# Patient Record
Sex: FEMALE | Race: ASIAN | NOT HISPANIC OR LATINO | Employment: OTHER | URBAN - METROPOLITAN AREA
[De-identification: names, ages, dates, MRNs, and addresses within clinical notes are randomized per-mention and may not be internally consistent; named-entity substitution may affect disease eponyms.]

---

## 2017-01-18 ENCOUNTER — HOSPITAL ENCOUNTER (INPATIENT)
Facility: HOSPITAL | Age: 72
LOS: 2 days | Discharge: HOME | DRG: 202 | End: 2017-01-20
Attending: EMERGENCY MEDICINE | Admitting: INTERNAL MEDICINE
Payer: MEDICARE

## 2017-01-18 ENCOUNTER — APPOINTMENT (EMERGENCY)
Dept: RADIOLOGY | Facility: HOSPITAL | Age: 72
DRG: 202 | End: 2017-01-18
Payer: MEDICARE

## 2017-01-18 DIAGNOSIS — J45.901 ASTHMA EXACERBATION: Primary | ICD-10-CM

## 2017-01-18 DIAGNOSIS — J98.4 ACUTE PULMONARY INSUFFICIENCY: ICD-10-CM

## 2017-01-18 PROBLEM — E66.9 DIABETES MELLITUS TYPE 2 IN OBESE (HCC): Status: ACTIVE | Noted: 2017-01-18

## 2017-01-18 PROBLEM — F32.A DEPRESSION: Status: ACTIVE | Noted: 2017-01-18

## 2017-01-18 PROBLEM — I10 ACCELERATED HYPERTENSION: Status: ACTIVE | Noted: 2017-01-18

## 2017-01-18 PROBLEM — E11.69 DIABETES MELLITUS TYPE 2 IN OBESE (HCC): Status: ACTIVE | Noted: 2017-01-18

## 2017-01-18 PROBLEM — F41.9 ANXIETY DISORDER: Status: ACTIVE | Noted: 2017-01-18

## 2017-01-18 PROBLEM — E66.9 OBESITY: Status: ACTIVE | Noted: 2017-01-18

## 2017-01-18 PROBLEM — E78.5 DYSLIPIDEMIA: Status: ACTIVE | Noted: 2017-01-18

## 2017-01-18 LAB
ALBUMIN SERPL BCP-MCNC: 3.4 G/DL (ref 3.5–5)
ALP SERPL-CCNC: 72 U/L (ref 46–116)
ALT SERPL W P-5'-P-CCNC: 18 U/L (ref 12–78)
ANION GAP SERPL CALCULATED.3IONS-SCNC: 8 MMOL/L (ref 4–13)
AST SERPL W P-5'-P-CCNC: 20 U/L (ref 5–45)
BASOPHILS # BLD AUTO: 0.03 THOUSANDS/ΜL (ref 0–0.1)
BASOPHILS NFR BLD AUTO: 0 % (ref 0–1)
BILIRUB SERPL-MCNC: 0.3 MG/DL (ref 0.2–1)
BUN SERPL-MCNC: 10 MG/DL (ref 5–25)
CALCIUM SERPL-MCNC: 8.5 MG/DL (ref 8.3–10.1)
CHLORIDE SERPL-SCNC: 96 MMOL/L (ref 100–108)
CO2 SERPL-SCNC: 29 MMOL/L (ref 21–32)
CREAT SERPL-MCNC: 0.68 MG/DL (ref 0.6–1.3)
EOSINOPHIL # BLD AUTO: 0 THOUSAND/ΜL (ref 0–0.61)
EOSINOPHIL NFR BLD AUTO: 0 % (ref 0–6)
ERYTHROCYTE [DISTWIDTH] IN BLOOD BY AUTOMATED COUNT: 13.1 % (ref 11.6–15.1)
FLUAV AG SPEC QL IA: NEGATIVE
FLUBV AG SPEC QL IA: NEGATIVE
GFR SERPL CREATININE-BSD FRML MDRD: >60 ML/MIN/1.73SQ M
GLUCOSE SERPL-MCNC: 211 MG/DL (ref 65–140)
GLUCOSE SERPL-MCNC: 298 MG/DL (ref 65–140)
GLUCOSE SERPL-MCNC: 380 MG/DL (ref 65–140)
HCT VFR BLD AUTO: 46.7 % (ref 34.8–46.1)
HGB BLD-MCNC: 14.7 G/DL (ref 11.5–15.4)
LYMPHOCYTES # BLD AUTO: 1.65 THOUSANDS/ΜL (ref 0.6–4.47)
LYMPHOCYTES NFR BLD AUTO: 14 % (ref 14–44)
MCH RBC QN AUTO: 28.8 PG (ref 26.8–34.3)
MCHC RBC AUTO-ENTMCNC: 31.5 G/DL (ref 31.4–37.4)
MCV RBC AUTO: 92 FL (ref 82–98)
MONOCYTES # BLD AUTO: 0.38 THOUSAND/ΜL (ref 0.17–1.22)
MONOCYTES NFR BLD AUTO: 3 % (ref 4–12)
NEUTROPHILS # BLD AUTO: 9.43 THOUSANDS/ΜL (ref 1.85–7.62)
NEUTS SEG NFR BLD AUTO: 81 % (ref 43–75)
NRBC BLD AUTO-RTO: 0 /100 WBCS
PLATELET # BLD AUTO: 370 THOUSANDS/UL (ref 149–390)
PMV BLD AUTO: 9 FL (ref 8.9–12.7)
POTASSIUM SERPL-SCNC: 3.6 MMOL/L (ref 3.5–5.3)
PROT SERPL-MCNC: 7.8 G/DL (ref 6.4–8.2)
RBC # BLD AUTO: 5.1 MILLION/UL (ref 3.81–5.12)
SODIUM SERPL-SCNC: 133 MMOL/L (ref 136–145)
WBC # BLD AUTO: 11.62 THOUSAND/UL (ref 4.31–10.16)

## 2017-01-18 PROCEDURE — 80053 COMPREHEN METABOLIC PANEL: CPT | Performed by: EMERGENCY MEDICINE

## 2017-01-18 PROCEDURE — 36415 COLL VENOUS BLD VENIPUNCTURE: CPT | Performed by: EMERGENCY MEDICINE

## 2017-01-18 PROCEDURE — 94640 AIRWAY INHALATION TREATMENT: CPT

## 2017-01-18 PROCEDURE — 85025 COMPLETE CBC W/AUTO DIFF WBC: CPT | Performed by: EMERGENCY MEDICINE

## 2017-01-18 PROCEDURE — 87798 DETECT AGENT NOS DNA AMP: CPT | Performed by: EMERGENCY MEDICINE

## 2017-01-18 PROCEDURE — 99284 EMERGENCY DEPT VISIT MOD MDM: CPT

## 2017-01-18 PROCEDURE — 87400 INFLUENZA A/B EACH AG IA: CPT | Performed by: EMERGENCY MEDICINE

## 2017-01-18 PROCEDURE — 94760 N-INVAS EAR/PLS OXIMETRY 1: CPT

## 2017-01-18 PROCEDURE — 82948 REAGENT STRIP/BLOOD GLUCOSE: CPT

## 2017-01-18 PROCEDURE — 71020 HB CHEST X-RAY 2VW FRONTAL&LATL: CPT

## 2017-01-18 RX ORDER — ATORVASTATIN CALCIUM 10 MG/1
10 TABLET, FILM COATED ORAL DAILY
Status: DISCONTINUED | OUTPATIENT
Start: 2017-01-18 | End: 2017-01-20 | Stop reason: HOSPADM

## 2017-01-18 RX ORDER — LOSARTAN POTASSIUM AND HYDROCHLOROTHIAZIDE 12.5; 5 MG/1; MG/1
1 TABLET ORAL DAILY
COMMUNITY
End: 2021-06-29 | Stop reason: HOSPADM

## 2017-01-18 RX ORDER — LANOLIN ALCOHOL/MO/W.PET/CERES
6 CREAM (GRAM) TOPICAL
Status: DISCONTINUED | OUTPATIENT
Start: 2017-01-18 | End: 2017-01-20 | Stop reason: HOSPADM

## 2017-01-18 RX ORDER — ACETAMINOPHEN 325 MG/1
TABLET ORAL
Status: COMPLETED
Start: 2017-01-18 | End: 2017-01-18

## 2017-01-18 RX ORDER — PAROXETINE HYDROCHLORIDE 20 MG/1
10 TABLET, FILM COATED ORAL EVERY MORNING
Status: DISCONTINUED | OUTPATIENT
Start: 2017-01-19 | End: 2017-01-20 | Stop reason: HOSPADM

## 2017-01-18 RX ORDER — PREDNISONE 20 MG/1
60 TABLET ORAL ONCE
Status: COMPLETED | OUTPATIENT
Start: 2017-01-18 | End: 2017-01-18

## 2017-01-18 RX ORDER — MONTELUKAST SODIUM 10 MG/1
10 TABLET ORAL
Status: DISCONTINUED | OUTPATIENT
Start: 2017-01-18 | End: 2017-01-20 | Stop reason: HOSPADM

## 2017-01-18 RX ORDER — ALBUTEROL SULFATE 2.5 MG/3ML
2.5 SOLUTION RESPIRATORY (INHALATION) EVERY 6 HOURS PRN
COMMUNITY

## 2017-01-18 RX ORDER — LORAZEPAM 0.5 MG/1
0.5 TABLET ORAL ONCE
Status: COMPLETED | OUTPATIENT
Start: 2017-01-18 | End: 2017-01-18

## 2017-01-18 RX ORDER — GLIMEPIRIDE 2 MG/1
4 TABLET ORAL
Status: DISCONTINUED | OUTPATIENT
Start: 2017-01-19 | End: 2017-01-20 | Stop reason: HOSPADM

## 2017-01-18 RX ORDER — ACETAMINOPHEN 325 MG/1
650 TABLET ORAL EVERY 6 HOURS PRN
Status: DISCONTINUED | OUTPATIENT
Start: 2017-01-18 | End: 2017-01-20 | Stop reason: HOSPADM

## 2017-01-18 RX ORDER — LORAZEPAM 0.5 MG/1
0.5 TABLET ORAL EVERY 6 HOURS PRN
Status: ON HOLD | COMMUNITY
End: 2021-06-29 | Stop reason: SDUPTHER

## 2017-01-18 RX ORDER — HYDRALAZINE HYDROCHLORIDE 20 MG/ML
10 INJECTION INTRAMUSCULAR; INTRAVENOUS EVERY 4 HOURS PRN
Status: DISCONTINUED | OUTPATIENT
Start: 2017-01-18 | End: 2017-01-20 | Stop reason: HOSPADM

## 2017-01-18 RX ORDER — IPRATROPIUM BROMIDE AND ALBUTEROL SULFATE 2.5; .5 MG/3ML; MG/3ML
3 SOLUTION RESPIRATORY (INHALATION) ONCE
Status: COMPLETED | OUTPATIENT
Start: 2017-01-18 | End: 2017-01-18

## 2017-01-18 RX ORDER — BENZONATATE 100 MG/1
100 CAPSULE ORAL ONCE
Status: COMPLETED | OUTPATIENT
Start: 2017-01-18 | End: 2017-01-18

## 2017-01-18 RX ORDER — LORAZEPAM 0.5 MG/1
0.5 TABLET ORAL EVERY 6 HOURS PRN
Status: DISCONTINUED | OUTPATIENT
Start: 2017-01-18 | End: 2017-01-20 | Stop reason: HOSPADM

## 2017-01-18 RX ORDER — MONTELUKAST SODIUM 10 MG/1
10 TABLET ORAL
COMMUNITY

## 2017-01-18 RX ORDER — GLIMEPIRIDE 4 MG/1
4 TABLET ORAL
COMMUNITY
End: 2021-08-06 | Stop reason: HOSPADM

## 2017-01-18 RX ORDER — PAROXETINE 10 MG/1
10 TABLET, FILM COATED ORAL EVERY MORNING
COMMUNITY

## 2017-01-18 RX ORDER — ATORVASTATIN CALCIUM 10 MG/1
10 TABLET, FILM COATED ORAL
COMMUNITY

## 2017-01-18 RX ORDER — ALBUTEROL SULFATE 90 UG/1
2 AEROSOL, METERED RESPIRATORY (INHALATION) EVERY 6 HOURS PRN
COMMUNITY

## 2017-01-18 RX ADMIN — MELATONIN TAB 3 MG 6 MG: 3 TAB at 23:06

## 2017-01-18 RX ADMIN — ACETAMINOPHEN 650 MG: 325 TABLET ORAL at 16:34

## 2017-01-18 RX ADMIN — INSULIN LISPRO 2 UNITS: 100 INJECTION, SOLUTION INTRAVENOUS; SUBCUTANEOUS at 20:50

## 2017-01-18 RX ADMIN — ALBUTEROL SULFATE 10 MG: 2.5 SOLUTION RESPIRATORY (INHALATION) at 10:28

## 2017-01-18 RX ADMIN — LOSARTAN POTASSIUM: 50 TABLET, FILM COATED ORAL at 10:56

## 2017-01-18 RX ADMIN — BENZONATATE 100 MG: 100 CAPSULE ORAL at 13:40

## 2017-01-18 RX ADMIN — SITAGLIPTIN 100 MG: 100 TABLET, FILM COATED ORAL at 16:34

## 2017-01-18 RX ADMIN — ACETAMINOPHEN 650 MG: 325 TABLET ORAL at 23:05

## 2017-01-18 RX ADMIN — LOSARTAN POTASSIUM: 50 TABLET, FILM COATED ORAL at 12:58

## 2017-01-18 RX ADMIN — ATORVASTATIN CALCIUM 10 MG: 10 TABLET, FILM COATED ORAL at 18:29

## 2017-01-18 RX ADMIN — METFORMIN HYDROCHLORIDE 500 MG: 500 TABLET, FILM COATED ORAL at 16:34

## 2017-01-18 RX ADMIN — LORAZEPAM 0.5 MG: 0.5 TABLET ORAL at 09:04

## 2017-01-18 RX ADMIN — ALBUTEROL SULFATE 10 MG: 2.5 SOLUTION RESPIRATORY (INHALATION) at 09:20

## 2017-01-18 RX ADMIN — PREDNISONE 60 MG: 20 TABLET ORAL at 08:25

## 2017-01-18 RX ADMIN — INSULIN LISPRO 6 UNITS: 100 INJECTION, SOLUTION INTRAVENOUS; SUBCUTANEOUS at 16:33

## 2017-01-18 RX ADMIN — HYDRALAZINE HYDROCHLORIDE 10 MG: 20 INJECTION INTRAMUSCULAR; INTRAVENOUS at 21:40

## 2017-01-18 RX ADMIN — HYDROCHLOROTHIAZIDE: 12.5 TABLET ORAL at 18:28

## 2017-01-18 RX ADMIN — HYDRALAZINE HYDROCHLORIDE 10 MG: 20 INJECTION INTRAMUSCULAR; INTRAVENOUS at 16:34

## 2017-01-18 RX ADMIN — IPRATROPIUM BROMIDE AND ALBUTEROL SULFATE 3 ML: .5; 3 SOLUTION RESPIRATORY (INHALATION) at 08:25

## 2017-01-18 RX ADMIN — LORAZEPAM 0.5 MG: 0.5 TABLET ORAL at 23:06

## 2017-01-18 RX ADMIN — MONTELUKAST SODIUM 10 MG: 10 TABLET, FILM COATED ORAL at 22:23

## 2017-01-19 LAB
ANION GAP SERPL CALCULATED.3IONS-SCNC: 10 MMOL/L (ref 4–13)
BUN SERPL-MCNC: 9 MG/DL (ref 5–25)
CALCIUM SERPL-MCNC: 9 MG/DL (ref 8.3–10.1)
CHLORIDE SERPL-SCNC: 95 MMOL/L (ref 100–108)
CO2 SERPL-SCNC: 31 MMOL/L (ref 21–32)
CREAT SERPL-MCNC: 0.53 MG/DL (ref 0.6–1.3)
ERYTHROCYTE [DISTWIDTH] IN BLOOD BY AUTOMATED COUNT: 13.2 % (ref 11.6–15.1)
FLUAV AG SPEC QL: NORMAL
FLUBV AG SPEC QL: NORMAL
GFR SERPL CREATININE-BSD FRML MDRD: >60 ML/MIN/1.73SQ M
GLUCOSE SERPL-MCNC: 140 MG/DL (ref 65–140)
GLUCOSE SERPL-MCNC: 141 MG/DL (ref 65–140)
GLUCOSE SERPL-MCNC: 166 MG/DL (ref 65–140)
GLUCOSE SERPL-MCNC: 183 MG/DL (ref 65–140)
GLUCOSE SERPL-MCNC: 243 MG/DL (ref 65–140)
HCT VFR BLD AUTO: 45.6 % (ref 34.8–46.1)
HGB BLD-MCNC: 14.7 G/DL (ref 11.5–15.4)
MCH RBC QN AUTO: 28.9 PG (ref 26.8–34.3)
MCHC RBC AUTO-ENTMCNC: 32.2 G/DL (ref 31.4–37.4)
MCV RBC AUTO: 90 FL (ref 82–98)
PLATELET # BLD AUTO: 370 THOUSANDS/UL (ref 149–390)
PMV BLD AUTO: 9.3 FL (ref 8.9–12.7)
POTASSIUM SERPL-SCNC: 3.5 MMOL/L (ref 3.5–5.3)
RBC # BLD AUTO: 5.08 MILLION/UL (ref 3.81–5.12)
RSV B RNA SPEC QL NAA+PROBE: NORMAL
SODIUM SERPL-SCNC: 136 MMOL/L (ref 136–145)
WBC # BLD AUTO: 11.65 THOUSAND/UL (ref 4.31–10.16)

## 2017-01-19 PROCEDURE — 80048 BASIC METABOLIC PNL TOTAL CA: CPT | Performed by: INTERNAL MEDICINE

## 2017-01-19 PROCEDURE — 82948 REAGENT STRIP/BLOOD GLUCOSE: CPT

## 2017-01-19 PROCEDURE — 85027 COMPLETE CBC AUTOMATED: CPT | Performed by: INTERNAL MEDICINE

## 2017-01-19 RX ORDER — PREDNISONE 20 MG/1
40 TABLET ORAL DAILY
Status: DISCONTINUED | OUTPATIENT
Start: 2017-01-19 | End: 2017-01-20 | Stop reason: HOSPADM

## 2017-01-19 RX ADMIN — INSULIN LISPRO 1 UNITS: 100 INJECTION, SOLUTION INTRAVENOUS; SUBCUTANEOUS at 10:45

## 2017-01-19 RX ADMIN — MELATONIN TAB 3 MG 6 MG: 3 TAB at 21:34

## 2017-01-19 RX ADMIN — GLIMEPIRIDE 4 MG: 2 TABLET ORAL at 08:46

## 2017-01-19 RX ADMIN — LORAZEPAM 0.5 MG: 0.5 TABLET ORAL at 12:15

## 2017-01-19 RX ADMIN — ATORVASTATIN CALCIUM 10 MG: 10 TABLET, FILM COATED ORAL at 16:17

## 2017-01-19 RX ADMIN — INSULIN LISPRO 3 UNITS: 100 INJECTION, SOLUTION INTRAVENOUS; SUBCUTANEOUS at 17:40

## 2017-01-19 RX ADMIN — LORAZEPAM 0.5 MG: 0.5 TABLET ORAL at 05:32

## 2017-01-19 RX ADMIN — METFORMIN HYDROCHLORIDE 500 MG: 500 TABLET, FILM COATED ORAL at 16:17

## 2017-01-19 RX ADMIN — MONTELUKAST SODIUM 10 MG: 10 TABLET, FILM COATED ORAL at 21:34

## 2017-01-19 RX ADMIN — METFORMIN HYDROCHLORIDE 500 MG: 500 TABLET, FILM COATED ORAL at 08:46

## 2017-01-19 RX ADMIN — PAROXETINE HYDROCHLORIDE 10 MG: 20 TABLET, FILM COATED ORAL at 08:45

## 2017-01-19 RX ADMIN — LORAZEPAM 0.5 MG: 0.5 TABLET ORAL at 21:34

## 2017-01-19 RX ADMIN — ACETAMINOPHEN 650 MG: 325 TABLET ORAL at 12:15

## 2017-01-19 RX ADMIN — ENOXAPARIN SODIUM 40 MG: 40 INJECTION SUBCUTANEOUS at 08:45

## 2017-01-19 RX ADMIN — ACETAMINOPHEN 650 MG: 325 TABLET ORAL at 21:35

## 2017-01-19 RX ADMIN — HYDROCHLOROTHIAZIDE: 12.5 TABLET ORAL at 08:47

## 2017-01-19 RX ADMIN — LOSARTAN POTASSIUM: 50 TABLET, FILM COATED ORAL at 08:46

## 2017-01-19 RX ADMIN — ACETAMINOPHEN 650 MG: 325 TABLET ORAL at 05:31

## 2017-01-19 RX ADMIN — PREDNISONE 40 MG: 20 TABLET ORAL at 10:42

## 2017-01-19 RX ADMIN — INSULIN LISPRO 1 UNITS: 100 INJECTION, SOLUTION INTRAVENOUS; SUBCUTANEOUS at 10:44

## 2017-01-19 RX ADMIN — SITAGLIPTIN 100 MG: 100 TABLET, FILM COATED ORAL at 08:46

## 2017-01-20 VITALS
HEART RATE: 101 BPM | DIASTOLIC BLOOD PRESSURE: 61 MMHG | HEIGHT: 62 IN | TEMPERATURE: 98 F | SYSTOLIC BLOOD PRESSURE: 139 MMHG | OXYGEN SATURATION: 92 % | BODY MASS INDEX: 40.48 KG/M2 | WEIGHT: 220 LBS | RESPIRATION RATE: 18 BRPM

## 2017-01-20 LAB — GLUCOSE SERPL-MCNC: 155 MG/DL (ref 65–140)

## 2017-01-20 PROCEDURE — 82948 REAGENT STRIP/BLOOD GLUCOSE: CPT

## 2017-01-20 RX ORDER — METHYLPREDNISOLONE 4 MG/1
TABLET ORAL
Qty: 21 TABLET | Refills: 0 | Status: SHIPPED | OUTPATIENT
Start: 2017-01-20 | End: 2021-06-29 | Stop reason: HOSPADM

## 2017-01-20 RX ADMIN — LOSARTAN POTASSIUM: 50 TABLET, FILM COATED ORAL at 08:44

## 2017-01-20 RX ADMIN — PAROXETINE HYDROCHLORIDE 10 MG: 20 TABLET, FILM COATED ORAL at 08:43

## 2017-01-20 RX ADMIN — GLIMEPIRIDE 4 MG: 2 TABLET ORAL at 08:43

## 2017-01-20 RX ADMIN — SITAGLIPTIN 100 MG: 100 TABLET, FILM COATED ORAL at 08:43

## 2017-01-20 RX ADMIN — ENOXAPARIN SODIUM 40 MG: 40 INJECTION SUBCUTANEOUS at 08:43

## 2017-01-20 RX ADMIN — LORAZEPAM 0.5 MG: 0.5 TABLET ORAL at 05:16

## 2017-01-20 RX ADMIN — ACETAMINOPHEN 650 MG: 325 TABLET ORAL at 05:16

## 2017-01-20 RX ADMIN — HYDROCHLOROTHIAZIDE: 12.5 TABLET ORAL at 08:44

## 2017-01-20 RX ADMIN — METFORMIN HYDROCHLORIDE 500 MG: 500 TABLET, FILM COATED ORAL at 08:43

## 2017-01-20 RX ADMIN — PREDNISONE 40 MG: 20 TABLET ORAL at 08:44

## 2018-12-07 ENCOUNTER — APPOINTMENT (EMERGENCY)
Dept: CT IMAGING | Facility: HOSPITAL | Age: 73
DRG: 378 | End: 2018-12-07
Payer: MEDICARE

## 2018-12-07 ENCOUNTER — HOSPITAL ENCOUNTER (INPATIENT)
Facility: HOSPITAL | Age: 73
LOS: 1 days | Discharge: HOME | DRG: 378 | End: 2018-12-09
Attending: EMERGENCY MEDICINE | Admitting: INTERNAL MEDICINE
Payer: MEDICARE

## 2018-12-07 DIAGNOSIS — E11.69 DIABETES MELLITUS TYPE 2 IN OBESE (HCC): ICD-10-CM

## 2018-12-07 DIAGNOSIS — E66.9 DIABETES MELLITUS TYPE 2 IN OBESE (HCC): ICD-10-CM

## 2018-12-07 DIAGNOSIS — K62.5 BLEEDING PER RECTUM: ICD-10-CM

## 2018-12-07 DIAGNOSIS — K92.2 GI BLEED: Primary | ICD-10-CM

## 2018-12-07 PROBLEM — E11.9 DIABETES MELLITUS (HCC): Status: ACTIVE | Noted: 2018-12-07

## 2018-12-07 LAB
ABO GROUP BLD: NORMAL
ALBUMIN SERPL BCP-MCNC: 3.2 G/DL (ref 3.5–5)
ALP SERPL-CCNC: 78 U/L (ref 46–116)
ALT SERPL W P-5'-P-CCNC: 15 U/L (ref 12–78)
ANION GAP SERPL CALCULATED.3IONS-SCNC: 7 MMOL/L (ref 4–13)
AST SERPL W P-5'-P-CCNC: 20 U/L (ref 5–45)
BASOPHILS # BLD AUTO: 0.05 THOUSANDS/ΜL (ref 0–0.1)
BASOPHILS NFR BLD AUTO: 1 % (ref 0–1)
BILIRUB SERPL-MCNC: 0.4 MG/DL (ref 0.2–1)
BLD GP AB SCN SERPL QL: NEGATIVE
BUN SERPL-MCNC: 13 MG/DL (ref 5–25)
CALCIUM SERPL-MCNC: 8.6 MG/DL (ref 8.3–10.1)
CHLORIDE SERPL-SCNC: 92 MMOL/L (ref 100–108)
CO2 SERPL-SCNC: 35 MMOL/L (ref 21–32)
CREAT SERPL-MCNC: 0.65 MG/DL (ref 0.6–1.3)
EOSINOPHIL # BLD AUTO: 0.18 THOUSAND/ΜL (ref 0–0.61)
EOSINOPHIL NFR BLD AUTO: 2 % (ref 0–6)
ERYTHROCYTE [DISTWIDTH] IN BLOOD BY AUTOMATED COUNT: 13.3 % (ref 11.6–15.1)
EST. AVERAGE GLUCOSE BLD GHB EST-MCNC: 166 MG/DL
GFR SERPL CREATININE-BSD FRML MDRD: 87 ML/MIN/1.73SQ M
GLUCOSE SERPL-MCNC: 170 MG/DL (ref 65–140)
GLUCOSE SERPL-MCNC: 174 MG/DL (ref 65–140)
GLUCOSE SERPL-MCNC: 206 MG/DL (ref 65–140)
GLUCOSE SERPL-MCNC: 211 MG/DL (ref 65–140)
HBA1C MFR BLD: 7.4 % (ref 4.2–6.3)
HCT VFR BLD AUTO: 39 % (ref 34.8–46.1)
HGB BLD-MCNC: 13 G/DL (ref 11.5–15.4)
IMM GRANULOCYTES # BLD AUTO: 0.02 THOUSAND/UL (ref 0–0.2)
IMM GRANULOCYTES NFR BLD AUTO: 0 % (ref 0–2)
INR PPP: 0.96 (ref 0.86–1.17)
LACTATE SERPL-SCNC: 1.6 MMOL/L (ref 0.5–2)
LYMPHOCYTES # BLD AUTO: 1.97 THOUSANDS/ΜL (ref 0.6–4.47)
LYMPHOCYTES NFR BLD AUTO: 26 % (ref 14–44)
MCH RBC QN AUTO: 29.9 PG (ref 26.8–34.3)
MCHC RBC AUTO-ENTMCNC: 33.3 G/DL (ref 31.4–37.4)
MCV RBC AUTO: 90 FL (ref 82–98)
MONOCYTES # BLD AUTO: 0.87 THOUSAND/ΜL (ref 0.17–1.22)
MONOCYTES NFR BLD AUTO: 11 % (ref 4–12)
NEUTROPHILS # BLD AUTO: 4.59 THOUSANDS/ΜL (ref 1.85–7.62)
NEUTS SEG NFR BLD AUTO: 60 % (ref 43–75)
NRBC BLD AUTO-RTO: 0 /100 WBCS
PLATELET # BLD AUTO: 289 THOUSANDS/UL (ref 149–390)
PMV BLD AUTO: 8.6 FL (ref 8.9–12.7)
POTASSIUM SERPL-SCNC: 3.8 MMOL/L (ref 3.5–5.3)
PROT SERPL-MCNC: 7 G/DL (ref 6.4–8.2)
PROTHROMBIN TIME: 12.7 SECONDS (ref 11.8–14.2)
RBC # BLD AUTO: 4.35 MILLION/UL (ref 3.81–5.12)
RH BLD: POSITIVE
SODIUM SERPL-SCNC: 134 MMOL/L (ref 136–145)
SPECIMEN EXPIRATION DATE: NORMAL
WBC # BLD AUTO: 7.68 THOUSAND/UL (ref 4.31–10.16)

## 2018-12-07 PROCEDURE — 86850 RBC ANTIBODY SCREEN: CPT | Performed by: EMERGENCY MEDICINE

## 2018-12-07 PROCEDURE — 83605 ASSAY OF LACTIC ACID: CPT | Performed by: EMERGENCY MEDICINE

## 2018-12-07 PROCEDURE — C9113 INJ PANTOPRAZOLE SODIUM, VIA: HCPCS | Performed by: EMERGENCY MEDICINE

## 2018-12-07 PROCEDURE — 85610 PROTHROMBIN TIME: CPT | Performed by: EMERGENCY MEDICINE

## 2018-12-07 PROCEDURE — 96374 THER/PROPH/DIAG INJ IV PUSH: CPT

## 2018-12-07 PROCEDURE — 85025 COMPLETE CBC W/AUTO DIFF WBC: CPT | Performed by: EMERGENCY MEDICINE

## 2018-12-07 PROCEDURE — 80053 COMPREHEN METABOLIC PANEL: CPT | Performed by: EMERGENCY MEDICINE

## 2018-12-07 PROCEDURE — 83036 HEMOGLOBIN GLYCOSYLATED A1C: CPT | Performed by: INTERNAL MEDICINE

## 2018-12-07 PROCEDURE — 36415 COLL VENOUS BLD VENIPUNCTURE: CPT | Performed by: EMERGENCY MEDICINE

## 2018-12-07 PROCEDURE — 99220 PR INITIAL OBSERVATION CARE/DAY 70 MINUTES: CPT | Performed by: INTERNAL MEDICINE

## 2018-12-07 PROCEDURE — 74177 CT ABD & PELVIS W/CONTRAST: CPT

## 2018-12-07 PROCEDURE — 86900 BLOOD TYPING SEROLOGIC ABO: CPT | Performed by: EMERGENCY MEDICINE

## 2018-12-07 PROCEDURE — 93005 ELECTROCARDIOGRAM TRACING: CPT

## 2018-12-07 PROCEDURE — 86901 BLOOD TYPING SEROLOGIC RH(D): CPT | Performed by: EMERGENCY MEDICINE

## 2018-12-07 PROCEDURE — 82948 REAGENT STRIP/BLOOD GLUCOSE: CPT

## 2018-12-07 PROCEDURE — 1124F ACP DISCUSS-NO DSCNMKR DOCD: CPT | Performed by: INTERNAL MEDICINE

## 2018-12-07 PROCEDURE — 99214 OFFICE O/P EST MOD 30 MIN: CPT | Performed by: INTERNAL MEDICINE

## 2018-12-07 PROCEDURE — 87505 NFCT AGENT DETECTION GI: CPT | Performed by: EMERGENCY MEDICINE

## 2018-12-07 PROCEDURE — 99285 EMERGENCY DEPT VISIT HI MDM: CPT

## 2018-12-07 RX ORDER — PAROXETINE HYDROCHLORIDE 20 MG/1
10 TABLET, FILM COATED ORAL EVERY MORNING
Status: DISCONTINUED | OUTPATIENT
Start: 2018-12-07 | End: 2018-12-09 | Stop reason: HOSPADM

## 2018-12-07 RX ORDER — HYDRALAZINE HYDROCHLORIDE 20 MG/ML
10 INJECTION INTRAMUSCULAR; INTRAVENOUS EVERY 4 HOURS PRN
Status: DISCONTINUED | OUTPATIENT
Start: 2018-12-07 | End: 2018-12-09 | Stop reason: HOSPADM

## 2018-12-07 RX ORDER — MONTELUKAST SODIUM 10 MG/1
10 TABLET ORAL
Status: DISCONTINUED | OUTPATIENT
Start: 2018-12-07 | End: 2018-12-09 | Stop reason: HOSPADM

## 2018-12-07 RX ORDER — SODIUM CHLORIDE AND POTASSIUM CHLORIDE .9; .15 G/100ML; G/100ML
50 SOLUTION INTRAVENOUS CONTINUOUS
Status: DISCONTINUED | OUTPATIENT
Start: 2018-12-07 | End: 2018-12-08

## 2018-12-07 RX ORDER — ALBUTEROL SULFATE 90 UG/1
2 AEROSOL, METERED RESPIRATORY (INHALATION) EVERY 6 HOURS PRN
Status: DISCONTINUED | OUTPATIENT
Start: 2018-12-07 | End: 2018-12-09 | Stop reason: HOSPADM

## 2018-12-07 RX ORDER — ALBUTEROL SULFATE 2.5 MG/3ML
2.5 SOLUTION RESPIRATORY (INHALATION) EVERY 6 HOURS PRN
Status: DISCONTINUED | OUTPATIENT
Start: 2018-12-07 | End: 2018-12-08

## 2018-12-07 RX ORDER — PANTOPRAZOLE SODIUM 40 MG/1
40 INJECTION, POWDER, FOR SOLUTION INTRAVENOUS EVERY 12 HOURS SCHEDULED
Status: DISCONTINUED | OUTPATIENT
Start: 2018-12-07 | End: 2018-12-07

## 2018-12-07 RX ORDER — LORAZEPAM 0.5 MG/1
0.5 TABLET ORAL EVERY 6 HOURS PRN
Status: DISCONTINUED | OUTPATIENT
Start: 2018-12-07 | End: 2018-12-09 | Stop reason: HOSPADM

## 2018-12-07 RX ORDER — ACETAMINOPHEN 325 MG/1
650 TABLET ORAL EVERY 8 HOURS PRN
Status: DISCONTINUED | OUTPATIENT
Start: 2018-12-07 | End: 2018-12-09 | Stop reason: HOSPADM

## 2018-12-07 RX ORDER — PANTOPRAZOLE SODIUM 40 MG/1
40 INJECTION, POWDER, FOR SOLUTION INTRAVENOUS ONCE
Status: COMPLETED | OUTPATIENT
Start: 2018-12-07 | End: 2018-12-07

## 2018-12-07 RX ORDER — POLYETHYLENE GLYCOL 3350 17 G/17G
238 POWDER, FOR SOLUTION ORAL ONCE
Status: COMPLETED | OUTPATIENT
Start: 2018-12-07 | End: 2018-12-07

## 2018-12-07 RX ORDER — ENALAPRILAT 2.5 MG/2ML
1.25 INJECTION INTRAVENOUS ONCE
Status: DISCONTINUED | OUTPATIENT
Start: 2018-12-07 | End: 2018-12-07

## 2018-12-07 RX ORDER — ATORVASTATIN CALCIUM 10 MG/1
10 TABLET, FILM COATED ORAL DAILY
Status: DISCONTINUED | OUTPATIENT
Start: 2018-12-07 | End: 2018-12-09 | Stop reason: HOSPADM

## 2018-12-07 RX ADMIN — PANTOPRAZOLE SODIUM 40 MG: 40 INJECTION, POWDER, FOR SOLUTION INTRAVENOUS at 08:14

## 2018-12-07 RX ADMIN — LOSARTAN POTASSIUM: 50 TABLET, FILM COATED ORAL at 12:53

## 2018-12-07 RX ADMIN — LORAZEPAM 0.5 MG: 0.5 TABLET ORAL at 22:46

## 2018-12-07 RX ADMIN — INSULIN LISPRO 1 UNITS: 100 INJECTION, SOLUTION INTRAVENOUS; SUBCUTANEOUS at 17:03

## 2018-12-07 RX ADMIN — IOHEXOL 100 ML: 350 INJECTION, SOLUTION INTRAVENOUS at 09:06

## 2018-12-07 RX ADMIN — MONTELUKAST SODIUM 10 MG: 10 TABLET, FILM COATED ORAL at 21:06

## 2018-12-07 RX ADMIN — POLYETHYLENE GLYCOL 3350 238 G: 17 POWDER, FOR SOLUTION ORAL at 17:03

## 2018-12-07 RX ADMIN — SODIUM CHLORIDE AND POTASSIUM CHLORIDE 50 ML/HR: .9; .15 SOLUTION INTRAVENOUS at 14:10

## 2018-12-07 RX ADMIN — PAROXETINE HYDROCHLORIDE 10 MG: 20 TABLET, FILM COATED ORAL at 12:52

## 2018-12-07 RX ADMIN — ATORVASTATIN CALCIUM 10 MG: 10 TABLET, FILM COATED ORAL at 12:53

## 2018-12-07 RX ADMIN — INSULIN LISPRO 2 UNITS: 100 INJECTION, SOLUTION INTRAVENOUS; SUBCUTANEOUS at 14:52

## 2018-12-07 RX ADMIN — LORAZEPAM 0.5 MG: 0.5 TABLET ORAL at 12:57

## 2018-12-07 RX ADMIN — BISACODYL 10 MG: 5 TABLET, COATED ORAL at 16:18

## 2018-12-07 RX ADMIN — INSULIN LISPRO 2 UNITS: 100 INJECTION, SOLUTION INTRAVENOUS; SUBCUTANEOUS at 21:08

## 2018-12-07 NOTE — H&P
History and Physical - [facility]    Patient Information: Vanessa Forbes 74 y o  female MRN: [id number]  Unit/Bed#: -01 Encounter: [id number]  Admitting Physician: Shiva Alex MD  PCP: No primary care provider on file  Date of Admission:  12/07/18    Assessment/Plan:      * Bleeding per rectum   Assessment & Plan    Patient has no significant abdominal pain  She also denies being constipated  Three days ago, she had some diarrhea  Likely diverticular bleed  She needs further workup to rule out other causes like AVMs/tumors/cancers/hemorrhoids  Discussed with GI  She would be prep later today for further GI workup  GI bleed   Assessment & Plan    Upper versus lower  Would also give her PPI  Likely lower  GI workup in progress  Accelerated hypertension   Assessment & Plan    Blood pressure was quite elevated in the ER  I am not really convinced that those were correct readings as on the floor her blood pressure-systolic is in 434'U  Would resume her home medication and also give her IV p r n  Hydralazine for elevated blood pressures  She did not take her medications at home  Depression   Assessment & Plan    Continue with home medication including Paxil     Dyslipidemia   Assessment & Plan    Continue with statin     Diabetes mellitus type 2 in obese [facility])   Assessment & Plan    No results found for: HGBA1C    No results for input(s): POCGLU in the last 72 hours  Blood Sugar Average: Last 72 hrs:    Check hemoglobin A1c  Put her on sliding scale insulin as she is going to be NPO for her GI procedures  Hold oral medications     Anxiety disorder   Assessment & Plan    Continue with current treatment including benzodiazepine     Obesity   Assessment & Plan    Needs to work on her diet and do some exercise           Present on Admission:   Bleeding per rectum   Diabetes mellitus type 2 in obese ([address] )   Obesity   Anxiety disorder   Dyslipidemia   Depression   Accelerated hypertension   GI bleed        VTE Prophylaxis: Pharmacologic VTE Prophylaxis contraindicated due to Bleeding per rectum  / sequential compression device   Code Status:  Full code  POLST: There is no POLST form on file for this patient (pre-hospital)    Anticipated Length of Stay:  Patient will be admitted on an Observation basis with an anticipated length of stay of  less than 2 midnights  Justification for Hospital Stay:  Bleeding per rectum    Total Time for Visit, including Counseling / Coordination of Care: 45 minutes  Greater than 50% of this total time spent on direct patient counseling and coordination of care  Chief Complaint:   Bleeding per rectum    History of Present Illness:    Minh Wells is a 74 y o  female who presents with bleeding per rectum started last night  She had diarrhea about 3 days ago  No abdominal pain  No fever or chills  No nausea or vomiting  No history of having similar issues in the past or any history of constipation  Denies being dizzy or lightheaded  Denies any chest pain  No hematemesis or melena  Bleeding is actually bright red             Review of Systems    All systems are reviewed  Positive as per history of presenting illness  Patient answered no to all other questions  Past Medical and Surgical History:     Past Medical History:   Diagnosis Date    Asthma     Diabetes mellitus ([facility] [address] )     Hypertension        History reviewed  No pertinent surgical history  Meds/Allergies:    Prior to Admission medications    Medication Sig Start Date End Date Taking?  Authorizing Provider   albuterol (2 5 mg/3 mL) 0 083 % nebulizer solution Take 2 5 mg by nebulization every 6 (six) hours as needed for wheezing   Yes Historical Provider, MD   albuterol (PROVENTIL HFA,VENTOLIN HFA) 90 mcg/act inhaler Inhale 2 puffs every 6 (six) hours as needed for wheezing   Yes Historical Provider, MD   atorvastatin (LIPITOR) 10 mg tablet Take 10 mg by mouth daily Yes Historical Provider, MD   glimepiride (AMARYL) 4 mg tablet Take 4 mg by mouth every morning before breakfast   Yes Historical Provider, MD   LORazepam (ATIVAN) 0 5 mg tablet Take 0 5 mg by mouth every 6 (six) hours as needed for anxiety   Yes Historical Provider, MD   losartan-hydrochlorothiazide (HYZAAR) 50-12 5 mg per tablet Take 1 tablet by mouth daily   Yes Historical Provider, MD   metFORMIN (GLUCOPHAGE) 500 mg tablet Take 500 mg by mouth 2 (two) times a day with meals   Yes Historical Provider, MD   montelukast (SINGULAIR) 10 mg tablet Take 10 mg by mouth daily at bedtime   Yes Historical Provider, MD   PARoxetine (PAXIL) 10 mg tablet Take 10 mg by mouth every morning   Yes Historical Provider, MD   sitaGLIPtin (JANUVIA) 100 mg tablet Take 100 mg by mouth daily   Yes Historical Provider, MD   Methylprednisolone 4 MG TBPK Use as directed on package 1/20/17   Alli Griffiths MD     Reviewed as documented above    Allergies: No Known Allergies    Social History:     Marital Status: /Civil Union   Occupation:  Not working  Patient Pre-hospital Living Situation:  With Family  Patient Pre-hospital Level of Mobility:  Independent  Patient Pre-hospital Diet Restrictions:  None  Substance Use History:   History   Alcohol Use No     History   Smoking Status    Never Smoker   Smokeless Tobacco    Never Used     History   Drug Use No       Family History:    Family history is reviewed and is not pertinent to her current illness        Physical Exam      Vitals:   Blood Pressure: 114/60 (12/07/18 1208)  Pulse: 77 (12/07/18 1208)  Temperature: 98 7 °F (37 1 °C) (12/07/18 0743)  Temp Source: Oral (12/07/18 1208)  Respirations: 17 (12/07/18 1208)  Weight - Scale: 94 2 kg (207 lb 10 8 oz) (12/07/18 0743)  SpO2: 96 % (12/07/18 1208)    Vital signs are reviewed as above  Constitutional:  Morbidly obese female who is lying in bed  She is not in any respiratory distress  Eyes: EOM grossly intact   Conjunctivae slightly pale  Patient has anicteric sclera  HENT: Oropharynx are crowded and dry  Did not notice any significant lesions on the tongue  Head normocephalic  Neck: Neck is obese and supple  There is no significant lymphadenopathy  I also did not notice any significant thyromegaly  Cardiac: I did not hear any rubs or gallop  Patient appears to be in sinus rhythm  Heart sounds are distant because of body habitus  Respiratory: Patient not in significant respiratory distress  Air entry in general is poor because of body habitus  GI: Abdomen is obese and soft  It is grossly nontender  Bowel sounds are audible  I was not able to appreciate any hepatosplenomegaly  Neurologic:  Patient is awake and alert  Neurological examination is grossly intact  No obvious focal neurological deficit noticed  Skin: Skin is warm and dry  Her skin looks pale/Sallow to me  Psychiatric: Mood and affect are pleasant  Musculoskeletal  Patient moving her arms while in bed  Extremities: Patient has big ankles          Additional Data:     Lab Results: I have personally reviewed pertinent reports  Results from last 7 days  Lab Units 12/07/18  0808   WBC Thousand/uL 7 68   HEMOGLOBIN g/dL 13 0   HEMATOCRIT % 39 0   PLATELETS Thousands/uL 289   NEUTROS PCT % 60   LYMPHS PCT % 26   MONOS PCT % 11   EOS PCT % 2       Results from last 7 days  Lab Units 12/07/18  0808   POTASSIUM mmol/L 3 8   CHLORIDE mmol/L 92*   CO2 mmol/L 35*   BUN mg/dL 13   CREATININE mg/dL 0 65   CALCIUM mg/dL 8 6   ALK PHOS U/L 78   ALT U/L 15   AST U/L 20       Results from last 7 days  Lab Units 12/07/18  0808   INR  0 96       Imaging: I have personally reviewed pertinent reports  Ct Abdomen Pelvis With Contrast    Result Date: 12/7/2018  Narrative: CT ABDOMEN AND PELVIS WITH IV CONTRAST INDICATION:   rectal bleeding, never had colonoscopy  COMPARISON:  None  TECHNIQUE:  CT examination of the abdomen and pelvis was performed   Axial, sagittal, and coronal 2D reformatted images were created from the source data and submitted for interpretation  Radiation dose length product (DLP) for this visit:  738 mGy-cm   This examination, like all CT scans performed in the [facility], was performed utilizing techniques to minimize radiation dose exposure, including the use of iterative reconstruction and automated exposure control  IV Contrast:  100 mL of iohexol (OMNIPAQUE) Enteric Contrast:  Enteric contrast was not administered  FINDINGS: ABDOMEN LOWER CHEST:  No clinically significant abnormality identified in the visualized lower chest  LIVER/BILIARY TREE:  Unremarkable  GALLBLADDER:  No calcified gallstones  No pericholecystic inflammatory change  SPLEEN:  Unremarkable  PANCREAS:  Unremarkable  ADRENAL GLANDS:  Unremarkable  KIDNEYS/URETERS:  Unremarkable  No hydronephrosis  STOMACH AND BOWEL:  Unremarkable  APPENDIX:  No findings to suggest appendicitis  ABDOMINOPELVIC CAVITY:  No ascites or free intraperitoneal air  No lymphadenopathy  VESSELS:  Unremarkable for patient's age  PELVIS REPRODUCTIVE ORGANS:  There are multiple calcified lesions within the uterus, most likely representing calcified fibroids  URINARY BLADDER:  Unremarkable  ABDOMINAL WALL/INGUINAL REGIONS:  Unremarkable  OSSEOUS STRUCTURES:  No acute fracture or destructive osseous lesion  Impression: 1  Diverticulosis without active diverticulitis  2   Calcified fibroids  Workstation performed: [id number]       EKG, Pathology, and Other Studies Reviewed on Admission:   · EKG:  No EKG done  Would get 1 if not done in the ER    Allscripts Records Reviewed: No     ** Please Note: Dragon 360 Dictation voice to text software may have been used in the creation of this document   **

## 2018-12-07 NOTE — ASSESSMENT & PLAN NOTE
Lab Results   Component Value Date    HGBA1C 7 4 (H) 12/07/2018       Recent Labs      12/07/18   1622  12/07/18   2108  12/08/18   0604  12/08/18   1405   POCGLU  170*  211*  167*  199*       Blood Sugar Average: Last 72 hrs:    Hemoglobin A1c 7 4    Accu-Cheks are fair

## 2018-12-07 NOTE — ASSESSMENT & PLAN NOTE
Bleeding stopped  Going for colonoscopy/GI workup  Likely diverticular bleed  Has mild decrease in hemoglobin  Likely dilutional/hemodynamic    Continue to monitor

## 2018-12-07 NOTE — ED PROVIDER NOTES
History  Chief Complaint   Patient presents with    Rectal Bleeding     pt c/o blood in stools since last night  denies any pain     68-year-old female patient presents emergency department for evaluation of rectal bleeding  The patient states started approximately midnight last night, was painless without cramping or any other discomfort  She states this morning she woke up, had worsening bleeding, told her  about this who brought her to the hospital for evaluation  Currently the patient is lying in bed, no apparent distress, otherwise well-appearing, she does have some abdominal tenderness  A patient 74years old, has never had a colonoscopy previously, has never had rectal bleeding her history of diverticulosis or diverticulitis  The patient be evaluated with a differential diagnosis to include but not be limited to rectal bleeding, bleeding diverticulitis, colitis  History provided by:  Patient and relative   used: No    Black or Bloody Stool   Quality:  Bright red  Timing:  Constant  Chronicity:  New  Context: not anal fissures, not constipation, not diarrhea, not hemorrhoids and not rectal injury    Relieved by:  Nothing  Worsened by:  Nothing  Ineffective treatments:  None tried  Associated symptoms: no dizziness, no light-headedness and no vomiting    Risk factors: no anticoagulant use        Prior to Admission Medications   Prescriptions Last Dose Informant Patient Reported? Taking?    LORazepam (ATIVAN) 0 5 mg tablet   Yes Yes   Sig: Take 0 5 mg by mouth every 6 (six) hours as needed for anxiety   Methylprednisolone 4 MG TBPK   No No   Sig: Use as directed on package   PARoxetine (PAXIL) 10 mg tablet   Yes Yes   Sig: Take 10 mg by mouth every morning   albuterol (2 5 mg/3 mL) 0 083 % nebulizer solution   Yes Yes   Sig: Take 2 5 mg by nebulization every 6 (six) hours as needed for wheezing   albuterol (PROVENTIL HFA,VENTOLIN HFA) 90 mcg/act inhaler   Yes Yes   Sig: Inhale 2 puffs every 6 (six) hours as needed for wheezing   atorvastatin (LIPITOR) 10 mg tablet   Yes Yes   Sig: Take 10 mg by mouth daily   glimepiride (AMARYL) 4 mg tablet   Yes Yes   Sig: Take 4 mg by mouth every morning before breakfast   losartan-hydrochlorothiazide (HYZAAR) 50-12 5 mg per tablet   Yes Yes   Sig: Take 1 tablet by mouth daily   metFORMIN (GLUCOPHAGE) 500 mg tablet   Yes Yes   Sig: Take 500 mg by mouth 2 (two) times a day with meals   montelukast (SINGULAIR) 10 mg tablet   Yes Yes   Sig: Take 10 mg by mouth daily at bedtime   sitaGLIPtin (JANUVIA) 100 mg tablet   Yes Yes   Sig: Take 100 mg by mouth daily      Facility-Administered Medications: None       Past Medical History:   Diagnosis Date    Asthma     [facility]     Hypertension        History reviewed  No pertinent surgical history  History reviewed  No pertinent family history  I have reviewed and agree with the history as documented  Social History   Substance Use Topics    Smoking status: Never Smoker    Smokeless tobacco: Never Used    Alcohol use No        Review of Systems   Gastrointestinal: Positive for blood in stool  Negative for vomiting  Neurological: Negative for dizziness and light-headedness  All other systems reviewed and are negative  Physical Exam  Physical Exam   Constitutional: She is oriented to person, place, and time  She appears well-developed and well-nourished  HENT:   Head: Normocephalic and atraumatic  Right Ear: External ear normal    Left Ear: External ear normal    Eyes: Conjunctivae and EOM are normal    Neck: No JVD present  No tracheal deviation present  No thyromegaly present  Cardiovascular: Normal rate  Pulmonary/Chest: Effort normal and breath sounds normal  No stridor  Abdominal: Soft  She exhibits no distension and no mass  There is no tenderness  There is no guarding  No hernia  Musculoskeletal: Normal range of motion   She exhibits no edema, tenderness or deformity  Lymphadenopathy:     She has no cervical adenopathy  Neurological: She is alert and oriented to person, place, and time  Skin: Skin is warm  No rash noted  No erythema  No pallor  Psychiatric: She has a normal mood and affect  Her behavior is normal    Nursing note and vitals reviewed  Vital Signs  ED Triage Vitals [12/07/18 0743]   Temperature Pulse Respirations Blood Pressure SpO2   98 7 °F (37 1 °C) 99 18 (!) 187/107 92 %      Temp Source Heart Rate Source Patient Position - Orthostatic VS BP Location FiO2 (%)   Oral Monitor Lying Right arm --      Pain Score       No Pain           Vitals:    12/07/18 1900 12/07/18 2317 12/08/18 0300 12/08/18 0700   BP: 118/61 104/60 116/78 114/60   Pulse: 72 69 68 84   Patient Position - Orthostatic VS: Lying Lying Lying Lying       Visual Acuity      ED Medications  Medications   PARoxetine (PAXIL) tablet 10 mg (10 mg Oral Given 12/8/18 0827)   montelukast (SINGULAIR) tablet 10 mg (10 mg Oral Given 12/7/18 2106)   losartan potassium-hydrochlorothiazide (HYZAAR 50/12  5) combination ( Oral Not Given 12/8/18 0826)   LORazepam (ATIVAN) tablet 0 5 mg (0 5 mg Oral Given 12/7/18 2246)   atorvastatin (LIPITOR) tablet 10 mg (10 mg Oral Given 12/8/18 0826)   albuterol (PROVENTIL HFA,VENTOLIN HFA) inhaler 2 puff (not administered)   insulin lispro (HumaLOG) 100 units/mL subcutaneous injection 1-6 Units ( Subcutaneous Dose Auto Held 12/13/18 1600)   insulin lispro (HumaLOG) 100 units/mL subcutaneous injection 1-6 Units ( Subcutaneous Dose Auto Held 12/13/18 2200)   hydrALAZINE (APRESOLINE) injection 10 mg ( Intravenous MAR Hold 12/8/18 0637)   sodium chloride 0 9 % with KCl 20 mEq/L infusion (premix) (50 mL/hr Intravenous New Bag 12/8/18 0822)   acetaminophen (TYLENOL) tablet 650 mg ( Oral MAR Hold 12/8/18 0637)   pantoprazole (PROTONIX) injection 40 mg (40 mg Intravenous Given 12/7/18 0814)   iohexol (OMNIPAQUE) 350 MG/ML injection (MULTI-DOSE) 100 mL (100 mL Intravenous Given 12/7/18 0906)   bisacodyl (DULCOLAX) EC tablet 10 mg (10 mg Oral Given 12/7/18 1618)   polyethylene glycol (GLYCOLAX) bowel prep 238 g (238 g Oral Given 12/7/18 1703)       Diagnostic Studies  Results Reviewed     Procedure Component Value Units Date/Time    Stool Enteric Bacterial Panel by PCR [[id number]] Collected:  12/07/18 0918    Lab Status: In process Specimen:  Stool from Rectum Updated:  12/07/18 0925    Lactic acid, plasma [[id number]]  (Normal) Collected:  12/07/18 6720    Lab Status:  Final result Specimen:  Blood from Arm, Left Updated:  12/07/18 0837     LACTIC ACID 1 6 mmol/L     Narrative:         Result may be elevated if tourniquet was used during collection  Comprehensive metabolic panel [[id number]]  (Abnormal) Collected:  12/07/18 0808    Lab Status:  Final result Specimen:  Blood from Arm, Left Updated:  12/07/18 9069     Sodium 134 (L) mmol/L      Potassium 3 8 mmol/L      Chloride 92 (L) mmol/L      CO2 35 (H) mmol/L      ANION GAP 7 mmol/L      BUN 13 mg/dL      Creatinine 0 65 mg/dL      Glucose 174 (H) mg/dL      Calcium 8 6 mg/dL      AST 20 U/L      ALT 15 U/L      Alkaline Phosphatase 78 U/L      Total Protein 7 0 g/dL      Albumin 3 2 (L) g/dL      Total Bilirubin 0 40 mg/dL      eGFR 87 ml/min/1 73sq m     Narrative:         National Kidney Disease Education Program recommendations are as follows:  GFR calculation is accurate only with a steady state creatinine  Chronic Kidney disease less than 60 ml/min/1 73 sq  meters  Kidney failure less than 15 ml/min/1 73 sq  meters      Protime-INR [[id number]]  (Normal) Collected:  12/07/18 0808    Lab Status:  Final result Specimen:  Blood from Arm, Left Updated:  12/07/18 0830     Protime 12 7 seconds      INR 0 96    CBC and differential [[id number]]  (Abnormal) Collected:  12/07/18 0808    Lab Status:  Final result Specimen:  Blood from Arm, Left Updated:  12/07/18 0815     WBC 7 68 Thousand/uL      RBC 4 35 Million/uL Hemoglobin 13 0 g/dL      Hematocrit 39 0 %      MCV 90 fL      MCH 29 9 pg      MCHC 33 3 g/dL      RDW 13 3 %      MPV 8 6 (L) fL      Platelets 433 Thousands/uL      nRBC 0 /100 WBCs      Neutrophils Relative 60 %      Immat GRANS % 0 %      Lymphocytes Relative 26 %      Monocytes Relative 11 %      Eosinophils Relative 2 %      Basophils Relative 1 %      Neutrophils Absolute 4 59 Thousands/µL      Immature Grans Absolute 0 02 Thousand/uL      Lymphocytes Absolute 1 97 Thousands/µL      Monocytes Absolute 0 87 Thousand/µL      Eosinophils Absolute 0 18 Thousand/µL      Basophils Absolute 0 05 Thousands/µL                  CT abdomen pelvis with contrast   Final Result by Ramon Chi MD (12/07 0112)         1  Diverticulosis without active diverticulitis  2   Calcified fibroids              Workstation performed: [id number]                    Procedures  Procedures       Phone Contacts  ED Phone Contact    ED Course                               MDM  Number of Diagnoses or Management Options  GI bleed: new and requires workup     Amount and/or Complexity of Data Reviewed  Clinical lab tests: reviewed and ordered  Tests in the radiology section of CPT®: reviewed and ordered  Decide to obtain previous medical records or to obtain history from someone other than the patient: yes  Review and summarize past medical records: yes    Patient Progress  Patient progress: stable    CritCare Time    Disposition  Final diagnoses:   GI bleed     Time reflects when diagnosis was documented in both MDM as applicable and the Disposition within this note     Time User Action Codes Description Comment    12/7/2018 10:53 AM Elfredia Mely Add [K92 2] GI bleed     12/7/2018 10:55 AM Mariah Fluke Add [K62 5] Bleeding per rectum     12/7/2018 10:55 AM Mariah Fluke Modify [K62 5] Bleeding per rectum       ED Disposition     ED Disposition Condition Comment    Admit  Case was discussed with Dr Vince Srinivasan and the patient's admission status was agreed to be Admission Status: inpatient status to the service of Dr Tennille Vizcarra          Follow-up Information    None         Current Discharge Medication List      CONTINUE these medications which have NOT CHANGED    Details   albuterol (2 5 mg/3 mL) 0 083 % nebulizer solution Take 2 5 mg by nebulization every 6 (six) hours as needed for wheezing      albuterol (PROVENTIL HFA,VENTOLIN HFA) 90 mcg/act inhaler Inhale 2 puffs every 6 (six) hours as needed for wheezing      atorvastatin (LIPITOR) 10 mg tablet Take 10 mg by mouth daily      glimepiride (AMARYL) 4 mg tablet Take 4 mg by mouth every morning before breakfast      LORazepam (ATIVAN) 0 5 mg tablet Take 0 5 mg by mouth every 6 (six) hours as needed for anxiety      losartan-hydrochlorothiazide (HYZAAR) 50-12 5 mg per tablet Take 1 tablet by mouth daily      metFORMIN (GLUCOPHAGE) 500 mg tablet Take 500 mg by mouth 2 (two) times a day with meals      montelukast (SINGULAIR) 10 mg tablet Take 10 mg by mouth daily at bedtime      PARoxetine (PAXIL) 10 mg tablet Take 10 mg by mouth every morning      sitaGLIPtin (JANUVIA) 100 mg tablet Take 100 mg by mouth daily      Methylprednisolone 4 MG TBPK Use as directed on package  Qty: 21 tablet, Refills: 0           No discharge procedures on file      ED Provider  Electronically Signed by           Giovany Stanley DO  12/08/18 4966

## 2018-12-07 NOTE — CONSULTS
Consultation - 126 [facility]  Glen Travissreedhar 74 y o  female MRN: [id number]  Unit/Bed#: ED 32 Encounter: [id number]        Consults    Reason for Consult / Principal Problem: Hematochezia    HPI: Ms Fred Padron is a 73 yo F with a PMH of DM2, HTN, asthma, HLD, presenting with acute onset of BRBPR since midnight  She denies any associated abdominal pain, nausea, or vomiting  She reports many episodes of this, including 10 episodes this morning  She denies any stool mixed in with the blood  This has never happened to her before and she does not take any anticoagulation or antiplatelets  She denies any recent weight loss but has noticed that her stools are becoming thinner  She has never had a colonoscopy  She has no known history of hemorrhoids  REVIEW OF SYSTEMS: Negative except for as stated above    Historical Information   Past Medical History:   Diagnosis Date    Asthma     Diabetes mellitus ([facility] [address] )     Hypertension      History reviewed  No pertinent surgical history  Social History   History   Alcohol Use No     History   Drug Use No     History   Smoking Status    Never Smoker   Smokeless Tobacco    Never Used     History reviewed  No pertinent family history  Meds/Allergies       (Not in a hospital admission)  Current Facility-Administered Medications   Medication Dose Route Frequency    pantoprazole (PROTONIX) injection 40 mg  40 mg Intravenous Q12H [address]       No Known Allergies        Objective     Blood pressure (!) 203/84, pulse 88, temperature 98 7 °F (37 1 °C), temperature source Oral, resp  rate 16, weight 94 2 kg (207 lb 10 8 oz), SpO2 95 %      No intake or output data in the 24 hours ending 12/07/18 1150      PHYSICAL EXAM:      General Appearance:   Alert, oriented x3, no acute distress   HENT[de-identified]  Eyes:   Normocephalic, atraumatic  Anicteric   Neck:  Supple, symmetrical, trachea midline   Lungs:   Clear to auscultation bilaterally, no respiratory distress   Heart[de-identified]   RRR, no murmur Abdomen:   Non-distended, soft, BS active, NTTP    Rectal:   Deferred    Extremities:  No cyanosis or LE edema    Pulses:  2+ and symmetric all extremities    Skin:  No jaundice or pallor      Lab Results:     Results from last 7 days  Lab Units 12/07/18  0808   WBC Thousand/uL 7 68   HEMOGLOBIN g/dL 13 0   HEMATOCRIT % 39 0   PLATELETS Thousands/uL 289   NEUTROS PCT % 60   LYMPHS PCT % 26   MONOS PCT % 11   EOS PCT % 2       Results from last 7 days  Lab Units 12/07/18  0808   POTASSIUM mmol/L 3 8   CHLORIDE mmol/L 92*   CO2 mmol/L 35*   BUN mg/dL 13   CREATININE mg/dL 0 65   CALCIUM mg/dL 8 6   ALK PHOS U/L 78   ALT U/L 15   AST U/L 20       Results from last 7 days  Lab Units 12/07/18  0808   INR  0 96           Imaging Studies: I have personally reviewed pertinent imaging studies  Ct Abdomen Pelvis With Contrast  Result Date: 12/7/2018  Impression: 1  Diverticulosis without active diverticulitis  2   Calcified fibroids  ASSESSMENT and PLAN:      Rectal Bleeding  - Hb 13 0 on admission, benign abdominal exam, CT A/P shows diverticulosis without any other acute etiology or mass  - Since she reports continued passage of BRB and has no prior colonoscopy, will plan for colonoscopy tomorrow for further evaluation  - Differential includes diverticular bleed v hemorrhoidal bleeding v AVMs v malignancy  - Clear liquid diet today  - Start bowel prep later this afternoon, NPO after midnight  - Monitor H/H q12 for now  - Serial abdominal exams      Patient will be seen and examined by Dr Luci Velez  All freeman medical decisions were made by Dr Luci Velez  Thank you for allowing us to participate in the care of this present patient  We will follow-up with you closely

## 2018-12-08 ENCOUNTER — ANESTHESIA (OUTPATIENT)
Dept: PERIOP | Facility: HOSPITAL | Age: 73
DRG: 378 | End: 2018-12-08
Payer: MEDICARE

## 2018-12-08 ENCOUNTER — ANESTHESIA EVENT (OUTPATIENT)
Dept: PERIOP | Facility: HOSPITAL | Age: 73
DRG: 378 | End: 2018-12-08
Payer: MEDICARE

## 2018-12-08 PROBLEM — E87.6 HYPOKALEMIA: Status: ACTIVE | Noted: 2018-12-08

## 2018-12-08 LAB
ANION GAP SERPL CALCULATED.3IONS-SCNC: 7 MMOL/L (ref 4–13)
ATRIAL RATE: 86 BPM
BUN SERPL-MCNC: 7 MG/DL (ref 5–25)
CALCIUM SERPL-MCNC: 8.4 MG/DL (ref 8.3–10.1)
CAMPYLOBACTER DNA SPEC NAA+PROBE: NORMAL
CHLORIDE SERPL-SCNC: 96 MMOL/L (ref 100–108)
CO2 SERPL-SCNC: 31 MMOL/L (ref 21–32)
CREAT SERPL-MCNC: 0.62 MG/DL (ref 0.6–1.3)
ERYTHROCYTE [DISTWIDTH] IN BLOOD BY AUTOMATED COUNT: 13.6 % (ref 11.6–15.1)
GFR SERPL CREATININE-BSD FRML MDRD: 88 ML/MIN/1.73SQ M
GLUCOSE SERPL-MCNC: 148 MG/DL (ref 65–140)
GLUCOSE SERPL-MCNC: 167 MG/DL (ref 65–140)
GLUCOSE SERPL-MCNC: 199 MG/DL (ref 65–140)
GLUCOSE SERPL-MCNC: 211 MG/DL (ref 65–140)
GLUCOSE SERPL-MCNC: 240 MG/DL (ref 65–140)
HCT VFR BLD AUTO: 33.3 % (ref 34.8–46.1)
HGB BLD-MCNC: 11 G/DL (ref 11.5–15.4)
MCH RBC QN AUTO: 29.6 PG (ref 26.8–34.3)
MCHC RBC AUTO-ENTMCNC: 33 G/DL (ref 31.4–37.4)
MCV RBC AUTO: 90 FL (ref 82–98)
P AXIS: 53 DEGREES
PLATELET # BLD AUTO: 276 THOUSANDS/UL (ref 149–390)
PMV BLD AUTO: 8.5 FL (ref 8.9–12.7)
POTASSIUM SERPL-SCNC: 3.3 MMOL/L (ref 3.5–5.3)
PR INTERVAL: 190 MS
QRS AXIS: 15 DEGREES
QRSD INTERVAL: 86 MS
QT INTERVAL: 408 MS
QTC INTERVAL: 488 MS
RBC # BLD AUTO: 3.72 MILLION/UL (ref 3.81–5.12)
SALMONELLA DNA SPEC QL NAA+PROBE: NORMAL
SHIGA TOXIN STX GENE SPEC NAA+PROBE: NORMAL
SHIGELLA DNA SPEC QL NAA+PROBE: NORMAL
SODIUM SERPL-SCNC: 134 MMOL/L (ref 136–145)
T WAVE AXIS: 47 DEGREES
VENTRICULAR RATE: 86 BPM
WBC # BLD AUTO: 8.18 THOUSAND/UL (ref 4.31–10.16)

## 2018-12-08 PROCEDURE — 93010 ELECTROCARDIOGRAM REPORT: CPT | Performed by: INTERNAL MEDICINE

## 2018-12-08 PROCEDURE — 45385 COLONOSCOPY W/LESION REMOVAL: CPT | Performed by: INTERNAL MEDICINE

## 2018-12-08 PROCEDURE — 0DBL8ZZ EXCISION OF TRANSVERSE COLON, VIA NATURAL OR ARTIFICIAL OPENING ENDOSCOPIC: ICD-10-PCS | Performed by: INTERNAL MEDICINE

## 2018-12-08 PROCEDURE — 88305 TISSUE EXAM BY PATHOLOGIST: CPT | Performed by: PATHOLOGY

## 2018-12-08 PROCEDURE — 82948 REAGENT STRIP/BLOOD GLUCOSE: CPT

## 2018-12-08 PROCEDURE — 85027 COMPLETE CBC AUTOMATED: CPT | Performed by: PHYSICIAN ASSISTANT

## 2018-12-08 PROCEDURE — 0DBN8ZZ EXCISION OF SIGMOID COLON, VIA NATURAL OR ARTIFICIAL OPENING ENDOSCOPIC: ICD-10-PCS | Performed by: INTERNAL MEDICINE

## 2018-12-08 PROCEDURE — 80048 BASIC METABOLIC PNL TOTAL CA: CPT | Performed by: PHYSICIAN ASSISTANT

## 2018-12-08 PROCEDURE — 99233 SBSQ HOSP IP/OBS HIGH 50: CPT | Performed by: INTERNAL MEDICINE

## 2018-12-08 RX ORDER — POTASSIUM CHLORIDE 14.9 MG/ML
20 INJECTION INTRAVENOUS ONCE
Status: COMPLETED | OUTPATIENT
Start: 2018-12-08 | End: 2018-12-08

## 2018-12-08 RX ORDER — LIDOCAINE HYDROCHLORIDE 10 MG/ML
INJECTION, SOLUTION INFILTRATION; PERINEURAL AS NEEDED
Status: DISCONTINUED | OUTPATIENT
Start: 2018-12-08 | End: 2018-12-08 | Stop reason: SURG

## 2018-12-08 RX ORDER — PROPOFOL 10 MG/ML
INJECTION, EMULSION INTRAVENOUS AS NEEDED
Status: DISCONTINUED | OUTPATIENT
Start: 2018-12-08 | End: 2018-12-08 | Stop reason: SURG

## 2018-12-08 RX ORDER — SODIUM CHLORIDE, SODIUM LACTATE, POTASSIUM CHLORIDE, CALCIUM CHLORIDE 600; 310; 30; 20 MG/100ML; MG/100ML; MG/100ML; MG/100ML
INJECTION, SOLUTION INTRAVENOUS CONTINUOUS PRN
Status: DISCONTINUED | OUTPATIENT
Start: 2018-12-08 | End: 2018-12-08 | Stop reason: SURG

## 2018-12-08 RX ADMIN — PROPOFOL 80 MG: 10 INJECTION, EMULSION INTRAVENOUS at 12:48

## 2018-12-08 RX ADMIN — LIDOCAINE HYDROCHLORIDE 25 MG: 10 INJECTION, SOLUTION INFILTRATION; PERINEURAL at 12:47

## 2018-12-08 RX ADMIN — ACETAMINOPHEN 650 MG: 325 TABLET ORAL at 21:55

## 2018-12-08 RX ADMIN — POTASSIUM CHLORIDE 20 MEQ: 200 INJECTION, SOLUTION INTRAVENOUS at 10:26

## 2018-12-08 RX ADMIN — PAROXETINE HYDROCHLORIDE 10 MG: 20 TABLET, FILM COATED ORAL at 08:27

## 2018-12-08 RX ADMIN — SODIUM CHLORIDE AND POTASSIUM CHLORIDE 50 ML/HR: .9; .15 SOLUTION INTRAVENOUS at 08:22

## 2018-12-08 RX ADMIN — LORAZEPAM 0.5 MG: 0.5 TABLET ORAL at 16:15

## 2018-12-08 RX ADMIN — PROPOFOL 20 MG: 10 INJECTION, EMULSION INTRAVENOUS at 12:55

## 2018-12-08 RX ADMIN — MONTELUKAST SODIUM 10 MG: 10 TABLET, FILM COATED ORAL at 21:55

## 2018-12-08 RX ADMIN — ATORVASTATIN CALCIUM 10 MG: 10 TABLET, FILM COATED ORAL at 08:26

## 2018-12-08 RX ADMIN — PROPOFOL 20 MG: 10 INJECTION, EMULSION INTRAVENOUS at 12:51

## 2018-12-08 RX ADMIN — SODIUM CHLORIDE, SODIUM LACTATE, POTASSIUM CHLORIDE, AND CALCIUM CHLORIDE: .6; .31; .03; .02 INJECTION, SOLUTION INTRAVENOUS at 12:45

## 2018-12-08 RX ADMIN — ACETAMINOPHEN 650 MG: 325 TABLET ORAL at 01:32

## 2018-12-08 RX ADMIN — ACETAMINOPHEN 650 MG: 325 TABLET ORAL at 16:19

## 2018-12-08 RX ADMIN — LORAZEPAM 0.5 MG: 0.5 TABLET ORAL at 22:00

## 2018-12-08 RX ADMIN — INSULIN LISPRO 2 UNITS: 100 INJECTION, SOLUTION INTRAVENOUS; SUBCUTANEOUS at 16:15

## 2018-12-08 RX ADMIN — PROPOFOL 20 MG: 10 INJECTION, EMULSION INTRAVENOUS at 12:53

## 2018-12-08 NOTE — UTILIZATION REVIEW
Initial Clinical Review    Admission: Date/Time/Statement: Observation 12/7 and changed to Inpatient on 12/8 @ 1422    Admitting Physician Angelita Snow    Level of Care Med Surg    Estimated length of stay More than 2 Midnights    Certification I certify that inpatient services are medically necessary for this patient for a duration of greater than two midnights  See H&P and MD Progress Notes for additional information about the patient's course of treatment  ED: Date/Time/Mode of Arrival:   ED Arrival Information     Expected Arrival Acuity Means of Arrival Escorted By Service Admission Type    - 12/7/2018 07:41 Urgent Walk-In Spouse General Medicine Urgent    Arrival Complaint    -          Chief Complaint:   Chief Complaint   Patient presents with    Rectal Bleeding     pt c/o blood in stools since last night  denies any pain       History of Illness: 52-year-old female patient presents emergency department for evaluation of rectal bleeding  The patient states started approximately midnight last night, was painless without cramping or any other discomfort  ED Vital Signs:   ED Triage Vitals [12/07/18 0743]   Temperature Pulse Respirations Blood Pressure SpO2   98 7 °F (37 1 °C) 99 18 (!) 187/107 92 %      Temp Source Heart Rate Source Patient Position - Orthostatic VS BP Location FiO2 (%)   Oral Monitor Lying Right arm --      Pain Score       No Pain        Wt Readings from Last 1 Encounters:   12/07/18 94 2 kg (207 lb 10 8 oz)       Vital Signs (abnormal):   12/07/18 1129  --  88  16   203/84  95 %  None (Room air)  Lying   12/07/18 0922  --  87  17   193/115  92 %  None (Room air)  Lying   12/07/18 0748  --  --  --   180/94           Abnormal Labs: Na = 134  K = 3 3  12/7 H/H = 13 0/ 39 0  12/8 H/H = 11 0/33 3    Diagnostic Test Results: CT Abd/ Pelvis -  Diverticulosis without active diverticulitis    Calcified fibroids    ED Treatment:   Medication Administration from 12/07/2018 0741 to 12/07/2018 1208       Date/Time Order Dose Route Action     12/07/2018 0814 pantoprazole (PROTONIX) injection 40 mg 40 mg Intravenous Given     12/07/2018 0906 iohexol (OMNIPAQUE) 350 MG/ML injection (MULTI-DOSE) 100 mL 100 mL Intravenous Given       Past Medical/Surgical History: Active Ambulatory Problems     Diagnosis Date Noted    Acute pulmonary insufficiency 01/18/2017    Asthma exacerbation 01/18/2017    Obesity 01/18/2017    Anxiety disorder 01/18/2017    Diabetes mellitus type 2 in obese ([facility] [address] ) 01/18/2017    Dyslipidemia 01/18/2017    Depression 01/18/2017    Accelerated hypertension 01/18/2017     Resolved Ambulatory Problems     Diagnosis Date Noted    No Resolved Ambulatory Problems     Past Medical History:   Diagnosis Date    Asthma     [facility]     Hypertension        Admitting Diagnosis: Rectal bleeding [K62 5]  GI bleed [K92 2]  Bleeding per rectum [K62 5]    Age/Sex: 74 y o  female    Assessment/Plan:   74y Female to ED presenting with bleeding per rectum started last night  She had diarrhea about 3 days ago  Pt is being admitted with Bleeding per rectum/ GI Bleed/ Accelerated hypertension  Likely diverticular bleed  Gastroenterology consult  Upper vs lower  PPI     B/P elevated in ED    12/7 Gastroenterology cons:  Rectal Bleeding  - Hb 13 0 on admission, benign abdominal exam, CT A/P shows diverticulosis without any other acute etiology or mass  - Since she reports continued passage of BRB and has no prior colonoscopy, will plan for colonoscopy tomorrow for further evaluation  - Differential includes diverticular bleed v hemorrhoidal bleeding v AVMs v malignancy  Start bowel prep later this afternoon, NPO after midnight  - Monitor H/H q12 for now  - Serial abdominal exams       Admission Orders:  NPO; Sips with meds  12/8 GI Lab - Colonoscopy    Tele monitoring  Stool Enteric Bacterial panel by PCR  Gastroenterology cons    Scheduled Meds:   Current Facility-Administered Medications:  atorvastatin 10 mg Oral Daily   [MAR Hold] insulin lispro 1-6 Units Subcutaneous TID AC   [MAR Hold] insulin lispro 1-6 Units Subcutaneous HS   losartan potassium-hydrochlorothiazide (HYZAAR 50/12  5) combination  Oral Daily   montelukast 10 mg Oral HS   PARoxetine 10 mg Oral QAM   potassium chloride 20 mEq Intravenous Once     Continuous Infusions:   sodium chloride 0 9 % with KCl 20 mEq/L 50 mL/hr Last Rate: 50 mL/hr (12/08/18 0587)     PRN Meds:     Acetaminophen po x1    albuterol    hydrALAZINE    LORazepam    ----------------------------------------------------------------------------------------------------------------------------------  12/8 Progress notes:  Bleeding stopped  Going for colonoscopy/GI workup  Likely diverticular bleed  Has mild decrease in hemoglobin  Likely dilutional/hemodynamic    Continue to monitor  Certification Statement: The patient, admitted on an observation basis, will now require > 2 midnight hospital stay due to Further close monitoring of her electrolytes/hemoglobin for acute lower GI bleed

## 2018-12-08 NOTE — ANESTHESIA POSTPROCEDURE EVALUATION
Post-Op Assessment Note      CV Status:  Stable    Mental Status:  Alert and awake    Hydration Status:  Euvolemic    PONV Controlled:  Controlled    Airway Patency:  Patent    Post Op Vitals Reviewed: Yes          Staff: CRNA, Anesthesiologist           BP   87/41   Temp      Pulse  77   Resp   16   SpO2   96%

## 2018-12-08 NOTE — OP NOTE
COLONOSCOPY    PROCEDURE: Colonoscopy/ Polypectomy (Cold Snare)    INDICATIONS: Rectal Bleeding    POST-OP DIAGNOSIS: See the impression below    SEDATION: Monitored anesthesia care, check anesthesia records    PHYSICAL EXAM:  Vitals:    12/08/18 1119   BP:    Pulse:    Resp:    Temp:    SpO2: 97%     Body mass index is 40 56 kg/m²  General: NAD  Heart: S1 & S2 normal, RRR  Lungs: CTA, No rales or rhonchi  Abdomen: Soft, nontender, nondistended, good bowel sounds    CONSENT:  Informed consent was obtained for the procedure, including sedation after explaining the risks and benefits of the procedure  Risks including but not limited to bleeding, perforation, infection, aspiration were discussed in detail  Also explained about less than 100%$ sensitivity with the exam and other alternatives  PREPARATION:   EKG tracing, pulse oximetry, blood pressure were monitored throughout the procedure  Patient was identified by myself both verbally and by visual inspection of ID band  DESCRIPTION:   Patient was placed in the left lateral decubitus position and was sedated with the above medication  Digital rectal examination was performed  The colonoscope was introduced in to the anal canal and advanced up to cecum, which was identified by the appendiceal orifice and IC valve  A careful inspection was made as the colonoscope was withdrawn, including a retroflexed view of the rectum; findings and interventions are described below  Appropriate photodocumentation was obtained  The quality of the colonic preparation was excellent  The blood loss was minimal     FINDINGS:  There was diverticulosis noted in the descending colon and sigmoid colon  Twop 1 5 cm sessile polyps were removed from the hepatic flexure colon and the rectosigmoid with cold snare polypectomy    The cecum ascending colon transverse colon splenic flexure and rectum appeared normal   Small internal hemorrhoids were noted on retroflexion IMPRESSIONS:    Likely diverticular bleed  Polyp status post cold snare    RECOMMENDATIONS:  Regular diet  She is stable for discharge  Will sign off please re-consult as needed  Await pathology    COMPLICATIONS:  None; patient tolerated the procedure well    DISPOSITION: PACU  CONDITION: Stable    Sameer Story MD  12/8/2018,12:58 PM

## 2018-12-08 NOTE — PROGRESS NOTES
[facility] Progress Note  Patient: Glen Turner 74 y o  female   MRN: [id number]  PCP: No primary care provider on file  Unit/Bed#: -01 Encounter: [id number]  Date Of Visit: 12/08/18          * Bleeding per rectum   Assessment & Plan    Bleeding stopped  Going for colonoscopy/GI workup  Likely diverticular bleed  Has mild decrease in hemoglobin  Likely dilutional/hemodynamic  Continue to monitor     Hypokalemia   Assessment & Plan    Replete and follow-up     GI bleed   Assessment & Plan    Upper versus lower  Would also give her PPI  Likely lower  GI workup in progress  Accelerated hypertension   Assessment & Plan    Blood pressure has remained stable  Hold antihypertensive medication for low blood pressure     Depression   Assessment & Plan    Continue with home medication including Paxil     Dyslipidemia   Assessment & Plan    Continue with statin     Diabetes mellitus type 2 in obese [facility])   Assessment & Plan    Lab Results   Component Value Date    HGBA1C 7 4 (H) 12/07/2018       Recent Labs      12/07/18   1622  12/07/18   2108  12/08/18   0604  12/08/18   1405   POCGLU  170*  211*  167*  199*       Blood Sugar Average: Last 72 hrs:    Hemoglobin A1c 7 4  Accu-Cheks are fair     Anxiety disorder   Assessment & Plan    Continue with current treatment including benzodiazepine     Obesity   Assessment & Plan    Needs to work on her diet and do some exercise  PT/OT evaluation and treatment         Present on Admission:   Bleeding per rectum   Diabetes mellitus type 2 in obese ([facility])   Obesity   Anxiety disorder   Dyslipidemia   Depression   Accelerated hypertension            VTE Pharmacologic Prophylaxis:   Pharmacologic: Pharmacologic VTE Prophylaxis contraindicated due to Bleeding per rectum  Mechanical VTE Prophylaxis in Place: Yes    Patient Centered Rounds: I have performed bedside rounds with nursing staff today      Discussions with Specialists or Other Care Team Provider:  Yes    Education and Discussions with Family / Patient:  Yes    Time Spent for Care: 35+ minutes  More than 50% of total time spent on counseling and coordination of care as described above  Current Length of Stay: 0 day(s)    Current Patient Status: Inpatient   Certification Statement: The patient, admitted on an observation basis, will now require > 2 midnight hospital stay due to Further close monitoring of her electrolytes/hemoglobin for acute lower GI bleed  Discharge Plan:  Hopefully home soon    Code Status:  Full code      Subjective:   Feels fine  No further bleeding per rectum  No chest pain or shortness of breath  Awaiting to go for colonoscopy  No nausea or vomiting  No abdominal pain    Objective:     Vitals:   Temp (24hrs), Av 2 °F (36 8 °C), Min:97 8 °F (36 6 °C), Max:98 8 °F (37 1 °C)    Temp:  [97 8 °F (36 6 °C)-98 8 °F (37 1 °C)] 98 °F (36 7 °C)  HR:  [68-84] 80  Resp:  [18-19] 18  BP: (104-155)/(60-78) 126/72  SpO2:  [90 %-97 %] 92 %  Body mass index is 40 56 kg/m²  Input and Output Summary (last 24 hours): Intake/Output Summary (Last 24 hours) at 18 1424  Last data filed at 18 1327   Gross per 24 hour   Intake          1054 17 ml   Output              850 ml   Net           204 17 ml           Physical Exam:     Vital signs are reviewed as above  Constitutional:  Obese female who is lying in bed  Eyes: EOM grossly intact  Conjunctivae slightly pale  HENT: Oropharynx are moist   Neck: Neck is obese and supple  Cardiac: I did not hear any rubs or gallop  Patient appears to be in sinus rhythm  Respiratory: Patient not in significant respiratory distress  Air entry in general is fair anterolaterally  GI: Abdomen is obese and soft  It is grossly nontender  Bowel sounds are audible  I was not able to appreciate any hepatosplenomegaly  Neurologic:  Patient is awake and alert  Neurological examination is grossly intact   No obvious focal neurological deficit noticed  Skin: Skin is warm and dry  Additional Data:     Labs:      Results from last 7 days  Lab Units 12/08/18  0916 12/07/18  0808   WBC Thousand/uL 8 18 7 68   HEMOGLOBIN g/dL 11 0* 13 0   HEMATOCRIT % 33 3* 39 0   PLATELETS Thousands/uL 276 289   NEUTROS PCT %  --  60   LYMPHS PCT %  --  26   MONOS PCT %  --  11   EOS PCT %  --  2       Results from last 7 days  Lab Units 12/08/18  0916 12/07/18  0808   POTASSIUM mmol/L 3 3* 3 8   CHLORIDE mmol/L 96* 92*   CO2 mmol/L 31 35*   BUN mg/dL 7 13   CREATININE mg/dL 0 62 0 65   CALCIUM mg/dL 8 4 8 6   ALK PHOS U/L  --  78   ALT U/L  --  15   AST U/L  --  20       Results from last 7 days  Lab Units 12/07/18  0808   INR  0 96       * I Have Reviewed All Lab Data Listed Above  * Additional Pertinent Lab Tests Reviewed: All Labs Within Last 24 Hours Reviewed      Recent Cultures (last 7 days):           Last 24 Hours Medication List:     Current Facility-Administered Medications:  acetaminophen 650 mg Oral Q8H PRN Camilo Salas MD   albuterol 2 puff Inhalation Q6H PRN Laureano Das MD   atorvastatin 10 mg Oral Daily Laureano Das MD   hydrALAZINE 10 mg Intravenous Q4H PRN Laureano Das MD   insulin lispro 1-6 Units Subcutaneous TID Stella Caraballo MD   insulin lispro 1-6 Units Subcutaneous HS Laureano Das MD   LORazepam 0 5 mg Oral Q6H PRN Laureano Das MD   losartan potassium-hydrochlorothiazide (HYZAAR 50/12  5) combination  Oral Daily Laureano Das MD   montelukast 10 mg Oral HS Laureano Das MD   PARoxetine 10 mg Oral QAM Laureano Das MD        Today, Patient Was Seen By: Laureano Das MD    ** Please Note: Dragon 360 Dictation voice to text software may have been used in the creation of this document   **

## 2018-12-08 NOTE — ANESTHESIA PREPROCEDURE EVALUATION
Review of Systems/Medical History  Patient summary reviewed  Chart reviewed  No history of anesthetic complications     Cardiovascular  Negative cardio ROS Hypertension ,    Pulmonary  Asthma , well controlled/ stable Last rescue: < 6 months ago Asthma type of rescue: PRN inhaler,        GI/Hepatic    GI bleeding , active,        Negative  ROS        Endo/Other  Diabetes type 2 Oral agent,      GYN  Negative gynecology ROS          Hematology  Negative hematology ROS      Musculoskeletal  Negative musculoskeletal ROS        Neurology  Negative neurology ROS      Psychology   Anxiety, Depression ,              Physical Exam    Airway    Mallampati score: II  TM Distance: >3 FB  Neck ROM: full     Dental   lower dentures and upper dentures,     Cardiovascular  Comment: Negative ROS, Cardiovascular exam normal    Pulmonary  Pulmonary exam normal     Other Findings        Anesthesia Plan  ASA Score- 2     Anesthesia Type- IV sedation with anesthesia with ASA Monitors  Additional Monitors:   Airway Plan:         Plan Factors-    Induction- intravenous  Postoperative Plan-     Informed Consent- Anesthetic plan and risks discussed with patient  I personally reviewed this patient with the CRNA  Discussed and agreed on the Anesthesia Plan with the CRNA  Talha Walton Pt seen and examined  All labs and diagnostic data personally reviewed  I agree with documentation in the pre-operative assessment performed by the CRNA

## 2018-12-09 VITALS
TEMPERATURE: 98.4 F | SYSTOLIC BLOOD PRESSURE: 116 MMHG | RESPIRATION RATE: 18 BRPM | HEIGHT: 60 IN | BODY MASS INDEX: 40.64 KG/M2 | OXYGEN SATURATION: 95 % | HEART RATE: 82 BPM | DIASTOLIC BLOOD PRESSURE: 52 MMHG | WEIGHT: 207 LBS

## 2018-12-09 PROBLEM — E87.6 HYPOKALEMIA: Status: RESOLVED | Noted: 2018-12-08 | Resolved: 2018-12-09

## 2018-12-09 LAB
ANION GAP SERPL CALCULATED.3IONS-SCNC: 10 MMOL/L (ref 4–13)
BUN SERPL-MCNC: 6 MG/DL (ref 5–25)
CALCIUM SERPL-MCNC: 8.1 MG/DL (ref 8.3–10.1)
CHLORIDE SERPL-SCNC: 100 MMOL/L (ref 100–108)
CO2 SERPL-SCNC: 28 MMOL/L (ref 21–32)
CREAT SERPL-MCNC: 0.62 MG/DL (ref 0.6–1.3)
ERYTHROCYTE [DISTWIDTH] IN BLOOD BY AUTOMATED COUNT: 13.7 % (ref 11.6–15.1)
GFR SERPL CREATININE-BSD FRML MDRD: 88 ML/MIN/1.73SQ M
GLUCOSE SERPL-MCNC: 171 MG/DL (ref 65–140)
GLUCOSE SERPL-MCNC: 176 MG/DL (ref 65–140)
GLUCOSE SERPL-MCNC: 176 MG/DL (ref 65–140)
HCT VFR BLD AUTO: 29.6 % (ref 34.8–46.1)
HGB BLD-MCNC: 9.7 G/DL (ref 11.5–15.4)
MCH RBC QN AUTO: 29.9 PG (ref 26.8–34.3)
MCHC RBC AUTO-ENTMCNC: 32.8 G/DL (ref 31.4–37.4)
MCV RBC AUTO: 91 FL (ref 82–98)
PLATELET # BLD AUTO: 270 THOUSANDS/UL (ref 149–390)
PMV BLD AUTO: 8.8 FL (ref 8.9–12.7)
POTASSIUM SERPL-SCNC: 3.6 MMOL/L (ref 3.5–5.3)
RBC # BLD AUTO: 3.24 MILLION/UL (ref 3.81–5.12)
SODIUM SERPL-SCNC: 138 MMOL/L (ref 136–145)
WBC # BLD AUTO: 11.32 THOUSAND/UL (ref 4.31–10.16)

## 2018-12-09 PROCEDURE — 85027 COMPLETE CBC AUTOMATED: CPT | Performed by: INTERNAL MEDICINE

## 2018-12-09 PROCEDURE — 99239 HOSP IP/OBS DSCHRG MGMT >30: CPT | Performed by: INTERNAL MEDICINE

## 2018-12-09 PROCEDURE — 82948 REAGENT STRIP/BLOOD GLUCOSE: CPT

## 2018-12-09 PROCEDURE — 80048 BASIC METABOLIC PNL TOTAL CA: CPT | Performed by: INTERNAL MEDICINE

## 2018-12-09 RX ADMIN — ACETAMINOPHEN 650 MG: 325 TABLET ORAL at 09:27

## 2018-12-09 RX ADMIN — PAROXETINE HYDROCHLORIDE 10 MG: 20 TABLET, FILM COATED ORAL at 09:12

## 2018-12-09 RX ADMIN — LOSARTAN POTASSIUM: 50 TABLET, FILM COATED ORAL at 09:12

## 2018-12-09 RX ADMIN — ATORVASTATIN CALCIUM 10 MG: 10 TABLET, FILM COATED ORAL at 09:12

## 2018-12-09 RX ADMIN — INSULIN LISPRO 1 UNITS: 100 INJECTION, SOLUTION INTRAVENOUS; SUBCUTANEOUS at 09:09

## 2018-12-09 NOTE — DISCHARGE SUMMARY
Discharge Summary - [facility] Medicine    Patient Information: Aamir Ragland 74 y o  female MRN: [id number]  Unit/Bed#: -01 Encounter: [id number]    Discharging Physician / Practitioner: Yana Mendenhall MD  PCP: No primary care provider on file  Admission Date: 12/7/2018  Discharge Date: 12/09/18    Reason for Admission:  Bleeding per rectum    Discharge Diagnoses:     Principal Problem:    Bleeding per rectum  Active Problems:  Acute blood loss anemia    Obesity    Anxiety disorder    Diabetes mellitus type 2 in obese (HCC)    Dyslipidemia    Depression    Accelerated hypertension  Resolved Problems:    Hypokalemia    Present on Admission:   Bleeding per rectum   Diabetes mellitus type 2 in obese ([address] )   Obesity   Anxiety disorder   Dyslipidemia   Depression   Accelerated hypertension    Consultations During Hospital Stay:  · GI    Procedures Performed:     · CT scan abdomen/pelvis showing diverticulosis and calcified fibroids  · Colonoscopy-showing diverticulosis descending and sigmoid colon  Also was found to have 1 5 cm sessile polyps x2 -status post cold snare    Significant Findings:     · As above    Incidental Findings:   · As above     Test Results Pending at Discharge (will require follow up):   · Polyps biopsy results     Outpatient Tests Requested:  · None    Complications:  None    Hospital Course:     Aamir Ragland is a 74 y o  female patient who originally presented to the hospital on 12/7/2018 due to bleeding per rectum  It was spontaneous and without any pain  She was given IV fluids  Also started on IV ppi  Did well  Had CT scan and colonoscopy and results as above  No further bleeding  Hemoglobin went down likely secondary to dilution/acute blood loss anemia  Her hemoglobin was pretty much normal when she came in  She is eating well  No further bleeding  Eager to be discharged home    She would resume her metformin on 12/11/18    Spoke with patient's  also patient's son  Patient's  is here    Condition at Discharge: good     Discharge Day Visit / Exam:     Subjective:  Feels good  No further bleeding per rectum  No abdominal pain  No nausea or vomiting  Eating well  Vitals: Blood Pressure: 116/52 (12/09/18 0700)  Pulse: 82 (12/09/18 0700)  Temperature: 98 4 °F (36 9 °C) (12/09/18 0700)  Temp Source: Oral (12/09/18 0700)  Respirations: 18 (12/09/18 0700)  Height: 5' (152 4 cm) (12/08/18 1634)  Weight - Scale: 93 9 kg (207 lb) (12/08/18 1633)  SpO2: 95 % (12/09/18 0700)  Exam:        Vital signs are reviewed as above  Constitutional:  Patient lying in bed  Not in any distress  Eyes: EOM grossly intact  Conjunctivae are slightly pale   HENT: Oropharynx are moist   Neck: Neck is supple  Cardiac: I did not hear any rubs or gallop  Patient appears to be in sinus rhythm  Respiratory: Patient not in significant respiratory distress  Air entry in general is fair  GI: Abdomen is obese and soft  It is grossly nontender  Bowel sounds are audible  I was not able to appreciate any hepatosplenomegaly  Neurologic:  Patient is awake and alert  Neurological examination is grossly intact  No obvious focal neurological deficit noticed  Skin: Skin is warm and dry  Psychiatric: Mood and affect are pleasant              Discharge instructions/Information to patient and family:   See after visit summary for information provided to patient and family  Provisions for Follow-Up Care:  See after visit summary for information related to follow-up care and any pertinent home health orders  Disposition:     Home    For Discharges to [facility] SNF:   · Not Applicable to this Patient - Not Applicable to this Patient    Planned Readmission:  None     Discharge Statement:  I spent 32+ minutes discharging the patient  This time was spent on the day of discharge  I had direct contact with the patient on the day of discharge   Greater than 50% of the total time was spent examining patient, answering all patient questions, arranging and discussing plan of care with patient as well as directly providing post-discharge instructions  Additional time then spent on discharge activities  Discharge Medications:  See after visit summary for reconciled discharge medications provided to patient and family  ** Please Note: Dragon 360 Dictation voice to text software may have been used in the creation of this document   **

## 2018-12-09 NOTE — PHYSICIAN ADVISOR
Current patient class: Inpatient  The patient is currently on Hospital Day: 3 at [address]      The patient was admitted to the hospital at 05 09 31 10 19 on 12/8/18 for the following diagnosis:  Rectal bleeding [K62 5]  GI bleed [K92 2]  Bleeding per rectum [K62 5]       There is documentation in the medical record of an expected length of stay of at least 2 midnights  The patient is therefore expected to satisfy the 2 midnight benchmark and given the 2 midnight presumption is appropriate for INPATIENT ADMISSION  Given this expectation of a satisfying stay, CMS instructs us that the patient is most often appropriate for inpatient admission under part A provided medical necessity is documented in the chart  After review of the relevant documentation, labs, vital signs and test results, the patient is appropriate for INPATIENT ADMISSION  Admission to the hospital as an inpatient is a complex decision making process which requires the practitioner to consider the patients presenting complaint, history and physical examination and all relevant testing  With this in mind, in this case, the patient was deemed appropriate for INPATIENT ADMISSION  After review of the documentation and testing available at the time of the admission I concur with this clinical determination of medical necessity  Rationale is as follows: The patient is a 74 yrs old Female who presented to the ED at 12/7/2018  7:41 AM with a chief complaint of Rectal Bleeding (pt c/o blood in stools since last night  denies any pain)     Given the need for further hospitalization, and along with the documentation of medical necessity present in the chart, the patient is appropriate for inpatient admission  The patient is expected to satisfy the 2 midnight benchmark, and will require further acute medical care  The patient does have comorbid conditions which increases the risk for significant adverse outcome   Given this the patient is appropriate for inpatient admission  The patients vitals on arrival were ED Triage Vitals [12/07/18 0743]   Temperature Pulse Respirations Blood Pressure SpO2   98 7 °F (37 1 °C) 99 18 (!) 187/107 92 %      Temp Source Heart Rate Source Patient Position - Orthostatic VS BP Location FiO2 (%)   Oral Monitor Lying Right arm --      Pain Score       No Pain           Past Medical History:   Diagnosis Date    Asthma     Diabetes mellitus ([address] )     Glaucoma     Hypertension      History reviewed  No pertinent surgical history  Consults have been placed to:   IP CONSULT TO GASTROENTEROLOGY    Vitals:    12/08/18 1633 12/08/18 1634 12/08/18 1934 12/08/18 2320   BP:   116/62 120/62   BP Location:   Right arm Right arm   Pulse:   87 60   Resp:   18 18   Temp:   97 9 °F (36 6 °C) 98 1 °F (36 7 °C)   TempSrc:   Oral Oral   SpO2:   90% 94%   Weight: 93 9 kg (207 lb)      Height: 5' (1 524 m) 5' (1 524 m)         Most recent labs:    Recent Labs      12/07/18   0808  12/08/18   0916   WBC  7 68  8 18   HGB  13 0  11 0*   HCT  39 0  33 3*   PLT  289  276   K  3 8  3 3*   CALCIUM  8 6  8 4   BUN  13  7   CREATININE  0 65  0 62   INR  0 96   --    AST  20   --    ALT  15   --    ALKPHOS  78   --        Scheduled Meds:  Current Facility-Administered Medications:  acetaminophen 650 mg Oral Q8H PRN Camilo Todd MD   albuterol 2 puff Inhalation Q6H PRN Abraham Ortiz MD   atorvastatin 10 mg Oral Daily Abraham Ortiz MD   hydrALAZINE 10 mg Intravenous Q4H PRN Abraham Ortiz MD   insulin lispro 1-6 Units Subcutaneous TID Jimmy King MD   insulin lispro 1-6 Units Subcutaneous HS Abraham Ortiz MD   LORazepam 0 5 mg Oral Q6H PRN Abraham Ortiz MD   losartan potassium-hydrochlorothiazide (HYZAAR 50/12  5) combination  Oral Daily Abraham Ortiz MD   montelukast 10 mg Oral HS Abraham Ortiz MD   PARoxetine 10 mg Oral QAM Abraham Ortiz MD     Continuous Infusions:   PRN Meds:   acetaminophen   albuterol    hydrALAZINE    LORazepam    Surgical procedures (if appropriate):  Procedure(s):  COLONOSCOPY

## 2018-12-09 NOTE — PLAN OF CARE
Problem: DISCHARGE PLANNING - CARE MANAGEMENT  Goal: Discharge to post-acute care or home with appropriate resources  INTERVENTIONS:  - Conduct assessment to determine patient/family and health care team treatment goals, and need for post-acute services based on payer coverage, community resources, and patient preferences, and barriers to discharge  - Address psychosocial, clinical, and financial barriers to discharge as identified in assessment in conjunction with the patient/family and health care team  - Arrange appropriate level of post-acute services according to patient's   needs and preference and payer coverage in collaboration with the physician and health care team  - Communicate with and update the patient/family, physician, and health care team regarding progress on the discharge plan  - Arrange appropriate transportation to post-acute venues  Outcome: Completed Date Met: 12/09/18  CM met with pt at bedside  Pt to be discharged home w/no needs  Refusing [facility] services   Alessandra Hernandez will transport home

## 2018-12-09 NOTE — SOCIAL WORK
CM met with pt at bedside  Pt to be discharged home w/no needs  Refusing [facility] services   Luli Lamb will transport home

## 2019-02-08 ENCOUNTER — HOSPITAL ENCOUNTER (EMERGENCY)
Facility: HOSPITAL | Age: 74
Discharge: HOME | End: 2019-02-08
Attending: EMERGENCY MEDICINE
Payer: MEDICARE

## 2019-02-08 ENCOUNTER — APPOINTMENT (EMERGENCY)
Dept: RADIOLOGY | Facility: HOSPITAL | Age: 74
End: 2019-02-08
Payer: MEDICARE

## 2019-02-08 VITALS
DIASTOLIC BLOOD PRESSURE: 70 MMHG | BODY MASS INDEX: 40.47 KG/M2 | OXYGEN SATURATION: 94 % | RESPIRATION RATE: 16 BRPM | SYSTOLIC BLOOD PRESSURE: 174 MMHG | TEMPERATURE: 99.2 F | WEIGHT: 207.23 LBS | HEART RATE: 107 BPM

## 2019-02-08 DIAGNOSIS — J45.909 ASTHMATIC BRONCHITIS: Primary | ICD-10-CM

## 2019-02-08 PROCEDURE — 94640 AIRWAY INHALATION TREATMENT: CPT

## 2019-02-08 PROCEDURE — 71046 X-RAY EXAM CHEST 2 VIEWS: CPT

## 2019-02-08 PROCEDURE — 99285 EMERGENCY DEPT VISIT HI MDM: CPT

## 2019-02-08 RX ORDER — PREDNISONE 20 MG/1
40 TABLET ORAL DAILY
Qty: 10 TABLET | Refills: 0 | Status: SHIPPED | OUTPATIENT
Start: 2019-02-08 | End: 2019-02-13

## 2019-02-08 RX ORDER — ALBUTEROL SULFATE 2.5 MG/3ML
5 SOLUTION RESPIRATORY (INHALATION) ONCE
Status: COMPLETED | OUTPATIENT
Start: 2019-02-08 | End: 2019-02-08

## 2019-02-08 RX ORDER — AZITHROMYCIN 250 MG/1
TABLET, FILM COATED ORAL
Qty: 6 TABLET | Refills: 0 | Status: SHIPPED | OUTPATIENT
Start: 2019-02-08 | End: 2019-02-13

## 2019-02-08 RX ADMIN — IPRATROPIUM BROMIDE 0.5 MG: 0.5 SOLUTION RESPIRATORY (INHALATION) at 09:37

## 2019-02-08 RX ADMIN — ALBUTEROL SULFATE 5 MG: 2.5 SOLUTION RESPIRATORY (INHALATION) at 09:37

## 2019-02-08 NOTE — ED NOTES
Discharge instructions and medications reviewed with pt  Pt verbalized understanding, with no further questions at this time  Pt assisted in wheelchair, via [place] - ED tech, assisted into 's car for ride home       Pam Trujillo, CANDACE  02/08/19 3751

## 2019-02-08 NOTE — DISCHARGE INSTRUCTIONS
Asthma, Ambulatory Care   GENERAL INFORMATION:   Asthma  is a lung disease that makes breathing difficult  Chronic inflammation and reactions to triggers narrow the airways in your lungs  Asthma can become life-threatening if it is not managed  Common symptoms include the following:   · Coughing     · Wheezing     · Shortness of breath     · Chest tightness  Seek immediate care for the following symptoms:   · Severe shortness of breath    · Blue or gray lips or nails    · Skin around your neck and ribs pulls in with each breath    · Shortness of breath, even after you take your short-term medicine as directed     · Peak flow numbers in the red zone of your asthma action plan  Treatment for asthma  will depend on how severe it is  Medicine may decrease inflammation, open airways, and make it easier to breathe  Medicines may be inhaled, taken as a pill, or injected  Short-term medicines relieve your symptoms quickly  Long-term medicines are used to prevent future attacks  You may also need medicine to help control your allergies  Manage and prevent future asthma attacks:   · Follow your asthma action pan  This is a written plan that you and your healthcare provider create  It explains which medicine you need and when to change doses if necessary  It also explains how you can monitor symptoms and use a peak flow meter  The meter measures how well your lungs are working  · Manage other health conditions , such as allergies, acid reflux, and sleep apnea  · Identify and avoid triggers  These may include pets, dust mites, mold, and cockroaches  · Do not smoke and avoid others who smoke  If you smoke, it is never too late to quit  Ask your healthcare provider if you need help quitting  · Ask about a flu vaccine  The flu can make your asthma worse  You may need a yearly flu shot  Follow up with your healthcare provider as directed:   You will need to return to make sure your medicine is working and your symptoms are controlled  You may be referred to an asthma or allergy specialist  Rosalinda Levin may be asked to keep a record of your peak flow values and bring it with you to your appointments  Write down your questions so you remember to ask them during your visits  CARE AGREEMENT:   You have the right to help plan your care  Learn about your health condition and how it may be treated  Discuss treatment options with your caregivers to decide what care you want to receive  You always have the right to refuse treatment  The above information is an  only  It is not intended as medical advice for individual conditions or treatments  Talk to your doctor, nurse or pharmacist before following any medical regimen to see if it is safe and effective for you  © 2014 [address] is for End User's use only and may not be sold, redistributed or otherwise used for commercial purposes  All illustrations and images included in CareNotes® are the copyrighted property of A D [employer] M [employer]  or [employer]  Acute Bronchitis   WHAT YOU NEED TO KNOW:   Acute bronchitis is swelling and irritation in the air passages of your lungs  This irritation may cause you to cough or have other breathing problems  Acute bronchitis often starts because of another illness, such as a cold or the flu  The illness spreads from your nose and throat to your windpipe and airways  Bronchitis is often called a chest cold  Acute bronchitis lasts about 3 to 6 weeks and is usually not a serious illness  Your cough can last for several weeks  DISCHARGE INSTRUCTIONS:   Return to the emergency department if:   · You cough up blood  · Your lips or fingernails turn blue  · You feel like you are not getting enough air when you breathe  Contact your healthcare provider if:   · You have a fever  · Your breathing problems do not go away or get worse  · Your cough does not get better within 4 weeks      · You have questions or concerns about your condition or care  Self-care:   · Get more rest   Rest helps your body to heal  Slowly start to do more each day  Rest when you feel it is needed  · Avoid irritants in the air  Avoid chemicals, fumes, and dust  Wear a face mask if you must work around dust or fumes  Stay inside on days when air pollution levels are high  If you have allergies, stay inside when pollen counts are high  Do not use aerosol products, such as spray-on deodorant, bug spray, and hair spray  · Do not smoke or be around others who smoke  Nicotine and other chemicals in cigarettes and cigars damages the cilia that move mucus out of your lungs  Ask your healthcare provider for information if you currently smoke and need help to quit  E-cigarettes or smokeless tobacco still contain nicotine  Talk to your healthcare provider before you use these products  · Drink liquids as directed  Liquids help keep your air passages moist and help you cough up mucus  You may need to drink more liquids when you have acute bronchitis  Ask how much liquid to drink each day and which liquids are best for you  · Use a humidifier or vaporizer  Use a cool mist humidifier or a vaporizer to increase air moisture in your home  This may make it easier for you to breathe and help decrease your cough  Decrease risk for acute bronchitis:   · Get the vaccinations you need  Ask your healthcare provider if you should get vaccinated against the flu or pneumonia  · Prevent the spread of germs  You can decrease your risk of acute bronchitis and other illnesses by doing the following:     [facility] your hands often with soap and water  Carry germ-killing hand lotion or gel with you  You can use the lotion or gel to clean your hands when soap and water are not available      ¨ Do not touch your eyes, nose, or mouth unless you have washed your hands first     ¨ Always cover your mouth when you cough to prevent the spread of germs  It is best to cough into a tissue or your shirt sleeve instead of into your hand  Ask those around you cover their mouths when they cough  ¨ Try to avoid people who have a cold or the flu  If you are sick, stay away from others as much as possible  Medicines: Your healthcare provider may  give you any of the following:  · Ibuprofen or acetaminophen  are medicines that help lower your fever  They are available without a doctor's order  Ask your healthcare provider which medicine is right for you  Ask how much to take and how often to take it  Follow directions  These medicines can cause stomach bleeding if not taken correctly  Ibuprofen can cause kidney damage  Do not take ibuprofen if you have kidney disease, an ulcer, or allergies to aspirin  Acetaminophen can cause liver damage  Do not take more than 4,000 milligrams in 24 hours  · Decongestants  help loosen mucus in your lungs and make it easier to cough up  This can help you breathe easier  · Cough suppressants  decrease your urge to cough  If your cough produces mucus, do not take a cough suppressant unless your healthcare provider tells you to  Your healthcare provider may suggest that you take a cough suppressant at night so you can rest     · Inhalers  may be given  Your healthcare provider may give you one or more inhalers to help you breathe easier and cough less  An inhaler gives your medicine to open your airways  Ask your healthcare provider to show you how to use your inhaler correctly  · Take your medicine as directed  Contact your healthcare provider if you think your medicine is not helping or if you have side effects  Tell him of her if you are allergic to any medicine  Keep a list of the medicines, vitamins, and herbs you take  Include the amounts, and when and why you take them  Bring the list or the pill bottles to follow-up visits  Carry your medicine list with you in case of an emergency    Follow up with your healthcare provider as directed:  Write down questions you have so you will remember to ask them during your follow-up visits  © 2017 [address] Information is for End User's use only and may not be sold, redistributed or otherwise used for commercial purposes  All illustrations and images included in CareNotes® are the copyrighted property of A D A M , Inc  or Jesus Villa  The above information is an  only  It is not intended as medical advice for individual conditions or treatments  Talk to your doctor, nurse or pharmacist before following any medical regimen to see if it is safe and effective for you

## 2019-02-08 NOTE — ED PROVIDER NOTES
History  Chief Complaint   Patient presents with    Breathing Difficulty     c/o coughing and sore throat with difficulty breathing for past few days     This is a 49-year-old female who presents here with a chief complaint of cough and wheeze and increased respiratory symptoms over the last few days  She reports sore throat as well  She feels generally achy reporting myalgias  No nausea no vomiting no chest pain  No neck or back pain  She reports increased sputum production for yellowish sputum  She has a soft oxygen saturation around 94%-96%            Prior to Admission Medications   Prescriptions Last Dose Informant Patient Reported? Taking?    LORazepam (ATIVAN) 0 5 mg tablet   Yes No   Sig: Take 0 5 mg by mouth every 6 (six) hours as needed for anxiety   Methylprednisolone 4 MG TBPK   No No   Sig: Use as directed on package   PARoxetine (PAXIL) 10 mg tablet   Yes No   Sig: Take 10 mg by mouth every morning   albuterol (2 5 mg/3 mL) 0 083 % nebulizer solution   Yes No   Sig: Take 2 5 mg by nebulization every 6 (six) hours as needed for wheezing   albuterol (PROVENTIL HFA,VENTOLIN HFA) 90 mcg/act inhaler   Yes No   Sig: Inhale 2 puffs every 6 (six) hours as needed for wheezing   atorvastatin (LIPITOR) 10 mg tablet   Yes No   Sig: Take 10 mg by mouth daily   glimepiride (AMARYL) 4 mg tablet   Yes No   Sig: Take 4 mg by mouth every morning before breakfast   losartan-hydrochlorothiazide (HYZAAR) 50-12 5 mg per tablet   Yes No   Sig: Take 1 tablet by mouth daily   metFORMIN (GLUCOPHAGE) 500 mg tablet   No No   Sig: Take 1 tablet (500 mg total) by mouth 2 (two) times a day with meals Resume on 12/11/18   montelukast (SINGULAIR) 10 mg tablet   Yes No   Sig: Take 10 mg by mouth daily at bedtime   sitaGLIPtin (JANUVIA) 100 mg tablet   Yes No   Sig: Take 100 mg by mouth daily      Facility-Administered Medications: None       Past Medical History:   Diagnosis Date    Asthma     [facility]     Glaucoma     Hypertension        Past Surgical History:   Procedure Laterality Date    COLONOSCOPY N/A 12/8/2018    Procedure: COLONOSCOPY;  Surgeon: Jose Antonio Olsen MD;  Location: [facility]; Service: Gastroenterology       History reviewed  No pertinent family history  I have reviewed and agree with the history as documented  Social History   Substance Use Topics    Smoking status: Never Smoker    Smokeless tobacco: Never Used    Alcohol use No        Review of Systems   Constitutional: Negative for activity change, fatigue and fever  HENT: Negative for congestion, ear pain, rhinorrhea and sore throat  Eyes: Negative  Respiratory: Positive for cough and wheezing  Negative for shortness of breath  Cardiovascular: Negative for chest pain and leg swelling  Gastrointestinal: Negative for abdominal pain, diarrhea, nausea and vomiting  Endocrine: Negative  Genitourinary: Negative for difficulty urinating, dyspareunia, dysuria, flank pain, frequency, menstrual problem, pelvic pain, urgency, vaginal bleeding, vaginal discharge and vaginal pain  Musculoskeletal: Negative for arthralgias and myalgias  Skin: Negative for color change and pallor  Neurological: Negative for dizziness, speech difficulty, weakness and headaches  Hematological: Negative for adenopathy  Psychiatric/Behavioral: Negative for confusion  Physical Exam  Physical Exam   Constitutional: She is oriented to person, place, and time  She appears well-developed and well-nourished  She appears distressed (mildly)  HENT:   Head: Normocephalic and atraumatic  Nose: No rhinorrhea or sinus tenderness  Mouth/Throat: Mucous membranes are normal  No trismus in the jaw  Posterior oropharyngeal erythema present  No oropharyngeal exudate  Eyes: Pupils are equal, round, and reactive to light  Conjunctivae and EOM are normal  Right eye exhibits no discharge  Left eye exhibits no discharge     Neck: Normal range of motion  Neck supple  No JVD present  Cardiovascular: Normal rate and regular rhythm  Pulmonary/Chest: Effort normal  She has wheezes  Abdominal: Soft  She exhibits no distension  There is no guarding  Musculoskeletal: Normal range of motion  She exhibits edema (Baseline) and tenderness (generalized body aches)  Lymphadenopathy:     She has no cervical adenopathy  Neurological: She is alert and oriented to person, place, and time  Skin: Skin is warm  No rash noted  Psychiatric: She has a normal mood and affect  Vitals reviewed  Vital Signs  ED Triage Vitals [02/08/19 0918]   Temperature Pulse Respirations Blood Pressure SpO2   99 2 °F (37 3 °C) 102 20 (!) 182/81 96 %      Temp Source Heart Rate Source Patient Position - Orthostatic VS BP Location FiO2 (%)   Oral Monitor Sitting Right arm --      Pain Score       No Pain           Vitals:    02/08/19 0918 02/08/19 1115   BP: (!) 182/81 (!) 174/70   Pulse: 102 (!) 107   Patient Position - Orthostatic VS: Sitting Lying       Visual Acuity      ED Medications  Medications   albuterol inhalation solution 5 mg (5 mg Nebulization Given 2/8/19 0937)   ipratropium (ATROVENT) 0 02 % inhalation solution 0 5 mg (0 5 mg Nebulization Given 2/8/19 4063)       Diagnostic Studies  Results Reviewed     None                 XR chest pa & lateral   ED Interpretation by RON Salomon (02/08 1101)   Unchanged from prior                 Procedures  Procedures       Phone Contacts  ED Phone Contact    ED Course                               MDM  Number of Diagnoses or Management Options  Asthmatic bronchitis: new and requires workup  Diagnosis management comments: Chest x-ray appears unchanged from previous  Increased asthma symptoms including increased sputum production and increased cough  Also reporting myalgias suggestive acute infectious process likely bronchitis possibly 2nd influenza         Amount and/or Complexity of Data Reviewed  Tests in the radiology section of CPT®: reviewed and ordered  Independent visualization of images, tracings, or specimens: yes    Patient Progress  Patient progress: stable      Disposition  Final diagnoses:   Asthmatic bronchitis     Time reflects when diagnosis was documented in both MDM as applicable and the Disposition within this note     Time User Action Codes Description Comment    2/8/2019 11:10 AM Jeferson Christian Add [J45 [id number]] Asthmatic bronchitis       ED Disposition     ED Disposition Condition Date/Time Comment    Discharge  Fri Feb 8, 2019 11:10 AM Brianda Camargo discharge to home/self care  Condition at discharge: Stable        Follow-up Information     Follow up With Specialties Details Why [facility] Emergency Department Emergency Medicine  If your symptoms worsen, or you are not improving  [address] [facility] [address]  [phone] [facility], [address] [place] [address], [facility]          Patient's Medications   Discharge Prescriptions    AZITHROMYCIN (ZITHROMAX) 250 MG TABLET    Take 500 mg day 1, 250 mg days 2-5       Start Date: 2/8/2019  End Date: 2/13/2019       Order Dose: --       Quantity: 6 tablet    Refills: 0    PREDNISONE 20 MG TABLET    Take 2 tablets (40 mg total) by mouth daily for 5 days       Start Date: 2/8/2019  End Date: 2/13/2019       Order Dose: 40 mg       Quantity: 10 tablet    Refills: 0     No discharge procedures on file      ED Provider  Electronically Signed by           RON Watson  02/08/19 1567

## 2019-04-23 ENCOUNTER — APPOINTMENT (OUTPATIENT)
Dept: LAB | Facility: HOSPITAL | Age: 74
End: 2019-04-23
Payer: MEDICARE

## 2019-04-23 ENCOUNTER — TRANSCRIBE ORDERS (OUTPATIENT)
Dept: ADMINISTRATIVE | Facility: HOSPITAL | Age: 74
End: 2019-04-23

## 2019-04-23 DIAGNOSIS — R53.83 OTHER FATIGUE: ICD-10-CM

## 2019-04-23 DIAGNOSIS — E78.5 HYPERLIPIDEMIA, UNSPECIFIED HYPERLIPIDEMIA TYPE: ICD-10-CM

## 2019-04-23 DIAGNOSIS — E13.8 DIABETES MELLITUS OF OTHER TYPE WITH COMPLICATION, UNSPECIFIED WHETHER LONG TERM INSULIN USE: Primary | ICD-10-CM

## 2019-04-23 DIAGNOSIS — E13.8 DIABETES MELLITUS OF OTHER TYPE WITH COMPLICATION, UNSPECIFIED WHETHER LONG TERM INSULIN USE: ICD-10-CM

## 2019-04-23 LAB
ALBUMIN SERPL BCP-MCNC: 3.7 G/DL (ref 3.5–5)
ALP SERPL-CCNC: 76 U/L (ref 46–116)
ALT SERPL W P-5'-P-CCNC: 15 U/L (ref 12–78)
ANION GAP SERPL CALCULATED.3IONS-SCNC: 7 MMOL/L (ref 4–13)
AST SERPL W P-5'-P-CCNC: 16 U/L (ref 5–45)
BILIRUB DIRECT SERPL-MCNC: 0.17 MG/DL (ref 0–0.2)
BILIRUB SERPL-MCNC: 0.5 MG/DL (ref 0.2–1)
BUN SERPL-MCNC: 9 MG/DL (ref 5–25)
CALCIUM SERPL-MCNC: 9.1 MG/DL (ref 8.3–10.1)
CHLORIDE SERPL-SCNC: 89 MMOL/L (ref 100–108)
CO2 SERPL-SCNC: 34 MMOL/L (ref 21–32)
CREAT SERPL-MCNC: 0.54 MG/DL (ref 0.6–1.3)
ERYTHROCYTE [DISTWIDTH] IN BLOOD BY AUTOMATED COUNT: 14.6 % (ref 11.6–15.1)
EST. AVERAGE GLUCOSE BLD GHB EST-MCNC: 157 MG/DL
GFR SERPL CREATININE-BSD FRML MDRD: 93 ML/MIN/1.73SQ M
GLUCOSE P FAST SERPL-MCNC: 108 MG/DL (ref 65–99)
HBA1C MFR BLD: 7.1 % (ref 4.2–6.3)
HCT VFR BLD AUTO: 44.7 % (ref 34.8–46.1)
HGB BLD-MCNC: 14.6 G/DL (ref 11.5–15.4)
MCH RBC QN AUTO: 29 PG (ref 26.8–34.3)
MCHC RBC AUTO-ENTMCNC: 32.7 G/DL (ref 31.4–37.4)
MCV RBC AUTO: 89 FL (ref 82–98)
PLATELET # BLD AUTO: 349 THOUSANDS/UL (ref 149–390)
PMV BLD AUTO: 8.7 FL (ref 8.9–12.7)
POTASSIUM SERPL-SCNC: 3.4 MMOL/L (ref 3.5–5.3)
PROT SERPL-MCNC: 7.9 G/DL (ref 6.4–8.2)
RBC # BLD AUTO: 5.03 MILLION/UL (ref 3.81–5.12)
SODIUM SERPL-SCNC: 130 MMOL/L (ref 136–145)
WBC # BLD AUTO: 6.77 THOUSAND/UL (ref 4.31–10.16)

## 2019-04-23 PROCEDURE — 85027 COMPLETE CBC AUTOMATED: CPT

## 2019-04-23 PROCEDURE — 36415 COLL VENOUS BLD VENIPUNCTURE: CPT

## 2019-04-23 PROCEDURE — 80076 HEPATIC FUNCTION PANEL: CPT

## 2019-04-23 PROCEDURE — 83036 HEMOGLOBIN GLYCOSYLATED A1C: CPT

## 2019-04-23 PROCEDURE — 80048 BASIC METABOLIC PNL TOTAL CA: CPT

## 2019-09-05 ENCOUNTER — TRANSCRIBE ORDERS (OUTPATIENT)
Dept: ADMINISTRATIVE | Facility: HOSPITAL | Age: 74
End: 2019-09-05

## 2019-09-05 ENCOUNTER — APPOINTMENT (OUTPATIENT)
Dept: LAB | Facility: HOSPITAL | Age: 74
End: 2019-09-05
Payer: MEDICARE

## 2019-09-05 DIAGNOSIS — R53.83 TIREDNESS: ICD-10-CM

## 2019-09-05 DIAGNOSIS — E11.9 DIABETES MELLITUS WITHOUT COMPLICATION (HCC): Primary | ICD-10-CM

## 2019-09-05 DIAGNOSIS — E11.9 DIABETES MELLITUS WITHOUT COMPLICATION (HCC): ICD-10-CM

## 2019-09-05 DIAGNOSIS — E78.5 HYPERLIPIDEMIA, UNSPECIFIED HYPERLIPIDEMIA TYPE: ICD-10-CM

## 2019-09-05 LAB
ALBUMIN SERPL BCP-MCNC: 3.7 G/DL (ref 3.5–5)
ALP SERPL-CCNC: 69 U/L (ref 46–116)
ALT SERPL W P-5'-P-CCNC: 17 U/L (ref 12–78)
AST SERPL W P-5'-P-CCNC: 24 U/L (ref 5–45)
BILIRUB DIRECT SERPL-MCNC: 0.17 MG/DL (ref 0–0.2)
BILIRUB SERPL-MCNC: 0.6 MG/DL (ref 0.2–1)
CHOLEST SERPL-MCNC: 134 MG/DL (ref 50–200)
EST. AVERAGE GLUCOSE BLD GHB EST-MCNC: 157 MG/DL
GLUCOSE SERPL-MCNC: 164 MG/DL (ref 65–140)
HBA1C MFR BLD: 7.1 % (ref 4.2–6.3)
HDLC SERPL-MCNC: 73 MG/DL (ref 40–60)
LDLC SERPL CALC-MCNC: 27 MG/DL (ref 0–100)
NONHDLC SERPL-MCNC: 61 MG/DL
PROT SERPL-MCNC: 8.1 G/DL (ref 6.4–8.2)
TRIGL SERPL-MCNC: 170 MG/DL

## 2019-09-05 PROCEDURE — 80061 LIPID PANEL: CPT

## 2019-09-05 PROCEDURE — 36415 COLL VENOUS BLD VENIPUNCTURE: CPT

## 2019-09-05 PROCEDURE — 80076 HEPATIC FUNCTION PANEL: CPT

## 2019-09-05 PROCEDURE — 83036 HEMOGLOBIN GLYCOSYLATED A1C: CPT

## 2019-09-05 PROCEDURE — 82947 ASSAY GLUCOSE BLOOD QUANT: CPT

## 2020-01-10 ENCOUNTER — TRANSCRIBE ORDERS (OUTPATIENT)
Dept: ADMINISTRATIVE | Facility: HOSPITAL | Age: 75
End: 2020-01-10

## 2020-01-10 ENCOUNTER — APPOINTMENT (OUTPATIENT)
Dept: LAB | Facility: HOSPITAL | Age: 75
End: 2020-01-10
Payer: COMMERCIAL

## 2020-01-10 DIAGNOSIS — E13.69 OTHER SPECIFIED DIABETES MELLITUS WITH OTHER SPECIFIED COMPLICATION, UNSPECIFIED WHETHER LONG TERM INSULIN USE (HCC): Primary | ICD-10-CM

## 2020-01-10 DIAGNOSIS — E78.5 HYPERLIPIDEMIA, UNSPECIFIED HYPERLIPIDEMIA TYPE: ICD-10-CM

## 2020-01-10 DIAGNOSIS — R53.83 OTHER FATIGUE: ICD-10-CM

## 2020-01-10 DIAGNOSIS — E13.69 OTHER SPECIFIED DIABETES MELLITUS WITH OTHER SPECIFIED COMPLICATION, UNSPECIFIED WHETHER LONG TERM INSULIN USE (HCC): ICD-10-CM

## 2020-01-10 LAB
ALBUMIN SERPL BCP-MCNC: 3.8 G/DL (ref 3.5–5)
ALP SERPL-CCNC: 89 U/L (ref 46–116)
ALT SERPL W P-5'-P-CCNC: 17 U/L (ref 12–78)
AST SERPL W P-5'-P-CCNC: 16 U/L (ref 5–45)
BILIRUB DIRECT SERPL-MCNC: 0.17 MG/DL (ref 0–0.2)
BILIRUB SERPL-MCNC: 0.6 MG/DL (ref 0.2–1)
CHOLEST SERPL-MCNC: 156 MG/DL (ref 50–200)
EST. AVERAGE GLUCOSE BLD GHB EST-MCNC: 157 MG/DL
GLUCOSE P FAST SERPL-MCNC: 124 MG/DL (ref 65–99)
HBA1C MFR BLD: 7.1 % (ref 4.2–6.3)
HDLC SERPL-MCNC: 76 MG/DL
LDLC SERPL CALC-MCNC: 53 MG/DL (ref 0–100)
NONHDLC SERPL-MCNC: 80 MG/DL
PROT SERPL-MCNC: 8.1 G/DL (ref 6.4–8.2)
TRIGL SERPL-MCNC: 135 MG/DL

## 2020-01-10 PROCEDURE — 80061 LIPID PANEL: CPT

## 2020-01-10 PROCEDURE — 83036 HEMOGLOBIN GLYCOSYLATED A1C: CPT

## 2020-01-10 PROCEDURE — 80076 HEPATIC FUNCTION PANEL: CPT

## 2020-01-10 PROCEDURE — 82947 ASSAY GLUCOSE BLOOD QUANT: CPT

## 2020-01-10 PROCEDURE — 36415 COLL VENOUS BLD VENIPUNCTURE: CPT

## 2020-07-24 ENCOUNTER — APPOINTMENT (OUTPATIENT)
Dept: LAB | Facility: HOSPITAL | Age: 75
End: 2020-07-24
Payer: COMMERCIAL

## 2020-07-24 ENCOUNTER — TRANSCRIBE ORDERS (OUTPATIENT)
Dept: ADMINISTRATIVE | Facility: HOSPITAL | Age: 75
End: 2020-07-24

## 2020-07-24 DIAGNOSIS — E78.5 HYPERLIPIDEMIA, UNSPECIFIED HYPERLIPIDEMIA TYPE: ICD-10-CM

## 2020-07-24 DIAGNOSIS — R53.83 TIREDNESS: ICD-10-CM

## 2020-07-24 DIAGNOSIS — E11.9 DIABETES MELLITUS WITHOUT COMPLICATION (HCC): ICD-10-CM

## 2020-07-24 DIAGNOSIS — E11.9 DIABETES MELLITUS WITHOUT COMPLICATION (HCC): Primary | ICD-10-CM

## 2020-07-24 LAB
ALBUMIN SERPL BCP-MCNC: 3.5 G/DL (ref 3.5–5)
ALP SERPL-CCNC: 63 U/L (ref 46–116)
ALT SERPL W P-5'-P-CCNC: 15 U/L (ref 12–78)
ANION GAP SERPL CALCULATED.3IONS-SCNC: 6 MMOL/L (ref 4–13)
AST SERPL W P-5'-P-CCNC: 22 U/L (ref 5–45)
BILIRUB DIRECT SERPL-MCNC: 0.16 MG/DL (ref 0–0.2)
BILIRUB SERPL-MCNC: 0.5 MG/DL (ref 0.2–1)
BUN SERPL-MCNC: 11 MG/DL (ref 5–25)
CALCIUM SERPL-MCNC: 8.8 MG/DL (ref 8.3–10.1)
CHLORIDE SERPL-SCNC: 100 MMOL/L (ref 100–108)
CHOLEST SERPL-MCNC: 155 MG/DL (ref 50–200)
CO2 SERPL-SCNC: 32 MMOL/L (ref 21–32)
CREAT SERPL-MCNC: 0.68 MG/DL (ref 0.6–1.3)
ERYTHROCYTE [DISTWIDTH] IN BLOOD BY AUTOMATED COUNT: 16 % (ref 11.6–15.1)
EST. AVERAGE GLUCOSE BLD GHB EST-MCNC: 163 MG/DL
GFR SERPL CREATININE-BSD FRML MDRD: 85 ML/MIN/1.73SQ M
GLUCOSE P FAST SERPL-MCNC: 168 MG/DL (ref 65–99)
HBA1C MFR BLD: 7.3 %
HCT VFR BLD AUTO: 46 % (ref 34.8–46.1)
HDLC SERPL-MCNC: 74 MG/DL
HGB BLD-MCNC: 14.5 G/DL (ref 11.5–15.4)
LDLC SERPL CALC-MCNC: 58 MG/DL (ref 0–100)
MCH RBC QN AUTO: 29.4 PG (ref 26.8–34.3)
MCHC RBC AUTO-ENTMCNC: 31.5 G/DL (ref 31.4–37.4)
MCV RBC AUTO: 93 FL (ref 82–98)
NONHDLC SERPL-MCNC: 81 MG/DL
PLATELET # BLD AUTO: 310 THOUSANDS/UL (ref 149–390)
PMV BLD AUTO: 9 FL (ref 8.9–12.7)
POTASSIUM SERPL-SCNC: 3.8 MMOL/L (ref 3.5–5.3)
PROT SERPL-MCNC: 7.1 G/DL (ref 6.4–8.2)
RBC # BLD AUTO: 4.94 MILLION/UL (ref 3.81–5.12)
SODIUM SERPL-SCNC: 138 MMOL/L (ref 136–145)
TRIGL SERPL-MCNC: 117 MG/DL
WBC # BLD AUTO: 7.06 THOUSAND/UL (ref 4.31–10.16)

## 2020-07-24 PROCEDURE — 36415 COLL VENOUS BLD VENIPUNCTURE: CPT

## 2020-07-24 PROCEDURE — 80061 LIPID PANEL: CPT

## 2020-07-24 PROCEDURE — 83036 HEMOGLOBIN GLYCOSYLATED A1C: CPT

## 2020-07-24 PROCEDURE — 80053 COMPREHEN METABOLIC PANEL: CPT

## 2020-07-24 PROCEDURE — 82248 BILIRUBIN DIRECT: CPT

## 2020-07-24 PROCEDURE — 85027 COMPLETE CBC AUTOMATED: CPT

## 2021-01-12 ENCOUNTER — TRANSCRIBE ORDERS (OUTPATIENT)
Dept: ADMINISTRATIVE | Facility: HOSPITAL | Age: 76
End: 2021-01-12

## 2021-01-12 ENCOUNTER — LAB (OUTPATIENT)
Dept: LAB | Facility: HOSPITAL | Age: 76
End: 2021-01-12
Payer: COMMERCIAL

## 2021-01-12 DIAGNOSIS — R53.83 TIREDNESS: ICD-10-CM

## 2021-01-12 DIAGNOSIS — E11.9 DIABETES MELLITUS WITHOUT COMPLICATION (HCC): ICD-10-CM

## 2021-01-12 DIAGNOSIS — E03.9 ADULT HYPOTHYROIDISM: ICD-10-CM

## 2021-01-12 DIAGNOSIS — E78.5 HYPERLIPIDEMIA, UNSPECIFIED HYPERLIPIDEMIA TYPE: ICD-10-CM

## 2021-01-12 DIAGNOSIS — E11.9 DIABETES MELLITUS WITHOUT COMPLICATION (HCC): Primary | ICD-10-CM

## 2021-01-12 LAB
ALBUMIN SERPL BCP-MCNC: 3.6 G/DL (ref 3.5–5)
ALP SERPL-CCNC: 75 U/L (ref 46–116)
ALT SERPL W P-5'-P-CCNC: 15 U/L (ref 12–78)
ANION GAP SERPL CALCULATED.3IONS-SCNC: 6 MMOL/L (ref 4–13)
AST SERPL W P-5'-P-CCNC: 15 U/L (ref 5–45)
BILIRUB SERPL-MCNC: 0.5 MG/DL (ref 0.2–1)
BUN SERPL-MCNC: 12 MG/DL (ref 5–25)
CALCIUM SERPL-MCNC: 8.9 MG/DL (ref 8.3–10.1)
CHLORIDE SERPL-SCNC: 92 MMOL/L (ref 100–108)
CHOLEST SERPL-MCNC: 144 MG/DL (ref 50–200)
CO2 SERPL-SCNC: 32 MMOL/L (ref 21–32)
CREAT SERPL-MCNC: 0.63 MG/DL (ref 0.6–1.3)
ERYTHROCYTE [DISTWIDTH] IN BLOOD BY AUTOMATED COUNT: 13.8 % (ref 11.6–15.1)
EST. AVERAGE GLUCOSE BLD GHB EST-MCNC: 148 MG/DL
GFR SERPL CREATININE-BSD FRML MDRD: 87 ML/MIN/1.73SQ M
GLUCOSE P FAST SERPL-MCNC: 147 MG/DL (ref 65–99)
HBA1C MFR BLD: 6.8 %
HCT VFR BLD AUTO: 42.5 % (ref 34.8–46.1)
HDLC SERPL-MCNC: 75 MG/DL
HGB BLD-MCNC: 13.4 G/DL (ref 11.5–15.4)
LDLC SERPL CALC-MCNC: 50 MG/DL (ref 0–100)
MCH RBC QN AUTO: 26.8 PG (ref 26.8–34.3)
MCHC RBC AUTO-ENTMCNC: 31.5 G/DL (ref 31.4–37.4)
MCV RBC AUTO: 85 FL (ref 82–98)
NONHDLC SERPL-MCNC: 69 MG/DL
PLATELET # BLD AUTO: 373 THOUSANDS/UL (ref 149–390)
PMV BLD AUTO: 8.7 FL (ref 8.9–12.7)
POTASSIUM SERPL-SCNC: 4 MMOL/L (ref 3.5–5.3)
PROT SERPL-MCNC: 8.1 G/DL (ref 6.4–8.2)
RBC # BLD AUTO: 5 MILLION/UL (ref 3.81–5.12)
SODIUM SERPL-SCNC: 130 MMOL/L (ref 136–145)
TRIGL SERPL-MCNC: 93 MG/DL
TSH SERPL DL<=0.05 MIU/L-ACNC: 1.37 UIU/ML (ref 0.36–3.74)
WBC # BLD AUTO: 7.9 THOUSAND/UL (ref 4.31–10.16)

## 2021-01-12 PROCEDURE — 80061 LIPID PANEL: CPT

## 2021-01-12 PROCEDURE — 80053 COMPREHEN METABOLIC PANEL: CPT

## 2021-01-12 PROCEDURE — 36415 COLL VENOUS BLD VENIPUNCTURE: CPT

## 2021-01-12 PROCEDURE — 83036 HEMOGLOBIN GLYCOSYLATED A1C: CPT

## 2021-01-12 PROCEDURE — 84443 ASSAY THYROID STIM HORMONE: CPT

## 2021-01-12 PROCEDURE — 85027 COMPLETE CBC AUTOMATED: CPT

## 2021-01-22 DIAGNOSIS — Z23 ENCOUNTER FOR IMMUNIZATION: ICD-10-CM

## 2021-02-05 ENCOUNTER — IMMUNIZATIONS (OUTPATIENT)
Dept: FAMILY MEDICINE CLINIC | Facility: HOSPITAL | Age: 76
End: 2021-02-05

## 2021-02-05 DIAGNOSIS — Z23 ENCOUNTER FOR IMMUNIZATION: Primary | ICD-10-CM

## 2021-03-03 ENCOUNTER — IMMUNIZATIONS (OUTPATIENT)
Dept: FAMILY MEDICINE CLINIC | Facility: HOSPITAL | Age: 76
End: 2021-03-03

## 2021-03-03 DIAGNOSIS — Z23 ENCOUNTER FOR IMMUNIZATION: Primary | ICD-10-CM

## 2021-03-03 PROCEDURE — 91301 SARS-COV-2 / COVID-19 MRNA VACCINE (MODERNA) 100 MCG: CPT

## 2021-03-03 PROCEDURE — 0012A SARS-COV-2 / COVID-19 MRNA VACCINE (MODERNA) 100 MCG: CPT

## 2021-06-08 ENCOUNTER — HOSPITAL ENCOUNTER (EMERGENCY)
Facility: HOSPITAL | Age: 76
Discharge: HOME | End: 2021-06-08
Attending: EMERGENCY MEDICINE | Admitting: EMERGENCY MEDICINE
Payer: COMMERCIAL

## 2021-06-08 ENCOUNTER — APPOINTMENT (EMERGENCY)
Dept: RADIOLOGY | Facility: HOSPITAL | Age: 76
End: 2021-06-08
Payer: COMMERCIAL

## 2021-06-08 VITALS
SYSTOLIC BLOOD PRESSURE: 131 MMHG | RESPIRATION RATE: 19 BRPM | WEIGHT: 207.23 LBS | TEMPERATURE: 99 F | DIASTOLIC BLOOD PRESSURE: 78 MMHG | BODY MASS INDEX: 40.47 KG/M2 | HEART RATE: 101 BPM | OXYGEN SATURATION: 89 %

## 2021-06-08 DIAGNOSIS — S76.012A HIP STRAIN, LEFT, INITIAL ENCOUNTER: Primary | ICD-10-CM

## 2021-06-08 PROCEDURE — 73552 X-RAY EXAM OF FEMUR 2/>: CPT

## 2021-06-08 PROCEDURE — 99284 EMERGENCY DEPT VISIT MOD MDM: CPT | Performed by: EMERGENCY MEDICINE

## 2021-06-08 PROCEDURE — 99283 EMERGENCY DEPT VISIT LOW MDM: CPT

## 2021-06-08 PROCEDURE — 73502 X-RAY EXAM HIP UNI 2-3 VIEWS: CPT

## 2021-06-08 RX ORDER — IBUPROFEN 600 MG/1
600 TABLET ORAL ONCE
Status: DISCONTINUED | OUTPATIENT
Start: 2021-06-08 | End: 2021-06-08 | Stop reason: HOSPADM

## 2021-06-08 RX ORDER — IBUPROFEN 600 MG/1
600 TABLET ORAL EVERY 6 HOURS PRN
Qty: 30 TABLET | Refills: 0 | Status: SHIPPED | OUTPATIENT
Start: 2021-06-08 | End: 2021-06-29 | Stop reason: HOSPADM

## 2021-06-08 NOTE — ED PROVIDER NOTES
History  Chief Complaint   Patient presents with    Leg Pain     pt c/o left hip and leg pain for three days  states that she thought she pulled something when rolling over in bed     Patient is a 64-year-old female  She rolled over in bed about 3 days ago and pulled something in her left hip  She has been complaining of pain there since  No associated motor or sensory complaints  No fever  No relief with Lay Chaffee or Tylenol  Prior to Admission Medications   Prescriptions Last Dose Informant Patient Reported? Taking?    LORazepam (ATIVAN) 0 5 mg tablet   Yes No   Sig: Take 0 5 mg by mouth every 6 (six) hours as needed for anxiety   Methylprednisolone 4 MG TBPK   No No   Sig: Use as directed on package   PARoxetine (PAXIL) 10 mg tablet   Yes No   Sig: Take 10 mg by mouth every morning   albuterol (2 5 mg/3 mL) 0 083 % nebulizer solution   Yes No   Sig: Take 2 5 mg by nebulization every 6 (six) hours as needed for wheezing   albuterol (PROVENTIL HFA,VENTOLIN HFA) 90 mcg/act inhaler   Yes No   Sig: Inhale 2 puffs every 6 (six) hours as needed for wheezing   atorvastatin (LIPITOR) 10 mg tablet   Yes No   Sig: Take 10 mg by mouth daily   glimepiride (AMARYL) 4 mg tablet   Yes No   Sig: Take 4 mg by mouth every morning before breakfast   losartan-hydrochlorothiazide (HYZAAR) 50-12 5 mg per tablet   Yes No   Sig: Take 1 tablet by mouth daily   metFORMIN (GLUCOPHAGE) 500 mg tablet   No No   Sig: Take 1 tablet (500 mg total) by mouth 2 (two) times a day with meals Resume on 12/11/18   montelukast (SINGULAIR) 10 mg tablet   Yes No   Sig: Take 10 mg by mouth daily at bedtime   sitaGLIPtin (JANUVIA) 100 mg tablet   Yes No   Sig: Take 100 mg by mouth daily      Facility-Administered Medications: None       Past Medical History:   Diagnosis Date    Asthma     Diabetes mellitus ([facility] [address] )     Glaucoma     Hypertension        Past Surgical History:   Procedure Laterality Date    COLONOSCOPY N/A 12/8/2018    Procedure: COLONOSCOPY;  Surgeon: Ron Flores MD;  Location: [facility]; Service: Gastroenterology       History reviewed  No pertinent family history  I have reviewed and agree with the history as documented  E-Cigarette/Vaping    E-Cigarette Use Never User      E-Cigarette/Vaping Substances     Social History     Tobacco Use    Smoking status: Never Smoker    Smokeless tobacco: Never Used   Substance Use Topics    Alcohol use: No    Drug use: No       Review of Systems   Skin: Negative for pallor and wound  Neurological: Negative for weakness and numbness  All other systems reviewed and are negative  Physical Exam  Physical Exam  Vitals signs reviewed  Constitutional:       General: She is not in acute distress  Appearance: She is obese  HENT:      Head: Normocephalic and atraumatic  Nose: Nose normal       Mouth/Throat:      Mouth: Mucous membranes are moist    Eyes:      General:         Right eye: No discharge  Left eye: No discharge  Conjunctiva/sclera: Conjunctivae normal    Neck:      Musculoskeletal: Normal range of motion and neck supple  Cardiovascular:      Rate and Rhythm: Normal rate and regular rhythm  Pulmonary:      Effort: Pulmonary effort is normal  No respiratory distress  Musculoskeletal:         General: Signs of injury present  No deformity  Comments: There is no point tenderness  Normal inspection  Neurovascular exam is normal   Patient has fairly decent range of motion, though with pain  No bruising  No swelling  Skin:     General: Skin is warm and dry  Findings: No rash  Neurological:      General: No focal deficit present  Mental Status: She is alert and oriented to person, place, and time     Psychiatric:         Mood and Affect: Mood normal          Behavior: Behavior normal          Vital Signs  ED Triage Vitals [06/08/21 0820]   Temperature Pulse Respirations Blood Pressure SpO2   99 °F (37 2 °C) 101 19 131/78 (!) 89 %      Temp Source Heart Rate Source Patient Position - Orthostatic VS BP Location FiO2 (%)   Oral Monitor Sitting Left arm --      Pain Score       9           Vitals:    06/08/21 0820   BP: 131/78   Pulse: 101   Patient Position - Orthostatic VS: Sitting         Visual Acuity      ED Medications  Medications   ibuprofen (MOTRIN) tablet 600 mg (has no administration in time range)       Diagnostic Studies  Results Reviewed     None                 XR hip/pelv 2-3 vws left   ED Interpretation by Ollie Beck MD (06/08 1021)   No fracture or dislocation      XR femur 2 views LEFT   ED Interpretation by Ollie Beck MD (06/08 1021)   No fracture or dislocation                 Procedures  Procedures         ED Course                                           MDM  Number of Diagnoses or Management Options  Diagnosis management comments: Imaging is negative for fracture or dislocation  Most likely muscular  Possibly ligamentous  Patient can ambulate with walker  Appropriate for discharge and outpatient management with follow-up with Orthopedics  Amount and/or Complexity of Data Reviewed  Tests in the radiology section of CPT®: ordered and reviewed  Independent visualization of images, tracings, or specimens: yes        Disposition  Final diagnoses:   Hip strain, left, initial encounter     Time reflects when diagnosis was documented in both MDM as applicable and the Disposition within this note     Time User Action Codes Description Comment    6/8/2021 10:36 AM Shakira Lao Add [S76 [id number]] Hip strain, left, initial encounter       ED Disposition     ED Disposition Condition Date/Time Comment    Discharge Stable Tue Jun 8, 2021 10:36 AM [facility] DESHAWN Mullen discharge to home/self care              Follow-up Information     Follow up With Specialties Details Why Contact Info Additional [address] In 1 week  [address] [facility], [id number] [place] [address], [address] [facility]          Patient's Medications   Discharge Prescriptions    IBUPROFEN (MOTRIN) 600 MG TABLET    Take 1 tablet (600 mg total) by mouth every 6 (six) hours as needed (Pain)       Start Date: 6/8/2021  End Date: --       Order Dose: 600 mg       Quantity: 30 tablet    Refills: 0     No discharge procedures on file      PDMP Review     None          ED Provider  Electronically Signed by           Marj Troy MD  06/08/21 1037

## 2021-06-19 ENCOUNTER — HOSPITAL ENCOUNTER (INPATIENT)
Facility: HOSPITAL | Age: 76
LOS: 9 days | Discharge: NURSING, REHAB OR LONG-TERM CARE | DRG: 071 | End: 2021-06-29
Attending: EMERGENCY MEDICINE | Admitting: STUDENT IN AN ORGANIZED HEALTH CARE EDUCATION/TRAINING PROGRAM
Payer: COMMERCIAL

## 2021-06-19 ENCOUNTER — APPOINTMENT (EMERGENCY)
Dept: CT IMAGING | Facility: HOSPITAL | Age: 76
DRG: 071 | End: 2021-06-19
Payer: COMMERCIAL

## 2021-06-19 ENCOUNTER — APPOINTMENT (EMERGENCY)
Dept: RADIOLOGY | Facility: HOSPITAL | Age: 76
DRG: 071 | End: 2021-06-19
Payer: COMMERCIAL

## 2021-06-19 DIAGNOSIS — F41.9 ANXIETY DISORDER: ICD-10-CM

## 2021-06-19 DIAGNOSIS — E87.1 HYPONATREMIA: ICD-10-CM

## 2021-06-19 DIAGNOSIS — I27.20 PULMONARY HYPERTENSION (HCC): ICD-10-CM

## 2021-06-19 DIAGNOSIS — K92.1 GASTROINTESTINAL HEMORRHAGE WITH MELENA: ICD-10-CM

## 2021-06-19 DIAGNOSIS — R41.82 ALTERED MENTAL STATUS: Primary | ICD-10-CM

## 2021-06-19 DIAGNOSIS — E87.1 ACUTE HYPONATREMIA: ICD-10-CM

## 2021-06-19 LAB
ALBUMIN SERPL BCP-MCNC: 4 G/DL (ref 3.5–5)
ALP SERPL-CCNC: 45 U/L (ref 46–116)
ALT SERPL W P-5'-P-CCNC: 36 U/L (ref 12–78)
AMPHETAMINES SERPL QL SCN: NEGATIVE
ANION GAP SERPL CALCULATED.3IONS-SCNC: 4 MMOL/L (ref 4–13)
APAP SERPL-MCNC: <2 UG/ML (ref 10–20)
AST SERPL W P-5'-P-CCNC: 50 U/L (ref 5–45)
BACTERIA UR QL AUTO: ABNORMAL /HPF
BARBITURATES UR QL: NEGATIVE
BASOPHILS # BLD AUTO: 0.01 THOUSANDS/ÂΜL (ref 0–0.1)
BASOPHILS NFR BLD AUTO: 0 % (ref 0–1)
BENZODIAZ UR QL: NEGATIVE
BILIRUB DIRECT SERPL-MCNC: 0.38 MG/DL (ref 0–0.2)
BILIRUB SERPL-MCNC: 1 MG/DL (ref 0.2–1)
BILIRUB UR QL STRIP: NEGATIVE
BUN SERPL-MCNC: 16 MG/DL (ref 5–25)
CALCIUM SERPL-MCNC: 8.8 MG/DL (ref 8.3–10.1)
CHLORIDE SERPL-SCNC: 77 MMOL/L (ref 100–108)
CLARITY UR: CLEAR
CO2 SERPL-SCNC: 35 MMOL/L (ref 21–32)
COCAINE UR QL: NEGATIVE
COLOR UR: YELLOW
CREAT SERPL-MCNC: 0.69 MG/DL (ref 0.6–1.3)
D DIMER PPP FEU-MCNC: 0.72 UG/ML FEU
EOSINOPHIL # BLD AUTO: 0.01 THOUSAND/ÂΜL (ref 0–0.61)
EOSINOPHIL NFR BLD AUTO: 0 % (ref 0–6)
ERYTHROCYTE [DISTWIDTH] IN BLOOD BY AUTOMATED COUNT: 14.6 % (ref 11.6–15.1)
ETHANOL SERPL-MCNC: <3 MG/DL (ref 0–3)
GFR SERPL CREATININE-BSD FRML MDRD: 84 ML/MIN/1.73SQ M
GLUCOSE SERPL-MCNC: 116 MG/DL (ref 65–140)
GLUCOSE SERPL-MCNC: 73 MG/DL (ref 65–140)
GLUCOSE UR STRIP-MCNC: NEGATIVE MG/DL
HCT VFR BLD AUTO: 44.3 % (ref 34.8–46.1)
HGB BLD-MCNC: 14.4 G/DL (ref 11.5–15.4)
HGB UR QL STRIP.AUTO: NEGATIVE
IMM GRANULOCYTES # BLD AUTO: 0.04 THOUSAND/UL (ref 0–0.2)
IMM GRANULOCYTES NFR BLD AUTO: 1 % (ref 0–2)
KETONES UR STRIP-MCNC: ABNORMAL MG/DL
LACTATE SERPL-SCNC: 0.9 MMOL/L (ref 0.5–2)
LEUKOCYTE ESTERASE UR QL STRIP: NEGATIVE
LIPASE SERPL-CCNC: 51 U/L (ref 73–393)
LYMPHOCYTES # BLD AUTO: 0.87 THOUSANDS/ÂΜL (ref 0.6–4.47)
LYMPHOCYTES NFR BLD AUTO: 13 % (ref 14–44)
MAGNESIUM SERPL-MCNC: 2 MG/DL (ref 1.6–2.6)
MCH RBC QN AUTO: 26 PG (ref 26.8–34.3)
MCHC RBC AUTO-ENTMCNC: 32.5 G/DL (ref 31.4–37.4)
MCV RBC AUTO: 80 FL (ref 82–98)
METHADONE UR QL: NEGATIVE
MONOCYTES # BLD AUTO: 0.64 THOUSAND/ÂΜL (ref 0.17–1.22)
MONOCYTES NFR BLD AUTO: 10 % (ref 4–12)
NEUTROPHILS # BLD AUTO: 5.13 THOUSANDS/ÂΜL (ref 1.85–7.62)
NEUTS SEG NFR BLD AUTO: 76 % (ref 43–75)
NITRITE UR QL STRIP: NEGATIVE
NON-SQ EPI CELLS URNS QL MICRO: ABNORMAL /HPF
NRBC BLD AUTO-RTO: 0 /100 WBCS
NT-PROBNP SERPL-MCNC: 7077 PG/ML
OPIATES UR QL SCN: NEGATIVE
OXYCODONE+OXYMORPHONE UR QL SCN: NEGATIVE
PCP UR QL: NEGATIVE
PH UR STRIP.AUTO: 6.5 [PH]
PLATELET # BLD AUTO: 174 THOUSANDS/UL (ref 149–390)
PMV BLD AUTO: 10.3 FL (ref 8.9–12.7)
POTASSIUM SERPL-SCNC: 3.5 MMOL/L (ref 3.5–5.3)
PROT SERPL-MCNC: 7.1 G/DL (ref 6.4–8.2)
PROT UR STRIP-MCNC: ABNORMAL MG/DL
RBC # BLD AUTO: 5.54 MILLION/UL (ref 3.81–5.12)
RBC #/AREA URNS AUTO: ABNORMAL /HPF
SALICYLATES SERPL-MCNC: <3 MG/DL (ref 3–20)
SARS-COV-2 RNA RESP QL NAA+PROBE: NEGATIVE
SODIUM SERPL-SCNC: 116 MMOL/L (ref 136–145)
SP GR UR STRIP.AUTO: 1.02 (ref 1–1.03)
THC UR QL: NEGATIVE
TROPONIN I SERPL-MCNC: 0.03 NG/ML
TSH SERPL DL<=0.05 MIU/L-ACNC: 0.47 UIU/ML (ref 0.36–3.74)
UROBILINOGEN UR QL STRIP.AUTO: 0.2 E.U./DL
WBC # BLD AUTO: 6.7 THOUSAND/UL (ref 4.31–10.16)
WBC #/AREA URNS AUTO: ABNORMAL /HPF

## 2021-06-19 PROCEDURE — U0005 INFEC AGEN DETEC AMPLI PROBE: HCPCS | Performed by: EMERGENCY MEDICINE

## 2021-06-19 PROCEDURE — 82570 ASSAY OF URINE CREATININE: CPT | Performed by: EMERGENCY MEDICINE

## 2021-06-19 PROCEDURE — 80048 BASIC METABOLIC PNL TOTAL CA: CPT | Performed by: EMERGENCY MEDICINE

## 2021-06-19 PROCEDURE — 36415 COLL VENOUS BLD VENIPUNCTURE: CPT | Performed by: EMERGENCY MEDICINE

## 2021-06-19 PROCEDURE — U0003 INFECTIOUS AGENT DETECTION BY NUCLEIC ACID (DNA OR RNA); SEVERE ACUTE RESPIRATORY SYNDROME CORONAVIRUS 2 (SARS-COV-2) (CORONAVIRUS DISEASE [COVID-19]), AMPLIFIED PROBE TECHNIQUE, MAKING USE OF HIGH THROUGHPUT TECHNOLOGIES AS DESCRIBED BY CMS-2020-01-R: HCPCS | Performed by: EMERGENCY MEDICINE

## 2021-06-19 PROCEDURE — 84484 ASSAY OF TROPONIN QUANT: CPT | Performed by: EMERGENCY MEDICINE

## 2021-06-19 PROCEDURE — 87040 BLOOD CULTURE FOR BACTERIA: CPT | Performed by: EMERGENCY MEDICINE

## 2021-06-19 PROCEDURE — 84300 ASSAY OF URINE SODIUM: CPT | Performed by: EMERGENCY MEDICINE

## 2021-06-19 PROCEDURE — 85379 FIBRIN DEGRADATION QUANT: CPT | Performed by: EMERGENCY MEDICINE

## 2021-06-19 PROCEDURE — 83880 ASSAY OF NATRIURETIC PEPTIDE: CPT | Performed by: EMERGENCY MEDICINE

## 2021-06-19 PROCEDURE — 85025 COMPLETE CBC W/AUTO DIFF WBC: CPT | Performed by: EMERGENCY MEDICINE

## 2021-06-19 PROCEDURE — 96365 THER/PROPH/DIAG IV INF INIT: CPT

## 2021-06-19 PROCEDURE — 81001 URINALYSIS AUTO W/SCOPE: CPT | Performed by: EMERGENCY MEDICINE

## 2021-06-19 PROCEDURE — 99291 CRITICAL CARE FIRST HOUR: CPT | Performed by: EMERGENCY MEDICINE

## 2021-06-19 PROCEDURE — 96366 THER/PROPH/DIAG IV INF ADDON: CPT

## 2021-06-19 PROCEDURE — 83605 ASSAY OF LACTIC ACID: CPT | Performed by: EMERGENCY MEDICINE

## 2021-06-19 PROCEDURE — 71045 X-RAY EXAM CHEST 1 VIEW: CPT

## 2021-06-19 PROCEDURE — 83935 ASSAY OF URINE OSMOLALITY: CPT | Performed by: EMERGENCY MEDICINE

## 2021-06-19 PROCEDURE — 99285 EMERGENCY DEPT VISIT HI MDM: CPT

## 2021-06-19 PROCEDURE — 93005 ELECTROCARDIOGRAM TRACING: CPT

## 2021-06-19 PROCEDURE — 80179 DRUG ASSAY SALICYLATE: CPT | Performed by: EMERGENCY MEDICINE

## 2021-06-19 PROCEDURE — 82077 ASSAY SPEC XCP UR&BREATH IA: CPT | Performed by: EMERGENCY MEDICINE

## 2021-06-19 PROCEDURE — 83690 ASSAY OF LIPASE: CPT | Performed by: EMERGENCY MEDICINE

## 2021-06-19 PROCEDURE — 80143 DRUG ASSAY ACETAMINOPHEN: CPT | Performed by: EMERGENCY MEDICINE

## 2021-06-19 PROCEDURE — 83735 ASSAY OF MAGNESIUM: CPT | Performed by: EMERGENCY MEDICINE

## 2021-06-19 PROCEDURE — 70450 CT HEAD/BRAIN W/O DYE: CPT

## 2021-06-19 PROCEDURE — 71275 CT ANGIOGRAPHY CHEST: CPT

## 2021-06-19 PROCEDURE — 84443 ASSAY THYROID STIM HORMONE: CPT | Performed by: EMERGENCY MEDICINE

## 2021-06-19 PROCEDURE — 80076 HEPATIC FUNCTION PANEL: CPT | Performed by: EMERGENCY MEDICINE

## 2021-06-19 PROCEDURE — 82948 REAGENT STRIP/BLOOD GLUCOSE: CPT

## 2021-06-19 PROCEDURE — 80307 DRUG TEST PRSMV CHEM ANLYZR: CPT | Performed by: EMERGENCY MEDICINE

## 2021-06-19 RX ADMIN — SODIUM CHLORIDE, SODIUM LACTATE, POTASSIUM CHLORIDE, AND CALCIUM CHLORIDE 500 ML: .6; .31; .03; .02 INJECTION, SOLUTION INTRAVENOUS at 19:26

## 2021-06-19 RX ADMIN — CEFEPIME HYDROCHLORIDE 2000 MG: 2 INJECTION, POWDER, FOR SOLUTION INTRAVENOUS at 22:35

## 2021-06-19 RX ADMIN — IOHEXOL 100 ML: 350 INJECTION, SOLUTION INTRAVENOUS at 21:52

## 2021-06-20 PROBLEM — R41.82 ALTERED MENTAL STATUS: Status: ACTIVE | Noted: 2021-06-20

## 2021-06-20 PROBLEM — E16.2 HYPOGLYCEMIA: Status: ACTIVE | Noted: 2021-06-20

## 2021-06-20 PROBLEM — J81.1 PULMONARY EDEMA: Status: ACTIVE | Noted: 2021-06-20

## 2021-06-20 PROBLEM — N39.0 UTI (URINARY TRACT INFECTION): Status: ACTIVE | Noted: 2021-06-20

## 2021-06-20 PROBLEM — E87.1 HYPONATREMIA: Status: ACTIVE | Noted: 2021-06-20

## 2021-06-20 LAB
ANION GAP SERPL CALCULATED.3IONS-SCNC: 1 MMOL/L (ref 4–13)
ANION GAP SERPL CALCULATED.3IONS-SCNC: 3 MMOL/L (ref 4–13)
ANION GAP SERPL CALCULATED.3IONS-SCNC: 3 MMOL/L (ref 4–13)
BUN SERPL-MCNC: 11 MG/DL (ref 5–25)
BUN SERPL-MCNC: 5 MG/DL (ref 5–25)
BUN SERPL-MCNC: 7 MG/DL (ref 5–25)
CALCIUM SERPL-MCNC: 7.7 MG/DL (ref 8.3–10.1)
CALCIUM SERPL-MCNC: 7.8 MG/DL (ref 8.3–10.1)
CALCIUM SERPL-MCNC: 8.1 MG/DL (ref 8.3–10.1)
CHLORIDE SERPL-SCNC: 78 MMOL/L (ref 100–108)
CHLORIDE SERPL-SCNC: 82 MMOL/L (ref 100–108)
CHLORIDE SERPL-SCNC: 84 MMOL/L (ref 100–108)
CO2 SERPL-SCNC: 35 MMOL/L (ref 21–32)
CO2 SERPL-SCNC: 36 MMOL/L (ref 21–32)
CO2 SERPL-SCNC: 38 MMOL/L (ref 21–32)
CREAT SERPL-MCNC: 0.5 MG/DL (ref 0.6–1.3)
CREAT SERPL-MCNC: 0.52 MG/DL (ref 0.6–1.3)
CREAT SERPL-MCNC: 0.53 MG/DL (ref 0.6–1.3)
CREAT UR-MCNC: 69.4 MG/DL
GFR SERPL CREATININE-BSD FRML MDRD: 92 ML/MIN/1.73SQ M
GFR SERPL CREATININE-BSD FRML MDRD: 92 ML/MIN/1.73SQ M
GFR SERPL CREATININE-BSD FRML MDRD: 94 ML/MIN/1.73SQ M
GLUCOSE SERPL-MCNC: 105 MG/DL (ref 65–140)
GLUCOSE SERPL-MCNC: 108 MG/DL (ref 65–140)
GLUCOSE SERPL-MCNC: 108 MG/DL (ref 65–140)
GLUCOSE SERPL-MCNC: 117 MG/DL (ref 65–140)
GLUCOSE SERPL-MCNC: 121 MG/DL (ref 65–140)
GLUCOSE SERPL-MCNC: 50 MG/DL (ref 65–140)
GLUCOSE SERPL-MCNC: 51 MG/DL (ref 65–140)
GLUCOSE SERPL-MCNC: 82 MG/DL (ref 65–140)
GLUCOSE SERPL-MCNC: 94 MG/DL (ref 65–140)
OSMOLALITY UR/SERPL-RTO: 243 MMOL/KG (ref 282–298)
OSMOLALITY UR: 207 MMOL/KG
OSMOLALITY UR: 506 MMOL/KG
POTASSIUM SERPL-SCNC: 3.2 MMOL/L (ref 3.5–5.3)
POTASSIUM SERPL-SCNC: 3.3 MMOL/L (ref 3.5–5.3)
POTASSIUM SERPL-SCNC: 3.5 MMOL/L (ref 3.5–5.3)
SODIUM 24H UR-SCNC: 55 MOL/L
SODIUM SERPL-SCNC: 117 MMOL/L (ref 136–145)
SODIUM SERPL-SCNC: 120 MMOL/L (ref 136–145)
SODIUM SERPL-SCNC: 123 MMOL/L (ref 136–145)
TSH SERPL DL<=0.05 MIU/L-ACNC: 0.31 UIU/ML (ref 0.36–3.74)
URATE SERPL-MCNC: 3.6 MG/DL (ref 2–6.8)

## 2021-06-20 PROCEDURE — 99223 1ST HOSP IP/OBS HIGH 75: CPT | Performed by: INTERNAL MEDICINE

## 2021-06-20 PROCEDURE — 83930 ASSAY OF BLOOD OSMOLALITY: CPT | Performed by: EMERGENCY MEDICINE

## 2021-06-20 PROCEDURE — 82948 REAGENT STRIP/BLOOD GLUCOSE: CPT

## 2021-06-20 PROCEDURE — 82436 ASSAY OF URINE CHLORIDE: CPT | Performed by: INTERNAL MEDICINE

## 2021-06-20 PROCEDURE — 84443 ASSAY THYROID STIM HORMONE: CPT | Performed by: INTERNAL MEDICINE

## 2021-06-20 PROCEDURE — 84300 ASSAY OF URINE SODIUM: CPT | Performed by: INTERNAL MEDICINE

## 2021-06-20 PROCEDURE — 84133 ASSAY OF URINE POTASSIUM: CPT | Performed by: INTERNAL MEDICINE

## 2021-06-20 PROCEDURE — 80048 BASIC METABOLIC PNL TOTAL CA: CPT | Performed by: PHYSICIAN ASSISTANT

## 2021-06-20 PROCEDURE — 83935 ASSAY OF URINE OSMOLALITY: CPT | Performed by: INTERNAL MEDICINE

## 2021-06-20 PROCEDURE — 84550 ASSAY OF BLOOD/URIC ACID: CPT | Performed by: INTERNAL MEDICINE

## 2021-06-20 PROCEDURE — 36415 COLL VENOUS BLD VENIPUNCTURE: CPT | Performed by: EMERGENCY MEDICINE

## 2021-06-20 RX ORDER — ACETAMINOPHEN 325 MG/1
650 TABLET ORAL EVERY 6 HOURS PRN
Status: DISCONTINUED | OUTPATIENT
Start: 2021-06-20 | End: 2021-06-29 | Stop reason: HOSPADM

## 2021-06-20 RX ORDER — POTASSIUM CHLORIDE 20 MEQ/1
40 TABLET, EXTENDED RELEASE ORAL ONCE
Status: DISCONTINUED | OUTPATIENT
Start: 2021-06-20 | End: 2021-06-20

## 2021-06-20 RX ORDER — POTASSIUM CHLORIDE 20 MEQ/1
40 TABLET, EXTENDED RELEASE ORAL ONCE
Status: COMPLETED | OUTPATIENT
Start: 2021-06-20 | End: 2021-06-20

## 2021-06-20 RX ORDER — MONTELUKAST SODIUM 10 MG/1
10 TABLET ORAL
Status: DISCONTINUED | OUTPATIENT
Start: 2021-06-20 | End: 2021-06-29 | Stop reason: HOSPADM

## 2021-06-20 RX ORDER — ATORVASTATIN CALCIUM 10 MG/1
10 TABLET, FILM COATED ORAL
Status: DISCONTINUED | OUTPATIENT
Start: 2021-06-20 | End: 2021-06-29 | Stop reason: HOSPADM

## 2021-06-20 RX ORDER — HEPARIN SODIUM 5000 [USP'U]/ML
5000 INJECTION, SOLUTION INTRAVENOUS; SUBCUTANEOUS EVERY 8 HOURS SCHEDULED
Status: DISCONTINUED | OUTPATIENT
Start: 2021-06-20 | End: 2021-06-29 | Stop reason: HOSPADM

## 2021-06-20 RX ORDER — FUROSEMIDE 10 MG/ML
20 INJECTION INTRAMUSCULAR; INTRAVENOUS ONCE
Status: COMPLETED | OUTPATIENT
Start: 2021-06-20 | End: 2021-06-20

## 2021-06-20 RX ORDER — ALBUTEROL SULFATE 2.5 MG/3ML
2.5 SOLUTION RESPIRATORY (INHALATION) EVERY 6 HOURS PRN
Status: DISCONTINUED | OUTPATIENT
Start: 2021-06-20 | End: 2021-06-29 | Stop reason: HOSPADM

## 2021-06-20 RX ORDER — PAROXETINE 10 MG/1
10 TABLET, FILM COATED ORAL EVERY MORNING
Status: CANCELLED | OUTPATIENT
Start: 2021-06-20

## 2021-06-20 RX ORDER — SODIUM CHLORIDE 9 MG/ML
50 INJECTION, SOLUTION INTRAVENOUS CONTINUOUS
Status: DISCONTINUED | OUTPATIENT
Start: 2021-06-20 | End: 2021-06-21

## 2021-06-20 RX ORDER — ALBUTEROL SULFATE 90 UG/1
2 AEROSOL, METERED RESPIRATORY (INHALATION) EVERY 6 HOURS PRN
Status: DISCONTINUED | OUTPATIENT
Start: 2021-06-20 | End: 2021-06-29 | Stop reason: HOSPADM

## 2021-06-20 RX ORDER — DEXTROSE MONOHYDRATE 25 G/50ML
25 INJECTION, SOLUTION INTRAVENOUS ONCE
Status: COMPLETED | OUTPATIENT
Start: 2021-06-20 | End: 2021-06-20

## 2021-06-20 RX ADMIN — ACETAMINOPHEN 650 MG: 325 TABLET, FILM COATED ORAL at 22:48

## 2021-06-20 RX ADMIN — CEFEPIME HYDROCHLORIDE 1000 MG: 2 INJECTION, POWDER, FOR SOLUTION INTRAVENOUS at 11:57

## 2021-06-20 RX ADMIN — ALBUTEROL SULFATE 2 PUFF: 90 AEROSOL, METERED RESPIRATORY (INHALATION) at 23:00

## 2021-06-20 RX ADMIN — POTASSIUM CHLORIDE 40 MEQ: 1500 TABLET, EXTENDED RELEASE ORAL at 19:24

## 2021-06-20 RX ADMIN — FUROSEMIDE 20 MG: 10 INJECTION, SOLUTION INTRAMUSCULAR; INTRAVENOUS at 05:22

## 2021-06-20 RX ADMIN — POTASSIUM CHLORIDE 40 MEQ: 1500 TABLET, EXTENDED RELEASE ORAL at 05:35

## 2021-06-20 RX ADMIN — DEXTROSE MONOHYDRATE 25 ML: 25 INJECTION, SOLUTION INTRAVENOUS at 05:36

## 2021-06-20 RX ADMIN — ATORVASTATIN CALCIUM 10 MG: 10 TABLET, FILM COATED ORAL at 18:34

## 2021-06-20 RX ADMIN — HEPARIN SODIUM 5000 UNITS: 5000 INJECTION INTRAVENOUS; SUBCUTANEOUS at 05:24

## 2021-06-20 RX ADMIN — MONTELUKAST SODIUM 10 MG: 10 TABLET, FILM COATED ORAL at 22:48

## 2021-06-20 RX ADMIN — SODIUM CHLORIDE 50 ML/HR: 0.9 INJECTION, SOLUTION INTRAVENOUS at 05:05

## 2021-06-20 RX ADMIN — HEPARIN SODIUM 5000 UNITS: 5000 INJECTION INTRAVENOUS; SUBCUTANEOUS at 22:48

## 2021-06-20 RX ADMIN — CEFEPIME HYDROCHLORIDE 1000 MG: 2 INJECTION, POWDER, FOR SOLUTION INTRAVENOUS at 23:00

## 2021-06-20 NOTE — ED PROVIDER NOTES
History  Chief Complaint   Patient presents with   • Altered Mental Status     Pt arrives to the ED AMS  EMS states her BS was 46 on scene  24-year-old female presents for evaluation of altered mental status  The patient presents via EMS  EMS states that the patient was found somnolent to the point of being somewhat obtunded  Family had reported that she had been taking more “sleeping pills” than she had been prescribed  Also she had been taking all of her diabetes medications but apparently not eating  EMS found her initial blood sugar to be in the low 40s and so administered D10 en route to the hospital   EMS states that after correction of her blood sugar of the patient became more responsive  On my evaluation in the emergency department she is able to answer orientation questions and follow simple commands without focal deficit  When asked how she is feeling, the patient reports “better” but also seems to perseverate on the fact that “I can not pee ”      History provided by:  Patient   used: No    Altered Mental Status  Presenting symptoms: lethargy and partial responsiveness    Severity:  Moderate  Most recent episode: Today  Episode history:  Single  Timing:  Constant  Progression:  Improving  Chronicity:  New  Context: not taking medications as prescribed        Prior to Admission Medications   Prescriptions Last Dose Informant Patient Reported? Taking?    LORazepam (ATIVAN) 0 5 mg tablet   Yes Yes   Sig: Take 0 5 mg by mouth every 6 (six) hours as needed for anxiety   Methylprednisolone 4 MG TBPK Not Taking at Unknown time  No No   Sig: Use as directed on package   Patient not taking: Reported on 6/19/2021   PARoxetine (PAXIL) 10 mg tablet   Yes No   Sig: Take 10 mg by mouth every morning   albuterol (2 5 mg/3 mL) 0 083 % nebulizer solution   Yes Yes   Sig: Take 2 5 mg by nebulization every 6 (six) hours as needed for wheezing   albuterol (PROVENTIL HFA,VENTOLIN HFA) 90 mcg/act inhaler   Yes Yes   Sig: Inhale 2 puffs every 6 (six) hours as needed for wheezing   atorvastatin (LIPITOR) 10 mg tablet   Yes Yes   Sig: Take 10 mg by mouth daily   glimepiride (AMARYL) 4 mg tablet   Yes Yes   Sig: Take 4 mg by mouth every morning before breakfast   ibuprofen (MOTRIN) 600 mg tablet Not Taking at Unknown time  No No   Sig: Take 1 tablet (600 mg total) by mouth every 6 (six) hours as needed (Pain)   Patient not taking: Reported on 6/19/2021   losartan-hydrochlorothiazide (HYZAAR) 50-12 5 mg per tablet Not Taking at Unknown time  Yes No   Sig: Take 1 tablet by mouth daily   Patient not taking: Reported on 6/19/2021   metFORMIN (GLUCOPHAGE) 500 mg tablet   No Yes   Sig: Take 1 tablet (500 mg total) by mouth 2 (two) times a day with meals Resume on 12/11/18   montelukast (SINGULAIR) 10 mg tablet   Yes No   Sig: Take 10 mg by mouth daily at bedtime   sitaGLIPtin (JANUVIA) 100 mg tablet   Yes No   Sig: Take 100 mg by mouth daily      Facility-Administered Medications: None       Past Medical History:   Diagnosis Date   • Asthma    • Diabetes mellitus ([address] )    • Glaucoma    • Hypertension        Past Surgical History:   Procedure Laterality Date   • COLONOSCOPY N/A 12/8/2018    Procedure: COLONOSCOPY;  Surgeon: Queenie Germain MD;  Location: [facility]; Service: Gastroenterology       No family history on file  I have reviewed and agree with the history as documented  E-Cigarette/Vaping   • E-Cigarette Use Never User      E-Cigarette/Vaping Substances     Social History     Tobacco Use   • Smoking status: Never Smoker   • Smokeless tobacco: Never Used   Vaping Use   • Vaping Use: Never used   Substance Use Topics   • Alcohol use: No   • Drug use: No       Review of Systems   Unable to perform ROS: Mental status change       Physical Exam  Physical Exam  Constitutional:       Appearance: Normal appearance  She is obese        Comments: Mildly somnolent and somewhat confused but easily arousable and able to answer simple questions follows simple commands  HENT:      Head: Normocephalic and atraumatic  Nose: Nose normal       Mouth/Throat:      Mouth: Mucous membranes are moist       Pharynx: Oropharynx is clear  Eyes:      Extraocular Movements: Extraocular movements intact  Conjunctiva/sclera: Conjunctivae normal       Pupils: Pupils are equal, round, and reactive to light  Cardiovascular:      Rate and Rhythm: Normal rate and regular rhythm  Pulses: Normal pulses  Pulmonary:      Effort: Pulmonary effort is normal  No respiratory distress  Breath sounds: Normal breath sounds  Abdominal:      General: There is no distension  Palpations: Abdomen is soft  Tenderness: There is no abdominal tenderness  Musculoskeletal:         General: No swelling, tenderness or signs of injury  Normal range of motion  Cervical back: Normal range of motion and neck supple  No rigidity  Skin:     General: Skin is warm and dry  Capillary Refill: Capillary refill takes less than 2 seconds  Findings: No rash  Neurological:      General: No focal deficit present  Mental Status: She is alert and oriented to person, place, and time  GCS: GCS eye subscore is 4  GCS verbal subscore is 5  GCS motor subscore is 6  Sensory: Sensory deficit present  Psychiatric:         Mood and Affect: Mood normal          Thought Content:  Thought content normal          Vital Signs  ED Triage Vitals [06/19/21 1757]   Temperature Pulse Respirations Blood Pressure SpO2   97 6 °F (36 4 °C) 90 20 164/81 99 %      Temp Source Heart Rate Source Patient Position - Orthostatic VS BP Location FiO2 (%)   Oral Monitor Lying Left arm --      Pain Score       --           Vitals:    06/19/21 2200 06/19/21 2330 06/20/21 0000 06/20/21 0015   BP: (!) 171/80 (!) 172/81 156/67    Pulse: 86 86 80 82   Patient Position - Orthostatic VS: Lying Lying Lying          Visual Acuity      ED Medications  Medications   lactated ringers bolus 500 mL (0 mL Intravenous Stopped 6/19/21 2130)   iohexol (OMNIPAQUE) 350 MG/ML injection (MULTI-DOSE) 100 mL (100 mL Intravenous Given 6/19/21 2152)   cefepime (MAXIPIME) 2,000 mg in dextrose 5 % 50 mL IVPB (0 mg Intravenous Stopped 6/20/21 0015)       Diagnostic Studies  Results Reviewed     Procedure Component Value Units Date/Time    Osmolality-"If this is regarding a toxic alcohol, please STOP and consult medical  for further guidance " [[id number]] Collected: 06/20/21 0108    Lab Status: In process Specimen: Blood from Arm, Left Updated: 06/20/21 0110    Sodium, urine, random [[id number]] Collected: 06/19/21 1845    Lab Status: In process Specimen: Urine Updated: 06/20/21 0038    Creatinine, urine, random [[id number]] Collected: 06/19/21 1845    Lab Status: In process Specimen: Urine Updated: 06/20/21 0038    Osmolality, urine [[id number]] Collected: 06/19/21 1845    Lab Status: In process Specimen: Urine Updated: 06/20/21 0037    Lactic acid [[id number]]  (Normal) Collected: 06/19/21 2008    Lab Status: Final result Specimen: Blood from Arm, Left Updated: 06/19/21 2033     LACTIC ACID 0 9 mmol/L     Narrative:      Result may be elevated if tourniquet was used during collection  Blood culture #1 [[id number]] Collected: 06/19/21 2008    Lab Status: In process Specimen: Blood from Arm, Right Updated: 06/19/21 2012    Blood culture #2 [[id number]] Collected: 06/19/21 2008    Lab Status:  In process Specimen: Blood from Arm, Left Updated: 06/19/21 2012    Ethanol [[id number]]  (Normal) Collected: 06/19/21 1922    Lab Status: Final result Specimen: Blood from Arm, Right Updated: 06/19/21 2008     Ethanol Lvl <3 mg/dL     Lipase [[id number]]  (Abnormal) Collected: 06/19/21 1922    Lab Status: Final result Specimen: Blood from Arm, Right Updated: 06/19/21 2007     Lipase 51 u/L     TSH [[id number]]  (Normal) Collected: 06/19/21 1922    Lab Status: Final result Specimen: Blood from Arm, Right Updated: 06/19/21 2007     TSH 3RD GENERATON 0 475 uIU/mL     Narrative:      Patients undergoing fluorescein dye angiography may retain small amounts of fluorescein in the body for 48-72 hours post procedure  Samples containing fluorescein can produce falsely depressed TSH values  If the patient had this procedure,a specimen should be resubmitted post fluorescein clearance  NT-BNP PRO [[id number]]  (Abnormal) Collected: 06/19/21 1922    Lab Status: Final result Specimen: Blood from Arm, Right Updated: 06/19/21 2007     NT-proBNP 3,564 pg/mL     Salicylate level [[id number]]  (Abnormal) Collected: 06/19/21 1922    Lab Status: Final result Specimen: Blood from Arm, Right Updated: 33/89/88 7454     Salicylate Lvl <3 mg/dL     Acetaminophen level-If concentration is detectable, please discuss with medical  on call   [[id number]]  (Abnormal) Collected: 06/19/21 1922    Lab Status: Final result Specimen: Blood from Arm, Right Updated: 06/19/21 2007     Acetaminophen Level <2 0 ug/mL     Basic metabolic panel [[id number]]  (Abnormal) Collected: 06/19/21 1922    Lab Status: Final result Specimen: Blood from Arm, Right Updated: 06/19/21 2007     Sodium 116 mmol/L      Potassium 3 5 mmol/L      Chloride 77 mmol/L      CO2 35 mmol/L      ANION GAP 4 mmol/L      BUN 16 mg/dL      Creatinine 0 69 mg/dL      Glucose 73 mg/dL      Calcium 8 8 mg/dL      eGFR 84 ml/min/1 73sq m     Narrative:      [facility] guidelines for Chronic Kidney Disease (CKD):   •  Stage 1 with normal or high GFR (GFR > 90 mL/min/1 73 square meters)  •  Stage 2 Mild CKD (GFR = 60-89 mL/min/1 73 square meters)  •  Stage 3A Moderate CKD (GFR = 45-59 mL/min/1 73 square meters)  •  Stage 3B Moderate CKD (GFR = 30-44 mL/min/1 73 square meters)  •  Stage 4 Severe CKD (GFR = 15-29 mL/min/1 73 square meters)  •  Stage 5 End Stage CKD (GFR <15 mL/min/1 73 square meters)  Note: GFR calculation is accurate only with a steady state creatinine    Hepatic function panel [[id number]]  (Abnormal) Collected: 06/19/21 1922    Lab Status: Final result Specimen: Blood from Arm, Right Updated: 06/19/21 2007     Total Bilirubin 1 00 mg/dL      Bilirubin, Direct 0 38 mg/dL      Alkaline Phosphatase 45 U/L      AST 50 U/L      ALT 36 U/L      Total Protein 7 1 g/dL      Albumin 4 0 g/dL     Magnesium [[id number]]  (Normal) Collected: 06/19/21 1922    Lab Status: Final result Specimen: Blood from Arm, Right Updated: 06/19/21 2007     Magnesium 2 0 mg/dL     Troponin I [[id number]]  (Normal) Collected: 06/19/21 1922    Lab Status: Final result Specimen: Blood from Arm, Right Updated: 06/19/21 1955     Troponin I 0 03 ng/mL     D-Dimer [[id number]]  (Abnormal) Collected: 06/19/21 1922    Lab Status: Final result Specimen: Blood from Arm, Right Updated: 06/19/21 1951     D-Dimer, Quant 0 72 ug/ml FEU     CBC and differential [[id number]]  (Abnormal) Collected: 06/19/21 1922    Lab Status: Final result Specimen: Blood from Arm, Right Updated: 06/19/21 1933     WBC 6 70 Thousand/uL      RBC 5 54 Million/uL      Hemoglobin 14 4 g/dL      Hematocrit 44 3 %      MCV 80 fL      MCH 26 0 pg      MCHC 32 5 g/dL      RDW 14 6 %      MPV 10 3 fL      Platelets 588 Thousands/uL      nRBC 0 /100 WBCs      Neutrophils Relative 76 %      Immat GRANS % 1 %      Lymphocytes Relative 13 %      Monocytes Relative 10 %      Eosinophils Relative 0 %      Basophils Relative 0 %      Neutrophils Absolute 5 13 Thousands/µL      Immature Grans Absolute 0 04 Thousand/uL      Lymphocytes Absolute 0 87 Thousands/µL      Monocytes Absolute 0 64 Thousand/µL      Eosinophils Absolute 0 01 Thousand/µL      Basophils Absolute 0 01 Thousands/µL     Novel Coronavirus (Covid-19),PCR [facility] - 2 Hour Stat [[id number]]  (Normal) Collected: 06/19/21 1809    Lab Status: Final result Specimen: Nares from Nose Updated: 06/19/21 1914     SARS-CoV-2 Negative    Narrative: The specimen collection materials, transport medium, and/or testing methodology utilized in the production of these test results have been proven to be reliable in a limited validation with an abbreviated program under the Emergency Utilization Authorization provided by the FDA  Testing reported as "Presumptive positive" will be confirmed with secondary testing to ensure result accuracy  Clinical caution and judgement should be used with the interpretation of these results with consideration of the clinical impression and other laboratory testing  Testing reported as "Positive" or "Negative" has been proven to be accurate according to standard laboratory validation requirements  All testing is performed with control materials showing appropriate reactivity at standard intervals  Urine Microscopic [[id number]]  (Abnormal) Collected: 06/19/21 1845    Lab Status: Final result Specimen: Urine, Indwelling Jean-Baptiste Catheter Updated: 06/19/21 1913     RBC, UA None Seen /hpf      WBC, UA 2-4 /hpf      Epithelial Cells Occasional /hpf      Bacteria, UA Moderate /hpf     Rapid drug screen, urine [[id number]]  (Normal) Collected: 06/19/21 1845    Lab Status: Final result Specimen: Urine, Other Updated: 06/19/21 1909     Amph/Meth UR Negative     Barbiturate Ur Negative     Benzodiazepine Urine Negative     Cocaine Urine Negative     Methadone Urine Negative     Opiate Urine Negative     PCP Ur Negative     THC Urine Negative     Oxycodone Urine Negative    Narrative:      FOR MEDICAL PURPOSES ONLY  IF CONFIRMATION NEEDED PLEASE CONTACT THE LAB WITHIN 5 DAYS      Drug Screen Cutoff Levels:  AMPHETAMINE/METHAMPHETAMINES  1000 ng/mL  BARBITURATES     200 ng/mL  BENZODIAZEPINES     200 ng/mL  COCAINE      300 ng/mL  METHADONE      300 ng/mL  OPIATES      300 ng/mL  PHENCYCLIDINE     25 ng/mL  THC       50 ng/mL  OXYCODONE      100 ng/mL    UA w Reflex to Microscopic w Reflex to Culture [[id number]]  (Abnormal) Collected: 06/19/21 1845    Lab Status: Final result Specimen: Urine, Indwelling Jean-Baptiste Catheter Updated: 06/19/21 1856     Color, UA Yellow     Clarity, UA Clear     Specific [place], UA 1 020     pH, UA 6 5     Leukocytes, UA Negative     Nitrite, UA Negative     Protein, UA Trace mg/dl      Glucose, UA Negative mg/dl      Ketones, UA Trace mg/dl      Urobilinogen, UA 0 2 E U /dl      Bilirubin, UA Negative     Blood, UA Negative    Fingerstick Glucose (POCT) [[id number]]  (Normal) Collected: 06/19/21 1757    Lab Status: Final result Updated: 06/19/21 1811     POC Glucose 116 mg/dl                  CTA ED chest PE study   Final Result by Berenice Soulier, MD (06/19 2327)      No evidence of pulmonary embolism  Bilateral patchy groundglass attenuation with interlobular septal thickening likely represents pulmonary edema  Main pulmonary artery is dilated measuring 4 4 cm distended with pulmonary arterial hypertension  Correlate with echocardiogram       The study was marked in EPIC for immediate notification  Workstation performed: [id number]         CT head without contrast   Final Result by Jeremias Collins MD (06/19 1842)      No acute intracranial abnormality                    Workstation performed: [id number]         XR chest 1 view portable    (Results Pending)              Procedures  CriticalCare Time  Performed by: Morales Wilson MD  Authorized by: Morales Wilson MD     Critical care provider statement:     Critical care time (minutes):  45    Critical care time was exclusive of:  Separately billable procedures and treating other patients and teaching time    Critical care was necessary to treat or prevent imminent or life-threatening deterioration of the following conditions:  Metabolic crisis    Critical care was time spent personally by me on the following activities:  Blood draw for specimens, obtaining history from patient or surrogate, development of treatment plan with patient or surrogate, discussions with consultants, evaluation of patient's response to treatment, examination of patient, ordering and performing treatments and interventions, ordering and review of laboratory studies, ordering and review of radiographic studies, re-evaluation of patient's condition and review of old charts    I assumed direction of critical care for this patient from another provider in my specialty: no    Comments:      Severe hyponatremia               ED Course                             SBIRT 22yo+      Most Recent Value   SBIRT (22 yo +)   In order to provide better care to our patients, we are screening all of our patients for alcohol and drug use  Would it be okay to ask you these screening questions? Yes Filed at: 06/19/2021 1926   Initial Alcohol Screen: US AUDIT-C    1  How often do you have a drink containing alcohol?  0 Filed at: 06/19/2021 1926   2  How many drinks containing alcohol do you have on a typical day you are drinking? 0 Filed at: 06/19/2021 1926   3a  Male UNDER 65: How often do you have five or more drinks on one occasion? 0 Filed at: 06/19/2021 1926   3b  FEMALE Any Age, or MALE 65+: How often do you have 4 or more drinks on one occassion? 0 Filed at: 06/19/2021 1926   Audit-C Score  0 Filed at: 06/19/2021 1926   ELYSIA: How many times in the past year have you    Used an illegal drug or used a prescription medication for non-medical reasons?   Never Filed at: 06/19/2021 1926                    MDM  Number of Diagnoses or Management Options  Acute hyponatremia: new and requires workup  Altered mental status: new and requires workup  Pulmonary hypertension ([facility] [address] ): new and requires workup     Amount and/or Complexity of Data Reviewed  Clinical lab tests: reviewed and ordered  Tests in the radiology section of CPT®: reviewed and ordered  Review and summarize past medical records: yes  Discuss the patient with other providers: yes  Independent visualization of images, tracings, or specimens: yes        Disposition  Final diagnoses: Altered mental status   Pulmonary hypertension ([facility] [address] )   Acute hyponatremia     Time reflects when diagnosis was documented in both MDM as applicable and the Disposition within this note     Time User Action Codes Description Comment    6/19/2021 10:47 PM Jocelyn Corporal Add [R41 82] Altered mental status     6/20/2021  1:24 AM Tretha Carota L Add [E87 6] Hypokalemia     6/20/2021  1:24 AM Jocelyn Corporal Add [I27 20] Pulmonary hypertension ([facility] [address] )     6/20/2021  1:24 AM Jocelyn Corporal Remove [E87 6] Hypokalemia     6/20/2021  1:24 AM Jocelyn Corporal Add [E87 1] Acute hyponatremia       ED Disposition     ED Disposition Condition Date/Time Comment    Admit Stable Sat Jun 19, 2021 9552 Case was discussed with FAMILIA and the patient's admission status was agreed to be Admission Status: inpatient status to the service of Dr Stockton Smoker  Follow-up Information    None         Patient's Medications   Discharge Prescriptions    No medications on file     No discharge procedures on file      PDMP Review     None          ED Provider  Electronically Signed by           Reymundo Gaona MD  06/20/21 3228

## 2021-06-20 NOTE — CONSULTS
Consultation - Nephrology   Shilpa Velasco 66 y o  female MRN: [id number]  Unit/Bed#: ED 15 Encounter: [id number]    Referring PHYSICIAN: Jonn Parker     REASON FOR THE CONSULTATION:  Hyponatremia    DATE OF CONSULTATION:  June 20, 2021    ADMISSION DIAGNOSIS: Altered mental status     CHIEF COMPLAINT     Patient was brought emergency room with altered mental status likely because of hypoglycemia and was also found to be hyponatremic and being admitted and I am being consulted for hyponatremia    HPI     Patient who does not speak English well was found to be confusion of secondary to home so EMS was called who found blood glucose to be low  Patient was brought emergency room where further workup also revealed hyponatremia    Patient also has history of diabetes and hypertension and on medication including diuretic in form of hydrochlorothiazide    Patient apparently was not eating and drinking well and continue took medication  She has she was having recurrent hypoglycemia at home  Patient also taking Ambien and possibly taking more than need to be because of inability to sleep    In emergency room glucose was supplemented with patient making more awake and alert though still seems to be confused    Talking the patient in ER season a quite awake  Complaining of sleeping problem  Also complaining abdominal discomfort    She is complaining of short of breath though does not appear to be short of breath  She is making quite a bit of urine with help of Jean-Baptiste catheter    She was given dose of diuretic in form of Lasix because of CT scan suggesting volume overload    PAST MEDICAL HISTORY     Past Medical History:   Diagnosis Date   • Asthma    • Diabetes mellitus ([address] )    • Glaucoma    • Hypertension        PAST SURGICAL HISTORY     Past Surgical History:   Procedure Laterality Date   • COLONOSCOPY N/A 12/8/2018    Procedure: COLONOSCOPY;  Surgeon: Kimmy Fonseca MD;  Location: [facility];   Service: Gastroenterology       ALLERGIES     No Known Allergies    SOCIAL HISTORY     Social History     Substance and Sexual Activity   Alcohol Use No     Social History     Substance and Sexual Activity   Drug Use No     Social History     Tobacco Use   Smoking Status Never Smoker   Smokeless Tobacco Never Used       FAMILY HISTORY     No family history on file      CURRENT MEDICATIONS       Current Facility-Administered Medications:   •  acetaminophen (TYLENOL) tablet 650 mg, 650 mg, Oral, Q6H PRN, Enrique Erickson PA-C  •  albuterol (PROVENTIL HFA,VENTOLIN HFA) inhaler 2 puff, 2 puff, Inhalation, Q6H PRN, Enrique Erickson PA-C  •  albuterol inhalation solution 2 5 mg, 2 5 mg, Nebulization, Q6H PRN, Enrique Erickson PA-C  •  atorvastatin (LIPITOR) tablet 10 mg, 10 mg, Oral, Daily With Dinner, Enrique Erickson PA-C  •  cefepime (MAXIPIME) 1,000 mg in dextrose 5 % 50 mL IVPB, 1,000 mg, Intravenous, Q12H, Letty Chairez PA-C  •  heparin (porcine) subcutaneous injection 5,000 Units, 5,000 Units, Subcutaneous, Q8H FLORESITA, Enrique Erickson PA-C, 5,000 Units at 06/20/21 3055  •  montelukast (SINGULAIR) tablet 10 mg, 10 mg, Oral, HS, Letty Chairez PA-C  •  sodium chloride 0 9 % infusion, 50 mL/hr, Intravenous, Continuous, Enrique Erickson PA-C, Last Rate: 50 mL/hr at 06/20/21 0505, 50 mL/hr at 06/20/21 0505    Current Outpatient Medications:   •  albuterol (2 5 mg/3 mL) 0 083 % nebulizer solution, Take 2 5 mg by nebulization every 6 (six) hours as needed for wheezing, Disp: , Rfl:   •  albuterol (PROVENTIL HFA,VENTOLIN HFA) 90 mcg/act inhaler, Inhale 2 puffs every 6 (six) hours as needed for wheezing, Disp: , Rfl:   •  atorvastatin (LIPITOR) 10 mg tablet, Take 10 mg by mouth daily, Disp: , Rfl:   •  glimepiride (AMARYL) 4 mg tablet, Take 4 mg by mouth every morning before breakfast, Disp: , Rfl:   •  LORazepam (ATIVAN) 0 5 mg tablet, Take 0 5 mg by mouth every 6 (six) hours as needed for anxiety, Disp: , Rfl:   •  metFORMIN (GLUCOPHAGE) 500 mg tablet, Take 1 tablet (500 mg total) by mouth 2 (two) times a day with meals Resume on 12/11/18, Disp: , Rfl: 0  •  ibuprofen (MOTRIN) 600 mg tablet, Take 1 tablet (600 mg total) by mouth every 6 (six) hours as needed (Pain) (Patient not taking: Reported on 6/19/2021), Disp: 30 tablet, Rfl: 0  •  losartan-hydrochlorothiazide (HYZAAR) 50-12 5 mg per tablet, Take 1 tablet by mouth daily (Patient not taking: Reported on 6/19/2021), Disp: , Rfl:   •  Methylprednisolone 4 MG TBPK, Use as directed on package (Patient not taking: Reported on 6/19/2021), Disp: 21 tablet, Rfl: 0  •  montelukast (SINGULAIR) 10 mg tablet, Take 10 mg by mouth daily at bedtime, Disp: , Rfl:   •  PARoxetine (PAXIL) 10 mg tablet, Take 10 mg by mouth every morning, Disp: , Rfl:   •  sitaGLIPtin (JANUVIA) 100 mg tablet, Take 100 mg by mouth daily, Disp: , Rfl:     REVIEW OF SYSTEMS     Review of Systems   Constitutional: Positive for appetite change and fatigue  Negative for activity change  HENT: Negative for congestion and ear discharge  Eyes: Negative for photophobia and pain  Respiratory: Negative for apnea and choking  Cardiovascular: Negative for chest pain and palpitations  Gastrointestinal: Negative for abdominal distention and blood in stool  Endocrine: Negative for heat intolerance and polyphagia  Genitourinary: Negative for difficulty urinating, flank pain and urgency  Musculoskeletal: Negative for neck pain and neck stiffness  Skin: Negative for color change and wound  Allergic/Immunologic: Negative for food allergies and immunocompromised state  Neurological: Negative for seizures and facial asymmetry  Hematological: Negative for adenopathy  Does not bruise/bleed easily  Psychiatric/Behavioral: Positive for confusion and sleep disturbance  Negative for self-injury and suicidal ideas         LAB RESULTS        Results from last 7 days   Lab Units 06/20/21  0108 06/19/21  1922   WBC Thousand/uL  --  6 70   HEMOGLOBIN g/dL  --  14 4   HEMATOCRIT %  --  44 3   PLATELETS Thousands/uL  --  174   POTASSIUM mmol/L 3 3* 3 5   CHLORIDE mmol/L 78* 77*   CO2 mmol/L 36* 35*   BUN mg/dL 11 16   CREATININE mg/dL 0 52* 0 69   EGFR ml/min/1 73sq m 92 84   CALCIUM mg/dL 7 8* 8 8   MAGNESIUM mg/dL  --  2 0       I have personally reviewed the old medical records and patient does have hyponatremia in past though not this severe  Renal function also was normal    RADIOLOGY RESULTS     No results found for this or any previous visit  Results for orders placed during the hospital encounter of 02/08/19    XR chest pa & lateral    Narrative  CHEST    INDICATION:   cough  COMPARISON:  1/18/2017    EXAM PERFORMED/VIEWS:  XR CHEST PA & LATERAL      FINDINGS:    Cardiomediastinal silhouette appears unremarkable  The lungs are clear  Stable elevation right hemidiaphragm  No pneumothorax or pleural effusion  Osseous structures appear within normal limits for patient age  Impression  No acute cardiopulmonary disease  Workstation performed: [id number]    No results found for this or any previous visit  No results found for this or any previous visit  No results found for this or any previous visit  No results found for this or any previous visit  OBJECTIVE     Current Weight: Weight - Scale: 92 7 kg (204 lb 5 9 oz)  Vitals:    06/20/21 1000   BP: 159/77   Pulse: 83   Resp: 21   Temp:    SpO2: 97%       Intake/Output Summary (Last 24 hours) at 6/20/2021 1017  Last data filed at 6/20/2021 5281  Gross per 24 hour   Intake 550 ml   Output 4560 ml   Net -4010 ml       PHYSICAL EXAMINATION     Physical Exam  Constitutional:       General: She is not in acute distress  Appearance: She is well-developed  She is obese  HENT:      Head: Normocephalic and atraumatic  Mouth/Throat:      Mouth: Mucous membranes are moist    Eyes:      General: No scleral icterus       Conjunctiva/sclera: Conjunctivae normal       Pupils: Pupils are equal, round, and reactive to light  Neck:      Vascular: No JVD  Cardiovascular:      Rate and Rhythm: Normal rate  Heart sounds: Normal heart sounds  Pulmonary:      Effort: Pulmonary effort is normal       Breath sounds: Normal breath sounds  No wheezing  Abdominal:      General: Bowel sounds are normal  There is no distension  Palpations: Abdomen is soft  Tenderness: There is no abdominal tenderness  Musculoskeletal:         General: No swelling  Normal range of motion  Cervical back: Normal range of motion and neck supple  Skin:     General: Skin is warm  Findings: No rash  Neurological:      General: No focal deficit present  Mental Status: She is alert  She is disoriented  Psychiatric:      Comments: Seems to be confused          PLAN / RECOMMENDATIONS      Hyponatremia:  Sodium is 117 early this morning  She is making quite a bit urine  Will like to repeat BMP to assess improvement in sodium  Definitely will avoid any fast correction of the sodium  Agree with normal saline at this point with she is getting  Volume overload status:  By CT scan the patient appear quite asymptomatic  She is diuresing quite well with help of Lasix  Will hold of any more diuretic at this point    Altered mental status:  Likely because of hypoglycemia and hyponatremia along with Ambien use it  Will continue to monitor    Hypertension:  Seems to be reasonable control at this point  Will avoid any hydrochlorothiazide at this point    Diabetes type 2:  Fluctuating glucose  Has been running low in the past   Medicine dose will be adjusted by the slim    Will monitor patient closely with you    Thank you for the consultation to participate in patient's care  I have personally discussed my plan with the referring physician       Tyree Santo MD  Nephrology  6/20/2021        Portions of the record may have been created with voice recognition software  Occasional wrong word or "sound a like" substitutions may have occurred due to the inherent limitations of voice recognition software  Read the chart carefully and recognize, using context, where substitutions have occurred

## 2021-06-20 NOTE — ASSESSMENT & PLAN NOTE
· Level on arrival 116, most likely in setting of persistent diarrhea and dehydration  · Per review of ICU evaluation note provided with gentle IV fluid hydration with normal saline at 50 mL/hr  · Serial BMP checks q 8 hours  · Monitor on telemetry  · Seizure precautions

## 2021-06-20 NOTE — ASSESSMENT & PLAN NOTE
· Urinalysis revealing moderate bacteria and white blood cells  · Continue IV cefepime 1 g q 12 hours

## 2021-06-20 NOTE — ASSESSMENT & PLAN NOTE
· Patient's son reporting patient more confused at home and was found to be hypoglycemic around 55, has not been eating but taking diabetic meds  · CT head negative, EKG with sinus rhythm with PAC and T-wave inversion  · Most likely in setting of hypoglycemia, UTI, hyponatremia  · Fall precautions, provide with frequent verbal redirection  · Hold home Ativan, Ambien, avoid opiates

## 2021-06-20 NOTE — QUICK NOTE
Progress Note - Triage Asssessment   Stan Dai 66 y o  female MRN: [id number]    Time Called ( Time): 0015  Date Called: 06/20/21  Room#: [facility]  Person requesting evaluation: Dr Josh Yu    Situation:    78-year-old female presenting with altered mental status with a several day history of diarrhea and increased use of Ambien, presumably around the clock  Sodium 116  Nonfocal neurologic exam, speaks some English, oriented to name and place  Follows commands, falls back to sleep when not stimulated  Interventions:   Suspect hypovolemic hyponatremia secondary to diarrhea  Recommend gentle normal saline resuscitation, serial sodiums with correction not to exceed 0 5-1 mEq/L/hr  Fall and seizure precautions  Abstain from further Ambien administration           Triage Assessment:     Patient can be admitted to med-surg level of care with telemetry    Recommendations discussed with Dr Josh Yu

## 2021-06-20 NOTE — ED NOTES
Pt  at bedside  Warm blankets provided  Pt offered to order breakfast but denied at this time  No complaints, call bell within reach       Paula Carbajal RN  06/20/21 6725

## 2021-06-20 NOTE — ASSESSMENT & PLAN NOTE
· Noted on CT A, no PE, pulmonary edema with pulmonary hypertension  · Echocardiogram ordered  · Consider cardiology consult  · Due to benefit of needing IV fluids at this time will provide with 1 dose IV Lasix 20 mg  · Intake and output monitoring, daily weights  · Low-sodium diet with 1500 mL fluid restriction

## 2021-06-20 NOTE — H&P
[address] [facility]  H&P- Fly Lopes 1945, 66 y o  female MRN: [id number]  Unit/Bed#: ED 15 Encounter: [id number]  Primary Care Provider: Jose Luis Jarvis MD   Date and time admitted to hospital: 6/19/2021  5:47 PM    * Altered mental status  Assessment & Plan  · Patient's son reporting patient more confused at home and was found to be hypoglycemic around 55, has not been eating but taking diabetic meds  · CT head negative, EKG with sinus rhythm with PAC and T-wave inversion  · Most likely in setting of hypoglycemia, UTI, hyponatremia  · Fall precautions, provide with frequent verbal redirection  · Hold home Ativan, Ambien, avoid opiates    Hyponatremia  Assessment & Plan  · Level on arrival 116, most likely in setting of persistent diarrhea and dehydration  · Per review of ICU evaluation note provided with gentle IV fluid hydration with normal saline at 50 mL/hr  · Serial BMP checks q 8 hours  · Monitor on telemetry  · Seizure precautions    Pulmonary edema  Assessment & Plan  · Noted on CT A, no PE, pulmonary edema with pulmonary hypertension  · Echocardiogram ordered  · Consider cardiology consult  · Due to benefit of needing IV fluids at this time will provide with 1 dose IV Lasix 20 mg  · Intake and output monitoring, daily weights  · Low-sodium diet with 1500 mL fluid restriction    Hypoglycemia  Assessment & Plan  · Patient's son reporting low blood glucose level at home, on arrival blood glucose 73  · Continue to monitor blood glucose checks q i d , hypoglycemia protocol  · Will hold home oral diabetic medications  · Consider adding sliding scale insulin if blood glucose begins to elevate    UTI (urinary tract infection)  Assessment & Plan  · Urinalysis revealing moderate bacteria and white blood cells  · Continue IV cefepime 1 g q 12 hours      VTE Pharmacologic Prophylaxis: VTE Score: 4 Moderate Risk (Score 3-4) - Pharmacological DVT Prophylaxis Ordered: heparin    Code Status: Level 1 - Full Code   Discussion with family: Updated  (son) at bedside  Anticipated Length of Stay: Patient will be admitted on an inpatient basis with an anticipated length of stay of greater than 2 midnights secondary to See above  Total Time for Visit, including Counseling / Coordination of Care: 60 minutes Greater than 50% of this total time spent on direct patient counseling and coordination of care  Chief Complaint:    Chief Complaint   Patient presents with   • Altered Mental Status     Pt arrives to the ED AMS  EMS states her BS was 46 on scene  History of Present Illness:  Bouchra Coombs is a 66 y o  female with a PMH of diabetes mellitus type 2, asthma, anxiety disorder who presents with patient's son after he reports patient has been more confused at home and had low blood glucose level home  Patient with limited English but is able to express and be oriented to person and place  Disoriented to time with translation through patient's son  Patient currently without any complaints, denies any chest pain, shortness of breath  Per patient's son noted to have low blood glucose level home, has been having diarrhea and not been eating well but has been taking her diabetic medications  Patient also frequently taking Ambien per ICU provider note    Review of Systems:  Review of Systems   Constitutional: Positive for activity change  Negative for fatigue  Respiratory: Negative for shortness of breath  Cardiovascular: Negative for chest pain  Gastrointestinal: Negative for abdominal pain  Neurological: Negative for dizziness and headaches  Past Medical and Surgical History:   Past Medical History:   Diagnosis Date   • Asthma    • Diabetes mellitus ([address] )    • Glaucoma    • Hypertension        Past Surgical History:   Procedure Laterality Date   • COLONOSCOPY N/A 12/8/2018    Procedure: COLONOSCOPY;  Surgeon: Kriss Toribio MD;  Location: [facility];   Service: Gastroenterology       Meds/Allergies:  Prior to Admission medications    Medication Sig Start Date End Date Taking? Authorizing Provider   albuterol (2 5 mg/3 mL) 0 083 % nebulizer solution Take 2 5 mg by nebulization every 6 (six) hours as needed for wheezing   Yes Historical Provider, MD   albuterol (PROVENTIL HFA,VENTOLIN HFA) 90 mcg/act inhaler Inhale 2 puffs every 6 (six) hours as needed for wheezing   Yes Historical Provider, MD   atorvastatin (LIPITOR) 10 mg tablet Take 10 mg by mouth daily   Yes Historical Provider, MD   glimepiride (AMARYL) 4 mg tablet Take 4 mg by mouth every morning before breakfast   Yes Historical Provider, MD   LORazepam (ATIVAN) 0 5 mg tablet Take 0 5 mg by mouth every 6 (six) hours as needed for anxiety   Yes Historical Provider, MD   metFORMIN (GLUCOPHAGE) 500 mg tablet Take 1 tablet (500 mg total) by mouth 2 (two) times a day with meals Resume on 12/11/18 12/11/18  Yes Gege Billingsley MD   ibuprofen (MOTRIN) 600 mg tablet Take 1 tablet (600 mg total) by mouth every 6 (six) hours as needed (Pain)  Patient not taking: Reported on 6/19/2021 6/8/21   Kameron Moser MD   losartan-hydrochlorothiazide [facility]) 50-12 5 mg per tablet Take 1 tablet by mouth daily  Patient not taking: Reported on 6/19/2021    Historical Provider, MD   Methylprednisolone 4 MG TBPK Use as directed on package  Patient not taking: Reported on 6/19/2021 1/20/17   Gege Billingsley MD   montelukast (SINGULAIR) 10 mg tablet Take 10 mg by mouth daily at bedtime    Historical Provider, MD   PARoxetine (PAXIL) 10 mg tablet Take 10 mg by mouth every morning    Historical Provider, MD   sitaGLIPtin (JANUVIA) 100 mg tablet Take 100 mg by mouth daily    Historical Provider, MD FLOREZ reviewed home medications per review of chart       Allergies: No Known Allergies    Social History:  Marital Status: /Civil Union   Patient Pre-hospital Diet Restrictions:  Diabetic  Substance Use History:   Social History     Substance and Sexual Activity   Alcohol Use No     Social History     Tobacco Use   Smoking Status Never Smoker   Smokeless Tobacco Never Used     Social History     Substance and Sexual Activity   Drug Use No       Family History:  No family history on file  Physical Exam:     Vitals:   Blood Pressure: 155/74 (06/20/21 0330)  Pulse: 91 (06/20/21 0330)  Temperature: 97 6 °F (36 4 °C) (06/19/21 1757)  Temp Source: Oral (06/19/21 1757)  Respirations: 18 (06/20/21 0330)  Weight - Scale: 92 7 kg (204 lb 5 9 oz) (06/19/21 1757)  SpO2: 94 % (06/20/21 0330)    Physical Exam  Vitals and nursing note reviewed  Constitutional:       General: She is not in acute distress  Appearance: Normal appearance  She is obese  HENT:      Head: Normocephalic  Cardiovascular:      Rate and Rhythm: Normal rate and regular rhythm  Pulses: Normal pulses  Heart sounds: Normal heart sounds  Pulmonary:      Effort: Pulmonary effort is normal  No respiratory distress  Breath sounds: Normal breath sounds  No wheezing or rales  Abdominal:      General: Abdomen is flat  Bowel sounds are normal  There is no distension  Palpations: Abdomen is soft  Tenderness: There is no abdominal tenderness  There is no guarding  Musculoskeletal:         General: No tenderness  Right lower leg: No edema  Left lower leg: No edema  Skin:     General: Skin is warm and dry  Coloration: Skin is not pale  Findings: No erythema  Neurological:      General: No focal deficit present  Mental Status: She is alert        Comments: Alert and oriented to person and place, disoriented to time   Psychiatric:      Comments: Blunted affect          Additional Data:     Lab Results:  Results from last 7 days   Lab Units 06/19/21  1922   WBC Thousand/uL 6 70   HEMOGLOBIN g/dL 14 4   HEMATOCRIT % 44 3   PLATELETS Thousands/uL 174   NEUTROS PCT % 76*   LYMPHS PCT % 13*   MONOS PCT % 10   EOS PCT % 0     Results from last 7 days   Lab Units 06/19/21  1922   SODIUM mmol/L 116*   POTASSIUM mmol/L 3 5   CHLORIDE mmol/L 77*   CO2 mmol/L 35*   BUN mg/dL 16   CREATININE mg/dL 0 69   ANION GAP mmol/L 4   CALCIUM mg/dL 8 8   ALBUMIN g/dL 4 0   TOTAL BILIRUBIN mg/dL 1 00   ALK PHOS U/L 45*   ALT U/L 36   AST U/L 50*   GLUCOSE RANDOM mg/dL 73         Results from last 7 days   Lab Units 06/19/21  1757   POC GLUCOSE mg/dl 116         Results from last 7 days   Lab Units 06/19/21 2008   LACTIC ACID mmol/L 0 9       Imaging: Reviewed radiology reports from this admission including: chest CT scan and CT head  CTA ED chest PE study   Final Result by Matthew Arenas MD (06/19 1298)      No evidence of pulmonary embolism  Bilateral patchy groundglass attenuation with interlobular septal thickening likely represents pulmonary edema  Main pulmonary artery is dilated measuring 4 4 cm distended with pulmonary arterial hypertension  Correlate with echocardiogram       The study was marked in EPIC for immediate notification  Workstation performed: [id number]         CT head without contrast   Final Result by Yael Del Real MD (06/19 9152)      No acute intracranial abnormality  Workstation performed: [id number]         XR chest 1 view portable    (Results Pending)       EKG and Other Studies Reviewed on Admission:   · EKG: Sinus rhythm with PAC, T-wave inversion  ** Please Note: This note has been constructed using a voice recognition system   **

## 2021-06-20 NOTE — ASSESSMENT & PLAN NOTE
· Patient's son reporting low blood glucose level at home, on arrival blood glucose 73  · Continue to monitor blood glucose checks q i d , hypoglycemia protocol  · Will hold home oral diabetic medications  · Consider adding sliding scale insulin if blood glucose begins to elevate

## 2021-06-21 ENCOUNTER — APPOINTMENT (INPATIENT)
Dept: NON INVASIVE DIAGNOSTICS | Facility: HOSPITAL | Age: 76
DRG: 071 | End: 2021-06-21
Payer: COMMERCIAL

## 2021-06-21 LAB
ANION GAP SERPL CALCULATED.3IONS-SCNC: 3 MMOL/L (ref 4–13)
ANION GAP SERPL CALCULATED.3IONS-SCNC: 7 MMOL/L (ref 4–13)
ANION GAP SERPL CALCULATED.3IONS-SCNC: 7 MMOL/L (ref 4–13)
BUN SERPL-MCNC: 6 MG/DL (ref 5–25)
BUN SERPL-MCNC: 8 MG/DL (ref 5–25)
BUN SERPL-MCNC: 9 MG/DL (ref 5–25)
CALCIUM SERPL-MCNC: 8 MG/DL (ref 8.3–10.1)
CALCIUM SERPL-MCNC: 8.1 MG/DL (ref 8.3–10.1)
CALCIUM SERPL-MCNC: 8.2 MG/DL (ref 8.3–10.1)
CHLORIDE SERPL-SCNC: 81 MMOL/L (ref 100–108)
CHLORIDE SERPL-SCNC: 82 MMOL/L (ref 100–108)
CHLORIDE SERPL-SCNC: 84 MMOL/L (ref 100–108)
CHLORIDE UR-SCNC: 55 MMOL/L
CO2 SERPL-SCNC: 33 MMOL/L (ref 21–32)
CO2 SERPL-SCNC: 34 MMOL/L (ref 21–32)
CO2 SERPL-SCNC: 35 MMOL/L (ref 21–32)
CREAT SERPL-MCNC: 0.5 MG/DL (ref 0.6–1.3)
CREAT SERPL-MCNC: 0.5 MG/DL (ref 0.6–1.3)
CREAT SERPL-MCNC: 0.53 MG/DL (ref 0.6–1.3)
GFR SERPL CREATININE-BSD FRML MDRD: 92 ML/MIN/1.73SQ M
GFR SERPL CREATININE-BSD FRML MDRD: 94 ML/MIN/1.73SQ M
GFR SERPL CREATININE-BSD FRML MDRD: 94 ML/MIN/1.73SQ M
GLUCOSE SERPL-MCNC: 182 MG/DL (ref 65–140)
GLUCOSE SERPL-MCNC: 183 MG/DL (ref 65–140)
GLUCOSE SERPL-MCNC: 213 MG/DL (ref 65–140)
GLUCOSE SERPL-MCNC: 213 MG/DL (ref 65–140)
GLUCOSE SERPL-MCNC: 214 MG/DL (ref 65–140)
GLUCOSE SERPL-MCNC: 217 MG/DL (ref 65–140)
GLUCOSE SERPL-MCNC: 227 MG/DL (ref 65–140)
POTASSIUM SERPL-SCNC: 3.3 MMOL/L (ref 3.5–5.3)
POTASSIUM SERPL-SCNC: 3.4 MMOL/L (ref 3.5–5.3)
POTASSIUM SERPL-SCNC: 3.8 MMOL/L (ref 3.5–5.3)
POTASSIUM UR-SCNC: 14.9 MMOL/L
SODIUM 24H UR-SCNC: 62 MOL/L
SODIUM SERPL-SCNC: 122 MMOL/L (ref 136–145)

## 2021-06-21 PROCEDURE — 93306 TTE W/DOPPLER COMPLETE: CPT | Performed by: INTERNAL MEDICINE

## 2021-06-21 PROCEDURE — 94640 AIRWAY INHALATION TREATMENT: CPT

## 2021-06-21 PROCEDURE — C8929 TTE W OR WO FOL WCON,DOPPLER: HCPCS

## 2021-06-21 PROCEDURE — 80048 BASIC METABOLIC PNL TOTAL CA: CPT | Performed by: PHYSICIAN ASSISTANT

## 2021-06-21 PROCEDURE — 82948 REAGENT STRIP/BLOOD GLUCOSE: CPT

## 2021-06-21 PROCEDURE — 99232 SBSQ HOSP IP/OBS MODERATE 35: CPT | Performed by: INTERNAL MEDICINE

## 2021-06-21 PROCEDURE — 94760 N-INVAS EAR/PLS OXIMETRY 1: CPT

## 2021-06-21 RX ORDER — POTASSIUM CHLORIDE 20 MEQ/1
40 TABLET, EXTENDED RELEASE ORAL ONCE
Status: COMPLETED | OUTPATIENT
Start: 2021-06-21 | End: 2021-06-21

## 2021-06-21 RX ORDER — FUROSEMIDE 10 MG/ML
20 INJECTION INTRAMUSCULAR; INTRAVENOUS ONCE
Status: COMPLETED | OUTPATIENT
Start: 2021-06-21 | End: 2021-06-21

## 2021-06-21 RX ORDER — FUROSEMIDE 20 MG/1
20 TABLET ORAL
Status: DISCONTINUED | OUTPATIENT
Start: 2021-06-21 | End: 2021-06-29 | Stop reason: HOSPADM

## 2021-06-21 RX ADMIN — CEFEPIME HYDROCHLORIDE 1000 MG: 2 INJECTION, POWDER, FOR SOLUTION INTRAVENOUS at 12:07

## 2021-06-21 RX ADMIN — CEFEPIME HYDROCHLORIDE 1000 MG: 2 INJECTION, POWDER, FOR SOLUTION INTRAVENOUS at 21:53

## 2021-06-21 RX ADMIN — ALBUTEROL SULFATE 2.5 MG: 2.5 SOLUTION RESPIRATORY (INHALATION) at 01:01

## 2021-06-21 RX ADMIN — POTASSIUM CHLORIDE 40 MEQ: 1500 TABLET, EXTENDED RELEASE ORAL at 12:21

## 2021-06-21 RX ADMIN — HEPARIN SODIUM 5000 UNITS: 5000 INJECTION INTRAVENOUS; SUBCUTANEOUS at 05:25

## 2021-06-21 RX ADMIN — FUROSEMIDE 20 MG: 20 TABLET ORAL at 18:40

## 2021-06-21 RX ADMIN — FUROSEMIDE 20 MG: 10 INJECTION, SOLUTION INTRAMUSCULAR; INTRAVENOUS at 01:18

## 2021-06-21 RX ADMIN — HEPARIN SODIUM 5000 UNITS: 5000 INJECTION INTRAVENOUS; SUBCUTANEOUS at 21:57

## 2021-06-21 RX ADMIN — PERFLUTREN 0.4 ML/MIN: 6.52 INJECTION, SUSPENSION INTRAVENOUS at 11:28

## 2021-06-21 RX ADMIN — MONTELUKAST SODIUM 10 MG: 10 TABLET, FILM COATED ORAL at 21:56

## 2021-06-21 RX ADMIN — HEPARIN SODIUM 5000 UNITS: 5000 INJECTION INTRAVENOUS; SUBCUTANEOUS at 15:00

## 2021-06-21 RX ADMIN — ATORVASTATIN CALCIUM 10 MG: 10 TABLET, FILM COATED ORAL at 18:40

## 2021-06-21 NOTE — ASSESSMENT & PLAN NOTE
· Patient's son reporting patient more confused at home and was found to be hypoglycemic around 55, has not been eating but taking diabetic meds  · CT head negative, EKG with sinus rhythm with PAC and T-wave inversion  · Most likely in setting of hypoglycemia, UTI, hyponatremia  · Hold home Ativan, Ambien, avoid opiates  · Has since resolved

## 2021-06-21 NOTE — ASSESSMENT & PLAN NOTE
· Patient's son reporting low blood glucose level at home, on arrival blood glucose 73  · Continue to monitor blood glucose checks q i d , hypoglycemia protocol  · Will hold home oral diabetic medications  · Has since resolved

## 2021-06-21 NOTE — PROGRESS NOTES
[facility]  Progress Note - Nelida Gottron 1945, 66 y o  female MRN: [id number]  Unit/Bed#: -01 Encounter: [id number]  Primary Care Provider: Laurie Smith MD   Date and time admitted to hospital: 6/19/2021  5:47 PM    Pulmonary edema  Assessment & Plan  · Noted on CT A, no PE, pulmonary edema with pulmonary hypertension  · Echocardiogram ordered  · Consider cardiology consult  · Consider further diuresis will discuss with Nephrology  · Intake and output monitoring, daily weights  · Low-sodium diet with 1500 mL fluid restriction    Hypoglycemia  Assessment & Plan  · Patient's son reporting low blood glucose level at home, on arrival blood glucose 73  · Continue to monitor blood glucose checks q i d , hypoglycemia protocol  · Will hold home oral diabetic medications  · Has since resolved    Hyponatremia  Assessment & Plan  · Level on arrival 116, most likely in setting of persistent diarrhea and dehydration  · Initially treated with IVF with mild improvement of sodium level to 122 however was noted to be volume overloaded and IV  Was given Lasix  · Will continue to monitor sodium closely  · Follow-up nephrology recs    UTI (urinary tract infection)  Assessment & Plan  · Urinalysis revealing moderate bacteria and white blood cells  · Continue IV cefepime 1 g q 12 hours    * Altered mental status  Assessment & Plan  · Patient's son reporting patient more confused at home and was found to be hypoglycemic around 55, has not been eating but taking diabetic meds  · CT head negative, EKG with sinus rhythm with PAC and T-wave inversion  · Most likely in setting of hypoglycemia, UTI, hyponatremia  · Hold home Ativan, Ambien, avoid opiates  · Has since resolved      VTE Pharmacologic Prophylaxis:   Pharmacologic: Heparin  Mechanical VTE Prophylaxis in Place: Yes    Patient Centered Rounds: I have performed bedside rounds with nursing staff today      Discussions with Specialists or Other Care Team Provider: cm, nursing    Education and Discussions with Family / Patient: pt    Time Spent for Care: 30 minutes  More than 50% of total time spent on counseling and coordination of care as described above  Current Length of Stay: 1 day(s)    Current Patient Status: Inpatient   Certification Statement: The patient will continue to require additional inpatient hospital stay due to Still requiring further evaluation and treatment of hyponatremia    Discharge Plan:  See above statement    Code Status: Level 1 - Full Code      Subjective:   No acute complaints    Objective:     Vitals:   Temp (24hrs), Av °F (36 7 °C), Min:97 9 °F (36 6 °C), Max:98 °F (36 7 °C)    Temp:  [97 9 °F (36 6 °C)-98 °F (36 7 °C)] 98 °F (36 7 °C)  HR:  [80-95] 95  Resp:  [15-30] 16  BP: (136-165)/(68-81) 145/78  SpO2:  [85 %-100 %] 94 %  Body mass index is 38 66 kg/m²  Input and Output Summary (last 24 hours): Intake/Output Summary (Last 24 hours) at 2021 0754  Last data filed at 2021 0543  Gross per 24 hour   Intake 1071 67 ml   Output 6500 ml   Net -5428 33 ml       Physical Exam:     Physical Exam  Constitutional:       General: She is not in acute distress  Appearance: She is well-developed  She is not diaphoretic  HENT:      Head: Normocephalic and atraumatic  Nose: Nose normal       Mouth/Throat:      Pharynx: No oropharyngeal exudate  Eyes:      General: No scleral icterus  Right eye: No discharge  Left eye: No discharge  Conjunctiva/sclera: Conjunctivae normal    Neck:      Thyroid: No thyromegaly  Vascular: No JVD  Cardiovascular:      Rate and Rhythm: Normal rate and regular rhythm  Heart sounds: Normal heart sounds  No murmur heard  No friction rub  No gallop  Pulmonary:      Effort: Pulmonary effort is normal  No respiratory distress  Breath sounds: Normal breath sounds  No wheezing or rales  Chest:      Chest wall: No tenderness  Abdominal:      General: Bowel sounds are normal  There is no distension  Palpations: Abdomen is soft  Tenderness: There is no abdominal tenderness  There is no guarding or rebound  Musculoskeletal:         General: No tenderness or deformity  Normal range of motion  Cervical back: Normal range of motion and neck supple  Skin:     General: Skin is warm and dry  Findings: No erythema or rash  Neurological:      Mental Status: She is alert  Mental status is at baseline  Cranial Nerves: No cranial nerve deficit  Sensory: No sensory deficit  Motor: No abnormal muscle tone  Coordination: Coordination normal          Additional Data:     Labs:    Results from last 7 days   Lab Units 06/19/21  1922   WBC Thousand/uL 6 70   HEMOGLOBIN g/dL 14 4   HEMATOCRIT % 44 3   PLATELETS Thousands/uL 174   NEUTROS PCT % 76*   LYMPHS PCT % 13*   MONOS PCT % 10   EOS PCT % 0     Results from last 7 days   Lab Units 06/21/21  0540 06/19/21  1922   SODIUM mmol/L 122* 116*   POTASSIUM mmol/L 3 3* 3 5   CHLORIDE mmol/L 81* 77*   CO2 mmol/L 34* 35*   BUN mg/dL 6 16   CREATININE mg/dL 0 50* 0 69   ANION GAP mmol/L 7 4   CALCIUM mg/dL 8 0* 8 8   ALBUMIN g/dL  --  4 0   TOTAL BILIRUBIN mg/dL  --  1 00   ALK PHOS U/L  --  45*   ALT U/L  --  36   AST U/L  --  50*   GLUCOSE RANDOM mg/dL 183* 73         Results from last 7 days   Lab Units 06/20/21  2116 06/20/21  1716 06/20/21  1311 06/20/21  0747 06/20/21  0558 06/20/21  0524 06/19/21  1757   POC GLUCOSE mg/dl 117 121 94 105 108 50* 116         Results from last 7 days   Lab Units 06/19/21 2008   LACTIC ACID mmol/L 0 9           * I Have Reviewed All Lab Data Listed Above  * Additional Pertinent Lab Tests Reviewed:  All Labs Within Last 24 Hours Reviewed    Imaging:    Imaging Reports Reviewed Today Include: na  Imaging Personally Reviewed by Myself Includes:  na    Recent Cultures (last 7 days):     Results from last 7 days   Lab Units 06/19/21 2008   BLOOD CULTURE  Received in Microbiology Lab  Culture in Progress  Received in Microbiology Lab  Culture in Progress  Last 24 Hours Medication List:   Current Facility-Administered Medications   Medication Dose Route Frequency Provider Last Rate   • acetaminophen  650 mg Oral Q6H PRN Jana Blanton PA-C     • albuterol  2 puff Inhalation Q6H PRN Jana Blanton PA-C     • albuterol  2 5 mg Nebulization Q6H PRN Jana Blanton PA-C     • atorvastatin  10 mg Oral Daily With Dinner Jana Blanton PA-C     • cefepime  1,000 mg Intravenous Q12H FREDERICK MckeonC 1,000 mg (06/20/21 2300)   • heparin (porcine)  5,000 Units Subcutaneous Q8H [facility] Letty Chairez PA-C     • montelukast  10 mg Oral HS Jana Blanton PA-C          Today, Patient Was Seen By: Quiana Moralez MD    ** Please Note: Dictation voice to text software may have been used in the creation of this document   **

## 2021-06-21 NOTE — PROGRESS NOTES
Paged by nurse  Pt complaining of increased shortness of breath  Evaluated by respiratory with reports of wet sounding lungs  Stop IV fluids at this time  Give one time dose of IV lasix 20 mg  Echo already pending

## 2021-06-21 NOTE — PROGRESS NOTES
NEPHROLOGY PROGRESS NOTE    Patient: Marilia Nunez               Sex: female          DOA: 6/19/2021  5:47 PM   YOB: 1945        Age:  66 y o         LOS:  LOS: 1 day       HPI     Patient admitted the hospital with confusion and found to be hyponatremic    SUBJECTIVE     Patient feeling better  Denies any complaint    No shortness of breath no chest pain no palpitation    No nausea no vomiting    On admission patient was hypoglycemic now it is improving    CURRENT MEDICATIONS       Current Facility-Administered Medications:   •  acetaminophen (TYLENOL) tablet 650 mg, 650 mg, Oral, Q6H PRN, Jeanell Dance, PA-C, 650 mg at 06/20/21 2248  •  albuterol (PROVENTIL HFA,VENTOLIN HFA) inhaler 2 puff, 2 puff, Inhalation, Q6H PRN, Jeanell Dance, PA-C, 2 puff at 06/20/21 2300  •  albuterol inhalation solution 2 5 mg, 2 5 mg, Nebulization, Q6H PRN, Jeanell Dance, PA-C, 2 5 mg at 06/21/21 0101  •  atorvastatin (LIPITOR) tablet 10 mg, 10 mg, Oral, Daily With Dinner, Jeanell Dance, PA-C, 10 mg at 06/20/21 1834  •  cefepime (MAXIPIME) 1,000 mg in dextrose 5 % 50 mL IVPB, 1,000 mg, Intravenous, Q12H, Letty Chairez PA-C, Last Rate: 100 mL/hr at 06/21/21 1207, 1,000 mg at 06/21/21 1207  •  heparin (porcine) subcutaneous injection 5,000 Units, 5,000 Units, Subcutaneous, Q8H [facility], Jeanell Dance, PA-C, 5,000 Units at 06/21/21 0525  •  montelukast (SINGULAIR) tablet 10 mg, 10 mg, Oral, HS, Letty Chairez PA-C, 10 mg at 06/20/21 2248    OBJECTIVE     Current Weight: Weight - Scale: 89 8 kg (197 lb 15 6 oz)  Vitals:    06/21/21 1424   BP: 163/92   Pulse: 96   Resp:    Temp: 97 7 °F (36 5 °C)   SpO2: 96%       Intake/Output Summary (Last 24 hours) at 6/21/2021 1534  Last data filed at 6/21/2021 1345  Gross per 24 hour   Intake 1551 67 ml   Output 3700 ml   Net -2148 33 ml       PHYSICAL EXAMINATION     Physical Exam  Constitutional:       General: She is not in acute distress  Appearance: She is well-developed  HENT:      Head: Normocephalic  Eyes:      General: No scleral icterus  Conjunctiva/sclera: Conjunctivae normal       Pupils: Pupils are equal, round, and reactive to light  Neck:      Vascular: No JVD  Cardiovascular:      Rate and Rhythm: Normal rate and regular rhythm  Heart sounds: Normal heart sounds  Pulmonary:      Effort: Pulmonary effort is normal       Breath sounds: Normal breath sounds  No wheezing  Abdominal:      General: Bowel sounds are normal       Palpations: Abdomen is soft  Tenderness: There is no abdominal tenderness  Musculoskeletal:         General: Normal range of motion  Cervical back: Neck supple  Skin:     General: Skin is warm  Findings: No rash  Neurological:      General: No focal deficit present  Mental Status: She is alert and oriented to person, place, and time  Psychiatric:         Behavior: Behavior normal           LAB RESULTS     Results from last 7 days   Lab Units 06/21/21  1249 06/21/21  0540 06/20/21  2223 06/20/21  1459 06/20/21  0108 06/19/21  1922   WBC Thousand/uL  --   --   --   --   --  6 70   HEMOGLOBIN g/dL  --   --   --   --   --  14 4   HEMATOCRIT %  --   --   --   --   --  44 3   PLATELETS Thousands/uL  --   --   --   --   --  174   POTASSIUM mmol/L 3 4* 3 3* 3 5 3 2* 3 3* 3 5   CHLORIDE mmol/L 82* 81* 82* 84* 78* 77*   CO2 mmol/L 33* 34* 35* 38* 36* 35*   BUN mg/dL 8 6 5 7 11 16   CREATININE mg/dL 0 50* 0 50* 0 53* 0 50* 0 52* 0 69   EGFR ml/min/1 73sq m 94 94 92 94 92 84   CALCIUM mg/dL 8 1* 8 0* 7 7* 8 1* 7 8* 8 8   MAGNESIUM mg/dL  --   --   --   --   --  2 0       RADIOLOGY RESULTS      Results for orders placed during the hospital encounter of 06/19/21    XR chest 1 view portable    Narrative  CHEST    INDICATION:   hypoxia  COMPARISON: Chest radiograph 2/8/2019 and chest CT from 6/19/2021  EXAM PERFORMED/VIEWS:  XR CHEST PORTABLE      FINDINGS:    Cardiomediastinal silhouette normal  Redemonstration of pulmonary artery enlargement  Lungs clear  No effusion or pneumothorax  Osseous structures normal for age  Impression  No acute cardiopulmonary disease  Redemonstration of pulmonary artery enlargement compatible with the history of pulmonary hypertension  Workstation performed: [id number]    Results for orders placed during the hospital encounter of 02/08/19    XR chest pa & lateral    Narrative  CHEST    INDICATION:   cough  COMPARISON:  1/18/2017    EXAM PERFORMED/VIEWS:  XR CHEST PA & LATERAL      FINDINGS:    Cardiomediastinal silhouette appears unremarkable  The lungs are clear  Stable elevation right hemidiaphragm  No pneumothorax or pleural effusion  Osseous structures appear within normal limits for patient age  Impression  No acute cardiopulmonary disease  Workstation performed: [id number]    No results found for this or any previous visit  No results found for this or any previous visit  No results found for this or any previous visit  No results found for this or any previous visit  PLAN / RECOMMENDATIONS      Hyponatremia:  Likely because of volume related  Patient appear volume overload with pulmonary edema  She got diuresis with help of Lasix  We had good urine output and feeling much better at  Sodium is still remain low and will continue to monitor  May need diuretic which I will put her on in form of Lasix    Pulmonary edema:  By the CT scan finding  Echo suggest diastolic dysfunction she will continue diuretic    Diabetes type 2:  Came in hypoglycemia much better now    Will continue to monitor    Tuan Barragan MD  Nephrology  6/21/2021        Portions of the record may have been created with voice recognition software  Occasional wrong word or "sound a like" substitutions may have occurred due to the inherent limitations of voice recognition software  Read the chart carefully and recognize, using context, where substitutions have occurred

## 2021-06-21 NOTE — ASSESSMENT & PLAN NOTE
· Noted on CT A, no PE, pulmonary edema with pulmonary hypertension  · Echocardiogram ordered  · Consider cardiology consult  · Consider further diuresis will discuss with Nephrology  · Intake and output monitoring, daily weights  · Low-sodium diet with 1500 mL fluid restriction

## 2021-06-21 NOTE — UTILIZATION REVIEW
Initial Clinical Review    Admission: Date/Time/Statement:   Admission Orders (From admission, onward)     Ordered        06/20/21 0417  Inpatient Admission  Once                   Orders Placed This Encounter   Procedures   • Inpatient Admission     Standing Status:   Standing     Number of Occurrences:   1     Order Specific Question:   Level of Care     Answer:   Med Surg [16]     Order Specific Question:   Estimated length of stay     Answer:   More than 2 Midnights     Order Specific Question:   Certification     Answer:   I certify that inpatient services are medically necessary for this patient for a duration of greater than two midnights  See H&P and MD Progress Notes for additional information about the patient's course of treatment  ED Arrival Information     Expected Arrival Acuity    - 6/19/2021 17:47 Emergent         Means of arrival Escorted by Service Admission type    [facility]) Hospitalist Emergency         Arrival complaint    Weakness        Chief Complaint   Patient presents with   • Altered Mental Status     Pt arrives to the ED AMS  EMS states her BS was 46 on scene  Initial Presentation: 65 yo female to ED from home via EMS  w/ inc confusion and low bld glucose level at home  Has been having diarrhea and not eating well , taking diabetic medications   CT head neg , EKG w/ NS w/ PACs and T wave inversion   Na 116 most likely w/ persistent diarrhea and dehydration   Pulm edema on CT   Admitted IP status  Plan for serial BMPs q8hr , IVF , tele , seizure precautions   IV alsix for edema, I&O , weights , low Na diet and fld restriction   BS on arrival 73, monitor BS and hold po meds  PE : oriented to person and place  Disoriented to time, blunt affect     6/20 Nephrology Consult   Hyponatremia Na 117, repeat BMP , cont NSS   Hold off on any further lasix   6/21 Progress Note   Pt c/o inc SOB , lungs sound wet   Plan to stop IVF , give 1x dose if iv lasix    Echo pending       6/21 IM Note   Na improved to 122, noted to be in volume overload   IVF stopped and given lasix  nephrology following   pulm edema , consider cardiology consult    Monitor I&O and weights     Last data filed at 6/21/2021 0543      Gross per 24 hour   Intake 1071 67 ml   Output 6500 ml   Net -5428 33 ml       ED Triage Vitals   Temperature Pulse Respirations Blood Pressure SpO2   06/19/21 1757 06/19/21 1757 06/19/21 1757 06/19/21 1757 06/19/21 1757   97 6 °F (36 4 °C) 90 20 164/81 99 %      Temp Source Heart Rate Source Patient Position - Orthostatic VS BP Location FiO2 (%)   06/19/21 1757 06/19/21 1757 06/19/21 1757 06/19/21 1757 --   Oral Monitor Lying Left arm       Pain Score       06/20/21 2030       2          Wt Readings from Last 1 Encounters:   06/21/21 89 8 kg (197 lb 15 6 oz)     Additional Vital Signs:   06/21/21 07:43:53  98 °F (36 7 °C)  95  16  --  --  94 %  --  --  --  --   06/21/21 0105  --  --  --  --  --  100 %  28  2 L/min  Nasal cannula  --   06/21/21 0102  --  --  --  --  --  100 %  --  --  --  --   06/20/21 22:13:13  --  89  30Abnormal   145/78  100  93 %  --  --  --  --   06/20/21 20:46:51  97 9 °F (36 6 °C)  83  16  136/78  97  85 %Abnormal   --  --  --  --   06/20/21 1830  --  91  30Abnormal   143/68  98  92 %  --  --  --  --   06/20/21 1730  --  91  26Abnormal   165/81  114  92 %  --  --  --  --   06/20/21 1700  --  87  23Abnormal   148/72  103  94 %  --  --  --  --   06/20/21 1630  --  91  22  147/77  105  92 %  --  --  --  --   06/20/21 1600  --  87  22  144/68  98  94 %  --  --  --  --   06/20/21 1530  --  86  20  153/72  103  97 %  --  --  --  --   06/20/21 1430  --  86  23Abnormal   147/76  97  97 %  --  --  --  --   06/20/21 1400  --  85  21  149/71  102  98 %  --  --  --  --   06/20/21 1230  --  85  20  152/71  102  97 %  --  --  --  --   06/20/21 1200  --  86  20  156/81  113  93 %  --  --  --  --   06/20/21 1130  --  84  20  162/71  102  98 %  --  --  --  --   06/20/21 1030  --  86  21  154/75  105  98 %  --  --  --  --   06/20/21 1000  --  83  21  159/77  110  97 %  --  --  --  --   06/20/21 0830  --  80  15  152/76  107  --  --  --  --  --   06/20/21 0730  --  81  21  147/92  108  96 %  --  --  --  --   06/20/21 0700  --  83  20  165/79  113  96 %  32  3 L/min  Nasal cannula  Lying   06/20/21 0630  --  82  19  160/79  113  97 %  28  2 L/min  Nasal cannula  Lying   06/20/21 0530  --  80  18  161/70  107  97 %  28  2 L/min  Nasal cannula  Lying   06/20/21 0500  --  84  19  155/74  107  95 %  28  2 L/min  Nasal cannula  Lying   06/20/21 0430  --  86  18  168/74  107  96 %  28  2 L/min  Nasal cannula  Lying   06/20/21 0400  --  89  18  181/84Abnormal   121  95 %  28  2 L/min  Nasal cannula  Lying   06/20/21 0330  --  91  18  155/74  106  94 %  28  2 L/min  Nasal cannula  Lying   06/20/21 0300  --  92  20  157/73  105  94 %  28  2 L/min  Nasal cannula  Lying   06/20/21 0230  --  91  20  141/88  108  94 %  28  2 L/min  Nasal cannula  Lying   06/20/21 0130  --  80  18  136/61  88  96 %  28  2 L/min  Nasal cannula  Lying   06/20/21 0100  --  84  20  141/64  92  98 %  28  2 L/min  Nasal cannula  Lying   06/20/21 0015  --  82  20  --  --  98 %  --  --  --  --   06/20/21 0000  --  80  17  156/67  96  97 %  --  --  None (Room air)  Lying   06/19/21 2330  --  86  18  172/81Abnormal   117  98 %  --  --  None (Room air)  Lying   06/19/21 2200  --  86  20  171/80Abnormal   115  96 %  32  3 L/min  Nasal cannula  Lying   06/19/21 2130  --  83  18  177/81Abnormal   117  98 %  --  --  None (Room air)  Lying   06/19/21 2100  --  86  22  164/81  115  98 %  32  3 L/min  Nasal cannula  Lying   06/19/21 2015  --  79  18  157/76  109  99 %  32  3 L/min  Nasal cannula  Lying   06/19/21 2014  --  --  --  --  --  --               Pertinent Labs/Diagnostic Test Results:   6/19 CT head No acute intracranial abnormality  6/19 PCXR    No acute cardiopulmonary disease    Redemonstration of pulmonary artery enlargement compatible with the history of pulmonary hypertension    6/19 CTA chest No evidence of pulmonary embolism    Bilateral patchy groundglass attenuation with interlobular septal thickening likely represents pulmonary edema    Main pulmonary artery is dilated measuring 4 4 cm distended with pulmonary arterial hypertension  Correlate with echocardiogram    6/21 Echo - LEFT VENTRICLE:  Systolic function was normal  Ejection fraction was estimated to be 60 %  There were no regional wall motion abnormalities  Doppler parameters were consistent with abnormal left ventricular relaxation (grade 1 diastolic dysfunction)    RIGHT VENTRICLE:  The ventricle was dilated  Systolic function was normal    LEFT ATRIUM:  The atrium was mildly dilated    MITRAL VALVE:  There was mild to moderate annular calcification    TRICUSPID VALVE:  There was trace regurgitation  Estimated peak PA pressure was at least 51 mmHg    Results from last 7 days   Lab Units 06/19/21  1809   SARS-COV-2  Negative     Results from last 7 days   Lab Units 06/19/21  1922   WBC Thousand/uL 6 70   HEMOGLOBIN g/dL 14 4   HEMATOCRIT % 44 3   PLATELETS Thousands/uL 174   NEUTROS ABS Thousands/µL 5 13     Results from last 7 days   Lab Units 06/21/21  0540 06/20/21  2223 06/20/21  1459 06/20/21  0108 06/19/21  1922   SODIUM mmol/L 122* 120* 123* 117* 116*   POTASSIUM mmol/L 3 3* 3 5 3 2* 3 3* 3 5   CHLORIDE mmol/L 81* 82* 84* 78* 77*   CO2 mmol/L 34* 35* 38* 36* 35*   ANION GAP mmol/L 7 3* 1* 3* 4   BUN mg/dL 6 5 7 11 16   CREATININE mg/dL 0 50* 0 53* 0 50* 0 52* 0 69   EGFR ml/min/1 73sq m 94 92 94 92 84   CALCIUM mg/dL 8 0* 7 7* 8 1* 7 8* 8 8   MAGNESIUM mg/dL  --   --   --   --  2 0     Results from last 7 days   Lab Units 06/19/21  1922   AST U/L 50*   ALT U/L 36   ALK PHOS U/L 45*   TOTAL PROTEIN g/dL 7 1   ALBUMIN g/dL 4 0   TOTAL BILIRUBIN mg/dL 1 00   BILIRUBIN DIRECT mg/dL 0 38*     Results from last 7 days   Lab Units 06/21/21  0740 06/20/21  2116 06/20/21  1716 06/20/21  1311 06/20/21  0747 06/20/21  0558 06/20/21  0524 06/19/21  1757   POC GLUCOSE mg/dl 182* 117 121 94 105 108 50* 116     Results from last 7 days   Lab Units 06/21/21  0540 06/20/21  2223 06/20/21  1459 06/20/21  0108 06/19/21  1922   GLUCOSE RANDOM mg/dL 183* 108 82 51* 73     Results from last 7 days   Lab Units 06/20/21  0108   OSMOLALITY, SERUM mmol/*     Results from last 7 days   Lab Units 06/19/21  1922   TROPONIN I ng/mL 0 03     Results from last 7 days   Lab Units 06/19/21  1922   D-DIMER QUANTITATIVE ug/ml FEU 0 72*     Results from last 7 days   Lab Units 06/20/21  1459 06/19/21  1922   TSH 3RD GENERATON uIU/mL 0 314* 0 475     Results from last 7 days   Lab Units 06/19/21 2008   LACTIC ACID mmol/L 0 9     Results from last 7 days   Lab Units 06/19/21 1922   NT-PRO BNP pg/mL 7,077*       Results from last 7 days   Lab Units 06/19/21  1922   LIPASE u/L 51*     Results from last 7 days   Lab Units 06/20/21  1054 06/19/21  1845   CLARITY UA   --  Clear   COLOR UA   --  Yellow   SPEC GRAV UA   --  1 020   PH UA   --  6 5   GLUCOSE UA mg/dl  --  Negative   KETONES UA mg/dl  --  Trace*   BLOOD UA   --  Negative   PROTEIN UA mg/dl  --  Trace*   NITRITE UA   --  Negative   BILIRUBIN UA   --  Negative   UROBILINOGEN UA E U /dl  --  0 2   LEUKOCYTES UA   --  Negative   WBC UA /hpf  --  2-4   RBC UA /hpf  --  None Seen   BACTERIA UA /hpf  --  Moderate*   EPITHELIAL CELLS WET PREP /hpf  --  Occasional   SODIUM UR  62 55   CREATININE UR mg/dL  --  69 4     Results from last 7 days   Lab Units 06/19/21  1845   AMPH/METH  Negative   BARBITURATE UR  Negative   BENZODIAZEPINE UR  Negative   COCAINE UR  Negative   METHADONE URINE  Negative   OPIATE UR  Negative   PCP UR  Negative   THC UR  Negative     Results from last 7 days   Lab Units 06/19/21  1922   ETHANOL LVL mg/dL <3   ACETAMINOPHEN LVL ug/mL <2 6*   SALICYLATE LVL mg/dL <3*     Results from last 7 days   Lab Units 06/19/21 2008   BLOOD CULTURE  Received in Microbiology Lab  Culture in Progress  Received in Microbiology Lab  Culture in Progress         ED Treatment:   Medication Administration from 06/19/2021 1747 to 06/20/2021 2037       Date/Time Order Dose Route Action     06/19/2021 1926 lactated ringers bolus 500 mL 500 mL Intravenous New Bag     06/19/2021 2235 cefepime (MAXIPIME) 2,000 mg in dextrose 5 % 50 mL IVPB 2,000 mg Intravenous New Bag     06/20/2021 0505 sodium chloride 0 9 % infusion 50 mL/hr Intravenous New Bag     06/20/2021 1834 atorvastatin (LIPITOR) tablet 10 mg 10 mg Oral Given     06/20/2021 1157 cefepime (MAXIPIME) 1,000 mg in dextrose 5 % 50 mL IVPB 1,000 mg Intravenous New Bag     06/20/2021 0522 furosemide (LASIX) injection 20 mg 20 mg Intravenous Given     06/20/2021 0524 heparin (porcine) subcutaneous injection 5,000 Units 5,000 Units Subcutaneous Given     06/20/2021 0536 dextrose 50 % IV solution 25 mL 25 mL Intravenous Given     06/20/2021 0535 potassium chloride (K-DUR,KLOR-CON) CR tablet 40 mEq 40 mEq Oral Given     06/20/2021 1924 potassium chloride (K-DUR,KLOR-CON) CR tablet 40 mEq 40 mEq Oral Given        Past Medical History:   Diagnosis Date   • Asthma    • Diabetes mellitus ([facility] )    • Glaucoma    • Hypertension      Present on Admission:  • Altered mental status  • UTI (urinary tract infection)  • Hyponatremia  • Hypoglycemia  • Pulmonary edema      Admitting Diagnosis: Acute hyponatremia [E87 1]  Hyponatremia [E87 1]  Altered mental status [R41 82]  Pulmonary hypertension ([facility] ) [I27 20]  AMS (altered mental status) [R41 82]  Age/Sex: 66 y o  female  Admission Orders:  Scheduled Medications:  atorvastatin, 10 mg, Oral, Daily With Dinner  cefepime, 1,000 mg, Intravenous, Q12H  heparin (porcine), 5,000 Units, Subcutaneous, Q8H [address]  montelukast, 10 mg, Oral, HS      Continuous IV Infusions:     PRN Meds:  acetaminophen, 650 mg, Oral, Q6H PRN  albuterol, 2 puff, Inhalation, Q6H PRN  albuterol, 2 5 mg, Nebulization, Q6H PRN    fingerstick ac and hs   Tele     IP CONSULT TO NEPHROLOGY    Network Utilization Review Department  ATTENTION: Please call with any questions or concerns to [phone] and carefully listen to the prompts so that you are directed to the right person  All voicemails are confidential   Ros Sanchez all requests for admission clinical reviews, approved or denied determinations and any other requests to dedicated fax number below belonging to the campus where the patient is receiving treatment   List of dedicated fax numbers for the Facilities:  [address] DENIALS (Administrative/Medical Necessity) [phone]   [address] (Maternity/NICU/Pediatrics) [phone] [address] [phone]   [address]  [place] [address] [address] Trev Nguyen 28 [phone]   [address] 815 [facility] [phone]

## 2021-06-21 NOTE — ASSESSMENT & PLAN NOTE
· Level on arrival 116, most likely in setting of persistent diarrhea and dehydration  · Initially treated with IVF with mild improvement of sodium level to 122 however was noted to be volume overloaded and IV    Was given Lasix  · Will continue to monitor sodium closely  · Follow-up nephrology recs

## 2021-06-22 LAB
ATRIAL RATE: 97 BPM
BASOPHILS # BLD AUTO: 0.04 THOUSANDS/ÂΜL (ref 0–0.1)
BASOPHILS NFR BLD AUTO: 0 % (ref 0–1)
EOSINOPHIL # BLD AUTO: 0.02 THOUSAND/ÂΜL (ref 0–0.61)
EOSINOPHIL NFR BLD AUTO: 0 % (ref 0–6)
ERYTHROCYTE [DISTWIDTH] IN BLOOD BY AUTOMATED COUNT: 14.9 % (ref 11.6–15.1)
GLUCOSE SERPL-MCNC: 193 MG/DL (ref 65–140)
GLUCOSE SERPL-MCNC: 205 MG/DL (ref 65–140)
GLUCOSE SERPL-MCNC: 219 MG/DL (ref 65–140)
GLUCOSE SERPL-MCNC: 262 MG/DL (ref 65–140)
HCT VFR BLD AUTO: 45.1 % (ref 34.8–46.1)
HGB BLD-MCNC: 14.7 G/DL (ref 11.5–15.4)
IMM GRANULOCYTES # BLD AUTO: 0.16 THOUSAND/UL (ref 0–0.2)
IMM GRANULOCYTES NFR BLD AUTO: 1 % (ref 0–2)
LYMPHOCYTES # BLD AUTO: 0.79 THOUSANDS/ÂΜL (ref 0.6–4.47)
LYMPHOCYTES NFR BLD AUTO: 7 % (ref 14–44)
MCH RBC QN AUTO: 26.5 PG (ref 26.8–34.3)
MCHC RBC AUTO-ENTMCNC: 32.6 G/DL (ref 31.4–37.4)
MCV RBC AUTO: 81 FL (ref 82–98)
MONOCYTES # BLD AUTO: 1.43 THOUSAND/ÂΜL (ref 0.17–1.22)
MONOCYTES NFR BLD AUTO: 12 % (ref 4–12)
NEUTROPHILS # BLD AUTO: 9.21 THOUSANDS/ÂΜL (ref 1.85–7.62)
NEUTS SEG NFR BLD AUTO: 80 % (ref 43–75)
NRBC BLD AUTO-RTO: 0 /100 WBCS
P AXIS: 64 DEGREES
PLATELET # BLD AUTO: 302 THOUSANDS/UL (ref 149–390)
PMV BLD AUTO: 7.9 FL (ref 8.9–12.7)
PR INTERVAL: 202 MS
QRS AXIS: 142 DEGREES
QRSD INTERVAL: 98 MS
QT INTERVAL: 364 MS
QTC INTERVAL: 462 MS
RBC # BLD AUTO: 5.55 MILLION/UL (ref 3.81–5.12)
T WAVE AXIS: -18 DEGREES
VENTRICULAR RATE: 97 BPM
WBC # BLD AUTO: 11.65 THOUSAND/UL (ref 4.31–10.16)

## 2021-06-22 PROCEDURE — 99232 SBSQ HOSP IP/OBS MODERATE 35: CPT | Performed by: INTERNAL MEDICINE

## 2021-06-22 PROCEDURE — 85025 COMPLETE CBC W/AUTO DIFF WBC: CPT | Performed by: INTERNAL MEDICINE

## 2021-06-22 PROCEDURE — 82948 REAGENT STRIP/BLOOD GLUCOSE: CPT

## 2021-06-22 PROCEDURE — 97163 PT EVAL HIGH COMPLEX 45 MIN: CPT

## 2021-06-22 PROCEDURE — 93010 ELECTROCARDIOGRAM REPORT: CPT | Performed by: INTERNAL MEDICINE

## 2021-06-22 RX ORDER — QUETIAPINE FUMARATE 25 MG/1
12.5 TABLET, FILM COATED ORAL
Status: DISCONTINUED | OUTPATIENT
Start: 2021-06-22 | End: 2021-06-29 | Stop reason: HOSPADM

## 2021-06-22 RX ADMIN — FUROSEMIDE 20 MG: 20 TABLET ORAL at 09:00

## 2021-06-22 RX ADMIN — Medication 15 MG: at 14:19

## 2021-06-22 RX ADMIN — ATORVASTATIN CALCIUM 10 MG: 10 TABLET, FILM COATED ORAL at 17:03

## 2021-06-22 RX ADMIN — CEFEPIME HYDROCHLORIDE 1000 MG: 2 INJECTION, POWDER, FOR SOLUTION INTRAVENOUS at 22:41

## 2021-06-22 RX ADMIN — FUROSEMIDE 20 MG: 20 TABLET ORAL at 17:00

## 2021-06-22 RX ADMIN — CEFEPIME HYDROCHLORIDE 1000 MG: 2 INJECTION, POWDER, FOR SOLUTION INTRAVENOUS at 11:54

## 2021-06-22 RX ADMIN — MONTELUKAST SODIUM 10 MG: 10 TABLET, FILM COATED ORAL at 22:35

## 2021-06-22 RX ADMIN — QUETIAPINE FUMARATE 12.5 MG: 25 TABLET ORAL at 22:35

## 2021-06-22 RX ADMIN — HEPARIN SODIUM 5000 UNITS: 5000 INJECTION INTRAVENOUS; SUBCUTANEOUS at 22:37

## 2021-06-22 RX ADMIN — QUETIAPINE FUMARATE 12.5 MG: 25 TABLET ORAL at 01:40

## 2021-06-22 RX ADMIN — HEPARIN SODIUM 5000 UNITS: 5000 INJECTION INTRAVENOUS; SUBCUTANEOUS at 05:20

## 2021-06-22 RX ADMIN — HEPARIN SODIUM 5000 UNITS: 5000 INJECTION INTRAVENOUS; SUBCUTANEOUS at 14:19

## 2021-06-22 NOTE — PROGRESS NOTES
[facility]  Progress Note - Sue Alaniz 1945, 66 y o  female MRN: [id number]  Unit/Bed#: -01 Encounter: [id number]  Primary Care Provider: Reymundo Wade MD   Date and time admitted to hospital: 6/19/2021  5:47 PM    Pulmonary edema  Assessment & Plan  · Noted on CT A, no PE, pulmonary edema with pulmonary hypertension  · Echocardiogram grossly wnl  · Will c/w further dieuresis  · Intake and output monitoring, daily weights  · Low-sodium diet with 1500 mL fluid restriction    Hypoglycemia  Assessment & Plan  · Patient's son reporting low blood glucose level at home, on arrival blood glucose 73  · Continue to monitor blood glucose checks q i d , hypoglycemia protocol  · Will hold home oral diabetic medications  · Has since resolved    Hyponatremia  Assessment & Plan  · Level on arrival 116, most likely in setting of persistent diarrhea and dehydration  · Initially treated with IVF with mild improvement of sodium level to 122 however was noted to be volume overloaded and IV  · Sodium stable at 122  · C/w lasix therapy  · Will continue to monitor sodium closely  · Follow-up nephrology recs    UTI (urinary tract infection)  Assessment & Plan  · Urinalysis revealing moderate bacteria and white blood cells  · Continue IV cefepime 1 g q 12 hours    * Altered mental status  Assessment & Plan  · Patient's son reporting patient more confused at home and was found to be hypoglycemic around 55, has not been eating but taking diabetic meds  · CT head negative, EKG with sinus rhythm with PAC and T-wave inversion  · Most likely in setting of hypoglycemia, UTI, hyponatremia  · Hold home Ativan, Ambien, avoid opiates  · Has since resolved        VTE Pharmacologic Prophylaxis:   Pharmacologic: Heparin  Mechanical VTE Prophylaxis in Place: Yes    Patient Centered Rounds: I have performed bedside rounds with nursing staff today      Discussions with Specialists or Other Care Team Provider: bucky nursing    Education and Discussions with Family / Patient: pt    Time Spent for Care: 30 minutes  More than 50% of total time spent on counseling and coordination of care as described above  Current Length of Stay: 2 day(s)    Current Patient Status: Inpatient   Certification Statement: The patient will continue to require additional inpatient hospital stay due to pending improvement of hyponatremia    Discharge Plan: see above    Code Status: Level 1 - Full Code      Subjective:   No acute complaints    Objective:     Vitals:   Temp (24hrs), Av 6 °F (36 4 °C), Min:97 4 °F (36 3 °C), Max:97 8 °F (36 6 °C)    Temp:  [97 4 °F (36 3 °C)-97 8 °F (36 6 °C)] 97 4 °F (36 3 °C)  HR:  [] 107  Resp:  [10] 10  BP: (150-163)/(74-92) 155/75  SpO2:  [89 %-96 %] 89 %  Body mass index is 38 45 kg/m²  Input and Output Summary (last 24 hours): Intake/Output Summary (Last 24 hours) at 2021 1024  Last data filed at 2021 0815  Gross per 24 hour   Intake 480 ml   Output 700 ml   Net -220 ml       Physical Exam:     Physical Exam  Constitutional:       General: She is not in acute distress  Appearance: She is well-developed  She is not diaphoretic  HENT:      Head: Normocephalic and atraumatic  Nose: Nose normal       Mouth/Throat:      Pharynx: No oropharyngeal exudate  Eyes:      General: No scleral icterus  Right eye: No discharge  Left eye: No discharge  Conjunctiva/sclera: Conjunctivae normal    Neck:      Thyroid: No thyromegaly  Vascular: No JVD  Cardiovascular:      Rate and Rhythm: Normal rate and regular rhythm  Heart sounds: Normal heart sounds  No murmur heard  No friction rub  No gallop  Pulmonary:      Effort: Pulmonary effort is normal  No respiratory distress  Breath sounds: Normal breath sounds  No wheezing or rales  Chest:      Chest wall: No tenderness  Abdominal:      General: Bowel sounds are normal  There is no distension  Palpations: Abdomen is soft  Tenderness: There is no abdominal tenderness  There is no guarding or rebound  Musculoskeletal:         General: No tenderness or deformity  Normal range of motion  Cervical back: Normal range of motion and neck supple  Skin:     General: Skin is warm and dry  Findings: No erythema or rash  Neurological:      Mental Status: She is alert  Mental status is at baseline  Cranial Nerves: No cranial nerve deficit  Sensory: No sensory deficit  Motor: No abnormal muscle tone  Coordination: Coordination normal          Additional Data:     Labs:    Results from last 7 days   Lab Units 06/22/21  0613   WBC Thousand/uL 11 65*   HEMOGLOBIN g/dL 14 7   HEMATOCRIT % 45 1   PLATELETS Thousands/uL 302   NEUTROS PCT % 80*   LYMPHS PCT % 7*   MONOS PCT % 12   EOS PCT % 0     Results from last 7 days   Lab Units 06/21/21  2142 06/19/21  1922   SODIUM mmol/L 122* 116*   POTASSIUM mmol/L 3 8 3 5   CHLORIDE mmol/L 84* 77*   CO2 mmol/L 35* 35*   BUN mg/dL 9 16   CREATININE mg/dL 0 53* 0 69   ANION GAP mmol/L 3* 4   CALCIUM mg/dL 8 2* 8 8   ALBUMIN g/dL  --  4 0   TOTAL BILIRUBIN mg/dL  --  1 00   ALK PHOS U/L  --  45*   ALT U/L  --  36   AST U/L  --  50*   GLUCOSE RANDOM mg/dL 213* 73         Results from last 7 days   Lab Units 06/22/21  0750 06/21/21  2100 06/21/21  1539 06/21/21  1119 06/21/21  0740 06/20/21  2116 06/20/21  1716 06/20/21  1311 06/20/21  0747 06/20/21  0558 06/20/21  0524 06/19/21  1757   POC GLUCOSE mg/dl 193* 227* 214* 217* 182* 117 121 94 105 108 50* 116         Results from last 7 days   Lab Units 06/19/21 2008   LACTIC ACID mmol/L 0 9           * I Have Reviewed All Lab Data Listed Above  * Additional Pertinent Lab Tests Reviewed:  [address] Admission Reviewed    Imaging:    Imaging Reports Reviewed Today Include: na  Imaging Personally Reviewed by Myself Includes:  na    Recent Cultures (last 7 days):     Results from last 7 days   Lab Units 06/19/21 2008   BLOOD CULTURE  No Growth at 24 hrs  No Growth at 24 hrs  Last 24 Hours Medication List:   Current Facility-Administered Medications   Medication Dose Route Frequency Provider Last Rate   • acetaminophen  650 mg Oral Q6H PRN Jeanell Dance, PA-C     • albuterol  2 puff Inhalation Q6H PRN Jeanell Dance, PA-C     • albuterol  2 5 mg Nebulization Q6H PRN Jeanell Dance, PA-C     • atorvastatin  10 mg Oral Daily With Dinner Jeanell Dance, PA-C     • cefepime  1,000 mg Intravenous Q12H Jeanell Dance, PA-C 1,000 mg (06/21/21 0492)   • furosemide  20 mg Oral BID (diuretic) Arie Elizondo MD     • heparin (porcine)  5,000 Units Subcutaneous Q8H [facility] Letty Chairez PA-C     • montelukast  10 mg Oral HS Letty Chairez PA-C     • QUEtiapine  12 5 mg Oral HS Anastasia Bravo PA-C          Today, Patient Was Seen By: Tavia Jay MD    ** Please Note: Dictation voice to text software may have been used in the creation of this document   **

## 2021-06-22 NOTE — PHYSICAL THERAPY NOTE
Physical Therapy Evaluation     Patient's Name: Jorge Shea    Admitting Diagnosis  Acute hyponatremia [E87 1]  Hyponatremia [E87 1]  Altered mental status [R41 82]  Pulmonary hypertension ([facility] [address] ) [I27 20]  AMS (altered mental status) [R41 82]    Problem List  Patient Active Problem List   Diagnosis   • Acute pulmonary insufficiency   • Asthma exacerbation   • Obesity   • Anxiety disorder   • Diabetes mellitus type 2 in obese [facility])   • Dyslipidemia   • Depression   • Accelerated hypertension   • Bleeding per rectum   • GI bleed   • Altered mental status   • UTI (urinary tract infection)   • Hyponatremia   • Hypoglycemia   • Pulmonary edema   • Pulmonary hypertension ([facility] [address] )       Past Medical History  Past Medical History:   Diagnosis Date   • Asthma    • Diabetes mellitus ([facility] [address] )    • Glaucoma    • Hypertension        Past Surgical History  Past Surgical History:   Procedure Laterality Date   • COLONOSCOPY N/A 12/8/2018    Procedure: COLONOSCOPY;  Surgeon: Lilo Bonds MD;  Location: [facility]; Service: Gastroenterology          06/22/21 1345   PT Last Visit   PT Visit Date 06/22/21   Note Type   Note type Evaluation   Pain Assessment   Pain Assessment Tool FLACC   Pain Rating: FLACC (Rest) - Face 0   Pain Rating: FLACC (Rest) - Legs 0   Pain Rating: FLACC (Rest) - Activity 0   Pain Rating: FLACC (Rest) - Cry 0   Pain Rating: FLACC (Rest) - Consolability 0   Score: FLACC (Rest) 0   Pain Rating: FLACC (Activity) - Face 1   Pain Rating: FLACC (Activity) - Legs 1   Pain Rating: FLACC (Activity) - Activity 1   Pain Rating: FLACC (Activity) - Cry 0   Pain Rating: FLACC (Activity) - Consolability 0   Score: FLACC (Activity) 3   Home Living   Type of Home House  (1 floor rancher)   Home Layout One level;Stairs to enter with rails; Performs ADLs on one level  (2-3 JAX)   Home Equipment Walker;Cane;Wheelchair-manual   Additional Comments pt ambulatory at baseline with walker   Prior Function   Level of [place] Needs assistance with ADLs and functional mobility; Needs assistance with IADLs   Lives With Spouse   Receives Help From Family   ADL Assistance Needs assistance   IADLs Needs assistance   Falls in the last 6 months 1 to 4  (2 falls in last 2 weeks)   Comments son assisted c history as pt poor historian given cognitive deficits  pt's son reports pt has been requiring increased assistance from spouse over last 6 months, for which pt's spouse unable to provide increased assistance   Restrictions/Precautions   Weight Bearing Precautions Per Order No   Other Precautions Cognitive; Bed Alarm;Multiple lines;O2;Fall Risk;Seizure  ([place] vs [place] communication (language barrier))   General   Family/Caregiver Present Yes   Cognition   Overall Cognitive Status Impaired   Arousal/Participation Alert   Orientation Level Oriented to person;Disoriented to place; Disoriented to time;Disoriented to situation   Memory Decreased short term memory;Decreased recall of biographical information;Decreased recall of recent events;Decreased recall of precautions   Following Commands Follows one step commands inconsistently   Comments son present at bedside, pt able to intermittently communicate in [place], [facility] needs known  son was providing assistance c [facility] translation at times, d/t pt's cognitive deficits   RLE Assessment   RLE Assessment   (grossly 3/5 assessed c functional mobility)   LLE Assessment   LLE Assessment   (grossly 3/5 assessed c functional mobility)   Coordination   Movements are Fluid and Coordinated 1   Sensation WFL   Bed Mobility   Supine to Sit 3  Moderate assistance   Additional items Assist x 2;HOB elevated; Increased time required;Verbal cues;LE management   Sit to Supine 3  Moderate assistance   Additional items Assist x 2;HOB elevated; Increased time required;Verbal cues;LE management   Transfers   Sit to Stand 4  Minimal assistance   Additional items Assist x 2; Increased time required;Verbal cues   Stand to Sit 4 Minimal assistance   Additional items Assist x 2; Increased time required;Verbal cues   Additional Comments pt unable to demonstrate egress test components   Ambulation/Elevation   Gait pattern Not tested   Balance   Static Sitting Fair   Dynamic Sitting Fair -   Static Standing Poor +   Dynamic Standing Poor   Ambulatory   (DNT)   Endurance Deficit   Endurance Deficit Yes   Activity Tolerance   Activity Tolerance Patient limited by fatigue   Medical Staff Made Aware CM Jacques   Nurse Made Aware RN Lul ([place]) verbalized pt appropriate to see, made aware of session outcome/recs   Assessment   Prognosis Fair   Problem List Decreased strength;Decreased endurance; Impaired balance;Decreased mobility; Decreased cognition;Obesity   Assessment Pt is 66 y o  female seen for PT evaluation on 6/22/2021 s/p admit to [place] on 6/19/2021 w/ Altered mental status  PT consulted to assess pt's functional mobility and d/c needs  Order placed for PT eval and tx, w/ up as tolerated order  Performed at least 2 patient identifiers during session: Name and wristband  Comorbidities affecting pt's physical performance at time of assessment include:  has a past medical history of Asthma, Diabetes mellitus ([address] ), Glaucoma, and Hypertension  PTA, pt was independent w/ all functional mobility w/ walker, ambulates household distances, has 2-3 JAX and lives w/ spouse in 1 level home  Personal factors affecting pt at time of IE include: inaccessible home environment, ambulating w/ assistive device, stairs to enter home, inability to ambulate household distances, inability to navigate level surfaces w/o external assistance, decreased cognition, limited home support, preferred language not [place] (language barrier), positive fall history and decreased initiation and engagement   Please find objective findings from PT assessment regarding body systems outlined above with impairments and limitations including weakness, impaired balance, decreased endurance, pain, decreased activity tolerance, fall risk and decreased cognition, as well as mobility assessment (need for cueing for mobility technique)  The following objective measures performed on IE also reveal limitations: Barthel Index: 25/100 and Modified [place]: 4 (moderate/severe disability)  Pt's clinical presentation is currently unstable/unpredictable seen in pt's presentation of abnormal lab value(s), need for input for task focus and mobility technique and ongoing medical assessment  Pt to benefit from continued PT tx to address deficits as defined above and maximize level of functional independent mobility and consistency  From PT/mobility standpoint, recommendation at time of d/c would be post acute rehabilitation services pending progress in order to facilitate return to PLOF  Barriers to Discharge Inaccessible home environment;Decreased caregiver support   Goals   Patient Goals to return home   STG Expiration Date 07/02/21   Short Term Goal #1 In 7-10 days: Increase bilateral LE strength 1/2 grade to facilitate independent mobility, Perform all bed mobility tasks with min A of 1 to decrease caregiver burden, Perform all transfers with min A of 1 to improve independence, Increase all balance 1/2 grade to decrease risk for falls, Tolerate seated at EOB 15 minutes to facilitate functional task performance, Improve Barthel Index score to 40 or greater to facilitate independence, PT provider will perform functional balance assessment to determine fall risk and PT to see and establish goals for ambulation and ambulatory balance when appropriate   PT Treatment Day 0   Plan   Treatment/Interventions Functional transfer training;LE strengthening/ROM; Therapeutic exercise; Endurance training;Patient/family training;Equipment eval/education; Bed mobility;Spoke to nursing;Spoke to case management; Family   PT Frequency   (3-5x/wk)   Recommendation   PT Discharge Recommendation Post acute rehabilitation services Equipment Recommended   (TBD)   PT - OK to Discharge Yes   Additional Comments when medically cleared if ot STR   AM-PAC Basic Mobility Inpatient   Turning in Bed Without Bedrails 2   Lying on Back to Sitting on Edge of Flat Bed 2   Moving Bed to Chair 1   Standing Up From Chair 1   Walk in Room 1   Climb 3-5 Stairs 1   Basic Mobility Inpatient Raw Score 8   Turning Head Towards Sound 3   Follow Simple Instructions 2   Low Function Basic Mobility Raw Score 13   Low Function Basic Mobility Standardized Score 20 14   Modified Baker Scale   Modified Baker Scale 4   Barthel Index   Feeding 5   Bathing 0   Grooming Score 0   Dressing Score 5   Bladder Score 0   Bowels Score 5   Toilet Use Score 5   Transfers (Bed/Chair) Score 5   Mobility (Level Surface) Score 0   Stairs Score 0   Barthel Index Score 25         Poli Proper, PT, DPT

## 2021-06-22 NOTE — PROGRESS NOTES
NEPHROLOGY PROGRESS NOTE    Patient: Ricardo Pineda               Sex: female          DOA: 6/19/2021  5:47 PM   YOB: 1945        Age:  66 y o         LOS:  LOS: 2 days       HPI    patient admitted with hyponatremia and altered mental status    SUBJECTIVE      patient remained confused though seems to be quite     Denies any acute complaint and does not appear to be any distress     No chest pain no palpitation or any shortness of breath    CURRENT MEDICATIONS       Current Facility-Administered Medications:   •  acetaminophen (TYLENOL) tablet 650 mg, 650 mg, Oral, Q6H PRN, Popeye Jennings PA-C, 650 mg at 06/20/21 2248  •  albuterol (PROVENTIL HFA,VENTOLIN HFA) inhaler 2 puff, 2 puff, Inhalation, Q6H PRN, Popeye Jennings PA-C, 2 puff at 06/20/21 2300  •  albuterol inhalation solution 2 5 mg, 2 5 mg, Nebulization, Q6H PRN, Popeye Jennings PA-C, 2 5 mg at 06/21/21 0101  •  atorvastatin (LIPITOR) tablet 10 mg, 10 mg, Oral, Daily With Dinner, Popeye Jennings PA-C, 10 mg at 06/21/21 1840  •  cefepime (MAXIPIME) 1,000 mg in dextrose 5 % 50 mL IVPB, 1,000 mg, Intravenous, Q12H, Letty Chairez PA-C, Last Rate: 100 mL/hr at 06/22/21 1154, 1,000 mg at 06/22/21 1154  •  furosemide (LASIX) tablet 20 mg, 20 mg, Oral, BID (diuretic), Mariana Solano MD, 20 mg at 06/22/21 0900  •  heparin (porcine) subcutaneous injection 5,000 Units, 5,000 Units, Subcutaneous, Q8H [facility], Popeye Jennings PA-C, 5,000 Units at 06/22/21 0520  •  montelukast (SINGULAIR) tablet 10 mg, 10 mg, Oral, HS, Letty Chairez PA-C, 10 mg at 06/21/21 2156  •  QUEtiapine (SEROquel) tablet 12 5 mg, 12 5 mg, Oral, HS, Anastasia Pacheco PA-C, 12 5 mg at 06/22/21 0140  •  tolvaptan (SAMSCA) split tablet 15 mg, 15 mg, Oral, Once, Mariana Solano MD    OBJECTIVE     Current Weight: Weight - Scale: 89 3 kg (196 lb 13 9 oz)  Vitals:    06/22/21 0756   BP: 155/75   Pulse: (!) 107   Resp: (!) 10   Temp: (!) 97 4 °F (36 3 °C)   SpO2: (!) 89%       Intake/Output Summary (Last 24 hours) at 6/22/2021 1413  Last data filed at 6/22/2021 4467  Gross per 24 hour   Intake 120 ml   Output 950 ml   Net -830 ml       PHYSICAL EXAMINATION     Physical Exam  Constitutional:       General: She is not in acute distress  Appearance: She is well-developed  HENT:      Head: Normocephalic  Eyes:      General: No scleral icterus  Conjunctiva/sclera: Conjunctivae normal    Neck:      Vascular: No JVD  Cardiovascular:      Rate and Rhythm: Normal rate  Heart sounds: Normal heart sounds  Pulmonary:      Effort: Pulmonary effort is normal       Breath sounds: No wheezing  Abdominal:      Palpations: Abdomen is soft  Tenderness: There is no abdominal tenderness  Musculoskeletal:         General: Normal range of motion  Cervical back: Neck supple  Skin:     General: Skin is warm  Findings: No rash  Neurological:      Mental Status: She is alert  She is disoriented     Psychiatric:         Behavior: Behavior normal           LAB RESULTS     Results from last 7 days   Lab Units 06/22/21  0613 06/21/21  2142 06/21/21  1249 06/21/21  0540 06/20/21  2223 06/20/21  1459 06/20/21  0108 06/19/21  1922   WBC Thousand/uL 11 65*  --   --   --   --   --   --  6 70   HEMOGLOBIN g/dL 14 7  --   --   --   --   --   --  14 4   HEMATOCRIT % 45 1  --   --   --   --   --   --  44 3   PLATELETS Thousands/uL 302  --   --   --   --   --   --  174   POTASSIUM mmol/L  --  3 8 3 4* 3 3* 3 5 3 2* 3 3* 3 5   CHLORIDE mmol/L  --  84* 82* 81* 82* 84* 78* 77*   CO2 mmol/L  --  35* 33* 34* 35* 38* 36* 35*   BUN mg/dL  --  9 8 6 5 7 11 16   CREATININE mg/dL  --  0 53* 0 50* 0 50* 0 53* 0 50* 0 52* 0 69   EGFR ml/min/1 73sq m  --  92 94 94 92 94 92 84   CALCIUM mg/dL  --  8 2* 8 1* 8 0* 7 7* 8 1* 7 8* 8 8   MAGNESIUM mg/dL  --   --   --   --   --   --   --  2 0       RADIOLOGY RESULTS      Results for orders placed during the hospital encounter of 06/19/21    XR chest 1 view portable    Narrative  CHEST    INDICATION:   hypoxia  COMPARISON: Chest radiograph 2/8/2019 and chest CT from 6/19/2021  EXAM PERFORMED/VIEWS:  XR CHEST PORTABLE  FINDINGS:    Cardiomediastinal silhouette normal  Redemonstration of pulmonary artery enlargement  Lungs clear  No effusion or pneumothorax  Osseous structures normal for age  Impression  No acute cardiopulmonary disease  Redemonstration of pulmonary artery enlargement compatible with the history of pulmonary hypertension  Workstation performed: [id number]    Results for orders placed during the hospital encounter of 02/08/19    XR chest pa & lateral    Narrative  CHEST    INDICATION:   cough  COMPARISON:  1/18/2017    EXAM PERFORMED/VIEWS:  XR CHEST PA & LATERAL      FINDINGS:    Cardiomediastinal silhouette appears unremarkable  The lungs are clear  Stable elevation right hemidiaphragm  No pneumothorax or pleural effusion  Osseous structures appear within normal limits for patient age  Impression  No acute cardiopulmonary disease  Workstation performed: [id number]    No results found for this or any previous visit  No results found for this or any previous visit  No results found for this or any previous visit  No results found for this or any previous visit  PLAN / RECOMMENDATIONS       hyponatremia:  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  Etiology of SIADH and clear  Maybe medicine effect  Patient's sodium remain low so will give tolvaptan 1 dose followed by salt tablet as needed  Discussed with the family     Altered mental status:  Etiology unclear  Possible metabolic encephalopathy versus dementia    3  Diabetes type 2: Glucose was low when he see came in  Better now    Will continue to monitor    Jordan Damon MD  Nephrology  6/22/2021        Portions of the record may have been created with voice recognition software   Occasional wrong word or "sound a like" substitutions may have occurred due to the inherent limitations of voice recognition software  Read the chart carefully and recognize, using context, where substitutions have occurred

## 2021-06-22 NOTE — CASE MANAGEMENT
LOS: 2 DAYS  PATIENT IS NOT A BUNDLE  PATIENT IS NOT A 30 DAY READMISSION  UNPLANNED READMISSION RISK SCORE IS 12 (GREEN/LOW)  CM called patient's son Leonardo November to complete general assessment due to patient being confused  Son reported that patient lives with his father (her ) in a ranch home with 2 JAX  Patient uses a cane, walker, and a wheelchair depending on how patient is feeling that day and where she is going  Patient is usually okay with navigating stairs  Patient requires some assistance with ADLs, and her  provides this assistance  Patient has Hx of STR at the [facility] [place]  Son denied [facility] Hx  Patient fills her prescriptions at [facility] in [facility], and son denied any barriers to obtaining or affording prescriptions  Patient's PCP is Dr Blanche Pelletier  Son denied MH and SA Hx  Patient does not have a POA, but her son Jacob Quesada is her HCR and assists with medical decisions  Patient is retired and not able to drive  Her family assists with transportation and will provide transportation at discharge  CM reviewed discharge planning process including the following: identifying help at home, patient preference for discharge planning needs, pharmacy preference, and availability of treatment team to discuss questions or concerns patient and/or family may have regarding understanding medications and recognizing signs and symptoms once discharged  CM also encouraged patient to follow up with all recommended appointments after discharge  Patient advised of importance for patient and family to participate in managing patient’s medical well being  Son reported that the family has been discussing STR vs  LTC due to patient's  being exhausted  Son reported that they have been in touch with [facility] regarding LTC and would prefer there if possible  CM sent referral to [facility] to determine if they can accept for STR with the possibility of transitioning to LTC   CM department will continue to follow patient through discharge

## 2021-06-22 NOTE — ASSESSMENT & PLAN NOTE
· Noted on CT A, no PE, pulmonary edema with pulmonary hypertension  · Echocardiogram grossly wnl  · Will c/w further dieuresis  · Intake and output monitoring, daily weights  · Low-sodium diet with 1500 mL fluid restriction

## 2021-06-22 NOTE — PLAN OF CARE
Problem: PHYSICAL THERAPY ADULT  Goal: Performs mobility at highest level of function for planned discharge setting  See evaluation for individualized goals  Description: Treatment/Interventions: Functional transfer training, LE strengthening/ROM, Therapeutic exercise, Endurance training, Patient/family training, Equipment eval/education, Bed mobility, Spoke to nursing, Spoke to case management, Family  Equipment Recommended:  (TBD)       See flowsheet documentation for full assessment, interventions and recommendations  6/22/2021 1405 by Estefany Fregoso PT  Note: Prognosis: Fair  Problem List: Decreased strength, Decreased endurance, Impaired balance, Decreased mobility, Decreased cognition, Obesity  Assessment: Pt is 66 y o  female seen for PT evaluation on 6/22/2021 s/p admit to [facility] on 6/19/2021 w/ Altered mental status  PT consulted to assess pt's functional mobility and d/c needs  Order placed for PT eval and tx, w/ up as tolerated order  Performed at least 2 patient identifiers during session: Name and wristband  Comorbidities affecting pt's physical performance at time of assessment include:  has a past medical history of Asthma, Diabetes mellitus ([facility] [address] ), Glaucoma, and Hypertension  PTA, pt was independent w/ all functional mobility w/ walker, ambulates household distances, has 2-3 JAX and lives w/ spouse in 1 level home  Personal factors affecting pt at time of IE include: inaccessible home environment, ambulating w/ assistive device, stairs to enter home, inability to ambulate household distances, inability to navigate level surfaces w/o external assistance, decreased cognition, limited home support, preferred language not [place] (language barrier), positive fall history and decreased initiation and engagement   Please find objective findings from PT assessment regarding body systems outlined above with impairments and limitations including weakness, impaired balance, decreased endurance, pain, decreased activity tolerance, fall risk and decreased cognition, as well as mobility assessment (need for cueing for mobility technique)  The following objective measures performed on IE also reveal limitations: Barthel Index: 25/100 and Modified [place]: 4 (moderate/severe disability)  Pt's clinical presentation is currently unstable/unpredictable seen in pt's presentation of abnormal lab value(s), need for input for task focus and mobility technique and ongoing medical assessment  Pt to benefit from continued PT tx to address deficits as defined above and maximize level of functional independent mobility and consistency  From PT/mobility standpoint, recommendation at time of d/c would be post acute rehabilitation services pending progress in order to facilitate return to PLOF  Barriers to Discharge: Inaccessible home environment, Decreased caregiver support        PT Discharge Recommendation: Post acute rehabilitation services     PT - OK to Discharge: Yes    See flowsheet documentation for full assessment  6/22/2021 1405 by Roger Higginbotham PT  Note: Prognosis: Fair  Problem List: Decreased strength, Decreased endurance, Impaired balance, Decreased mobility, Decreased cognition, Obesity  Assessment: Pt is 66 y o  female seen for PT evaluation on 6/22/2021 s/p admit to [place] on 6/19/2021 w/ Altered mental status  PT consulted to assess pt's functional mobility and d/c needs  Order placed for PT eval and tx, w/ up as tolerated order  Performed at least 2 patient identifiers during session: Name and wristband  Comorbidities affecting pt's physical performance at time of assessment include:  has a past medical history of Asthma, Diabetes mellitus ([address] ), Glaucoma, and Hypertension  PTA, pt was independent w/ all functional mobility w/ walker, ambulates household distances, has 2-3 JAX and lives w/ spouse in 1 level home   Personal factors affecting pt at time of IE include: inaccessible home environment, ambulating w/ assistive device, stairs to enter home, inability to ambulate household distances, inability to navigate level surfaces w/o external assistance, decreased cognition, limited home support, preferred language not [place] (language barrier), positive fall history and decreased initiation and engagement  Please find objective findings from PT assessment regarding body systems outlined above with impairments and limitations including weakness, impaired balance, decreased endurance, pain, decreased activity tolerance, fall risk and decreased cognition, as well as mobility assessment (need for cueing for mobility technique)  The following objective measures performed on IE also reveal limitations: Barthel Index: 25/100 and Modified [place]: 4 (moderate/severe disability)  Pt's clinical presentation is currently unstable/unpredictable seen in pt's presentation of abnormal lab value(s), need for input for task focus and mobility technique and ongoing medical assessment  Pt to benefit from continued PT tx to address deficits as defined above and maximize level of functional independent mobility and consistency  From PT/mobility standpoint, recommendation at time of d/c would be post acute rehabilitation services pending progress in order to facilitate return to PLOF  Barriers to Discharge: Inaccessible home environment, Decreased caregiver support        PT Discharge Recommendation: Post acute rehabilitation services     PT - OK to Discharge: Yes    See flowsheet documentation for full assessment

## 2021-06-22 NOTE — ASSESSMENT & PLAN NOTE
· Level on arrival 116, most likely in setting of persistent diarrhea and dehydration  · Initially treated with IVF with mild improvement of sodium level to 122 however was noted to be volume overloaded and IV     · Sodium stable at 122  · C/w lasix therapy  · Will continue to monitor sodium closely  · Follow-up nephrology recs

## 2021-06-23 PROBLEM — G93.41 METABOLIC ENCEPHALOPATHY: Status: ACTIVE | Noted: 2021-06-20

## 2021-06-23 PROBLEM — I50.31 ACUTE DIASTOLIC CONGESTIVE HEART FAILURE (HCC): Status: ACTIVE | Noted: 2021-06-20

## 2021-06-23 LAB
ANION GAP SERPL CALCULATED.3IONS-SCNC: 1 MMOL/L (ref 4–13)
ANION GAP SERPL CALCULATED.3IONS-SCNC: 7 MMOL/L (ref 4–13)
BUN SERPL-MCNC: 15 MG/DL (ref 5–25)
BUN SERPL-MCNC: 18 MG/DL (ref 5–25)
CALCIUM SERPL-MCNC: 8.5 MG/DL (ref 8.3–10.1)
CALCIUM SERPL-MCNC: 9.1 MG/DL (ref 8.3–10.1)
CHLORIDE SERPL-SCNC: 89 MMOL/L (ref 100–108)
CHLORIDE SERPL-SCNC: 89 MMOL/L (ref 100–108)
CO2 SERPL-SCNC: 37 MMOL/L (ref 21–32)
CO2 SERPL-SCNC: 39 MMOL/L (ref 21–32)
CREAT SERPL-MCNC: 0.69 MG/DL (ref 0.6–1.3)
CREAT SERPL-MCNC: 0.72 MG/DL (ref 0.6–1.3)
GFR SERPL CREATININE-BSD FRML MDRD: 81 ML/MIN/1.73SQ M
GFR SERPL CREATININE-BSD FRML MDRD: 84 ML/MIN/1.73SQ M
GLUCOSE SERPL-MCNC: 196 MG/DL (ref 65–140)
GLUCOSE SERPL-MCNC: 202 MG/DL (ref 65–140)
GLUCOSE SERPL-MCNC: 210 MG/DL (ref 65–140)
GLUCOSE SERPL-MCNC: 210 MG/DL (ref 65–140)
GLUCOSE SERPL-MCNC: 214 MG/DL (ref 65–140)
GLUCOSE SERPL-MCNC: 237 MG/DL (ref 65–140)
POTASSIUM SERPL-SCNC: 3.2 MMOL/L (ref 3.5–5.3)
POTASSIUM SERPL-SCNC: 3.5 MMOL/L (ref 3.5–5.3)
SODIUM SERPL-SCNC: 129 MMOL/L (ref 136–145)
SODIUM SERPL-SCNC: 133 MMOL/L (ref 136–145)

## 2021-06-23 PROCEDURE — 99232 SBSQ HOSP IP/OBS MODERATE 35: CPT | Performed by: INTERNAL MEDICINE

## 2021-06-23 PROCEDURE — 82948 REAGENT STRIP/BLOOD GLUCOSE: CPT

## 2021-06-23 PROCEDURE — 80048 BASIC METABOLIC PNL TOTAL CA: CPT | Performed by: INTERNAL MEDICINE

## 2021-06-23 PROCEDURE — 97167 OT EVAL HIGH COMPLEX 60 MIN: CPT

## 2021-06-23 PROCEDURE — 97530 THERAPEUTIC ACTIVITIES: CPT

## 2021-06-23 PROCEDURE — 97110 THERAPEUTIC EXERCISES: CPT

## 2021-06-23 RX ADMIN — CEFEPIME HYDROCHLORIDE 1000 MG: 2 INJECTION, POWDER, FOR SOLUTION INTRAVENOUS at 12:38

## 2021-06-23 RX ADMIN — MONTELUKAST SODIUM 10 MG: 10 TABLET, FILM COATED ORAL at 21:24

## 2021-06-23 RX ADMIN — FUROSEMIDE 20 MG: 20 TABLET ORAL at 08:36

## 2021-06-23 RX ADMIN — ATORVASTATIN CALCIUM 10 MG: 10 TABLET, FILM COATED ORAL at 18:15

## 2021-06-23 RX ADMIN — HEPARIN SODIUM 5000 UNITS: 5000 INJECTION INTRAVENOUS; SUBCUTANEOUS at 05:25

## 2021-06-23 RX ADMIN — FUROSEMIDE 20 MG: 20 TABLET ORAL at 18:15

## 2021-06-23 RX ADMIN — QUETIAPINE FUMARATE 12.5 MG: 25 TABLET ORAL at 21:24

## 2021-06-23 RX ADMIN — CEFEPIME HYDROCHLORIDE 1000 MG: 2 INJECTION, POWDER, FOR SOLUTION INTRAVENOUS at 21:32

## 2021-06-23 RX ADMIN — HEPARIN SODIUM 5000 UNITS: 5000 INJECTION INTRAVENOUS; SUBCUTANEOUS at 21:26

## 2021-06-23 NOTE — PROGRESS NOTES
[facility]  Progress Note - Marilia Nunez 1945, 66 y o  female MRN: [id number]  Unit/Bed#: -01 Encounter: [id number]  Primary Care Provider: Morena Kingston MD   Date and time admitted to hospital: 6/19/2021  5:47 PM    Acute diastolic congestive heart failure ([address] )  Assessment & Plan  · Noted on CT A, no PE, pulmonary edema with pulmonary hypertension  · Echocardiogram ef wnl but revealed diastolic dysfunction  · Will c/w further dieuresis  · Intake and output monitoring, daily weights  · Low-sodium diet with 1500 mL fluid restriction    Hypoglycemia  Assessment & Plan  · Patient's son reporting low blood glucose level at home, on arrival blood glucose 73  · Continue to monitor blood glucose checks q i d , hypoglycemia protocol  · Will hold home oral diabetic medications  · Has since resolved    Hyponatremia  Assessment & Plan  · Level on arrival 116, most likely in setting of persistent diarrhea and dehydration  · Initially treated with IVF with mild improvement of sodium level to 122 however was noted to be volume overloaded and IV     · Sodium stable at 122  · C/w lasix therapy  · 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  · Will continue to monitor sodium closely  · Follow-up nephrology recs    UTI (urinary tract infection)  Assessment & Plan  · Urinalysis revealing moderate bacteria and white blood cells  · Continue IV cefepime 1 g q 12 hours    * Metabolic encephalopathy  Assessment & Plan  · Patient's son reporting patient more confused at home and was found to be hypoglycemic around 55, has not been eating but taking diabetic meds  · CT head negative, EKG with sinus rhythm with PAC and T-wave inversion  · Most likely in setting of hypoglycemia, UTI, hyponatremia  · Hold home Ativan, Ambien, avoid opiates  · Has since resolved        VTE Pharmacologic Prophylaxis:   Pharmacologic: Heparin  Mechanical VTE Prophylaxis in Place: Yes    Patient Centered Rounds: I have performed bedside rounds with nursing staff today  Discussions with Specialists or Other Care Team Provider: cm, nursing    Education and Discussions with Family / Patient: pt    Time Spent for Care: 30 minutes  More than 50% of total time spent on counseling and coordination of care as described above  Current Length of Stay: 3 day(s)    Current Patient Status: Inpatient   Certification Statement: The patient will continue to require additional inpatient hospital stay due to pending improvement of sodium    Discharge Plan: see above    Code Status: Level 1 - Full Code      Subjective:   No acute compmaints    Objective:     Vitals:   Temp (24hrs), Av 3 °F (36 8 °C), Min:98 1 °F (36 7 °C), Max:98 5 °F (36 9 °C)    Temp:  [98 1 °F (36 7 °C)-98 5 °F (36 9 °C)] 98 1 °F (36 7 °C)  HR:  [] 106  Resp:  [17-18] 18  BP: (154-156)/(83-86) 154/86  SpO2:  [92 %-97 %] 97 %  Body mass index is 38 45 kg/m²  Input and Output Summary (last 24 hours): Intake/Output Summary (Last 24 hours) at 2021 1023  Last data filed at 2021 0851  Gross per 24 hour   Intake 360 ml   Output 2225 ml   Net -1865 ml       Physical Exam:     Physical Exam  Constitutional:       General: She is not in acute distress  Appearance: She is well-developed  She is not diaphoretic  HENT:      Head: Normocephalic and atraumatic  Nose: Nose normal       Mouth/Throat:      Pharynx: No oropharyngeal exudate  Eyes:      General: No scleral icterus  Right eye: No discharge  Left eye: No discharge  Conjunctiva/sclera: Conjunctivae normal    Neck:      Thyroid: No thyromegaly  Vascular: No JVD  Cardiovascular:      Rate and Rhythm: Normal rate and regular rhythm  Heart sounds: Normal heart sounds  No murmur heard  No friction rub  No gallop  Pulmonary:      Effort: Pulmonary effort is normal  No respiratory distress  Breath sounds: Normal breath sounds  No wheezing or rales     Chest:      Chest wall: No tenderness  Abdominal:      General: Bowel sounds are normal  There is no distension  Palpations: Abdomen is soft  Tenderness: There is no abdominal tenderness  There is no guarding or rebound  Musculoskeletal:         General: No tenderness or deformity  Normal range of motion  Cervical back: Normal range of motion and neck supple  Skin:     General: Skin is warm and dry  Findings: No erythema or rash  Neurological:      Mental Status: She is alert  Mental status is at baseline  Cranial Nerves: No cranial nerve deficit  Sensory: No sensory deficit  Motor: No abnormal muscle tone  Coordination: Coordination normal          Additional Data:     Labs:    Results from last 7 days   Lab Units 06/22/21  0613   WBC Thousand/uL 11 65*   HEMOGLOBIN g/dL 14 7   HEMATOCRIT % 45 1   PLATELETS Thousands/uL 302   NEUTROS PCT % 80*   LYMPHS PCT % 7*   MONOS PCT % 12   EOS PCT % 0     Results from last 7 days   Lab Units 06/21/21  2142 06/19/21  1922   SODIUM mmol/L 122* 116*   POTASSIUM mmol/L 3 8 3 5   CHLORIDE mmol/L 84* 77*   CO2 mmol/L 35* 35*   BUN mg/dL 9 16   CREATININE mg/dL 0 53* 0 69   ANION GAP mmol/L 3* 4   CALCIUM mg/dL 8 2* 8 8   ALBUMIN g/dL  --  4 0   TOTAL BILIRUBIN mg/dL  --  1 00   ALK PHOS U/L  --  45*   ALT U/L  --  36   AST U/L  --  50*   GLUCOSE RANDOM mg/dL 213* 73         Results from last 7 days   Lab Units 06/23/21  0728 06/22/21  2044 06/22/21  1542 06/22/21  1052 06/22/21  0750 06/21/21  2100 06/21/21  1539 06/21/21  1119 06/21/21  0740 06/20/21  2116 06/20/21  1716 06/20/21  1311   POC GLUCOSE mg/dl 202* 219* 205* 262* 193* 227* 214* 217* 182* 117 121 94         Results from last 7 days   Lab Units 06/19/21 2008   LACTIC ACID mmol/L 0 9           * I Have Reviewed All Lab Data Listed Above  * Additional Pertinent Lab Tests Reviewed:  All Labs Within Last 24 Hours Reviewed    Imaging:    Imaging Reports Reviewed Today Include: na  Imaging Personally Reviewed by Myself Includes:  na    Recent Cultures (last 7 days):     Results from last 7 days   Lab Units 06/19/21 2008   BLOOD CULTURE  No Growth at 48 hrs  No Growth at 48 hrs  Last 24 Hours Medication List:   Current Facility-Administered Medications   Medication Dose Route Frequency Provider Last Rate   • acetaminophen  650 mg Oral Q6H PRN Carmella Tavarez PA-C     • albuterol  2 puff Inhalation Q6H PRN Carmella Tavarez PA-C     • albuterol  2 5 mg Nebulization Q6H PRN Carmella Tavarez PA-C     • atorvastatin  10 mg Oral Daily With Dinner Carmella Tavarez PA-C     • cefepime  1,000 mg Intravenous Q12H Carmella Tavarez PA-C 1,000 mg (06/22/21 2241)   • furosemide  20 mg Oral BID (diuretic) Tyron Finley MD     • heparin (porcine)  5,000 Units Subcutaneous Q8H [address] Letty Chairze PA-C     • montelukast  10 mg Oral HS Letty Chairez PA-C     • QUEtiapine  12 5 mg Oral HS Anastasia Vieira PA-C          Today, Patient Was Seen By: Nilay Dominguez MD    ** Please Note: Dictation voice to text software may have been used in the creation of this document   **

## 2021-06-23 NOTE — PLAN OF CARE
Problem: OCCUPATIONAL THERAPY ADULT  Goal: Performs self-care activities at highest level of function for planned discharge setting  See evaluation for individualized goals  Description: Treatment Interventions: ADL retraining, Functional transfer training, UE strengthening/ROM, Endurance training, Cognitive reorientation, Patient/family training, Compensatory technique education, Activityengagement, Equipment evaluation/education          See flowsheet documentation for full assessment, interventions and recommendations  Note: Limitation: Decreased ADL status, Decreased UE strength, Decreased cognition, Decreased endurance, Decreased self-care trans, Decreased high-level ADLs  Prognosis: Good  Assessment: Patient is a 67 y/o female who was admitted to [facility] on 6/19/2021 with a diagnosis of AMS  Co-morbidities affecting performance include hyponatremia, pulmonary edema, hypoglycemia, and UTI  Two patient identifiers were noted to confirm patient ID  Patient lives with her  in a one level home with 3 JAX  Prior to admission, patient required assistance for ADLs/IADLs  At baseline patient ambulates using a walker  Currently, patient requires Min A for UB ADLs and Mod A for LB ADLs  Patient ambulates with Min Ax2 using RW  Patient is alert and oriented to person only  Personal factors impacting functional performance include decreased independence in ADLs/IADLs and steps to enter  Patient limitations include limited activity tolerance, decreased UE strength and decreased static/dynamic standing balance and cognitive limitations including orientation, attention span and memory  Patient will benefit from continued skilled OT services 3-5 x/week while in the hospital to address the limitations mentioned and increase functional performance in ADLs and functional mobility   Occupational performance areas to address include dressing, bathing, toileting, grooming and functional transfers/mobility  From an OT perspective, recommendation at time of d/c will be post acute rehabilitation services        OT Discharge Recommendation: Post acute rehabilitation services  OT - OK to Discharge: Yes (pending medical clearance)

## 2021-06-23 NOTE — CASE MANAGEMENT
CM called patient's sons to discuss accepting facilities: [facility] vs  [facility]  CM left a message for patient's son Yadiel Stroud  MICHAELA spoke to patient's son Jose F Davis reported that he is going to review with his father and brother to see which they prefer  Son will call CM back shortly  CM department will continue to follow patient through discharge

## 2021-06-23 NOTE — ASSESSMENT & PLAN NOTE
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

## 2021-06-23 NOTE — ASSESSMENT & PLAN NOTE
· Noted on CT A, no PE, pulmonary edema with pulmonary hypertension  · Echocardiogram ef wnl but revealed diastolic dysfunction  · Will c/w further dieuresis  · Intake and output monitoring, daily weights  · Low-sodium diet with 1500 mL fluid restriction

## 2021-06-23 NOTE — PROGRESS NOTES
NEPHROLOGY PROGRESS NOTE    Patient: Milus Libman               Sex: female          DOA: 6/19/2021  5:47 PM   YOB: 1945        Age:  66 y o         LOS:  LOS: 3 days       HPI     Patient admitted with hyponatremia        SUBJECTIVE     Doing quite well    Denies any acute complaint    No chest pain no palpitation or shortness of breath    CURRENT MEDICATIONS       Current Facility-Administered Medications:   •  acetaminophen (TYLENOL) tablet 650 mg, 650 mg, Oral, Q6H PRN, Rochelle Scott PA-C, 650 mg at 06/20/21 2248  •  albuterol (PROVENTIL HFA,VENTOLIN HFA) inhaler 2 puff, 2 puff, Inhalation, Q6H PRN, Rochelle Scott PA-C, 2 puff at 06/20/21 2300  •  albuterol inhalation solution 2 5 mg, 2 5 mg, Nebulization, Q6H PRN, Rochelle Scott PA-C, 2 5 mg at 06/21/21 0101  •  atorvastatin (LIPITOR) tablet 10 mg, 10 mg, Oral, Daily With Dinner, Rochelle Scott PA-C, 10 mg at 06/22/21 1703  •  cefepime (MAXIPIME) 1,000 mg in dextrose 5 % 50 mL IVPB, 1,000 mg, Intravenous, Q12H, Letty Chairez PA-C, Last Rate: 100 mL/hr at 06/22/21 2241, 1,000 mg at 06/22/21 2241  •  furosemide (LASIX) tablet 20 mg, 20 mg, Oral, BID (diuretic), Ivett Kelsey MD, 20 mg at 06/23/21 0836  •  heparin (porcine) subcutaneous injection 5,000 Units, 5,000 Units, Subcutaneous, Q8H [address], Rochelle Scott PA-C, 5,000 Units at 06/23/21 0525  •  montelukast (SINGULAIR) tablet 10 mg, 10 mg, Oral, HS, Letty Chairez PA-C, 10 mg at 06/22/21 2235  •  QUEtiapine (SEROquel) tablet 12 5 mg, 12 5 mg, Oral, HS, Anastasia Pacheco PA-C, 12 5 mg at 06/22/21 2235    OBJECTIVE     Current Weight: Weight - Scale: 89 3 kg (196 lb 13 9 oz)  Vitals:    06/22/21 2319   BP: 154/86   Pulse: (!) 106   Resp: 18   Temp: 98 1 °F (36 7 °C)   SpO2: 97%       Intake/Output Summary (Last 24 hours) at 6/23/2021 1156  Last data filed at 6/23/2021 0823  Gross per 24 hour   Intake 360 ml   Output 2225 ml   Net -1865 ml       PHYSICAL EXAMINATION     Physical Exam  Constitutional:       General: She is not in acute distress  Appearance: She is well-developed  HENT:      Head: Normocephalic  Eyes:      General: No scleral icterus  Conjunctiva/sclera: Conjunctivae normal    Neck:      Vascular: No JVD  Cardiovascular:      Rate and Rhythm: Normal rate  Heart sounds: Normal heart sounds  Pulmonary:      Effort: Pulmonary effort is normal       Breath sounds: No wheezing  Abdominal:      Palpations: Abdomen is soft  Tenderness: There is no abdominal tenderness  Musculoskeletal:         General: Normal range of motion  Cervical back: Neck supple  Skin:     General: Skin is warm  Findings: No rash  Neurological:      Mental Status: She is alert and oriented to person, place, and time  Psychiatric:         Behavior: Behavior normal           LAB RESULTS     Results from last 7 days   Lab Units 06/23/21  1056 06/22/21  0613 06/21/21  2142 06/21/21  1249 06/21/21  0540 06/20/21  2223 06/20/21  1459 06/20/21  0108 06/19/21  1922   WBC Thousand/uL  --  11 65*  --   --   --   --   --   --  6 70   HEMOGLOBIN g/dL  --  14 7  --   --   --   --   --   --  14 4   HEMATOCRIT %  --  45 1  --   --   --   --   --   --  44 3   PLATELETS Thousands/uL  --  302  --   --   --   --   --   --  174   POTASSIUM mmol/L 3 5  --  3 8 3 4* 3 3* 3 5 3 2* 3 3* 3 5   CHLORIDE mmol/L 89*  --  84* 82* 81* 82* 84* 78* 77*   CO2 mmol/L 37*  --  35* 33* 34* 35* 38* 36* 35*   BUN mg/dL 15  --  9 8 6 5 7 11 16   CREATININE mg/dL 0 69  --  0 53* 0 50* 0 50* 0 53* 0 50* 0 52* 0 69   EGFR ml/min/1 73sq m 84  --  92 94 94 92 94 92 84   CALCIUM mg/dL 9 1  --  8 2* 8 1* 8 0* 7 7* 8 1* 7 8* 8 8   MAGNESIUM mg/dL  --   --   --   --   --   --   --   --  2 0       RADIOLOGY RESULTS      Results for orders placed during the hospital encounter of 06/19/21    XR chest 1 view portable    Narrative  CHEST    INDICATION:   hypoxia      COMPARISON: Chest radiograph 2/8/2019 and chest CT from 6/19/2021  EXAM PERFORMED/VIEWS:  XR CHEST PORTABLE  FINDINGS:    Cardiomediastinal silhouette normal  Redemonstration of pulmonary artery enlargement  Lungs clear  No effusion or pneumothorax  Osseous structures normal for age  Impression  No acute cardiopulmonary disease  Redemonstration of pulmonary artery enlargement compatible with the history of pulmonary hypertension  Workstation performed: [id number]    Results for orders placed during the hospital encounter of 02/08/19    XR chest pa & lateral    Narrative  CHEST    INDICATION:   cough  COMPARISON:  1/18/2017    EXAM PERFORMED/VIEWS:  XR CHEST PA & LATERAL      FINDINGS:    Cardiomediastinal silhouette appears unremarkable  The lungs are clear  Stable elevation right hemidiaphragm  No pneumothorax or pleural effusion  Osseous structures appear within normal limits for patient age  Impression  No acute cardiopulmonary disease  Workstation performed: [id number]    No results found for this or any previous visit  No results found for this or any previous visit  No results found for this or any previous visit  No results found for this or any previous visit  PLAN / RECOMMENDATIONS      Hyponatremia:  Serum Na 133 and improving at this point  Will continue to monitor  Patient has a dose of tolvaptan yesterday    Diastolic heart failure:  Seems to be quite asymptomatic    Diabetes type 2:  Came with hypoglycemia and stable right now    Will continue to monitor    Vickie Joseph MD  Nephrology  6/23/2021        Portions of the record may have been created with voice recognition software  Occasional wrong word or "sound a like" substitutions may have occurred due to the inherent limitations of voice recognition software  Read the chart carefully and recognize, using context, where substitutions have occurred

## 2021-06-23 NOTE — PHYSICAL THERAPY NOTE
Physical Therapy Treatment    Patient's Name: Cristóbal Figueroa    Admitting Diagnosis  Acute hyponatremia [E87 1]  Hyponatremia [E87 1]  Altered mental status [R41 82]  Pulmonary hypertension ([facility] [address] ) [I27 20]  AMS (altered mental status) [R41 82]    Problem List  Patient Active Problem List   Diagnosis   • Acute pulmonary insufficiency   • Asthma exacerbation   • Obesity   • Anxiety disorder   • Diabetes mellitus type 2 in obese [facility])   • Dyslipidemia   • Depression   • Accelerated hypertension   • Bleeding per rectum   • GI bleed   • Metabolic encephalopathy   • UTI (urinary tract infection)   • Hyponatremia   • Hypoglycemia   • Acute diastolic congestive heart failure (HCC)   • Pulmonary hypertension ([facility] [address] )       Past Medical History  Past Medical History:   Diagnosis Date   • Asthma    • Diabetes mellitus ([facility] [address] )    • Glaucoma    • Hypertension        Past Surgical History  Past Surgical History:   Procedure Laterality Date   • COLONOSCOPY N/A 12/8/2018    Procedure: COLONOSCOPY;  Surgeon: Srikanth Black MD;  Location: [facility]; Service: Gastroenterology        06/23/21 1042   PT Last Visit   PT Visit Date 06/23/21   Note Type   Note Type Treatment   Pain Assessment   Pain Assessment Tool Pain Assessment not indicated - pt denies pain   Restrictions/Precautions   Weight Bearing Precautions Per Order No   Braces or Orthoses   (none reported)   Other Precautions Cognitive; Bed Alarm;Multiple lines;O2;Fall Risk;Seizure   General   Chart Reviewed Yes   Response to Previous Treatment Patient with no complaints from previous session  Family/Caregiver Present Yes   Cognition   Overall Cognitive Status Impaired   Arousal/Participation Alert; Responsive; Cooperative   Attention Attends with cues to redirect   Orientation Level Oriented to person;Disoriented to situation  ("wednesday, june" "[facility] [place] [place]")   Memory Decreased short term memory;Decreased recall of recent events   Following Commands Follows one step commands with increased time or repetition   Comments pt agreeable to PT session   Subjective   Subjective "I'll try to get up"   Bed Mobility   Supine to Sit 4  Minimal assistance   Additional items Assist x 1;HOB elevated; Bedrails; Increased time required;Verbal cues   Transfers   Sit to Stand 4  Minimal assistance   Additional items Assist x 2;Armrests; Increased time required;Verbal cues   Stand to Sit 4  Minimal assistance   Additional items Assist x 2;Armrests; Increased time required;Verbal cues   Additional Comments vc for appropriate hand/foot placement during sit<>stand transfers   Ambulation/Elevation   Gait pattern Decreased foot clearance; Short stride; Step to;Excessively slow; Shuffling   Gait Assistance 4  Minimal assist   Additional items Assist x 2; Tactile cues; Verbal cues   Assistive Device Rolling walker   Distance 5' from EOB to recliner   Balance   Static Sitting Fair +   Dynamic Sitting Fair   Static Standing Fair -   Dynamic Standing Poor +   Ambulatory Poor +   Endurance Deficit   Endurance Deficit Yes   Activity Tolerance   Activity Tolerance Patient tolerated treatment well   Medical Staff Made Aware OT Efren Jamison and student Steven   Nurse Made Aware CANDACE Hurd verbalized pt appropriate to see, made aware of session outcome/recs   Exercises   Hip Abduction Sitting;10 reps;AROM; Bilateral   Knee AROM Long Arc Quad Sitting;10 reps;AROM; Bilateral   Ankle Pumps Sitting;10 reps;AROM; Bilateral   Marching Sitting;10 reps;AROM; Bilateral   Assessment   Prognosis Fair   Problem List Decreased strength;Decreased endurance; Impaired balance;Decreased mobility; Decreased cognition;Obesity   Assessment Pt seen for PT treatment session this date with interventions consisting of Therapeutic exercise consisting of: AROM 10 reps B LE in sitting position and therapeutic activity consisting of training: supine<>sit transfers, sit<>stand transfers and vc and tactile cues for static standing posture faciliation   Pt agreeable to PT treatment session upon arrival, pt found supine in bed w/ HOB elevated, in no apparent distress and responsive  In comparison to previous session, pt with improvements in decreased A for all phases of mobility, improved short gait trial from EOB to recliner c use of RW, initiation of LE Exercise to tolerance without pain reported  Post session: pt returned back to recliner, chair alarm engaged, all needs in reach and RN notified of session findings/recommendations  Continue to recommend post acute rehabilitation services at time of d/c in order to maximize pt's functional independence and safety w/ mobility  Pt continues to be functioning below baseline level, and remains limited 2* factors listed above  PT will continue to see pt during current hospitalization in order to address the deficits listed above and provide interventions consistent w/ POC in effort to achieve STGs  Barriers to Discharge Inaccessible home environment;Decreased caregiver support   Goals   Patient Goals none expressed   STG Expiration Date 07/02/21   Short Term Goal #1 Additional: Ambulate > 50 ft  with least restrictive assistive device with SBA w/o LOB and w/ normalized gait pattern 100% of the time, Increase ambulatory balance 1/2 grade to decrease risk for falls and Tolerate 4 hr OOB to faciliate upright tolerance   PT Treatment Day 1   Plan   Treatment/Interventions Functional transfer training;LE strengthening/ROM; Therapeutic exercise; Endurance training;Patient/family training;Equipment eval/education; Bed mobility;Spoke to nursing;Spoke to case management; Family;Gait training;OT   Progress Slow progress, decreased activity tolerance   PT Frequency   (3-5x/wk)   Recommendation   PT Discharge Recommendation Post acute rehabilitation services   Equipment Recommended   (continue with RW)   PT - OK to Discharge Yes   AM-PAC Basic Mobility Inpatient   Turning in Bed Without Bedrails 3   Lying on Back to Sitting on Edge of Flat Bed 2 Moving Bed to Chair 2   Standing Up From Chair 2   Walk in Room 2   Climb 3-5 Stairs 1   Basic Mobility Inpatient Raw Score 12   Basic Mobility Standardized Score 32 23           Rudolph Lali, PT, DPT

## 2021-06-23 NOTE — OCCUPATIONAL THERAPY NOTE
Occupational Therapy Evaluation        Patient Name: Samreen Harrison  WGFAR'V Date: 6/23/2021 06/23/21 1013   OT Last Visit   OT Visit Date 06/23/21   Note Type   Note type Evaluation   Restrictions/Precautions   Weight Bearing Precautions Per Order No   Braces or Orthoses Other (Comment)  (none per pt)   Other Precautions Cognitive; Chair Alarm; Bed Alarm;Telemetry; Fall Risk;Seizure   Pain Assessment   Pain Assessment Tool Pain Assessment not indicated - pt denies pain   Pain Score No Pain   Home Living   Type of [address] [place] [address] One level;Stairs to enter with rails; Performs ADLs on one level  (3 JAX)   Bathroom Shower/Tub Walk-in shower   Bathroom Toilet Raised   Bathroom Equipment Grab bars in shower; Shower chair   Bathroom Accessibility Accessible   Home Equipment Walker;Cane;Wheelchair-manual  (pt ambulates with walker primarily)   Prior Function   Level of Twilight Needs assistance with IADLs; Needs assistance with ADLs and functional mobility   Lives With Spouse   Receives Help From Family   ADL Assistance Needs assistance   IADLs Needs assistance   Falls in the last 6 months 1 to 4  (2 in last week per )   Vocational Retired   Comments pt's  assisted with obtaining home set up and history at time of IE as pt is a poor historian   Lifestyle   Autonomy Patient lives with her  in a one level home with 3 JAX  Prior to admission, patient required assistance for ADLs/IADLs  At baseline patient ambulates using a walker     Reciprocal Relationships supportive family   Service to Others retired from retail   Intrinsic Gratification "I cant do anything anymore"   Psychosocial   Psychosocial (WDL) WDL   ADL   Eating Assistance 5  [facility] 4  [facility] 3  Moderate [address] 4  [address] 3  Moderate Assistance Toileting Assistance  2  Maximal Assistance   Functional Assistance 3  Moderate Assistance   Additional Comments based on pt's functional performance during OT evaluation   Bed Mobility   Rolling L 4  Minimal assistance   Additional items Assist x 1;HOB elevated; Bedrails; Increased time required;Verbal cues   Supine to Sit 4  Minimal assistance   Additional items Assist x 1;HOB elevated; Bedrails; Increased time required;Verbal cues;LE management   Additional Comments pt received lying supine in bed upon OT arrival  Post session; pt seated OOB in recliner chair with call bell and all needs within reach and chair alarm activated, in care of PT   Transfers   Sit to Stand 4  Minimal assistance   Additional items Assist x 2; Increased time required;Verbal cues   Stand to Sit 4  Minimal assistance   Additional items Assist x 2; Increased time required;Verbal cues   Additional Comments pt performed functional transfers using RW   Functional Mobility   Functional Mobility 4  Minimal assistance   Additional Comments Ax2; pt took 4-5 steps from bed to recliner chair with no overt LOB or SOB     Additional items Rolling walker   Balance   Static Sitting Fair +   Dynamic Sitting Fair   Static Standing Fair -   Dynamic Standing Poor +   Ambulatory Poor +   Activity Tolerance   Activity Tolerance Patient tolerated treatment well   Medical Staff Made Aware PT Ivania Hodge   Nurse Made Aware case discussed with CANDACE Brooke   RUE Assessment   RUE Assessment WFL  (AROM is [facility] based on formal assessment)   RUE Strength   RUE Overall Strength Deficits  (3+/5 grossly based on formal assessment)   LUE Assessment   LUE Assessment WFL  (AROM is WFL based on formal assessment)   LUE Strength   LUE Overall Strength Deficits  (3+/5 grossly based on formal assessment)   Hand Function   Gross Motor Coordination Functional  (based on pt's functional performance)   Fine Motor Coordination Functional  (based on pt's functional performance) Sensation   Light Touch No apparent deficits  (BUEs)   Vision-Basic Assessment   Current Vision Does not wear glasses   Patient Visual Report Other (Comment)  (pt reports no acute changes)   Cognition   Overall Cognitive Status Impaired   Arousal/Participation Alert; Responsive; Cooperative   Attention Attends with cues to redirect   Orientation Level Disoriented to situation;Oriented to person  ("wednesday, june" "[place]")   Memory Decreased short term memory;Decreased recall of recent events   Following Commands Follows one step commands with increased time or repetition   Comments pt agreeable to OT evaluation   Assessment   Limitation Decreased ADL status; Decreased UE strength;Decreased cognition;Decreased endurance;Decreased self-care trans;Decreased high-level ADLs   Prognosis Good   Assessment Patient is a 65 y/o female who was admitted to [address] [place] [address] on 6/19/2021 with a diagnosis of AMS  Co-morbidities affecting performance include hyponatremia, pulmonary edema, hypoglycemia, and UTI  Two patient identifiers were noted to confirm patient ID  Patient lives with her  in a one level home with 3 JAX  Prior to admission, patient required assistance for ADLs/IADLs  At baseline patient ambulates using a walker  Currently, patient requires Min A for UB ADLs and Mod A for LB ADLs  Patient ambulates with Min Ax2 using RW  Patient is alert and oriented to person only  Personal factors impacting functional performance include decreased independence in ADLs/IADLs and steps to enter  Patient limitations include limited activity tolerance, decreased UE strength and decreased static/dynamic standing balance and cognitive limitations including orientation, attention span and memory  Patient will benefit from continued skilled OT services 3-5 x/week while in the hospital to address the limitations mentioned and increase functional performance in ADLs and functional mobility   Occupational performance areas to address include dressing, bathing, toileting, grooming and functional transfers/mobility  From an OT perspective, recommendation at time of d/c will be post acute rehabilitation services  Goals   Patient Goals none stated at time of IE   Plan   Treatment Interventions ADL retraining;Functional transfer training;UE strengthening/ROM; Endurance training;Cognitive reorientation;Patient/family training; Compensatory technique education; Activityengagement;Equipment evaluation/education   Goal Expiration Date 07/07/21   OT Frequency 3-5x/wk   Recommendation   OT Discharge Recommendation Post acute rehabilitation services   OT - OK to Discharge Yes  (pending medical clearance)   Additional Comments  The patient's raw score on the AM-PAC Daily Activity inpatient short form is 15, standardized score is 34 69, less than 39 4  Patients at this level are likely to benefit from discharge to post-acute rehabilitation services  Please refer to the recommendation of the Occupational Therapist for safe discharge planning     AM-PAC Daily Activity Inpatient   Lower Body Dressing 2   Bathing 2   Toileting 2   Upper Body Dressing 3   Grooming 3   Eating 3   Daily Activity Raw Score 15   Daily Activity Standardized Score (Calc for Raw Score >=11) 34 69   -[facility] Applied Cognition Inpatient   Following a Speech/Presentation 3   Understanding Ordinary Conversation 3   Taking Medications 2   Remembering Where Things Are Placed or Put Away 3   Remembering List of 4-5 Errands 2   Taking Care of Complicated Tasks 2   Applied Cognition Raw Score 15   Applied Cognition Standardized Score 33 54   Barthel Index   Feeding 10   Bathing 0   Grooming Score 0   Dressing Score 5   Bladder Score 5   Bowels Score 5   Toilet Use Score 5   Transfers (Bed/Chair) Score 5   Mobility (Level Surface) Score 0   Stairs Score 0   Barthel Index Score 35   Modified [place] Scale   Modified [place] Scale 4     Occupational Therapy Goals to be addressed within 10-14 days:    Pt will verbalize and demonstrate good body mechanics and joint protections techniques during ADLs and IADLs with no verbal cues  Pt will verbalize and demonstrate back safety precautions during functional activity with no verbal cues  Pt will increase independence in bed mobility to a supervision level of assistance using the log rolling technique  Pt will increase standing tolerance to 4-5 minutes with unilateral UE support to increase independence in sink level ADLs  Pt will tolerate sitting OOB for 2-4 hours per day for increased participation in self care and leisure tasks  Pt will demonstrate competency in UE HEP  Pt will increase static/dynamic standing balance to Fair to increase independence in ADLs and functional mobility  Pt will be oriented x4 100% of the time with min verbal and environmental cueing  Pt will complete functional transfers to all surfaces with Mod I using AD as indicated  Pt will increase independence in UB ADLs to a supervision/setup level of assistance while seated  Pt will increase independence in LB ADLs to Min A while sitting, using adaptive equipment as indicated  Pt will increase independence in toileting to Min A      Tutu Albarran, OT Student

## 2021-06-23 NOTE — PLAN OF CARE
Problem: PHYSICAL THERAPY ADULT  Goal: Performs mobility at highest level of function for planned discharge setting  See evaluation for individualized goals  Description: Treatment/Interventions: Functional transfer training, LE strengthening/ROM, Therapeutic exercise, Endurance training, Patient/family training, Equipment eval/education, Bed mobility, Spoke to nursing, Spoke to case management, Family  Equipment Recommended:  (TBD)       See flowsheet documentation for full assessment, interventions and recommendations  Outcome: Progressing  Note: Prognosis: Fair  Problem List: Decreased strength, Decreased endurance, Impaired balance, Decreased mobility, Decreased cognition, Obesity  Assessment: Pt seen for PT treatment session this date with interventions consisting of Therapeutic exercise consisting of: AROM 10 reps B LE in sitting position and therapeutic activity consisting of training: supine<>sit transfers, sit<>stand transfers and vc and tactile cues for static standing posture faciliation  Pt agreeable to PT treatment session upon arrival, pt found supine in bed w/ HOB elevated, in no apparent distress and responsive  In comparison to previous session, pt with improvements in decreased A for all phases of mobility, improved short gait trial from EOB to recliner c use of RW, initiation of LE Exercise to tolerance without pain reported  Post session: pt returned back to recliner, chair alarm engaged, all needs in reach and RN notified of session findings/recommendations  Continue to recommend post acute rehabilitation services at time of d/c in order to maximize pt's functional independence and safety w/ mobility  Pt continues to be functioning below baseline level, and remains limited 2* factors listed above  PT will continue to see pt during current hospitalization in order to address the deficits listed above and provide interventions consistent w/ POC in effort to achieve STGs    Barriers to Discharge: Inaccessible home environment, Decreased caregiver support        PT Discharge Recommendation: Post acute rehabilitation services     PT - OK to Discharge: Yes    See flowsheet documentation for full assessment

## 2021-06-24 LAB
ANION GAP SERPL CALCULATED.3IONS-SCNC: 3 MMOL/L (ref 4–13)
ANION GAP SERPL CALCULATED.3IONS-SCNC: 4 MMOL/L (ref 4–13)
BUN SERPL-MCNC: 16 MG/DL (ref 5–25)
BUN SERPL-MCNC: 18 MG/DL (ref 5–25)
CALCIUM SERPL-MCNC: 8.7 MG/DL (ref 8.3–10.1)
CALCIUM SERPL-MCNC: 9.1 MG/DL (ref 8.3–10.1)
CHLORIDE SERPL-SCNC: 87 MMOL/L (ref 100–108)
CHLORIDE SERPL-SCNC: 88 MMOL/L (ref 100–108)
CO2 SERPL-SCNC: 39 MMOL/L (ref 21–32)
CO2 SERPL-SCNC: 41 MMOL/L (ref 21–32)
CREAT SERPL-MCNC: 0.68 MG/DL (ref 0.6–1.3)
CREAT SERPL-MCNC: 0.71 MG/DL (ref 0.6–1.3)
GFR SERPL CREATININE-BSD FRML MDRD: 82 ML/MIN/1.73SQ M
GFR SERPL CREATININE-BSD FRML MDRD: 85 ML/MIN/1.73SQ M
GLUCOSE SERPL-MCNC: 195 MG/DL (ref 65–140)
GLUCOSE SERPL-MCNC: 197 MG/DL (ref 65–140)
GLUCOSE SERPL-MCNC: 199 MG/DL (ref 65–140)
GLUCOSE SERPL-MCNC: 218 MG/DL (ref 65–140)
GLUCOSE SERPL-MCNC: 223 MG/DL (ref 65–140)
GLUCOSE SERPL-MCNC: 238 MG/DL (ref 65–140)
POTASSIUM SERPL-SCNC: 3.2 MMOL/L (ref 3.5–5.3)
POTASSIUM SERPL-SCNC: 3.6 MMOL/L (ref 3.5–5.3)
SODIUM SERPL-SCNC: 131 MMOL/L (ref 136–145)
SODIUM SERPL-SCNC: 131 MMOL/L (ref 136–145)

## 2021-06-24 PROCEDURE — 80048 BASIC METABOLIC PNL TOTAL CA: CPT | Performed by: INTERNAL MEDICINE

## 2021-06-24 PROCEDURE — 82948 REAGENT STRIP/BLOOD GLUCOSE: CPT

## 2021-06-24 PROCEDURE — 99232 SBSQ HOSP IP/OBS MODERATE 35: CPT | Performed by: NURSE PRACTITIONER

## 2021-06-24 PROCEDURE — 99232 SBSQ HOSP IP/OBS MODERATE 35: CPT | Performed by: INTERNAL MEDICINE

## 2021-06-24 RX ORDER — SODIUM CHLORIDE 1000 MG
1 TABLET, SOLUBLE MISCELLANEOUS
Status: DISCONTINUED | OUTPATIENT
Start: 2021-06-24 | End: 2021-06-25

## 2021-06-24 RX ADMIN — ATORVASTATIN CALCIUM 10 MG: 10 TABLET, FILM COATED ORAL at 14:58

## 2021-06-24 RX ADMIN — HEPARIN SODIUM 5000 UNITS: 5000 INJECTION INTRAVENOUS; SUBCUTANEOUS at 21:30

## 2021-06-24 RX ADMIN — CEFEPIME HYDROCHLORIDE 1000 MG: 2 INJECTION, POWDER, FOR SOLUTION INTRAVENOUS at 10:27

## 2021-06-24 RX ADMIN — MONTELUKAST SODIUM 10 MG: 10 TABLET, FILM COATED ORAL at 21:30

## 2021-06-24 RX ADMIN — INSULIN LISPRO 2 UNITS: 100 INJECTION, SOLUTION INTRAVENOUS; SUBCUTANEOUS at 21:30

## 2021-06-24 RX ADMIN — QUETIAPINE FUMARATE 12.5 MG: 25 TABLET ORAL at 21:29

## 2021-06-24 RX ADMIN — HEPARIN SODIUM 5000 UNITS: 5000 INJECTION INTRAVENOUS; SUBCUTANEOUS at 14:58

## 2021-06-24 RX ADMIN — Medication 1 G: at 15:27

## 2021-06-24 RX ADMIN — FUROSEMIDE 20 MG: 20 TABLET ORAL at 14:58

## 2021-06-24 RX ADMIN — HEPARIN SODIUM 5000 UNITS: 5000 INJECTION INTRAVENOUS; SUBCUTANEOUS at 06:48

## 2021-06-24 RX ADMIN — FUROSEMIDE 20 MG: 20 TABLET ORAL at 08:29

## 2021-06-24 RX ADMIN — INSULIN LISPRO 2 UNITS: 100 INJECTION, SOLUTION INTRAVENOUS; SUBCUTANEOUS at 16:57

## 2021-06-24 RX ADMIN — CEFEPIME HYDROCHLORIDE 1000 MG: 2 INJECTION, POWDER, FOR SOLUTION INTRAVENOUS at 22:50

## 2021-06-24 NOTE — CASE MANAGEMENT
CM called patient's son Loly Palma to discuss accepting facilities  Son reported that he has been discussing with his brother Kailee Moore, and they both prefer the [facility] [place], because patient has Hx there  CM reviewed the response in [employer] regarding a 40% OOP expense and citizen paperwork  Son reported that patient has a secondary insurance and would like to speak to someone from the [facility] [place] to discuss  CM to call Sandy Douglas from the [facility]  CM called Mickie from the [facility] [place] at [phone] and reported that family prefers her facility  CM noted son's contact number and will give him a call to discuss finances  Mickie to call CM back when possible  CM department will continue to follow patient through discharge

## 2021-06-24 NOTE — CASE MANAGEMENT
CM sent a task to Joshua via [employer] and asked if she could start the [facility]  [place] agreeable  Quentin Jyothi has been started, reference number [id number]  CM received phone call from patient's son Radha Riley  Son asked for an update on Therese JENNINGS reported that Therese is in network with [facility]inny [facility] and [facility] has been started  CM department will continue to follow patient through discharge

## 2021-06-24 NOTE — CASE MANAGEMENT
CM called patient's son again to review IMM  CM explained the [place] and [place] appeal process  Son reported understanding of these rights and the process and reported that he will be in tomorrow around 2 pm to visit patient  He will know then how he feels regarding discharge  CM placed the IMM in medical records

## 2021-06-24 NOTE — CASE MANAGEMENT
CM called Mickie from the [facility] at [phone]  Sandy Douglas reported that she called patient's son and left a message, but she has not heard back yet  CM called patient's son Faustino Garcia  Son reported that he spoke to patient's  and his brother Kailee Moore regarding placement  Originally, they wanted [facility] due to history there, but they are concerned about the OOP cost  After further discussion, family has agreed on [place] due to it being in network with [facility] and closer to home  MICHAELA called Therese at [phone] and left a message for [facility] requesting a call back  CM department will continue to follow patient through discharge

## 2021-06-24 NOTE — CASE MANAGEMENT
Auth submitted via [employer] for Therese Saleh NPI: [id number]   Accepting MD: Dr Ted Abraham MD NPI: [id number]      [employer] primary  Brian Elsie pending  [facility] task complete  Clinicals attached via [employer]

## 2021-06-24 NOTE — PROGRESS NOTES
[address]  Progress Note - Ricardo Pineda 1945, 66 y o  female MRN: [id number]  Unit/Bed#: -01 Encounter: [id number]  Primary Care Provider: Jenniffer Cotter MD   Date and time admitted to hospital: 6/19/2021  1:86 PM    * Metabolic encephalopathy  Assessment & Plan  · Patient's son reporting patient more confused at home and was found to be hypoglycemic around 55, has not been eating but taking diabetic meds  · CT head negative, EKG with sinus rhythm with PAC and T-wave inversion  · Most likely in setting of hypoglycemia, UTI, hyponatremia  · Hold home Ativan, Ambien, avoid opiates  · Has since resolved    UTI (urinary tract infection)  Assessment & Plan  · Urinalysis revealing moderate bacteria and white blood cells  · Continue IV cefepime 1 g q 12 hours    Acute diastolic congestive heart failure (HCC)  Assessment & Plan  · Noted on CT A, no PE, pulmonary edema with pulmonary hypertension  · Echocardiogram ef wnl but revealed diastolic dysfunction  · Will c/w further dieuresis  · Intake and output monitoring, daily weights  · Low-sodium diet with 1500 mL fluid restriction    Hypoglycemia  Assessment & Plan  · Patient's son reporting low blood glucose level at home, on arrival blood glucose 73  · Continue to monitor blood glucose checks q i d , hypoglycemia protocol  · Will hold home oral diabetic medications  · Has since resolved    Hyponatremia  Assessment & Plan  · Level on arrival 116, most likely in setting of persistent diarrhea and dehydration  · Initially treated with IVF with mild improvement of sodium level to 122 however was noted to be volume overloaded and IV  · Sodium stable at 131  · C/w lasix therapy  · 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  · Will continue to monitor sodium closely  · Follow-up nephrology recs-on 06/21 received tolvaptan 1 dose followed by salt tablet as needed           VTE Pharmacologic Prophylaxis:   Pharmacologic: Heparin  Mechanical VTE Prophylaxis in Place: Yes    Patient Centered Rounds: I have performed bedside rounds with nursing staff today  Discussions with Specialists or Other Care Team Provider:  Reviewed previous provider's notes discussed with case management primary discussed with Nephrology    Education and Discussions with Family / Patient:  Discussed plan of care with patient and patient's son at the bedside denies any additional questions or concerns at this time    Time Spent for Care: 20 minutes  More than 50% of total time spent on counseling and coordination of care as described above  Current Length of Stay: 4 day(s)    Current Patient Status: Inpatient   Certification Statement: The patient will continue to require additional inpatient hospital stay due to Insurance authorization for discharged rehab    Discharge Plan / Estimated Discharge Date:  Hopefully within the next 24 hours      Code Status: Level 1 - Full Code      Subjective:   Patient is resting comfortably in bedside appeared acute distress she is tolerating her meals her mentation is back to baseline she is conversing normally with her son offers no complaints    Objective:     Vitals:   Temp (24hrs), Av 2 °F (36 8 °C), Min:97 7 °F (36 5 °C), Max:98 6 °F (37 °C)    Temp:  [97 7 °F (36 5 °C)-98 6 °F (37 °C)] 98 2 °F (36 8 °C)  HR:  [] 85  Resp:  [17-18] 18  BP: ()/(70-84) 127/84  SpO2:  [95 %-99 %] 95 %  Body mass index is 38 45 kg/m²  Input and Output Summary (last 24 hours): Intake/Output Summary (Last 24 hours) at 2021 1211  Last data filed at 2021 0800  Gross per 24 hour   Intake 660 ml   Output --   Net 660 ml       Physical Exam:     Physical Exam  Vitals and nursing note reviewed  HENT:      Head: Normocephalic  Cardiovascular:      Rate and Rhythm: Normal rate  Pulses: Normal pulses  Pulmonary:      Breath sounds: Normal breath sounds  Abdominal:      Palpations: Abdomen is soft     Musculoskeletal:         General: Normal range of motion  Skin:     General: Skin is warm and dry  Neurological:      General: No focal deficit present  Mental Status: She is alert  Mental status is at baseline  Psychiatric:         Mood and Affect: Mood normal          Thought Content: Thought content normal          Judgment: Judgment normal        Additional Data:     Labs:    Results from last 7 days   Lab Units 06/22/21  0613   WBC Thousand/uL 11 65*   HEMOGLOBIN g/dL 14 7   HEMATOCRIT % 45 1   PLATELETS Thousands/uL 302   NEUTROS PCT % 80*   LYMPHS PCT % 7*   MONOS PCT % 12   EOS PCT % 0     Results from last 7 days   Lab Units 06/24/21  0753 06/19/21  1922   POTASSIUM mmol/L 3 6 3 5   CHLORIDE mmol/L 87* 77*   CO2 mmol/L 41* 35*   BUN mg/dL 16 16   CREATININE mg/dL 0 71 0 69   CALCIUM mg/dL 9 1 8 8   ALK PHOS U/L  --  45*   ALT U/L  --  36   AST U/L  --  50*           * I Have Reviewed All Lab Data Listed Above  * Additional Pertinent Lab Tests Reviewed: Ross 66 Admission Reviewed  Recent Cultures (last 7 days):     Results from last 7 days   Lab Units 06/19/21 2008   BLOOD CULTURE  No Growth at 72 hrs  No Growth at 72 hrs         Last 24 Hours Medication List:   Current Facility-Administered Medications   Medication Dose Route Frequency Provider Last Rate   • acetaminophen  650 mg Oral Q6H PRN Josie Cuenca PA-C     • albuterol  2 puff Inhalation Q6H PRN Josie Cuenca PA-C     • albuterol  2 5 mg Nebulization Q6H PRN Josie Cuenca PA-C     • atorvastatin  10 mg Oral Daily With Dinner Josie Cuenca PA-C     • cefepime  1,000 mg Intravenous Q12H Josie Cuenca PA-C 1,000 mg (06/24/21 1027)   • furosemide  20 mg Oral BID (diuretic) Haylee Finney MD     • heparin (porcine)  5,000 Units Subcutaneous Q8H [facility] HOME Josie Cuenca PA-C     • montelukast  10 mg Oral HS Josie Cuenca PA-C     • QUEtiapine  12 5 mg Oral HS Anastasia King PA-C          Today, Patient Was Seen By: RON Patel    ** Please Note: Dragon 360 Dictation voice to text software may have been used in the creation of this document   **

## 2021-06-24 NOTE — CASE MANAGEMENT
CM called Rashid Tello from [employer] at [phone]  CM asked for the NPI, accepting MD, and their NPI so CM can start the auth  Rashid Tello reported that she stepped away from her desk, so she is going to reach out to Clare Cervantes from [employer] and have Clare Cervantes send this information via [employer]  CM to check back in a few minutes

## 2021-06-24 NOTE — PLAN OF CARE
Problem: Potential for Falls  Goal: Patient will remain free of falls  Description: INTERVENTIONS:  - Educate patient/family on patient safety including physical limitations  - Instruct patient to call for assistance with activity   - Consult OT/PT to assist with strengthening/mobility   - Keep Call bell within reach  - Keep bed low and locked with side rails adjusted as appropriate  - Keep care items and personal belongings within reach  - Initiate and maintain comfort rounds  - Make Fall Risk Sign visible to staff  - Offer Toileting every 2 Hours, in advance of need  - Initiate/Maintain bed alarm  - Apply yellow socks and bracelet for high fall risk patients  - Consider moving patient to room near nurses station  Outcome: Progressing     Problem: MOBILITY - ADULT  Goal: Maintain or return to baseline ADL function  Description: INTERVENTIONS:  -  Assess patient's ability to carry out ADLs; assess patient's baseline for ADL function and identify physical deficits which impact ability to perform ADLs (bathing, care of mouth/teeth, toileting, grooming, dressing, etc )  - Assess/evaluate cause of self-care deficits   - Assess range of motion  - Assess patient's mobility; develop plan if impaired  - Assess patient's need for assistive devices and provide as appropriate  - Encourage maximum independence but intervene and supervise when necessary  - Involve family in performance of ADLs  - Assess for home care needs following discharge   - Consider OT consult to assist with ADL evaluation and planning for discharge  - Provide patient education as appropriate  Outcome: Progressing  Goal: Maintains/Returns to pre admission functional level  Description: INTERVENTIONS:  - Perform BMAT or MOVE assessment daily    - Set and communicate daily mobility goal to care team and patient/family/caregiver  - Collaborate with rehabilitation services on mobility goals if consulted  - Perform Range of Motion 3 times a day    - Reposition patient every 2 hours  - Dangle patient 2 times a day  - Stand patient 3 times a day  - Out of bed to chair 3 times a day   - Out of bed for meals 3 times a day  - Out of bed for toileting  - Record patient progress and toleration of activity level   Outcome: Progressing     Problem: INFECTION - ADULT  Goal: Absence or prevention of progression during hospitalization  Description: INTERVENTIONS:  - Assess and monitor for signs and symptoms of infection  - Monitor lab/diagnostic results  - Monitor all insertion sites, i e  indwelling lines, tubes, and drains  - Monitor endotracheal if appropriate and nasal secretions for changes in amount and color  - [place] appropriate cooling/warming therapies per order  - Administer medications as ordered  - Instruct and encourage patient and family to use good hand hygiene technique  - Identify and instruct in appropriate isolation precautions for identified infection/condition  Outcome: Progressing     Problem: DISCHARGE PLANNING  Goal: Discharge to home or other facility with appropriate resources  Description: INTERVENTIONS:  - Identify barriers to discharge w/patient and caregiver  - Arrange for needed discharge resources and transportation as appropriate  - Identify discharge learning needs (meds, wound care, etc )  - Arrange for interpretive services to assist at discharge as needed  - Refer to Case Management Department for coordinating discharge planning if the patient needs post-hospital services based on physician/advanced practitioner order or complex needs related to functional status, cognitive ability, or social support system  Outcome: Progressing     Problem: Knowledge Deficit  Goal: Patient/family/caregiver demonstrates understanding of disease process, treatment plan, medications, and discharge instructions  Description: Complete learning assessment and assess knowledge base    Interventions:  - Provide teaching at level of understanding  - Provide teaching via preferred learning methods  Outcome: Progressing     Problem: GENITOURINARY - ADULT  Goal: Maintains or returns to baseline urinary function  Description: INTERVENTIONS:  - Assess urinary function  - Encourage oral fluids to ensure adequate hydration if ordered  - Administer IV fluids as ordered to ensure adequate hydration  - Administer ordered medications as needed  - Offer frequent toileting  - Follow urinary retention protocol if ordered  Outcome: Progressing  Goal: Absence of urinary retention  Description: INTERVENTIONS:  - Assess patient’s ability to void and empty bladder  - Monitor I/O  - Bladder scan as needed  - Discuss with physician/AP medications to alleviate retention as needed  - Discuss catheterization for long term situations as appropriate  Outcome: Progressing     Problem: METABOLIC, FLUID AND ELECTROLYTES - ADULT  Goal: Electrolytes maintained within normal limits  Description: INTERVENTIONS:  - Monitor labs and assess patient for signs and symptoms of electrolyte imbalances  - Administer electrolyte replacement as ordered  - Monitor response to electrolyte replacements, including repeat lab results as appropriate  - Instruct patient on fluid and nutrition as appropriate  Outcome: Progressing  Goal: Fluid balance maintained  Description: INTERVENTIONS:  - Monitor labs   - Monitor I/O and WT  - Instruct patient on fluid and nutrition as appropriate  - Assess for signs & symptoms of volume excess or deficit  Outcome: Progressing     Problem: Prexisting or High Potential for Compromised Skin Integrity  Goal: Skin integrity is maintained or improved  Description: INTERVENTIONS:  - Identify patients at risk for skin breakdown  - Assess and monitor skin integrity  - Assess and monitor nutrition and hydration status  - Monitor labs   - Assess for incontinence   - Turn and reposition patient  - Assist with mobility/ambulation  - Relieve pressure over bony prominences  - Avoid friction and shearing  - Provide appropriate hygiene as needed including keeping skin clean and dry  - Evaluate need for skin moisturizer/barrier cream  - Collaborate with interdisciplinary team   - Patient/family teaching  - Consider wound care consult   Outcome: Progressing

## 2021-06-24 NOTE — ASSESSMENT & PLAN NOTE
· Level on arrival 116, most likely in setting of persistent diarrhea and dehydration  · Initially treated with IVF with mild improvement of sodium level to 122 however was noted to be volume overloaded and IV  · Sodium stable at 131  · C/w lasix therapy  · 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  · Will continue to monitor sodium closely  · Follow-up nephrology recs-on 06/21 received tolvaptan 1 dose followed by salt tablet as needed

## 2021-06-25 LAB
ANION GAP SERPL CALCULATED.3IONS-SCNC: 0 MMOL/L (ref 4–13)
BACTERIA BLD CULT: NORMAL
BACTERIA BLD CULT: NORMAL
BUN SERPL-MCNC: 14 MG/DL (ref 5–25)
CALCIUM SERPL-MCNC: 8.7 MG/DL (ref 8.3–10.1)
CHLORIDE SERPL-SCNC: 88 MMOL/L (ref 100–108)
CO2 SERPL-SCNC: 42 MMOL/L (ref 21–32)
CREAT SERPL-MCNC: 0.61 MG/DL (ref 0.6–1.3)
GFR SERPL CREATININE-BSD FRML MDRD: 88 ML/MIN/1.73SQ M
GLUCOSE SERPL-MCNC: 199 MG/DL (ref 65–140)
GLUCOSE SERPL-MCNC: 199 MG/DL (ref 65–140)
GLUCOSE SERPL-MCNC: 200 MG/DL (ref 65–140)
GLUCOSE SERPL-MCNC: 221 MG/DL (ref 65–140)
GLUCOSE SERPL-MCNC: 230 MG/DL (ref 65–140)
POTASSIUM SERPL-SCNC: 3.6 MMOL/L (ref 3.5–5.3)
SODIUM SERPL-SCNC: 130 MMOL/L (ref 136–145)

## 2021-06-25 PROCEDURE — 99232 SBSQ HOSP IP/OBS MODERATE 35: CPT | Performed by: NURSE PRACTITIONER

## 2021-06-25 PROCEDURE — 80048 BASIC METABOLIC PNL TOTAL CA: CPT | Performed by: INTERNAL MEDICINE

## 2021-06-25 PROCEDURE — 82948 REAGENT STRIP/BLOOD GLUCOSE: CPT

## 2021-06-25 PROCEDURE — 99233 SBSQ HOSP IP/OBS HIGH 50: CPT | Performed by: INTERNAL MEDICINE

## 2021-06-25 RX ORDER — SODIUM CHLORIDE 1000 MG
2 TABLET, SOLUBLE MISCELLANEOUS
Status: DISCONTINUED | OUTPATIENT
Start: 2021-06-25 | End: 2021-06-29 | Stop reason: HOSPADM

## 2021-06-25 RX ADMIN — HEPARIN SODIUM 5000 UNITS: 5000 INJECTION INTRAVENOUS; SUBCUTANEOUS at 05:47

## 2021-06-25 RX ADMIN — ATORVASTATIN CALCIUM 10 MG: 10 TABLET, FILM COATED ORAL at 17:37

## 2021-06-25 RX ADMIN — ACETAMINOPHEN 650 MG: 325 TABLET, FILM COATED ORAL at 21:14

## 2021-06-25 RX ADMIN — HEPARIN SODIUM 5000 UNITS: 5000 INJECTION INTRAVENOUS; SUBCUTANEOUS at 21:09

## 2021-06-25 RX ADMIN — CEFEPIME HYDROCHLORIDE 1000 MG: 2 INJECTION, POWDER, FOR SOLUTION INTRAVENOUS at 21:31

## 2021-06-25 RX ADMIN — Medication 2 G: at 12:19

## 2021-06-25 RX ADMIN — INSULIN LISPRO 2 UNITS: 100 INJECTION, SOLUTION INTRAVENOUS; SUBCUTANEOUS at 17:39

## 2021-06-25 RX ADMIN — FUROSEMIDE 20 MG: 20 TABLET ORAL at 08:16

## 2021-06-25 RX ADMIN — MONTELUKAST SODIUM 10 MG: 10 TABLET, FILM COATED ORAL at 21:16

## 2021-06-25 RX ADMIN — HEPARIN SODIUM 5000 UNITS: 5000 INJECTION INTRAVENOUS; SUBCUTANEOUS at 14:54

## 2021-06-25 RX ADMIN — Medication 2 G: at 17:36

## 2021-06-25 RX ADMIN — INSULIN LISPRO 2 UNITS: 100 INJECTION, SOLUTION INTRAVENOUS; SUBCUTANEOUS at 21:17

## 2021-06-25 RX ADMIN — CEFEPIME HYDROCHLORIDE 1000 MG: 2 INJECTION, POWDER, FOR SOLUTION INTRAVENOUS at 10:29

## 2021-06-25 RX ADMIN — INSULIN LISPRO 3 UNITS: 100 INJECTION, SOLUTION INTRAVENOUS; SUBCUTANEOUS at 12:19

## 2021-06-25 RX ADMIN — QUETIAPINE FUMARATE 12.5 MG: 25 TABLET ORAL at 21:15

## 2021-06-25 RX ADMIN — INSULIN LISPRO 2 UNITS: 100 INJECTION, SOLUTION INTRAVENOUS; SUBCUTANEOUS at 08:17

## 2021-06-25 RX ADMIN — FUROSEMIDE 20 MG: 20 TABLET ORAL at 17:36

## 2021-06-25 RX ADMIN — Medication 1 G: at 08:17

## 2021-06-25 NOTE — ASSESSMENT & PLAN NOTE
· Urinalysis revealing moderate bacteria and white blood cells  · Continue IV cefepime 1 g q 12 hours, day 5

## 2021-06-25 NOTE — ASSESSMENT & PLAN NOTE
· Level on arrival 116, most likely in setting of persistent diarrhea and dehydration  · Initially treated with IVF with mild improvement of sodium level to 122 however was noted to be volume overloaded and IV     · Sodium stable at 131  · C/w lasix therapy  · 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  · Follow-up nephrology recs, Cleared for discharge from a Neph standpoint

## 2021-06-25 NOTE — PLAN OF CARE
Problem: Potential for Falls  Goal: Patient will remain free of falls  Description: INTERVENTIONS:  - Educate patient/family on patient safety including physical limitations  - Instruct patient to call for assistance with activity   - Consult OT/PT to assist with strengthening/mobility   - Keep Call bell within reach  - Keep bed low and locked with side rails adjusted as appropriate  - Keep care items and personal belongings within reach  - Initiate and maintain comfort rounds  - Make Fall Risk Sign visible to staff  - Offer Toileting every 2 Hours, in advance of need  - Initiate/Maintain bed alarm  - Obtain necessary fall risk management equipment:   - Apply yellow socks and bracelet for high fall risk patients  - Consider moving patient to room near nurses station  Outcome: Progressing     Problem: INFECTION - ADULT  Goal: Absence or prevention of progression during hospitalization  Description: INTERVENTIONS:  - Assess and monitor for signs and symptoms of infection  - Monitor lab/diagnostic results  - Monitor all insertion sites, i e  indwelling lines, tubes, and drains  - Monitor endotracheal if appropriate and nasal secretions for changes in amount and color  - [place] appropriate cooling/warming therapies per order  - Administer medications as ordered  - Instruct and encourage patient and family to use good hand hygiene technique  - Identify and instruct in appropriate isolation precautions for identified infection/condition  Outcome: Progressing     Problem: DISCHARGE PLANNING  Goal: Discharge to home or other facility with appropriate resources  Description: INTERVENTIONS:  - Identify barriers to discharge w/patient and caregiver  - Arrange for needed discharge resources and transportation as appropriate  - Identify discharge learning needs (meds, wound care, etc )  - Arrange for interpretive services to assist at discharge as needed  - Refer to Case Management Department for coordinating discharge planning if the patient needs post-hospital services based on physician/advanced practitioner order or complex needs related to functional status, cognitive ability, or social support system  Outcome: Progressing     Problem: Knowledge Deficit  Goal: Patient/family/caregiver demonstrates understanding of disease process, treatment plan, medications, and discharge instructions  Description: Complete learning assessment and assess knowledge base    Interventions:  - Provide teaching at level of understanding  - Provide teaching via preferred learning methods  Outcome: Progressing     Problem: GENITOURINARY - ADULT  Goal: Maintains or returns to baseline urinary function  Description: INTERVENTIONS:  - Assess urinary function  - Encourage oral fluids to ensure adequate hydration if ordered  - Administer IV fluids as ordered to ensure adequate hydration  - Administer ordered medications as needed  - Offer frequent toileting  - Follow urinary retention protocol if ordered  Outcome: Progressing  Goal: Absence of urinary retention  Description: INTERVENTIONS:  - Assess patient’s ability to void and empty bladder  - Monitor I/O  - Bladder scan as needed  - Discuss with physician/AP medications to alleviate retention as needed  - Discuss catheterization for long term situations as appropriate  Outcome: Progressing     Problem: METABOLIC, FLUID AND ELECTROLYTES - ADULT  Goal: Electrolytes maintained within normal limits  Description: INTERVENTIONS:  - Monitor labs and assess patient for signs and symptoms of electrolyte imbalances  - Administer electrolyte replacement as ordered  - Monitor response to electrolyte replacements, including repeat lab results as appropriate  - Instruct patient on fluid and nutrition as appropriate  Outcome: Progressing  Goal: Fluid balance maintained  Description: INTERVENTIONS:  - Monitor labs   - Monitor I/O and WT  - Instruct patient on fluid and nutrition as appropriate  - Assess for signs & symptoms of volume excess or deficit  Outcome: Progressing     Problem: Prexisting or High Potential for Compromised Skin Integrity  Goal: Skin integrity is maintained or improved  Description: INTERVENTIONS:  - Identify patients at risk for skin breakdown  - Assess and monitor skin integrity  - Assess and monitor nutrition and hydration status  - Monitor labs   - Assess for incontinence   - Turn and reposition patient  - Assist with mobility/ambulation  - Relieve pressure over bony prominences  - Avoid friction and shearing  - Provide appropriate hygiene as needed including keeping skin clean and dry  - Evaluate need for skin moisturizer/barrier cream  - Collaborate with interdisciplinary team   - Patient/family teaching  - Consider wound care consult   Outcome: Progressing

## 2021-06-25 NOTE — PROGRESS NOTES
NEPHROLOGY PROGRESS NOTE    Patient: Primus Garfield               Sex: female          DOA: 6/19/2021  5:47 PM   Date of Birth: @        Age: @        LOS:  LOS: 5 days   6/25/2021    REASON FOR THE CONSULTATION:  Further management of hyponatremia    HPI     This is a 66 y o  female admitted for Metabolic encephalopathy     SUBJECTIVE     - updated patient's  at bedside today  Currently patient's breathing is stable and also found to be nonoliguric overnight  Patient denies nausea, vomiting, headache or dizziness today    - Reviewed last 24 hrs events     CURRENT MEDICATIONS       Current Facility-Administered Medications:   •  acetaminophen (TYLENOL) tablet 650 mg, 650 mg, Oral, Q6H PRN, Carmella Tavarez PA-C, 650 mg at 06/20/21 2248  •  albuterol (PROVENTIL HFA,VENTOLIN HFA) inhaler 2 puff, 2 puff, Inhalation, Q6H PRN, Carmella Tavarez PA-C, 2 puff at 06/20/21 2300  •  albuterol inhalation solution 2 5 mg, 2 5 mg, Nebulization, Q6H PRN, Carmella Tavarez PA-C, 2 5 mg at 06/21/21 0101  •  atorvastatin (LIPITOR) tablet 10 mg, 10 mg, Oral, Daily With Dinner, Carmella Tavarez PA-C, 10 mg at 06/24/21 1458  •  cefepime (MAXIPIME) 1,000 mg in dextrose 5 % 50 mL IVPB, 1,000 mg, Intravenous, Q12H, Letty Chairez PA-C, Last Rate: 100 mL/hr at 06/25/21 1029, 1,000 mg at 06/25/21 1029  •  furosemide (LASIX) tablet 20 mg, 20 mg, Oral, BID (diuretic), Tyron Finley MD, 20 mg at 06/25/21 0816  •  heparin (porcine) subcutaneous injection 5,000 Units, 5,000 Units, Subcutaneous, Q8H [address], Carmella Tavarez PA-C, 5,000 Units at 06/25/21 0547  •  insulin lispro (HumaLOG) 100 units/mL subcutaneous injection 1-6 Units, 1-6 Units, Subcutaneous, TID AC, 3 Units at 06/25/21 1219 **AND** Fingerstick Glucose (POCT), , , TID AC, Silvestre Norman MD  •  insulin lispro (HumaLOG) 100 units/mL subcutaneous injection 1-6 Units, 1-6 Units, Subcutaneous, HS, Silvestre Norman MD, 2 Units at 06/24/21 2130  •  montelukast (SINGULAIR) tablet 10 mg, 10 mg, Oral, HS, Eugenio Girard PA-C, 10 mg at 06/24/21 2130  •  QUEtiapine (SEROquel) tablet 12 5 mg, 12 5 mg, Oral, HS, Anastasia Pacheco PA-C, 12 5 mg at 06/24/21 2129  •  sodium chloride tablet 2 g, 2 g, Oral, TID With Meals, Vitor Castro MD, 2 g at 06/25/21 1219    OBJECTIVE     Current Weight: Weight - Scale: 95 4 kg (210 lb 5 1 oz)  /79 (BP Location: Left arm)   Pulse 87   Temp 99 4 °F (37 4 °C) (Oral)   Resp 20   Ht 5' (1 524 m)   Wt 95 4 kg (210 lb 5 1 oz)   SpO2 94%   BMI 41 08 kg/m²   Vitals:    06/25/21 0700   BP: 147/79   Pulse: 87   Resp: 20   Temp: 99 4 °F (37 4 °C)   SpO2: 94%     Body mass index is 41 08 kg/m²  Intake/Output Summary (Last 24 hours) at 6/25/2021 1238  Last data filed at 6/25/2021 1101  Gross per 24 hour   Intake 520 ml   Output 800 ml   Net -280 ml       PHYSICAL EXAMINATION     Physical Exam  Constitutional:       General: She is not in acute distress  HENT:      Head: Normocephalic and atraumatic  Eyes:      General: No scleral icterus  Neck:      Vascular: No JVD  Cardiovascular:      Rate and Rhythm: Normal rate  Pulmonary:      Effort: No accessory muscle usage or respiratory distress  Abdominal:      General: There is no distension  Palpations: Abdomen is soft  Musculoskeletal:      Right hand: No lacerations  Left hand: No lacerations  Cervical back: Neck supple  Skin:     General: Skin is warm  Comments: No Jaundice    Psychiatric:         Behavior: Behavior is not combative             LAB RESULTS     Results from last 7 days   Lab Units 06/25/21  0447 06/24/21  0753 06/24/21  0025 06/23/21 2056 06/23/21  1056 06/22/21  0613 06/21/21  2142 06/21/21  1249 06/19/21  1922   WBC Thousand/uL  --   --   --   --   --  11 65*  --   --  6 70   HEMOGLOBIN g/dL  --   --   --   --   --  14 7  --   --  14 4   HEMATOCRIT %  --   --   --   --   --  45 1  --   --  44 3   PLATELETS Thousands/uL  --   --   --   --   --  302  --   --  174 SODIUM mmol/L 130* 131* 131* 129* 133*  --  122* 122* 116*   POTASSIUM mmol/L 3 6 3 6 3 2* 3 2* 3 5  --  3 8 3 4* 3 5   CHLORIDE mmol/L 88* 87* 88* 89* 89*  --  84* 82* 77*   CO2 mmol/L 42* 41* 39* 39* 37*  --  35* 33* 35*   BUN mg/dL 14 16 18 18 15  --  9 8 16   CREATININE mg/dL 0 61 0 71 0 68 0 72 0 69  --  0 53* 0 50* 0 69   EGFR ml/min/1 73sq m 88 82 85 81 84  --  92 94 84   CALCIUM mg/dL 8 7 9 1 8 7 8 5 9 1  --  8 2* 8 1* 8 8   MAGNESIUM mg/dL  --   --   --   --   --   --   --   --  2 0       RADIOLOGY RESULTS      CTA ED chest PE study   Final Result by Julianna Martinez MD (06/19 3127)      No evidence of pulmonary embolism  Bilateral patchy groundglass attenuation with interlobular septal thickening likely represents pulmonary edema  Main pulmonary artery is dilated measuring 4 4 cm distended with pulmonary arterial hypertension  Correlate with echocardiogram       The study was marked in EPIC for immediate notification  Workstation performed: [id number]         XR chest 1 view portable   Final Result by Siddhartha Berrios MD (06/20 1442)      No acute cardiopulmonary disease  Redemonstration of pulmonary artery enlargement compatible with the history of pulmonary hypertension  Workstation performed: [id number]         CT head without contrast   Final Result by Dania Villasenor MD (06/19 3462)      No acute intracranial abnormality  Workstation performed: [id number]             PLAN / RECOMMENDATIONS      1  Hyponatremia  Chronic since duration is > 48 hours  Initial urine studies were suggestive of SIADH although exact etiology of SIADH has remained unclear  During the hospital stay patient has also received 1 time dose of tolvaptan  Now patient is on salt tablet with current sodium of 130 which is below the goal but currently patient is asymptomatic  Plan to increase salt tablet to 2 g PO TID today  Plan to continue Lasix 20 mg PO BID    Patient was also advised to liberalize salt intake in the diet  Recheck sodium level with AM labs  Disposition:  Stable from renal standpoint for discharge with current dose of salt tablet  Please check BMP in 3-5 days upon discharge and schedule outpatient Nephrology follow-up with us for in 2 weeks  Overall above mentioned plan was also d/w Janak Coronado MD  Nephrology  6/25/2021        Portions of the record may have been created with voice recognition software  Occasional wrong word or "sound a like" substitutions may have occurred due to the inherent limitations of voice recognition software  Read the chart carefully and recognize, using context, where substitutions have occurred

## 2021-06-25 NOTE — CASE MANAGEMENT
CM VIDA Dukesey to follow up on auth  Mel Juani reported that she did not get the auth yet  It is still pending  CM called Meggan Chauhan from [facility] at [phone] to report that Flako Gins is still pending, and patient was vaccinated in March  Meggan Chauhan reported that she will be available over the weekend and can still accept when Flako Gins is obtained  CM department will continue to follow patient through discharge

## 2021-06-25 NOTE — CASE MANAGEMENT
CM met with patient and her  at bedside   reported that patient was vaccinated in March of this year  CM sent this information to [address] [place] [address] via [employer]  CM department will continue to follow patient through discharge

## 2021-06-25 NOTE — NURSING NOTE
Per john Caal to leave IV in until last dose of IV abx at 2200 on 6/25 then d/c IV after last dose of abx

## 2021-06-26 ENCOUNTER — APPOINTMENT (INPATIENT)
Dept: RADIOLOGY | Facility: HOSPITAL | Age: 76
DRG: 071 | End: 2021-06-26
Payer: COMMERCIAL

## 2021-06-26 PROBLEM — E16.2 HYPOGLYCEMIA: Status: RESOLVED | Noted: 2021-06-20 | Resolved: 2021-06-26

## 2021-06-26 PROBLEM — E87.6 HYPOKALEMIA: Status: ACTIVE | Noted: 2021-06-26

## 2021-06-26 LAB
ANION GAP SERPL CALCULATED.3IONS-SCNC: 1 MMOL/L (ref 4–13)
BUN SERPL-MCNC: 13 MG/DL (ref 5–25)
CALCIUM SERPL-MCNC: 8.9 MG/DL (ref 8.3–10.1)
CHLORIDE SERPL-SCNC: 90 MMOL/L (ref 100–108)
CO2 SERPL-SCNC: 40 MMOL/L (ref 21–32)
CREAT SERPL-MCNC: 0.61 MG/DL (ref 0.6–1.3)
GFR SERPL CREATININE-BSD FRML MDRD: 88 ML/MIN/1.73SQ M
GLUCOSE SERPL-MCNC: 150 MG/DL (ref 65–140)
GLUCOSE SERPL-MCNC: 161 MG/DL (ref 65–140)
GLUCOSE SERPL-MCNC: 182 MG/DL (ref 65–140)
GLUCOSE SERPL-MCNC: 190 MG/DL (ref 65–140)
GLUCOSE SERPL-MCNC: 251 MG/DL (ref 65–140)
POTASSIUM SERPL-SCNC: 3.4 MMOL/L (ref 3.5–5.3)
SODIUM SERPL-SCNC: 131 MMOL/L (ref 136–145)

## 2021-06-26 PROCEDURE — 99232 SBSQ HOSP IP/OBS MODERATE 35: CPT | Performed by: NURSE PRACTITIONER

## 2021-06-26 PROCEDURE — 80048 BASIC METABOLIC PNL TOTAL CA: CPT | Performed by: INTERNAL MEDICINE

## 2021-06-26 PROCEDURE — 71045 X-RAY EXAM CHEST 1 VIEW: CPT

## 2021-06-26 PROCEDURE — 99233 SBSQ HOSP IP/OBS HIGH 50: CPT | Performed by: INTERNAL MEDICINE

## 2021-06-26 PROCEDURE — 82948 REAGENT STRIP/BLOOD GLUCOSE: CPT

## 2021-06-26 RX ORDER — POTASSIUM CHLORIDE 20 MEQ/1
20 TABLET, EXTENDED RELEASE ORAL
Status: DISCONTINUED | OUTPATIENT
Start: 2021-06-26 | End: 2021-06-28

## 2021-06-26 RX ORDER — POTASSIUM CHLORIDE 20 MEQ/1
40 TABLET, EXTENDED RELEASE ORAL 2 TIMES DAILY
Status: COMPLETED | OUTPATIENT
Start: 2021-06-26 | End: 2021-06-26

## 2021-06-26 RX ADMIN — FUROSEMIDE 20 MG: 20 TABLET ORAL at 07:36

## 2021-06-26 RX ADMIN — Medication 2 G: at 07:36

## 2021-06-26 RX ADMIN — POTASSIUM CHLORIDE 40 MEQ: 1500 TABLET, EXTENDED RELEASE ORAL at 13:41

## 2021-06-26 RX ADMIN — QUETIAPINE FUMARATE 12.5 MG: 25 TABLET ORAL at 21:25

## 2021-06-26 RX ADMIN — INSULIN LISPRO 2 UNITS: 100 INJECTION, SOLUTION INTRAVENOUS; SUBCUTANEOUS at 16:40

## 2021-06-26 RX ADMIN — ACETAMINOPHEN 650 MG: 325 TABLET, FILM COATED ORAL at 17:20

## 2021-06-26 RX ADMIN — HEPARIN SODIUM 5000 UNITS: 5000 INJECTION INTRAVENOUS; SUBCUTANEOUS at 05:14

## 2021-06-26 RX ADMIN — ATORVASTATIN CALCIUM 10 MG: 10 TABLET, FILM COATED ORAL at 16:03

## 2021-06-26 RX ADMIN — INSULIN LISPRO 1 UNITS: 100 INJECTION, SOLUTION INTRAVENOUS; SUBCUTANEOUS at 21:26

## 2021-06-26 RX ADMIN — POTASSIUM CHLORIDE 20 MEQ: 1500 TABLET, EXTENDED RELEASE ORAL at 16:03

## 2021-06-26 RX ADMIN — MONTELUKAST SODIUM 10 MG: 10 TABLET, FILM COATED ORAL at 21:20

## 2021-06-26 RX ADMIN — ALBUTEROL SULFATE 2 PUFF: 90 AEROSOL, METERED RESPIRATORY (INHALATION) at 07:33

## 2021-06-26 RX ADMIN — Medication 2 G: at 16:04

## 2021-06-26 RX ADMIN — FUROSEMIDE 20 MG: 20 TABLET ORAL at 16:04

## 2021-06-26 RX ADMIN — HEPARIN SODIUM 5000 UNITS: 5000 INJECTION INTRAVENOUS; SUBCUTANEOUS at 21:25

## 2021-06-26 RX ADMIN — Medication 2 G: at 12:14

## 2021-06-26 RX ADMIN — POTASSIUM CHLORIDE 40 MEQ: 1500 TABLET, EXTENDED RELEASE ORAL at 17:20

## 2021-06-26 RX ADMIN — INSULIN LISPRO 1 UNITS: 100 INJECTION, SOLUTION INTRAVENOUS; SUBCUTANEOUS at 08:05

## 2021-06-26 RX ADMIN — HEPARIN SODIUM 5000 UNITS: 5000 INJECTION INTRAVENOUS; SUBCUTANEOUS at 13:41

## 2021-06-26 RX ADMIN — INSULIN LISPRO 1 UNITS: 100 INJECTION, SOLUTION INTRAVENOUS; SUBCUTANEOUS at 12:15

## 2021-06-26 NOTE — PROGRESS NOTES
NEPHROLOGY PROGRESS NOTE    Patient: Jeannie León               Sex: female          DOA: 6/19/2021  5:47 PM   Date of Birth: @        Age: @        LOS:  LOS: 6 days   6/26/2021    REASON FOR THE CONSULTATION:  Further management of hyponatremia    HPI     This is a 66 y o  female admitted for Metabolic encephalopathy     SUBJECTIVE     - currently breathing is stable   Updated patient's  at bedside today also  Patient denies nausea, vomiting, headache or dizziness today    - Reviewed last 24 hrs events     CURRENT MEDICATIONS       Current Facility-Administered Medications:   •  acetaminophen (TYLENOL) tablet 650 mg, 650 mg, Oral, Q6H PRN, GUILLAUME Knowles-C, 650 mg at 06/25/21 2114  •  albuterol (PROVENTIL HFA,VENTOLIN HFA) inhaler 2 puff, 2 puff, Inhalation, Q6H PRN, GUILLAUME Knowles-C, 2 puff at 06/26/21 0367  •  albuterol inhalation solution 2 5 mg, 2 5 mg, Nebulization, Q6H PRN, Cici Kaufman PA-C, 2 5 mg at 06/21/21 0101  •  atorvastatin (LIPITOR) tablet 10 mg, 10 mg, Oral, Daily With Dinner, Cici Kaufman PA-C, 10 mg at 06/25/21 1737  •  cefepime (MAXIPIME) 1,000 mg in dextrose 5 % 50 mL IVPB, 1,000 mg, Intravenous, Q12H, Letty Chairez PA-C, Last Rate: 100 mL/hr at 06/25/21 2131, 1,000 mg at 06/25/21 2131  •  furosemide (LASIX) tablet 20 mg, 20 mg, Oral, BID (diuretic), Kelli Hernandez MD, 20 mg at 06/26/21 0736  •  heparin (porcine) subcutaneous injection 5,000 Units, 5,000 Units, Subcutaneous, Q8H [facility], Cici Kaufman PA-C, 5,000 Units at 06/26/21 0514  •  insulin lispro (HumaLOG) 100 units/mL subcutaneous injection 1-6 Units, 1-6 Units, Subcutaneous, TID AC, 1 Units at 06/26/21 0805 **AND** Fingerstick Glucose (POCT), , , TID AC, Briseyda Tristan MD  •  insulin lispro (HumaLOG) 100 units/mL subcutaneous injection 1-6 Units, 1-6 Units, Subcutaneous, HS, Briseyda Tristan MD, 2 Units at 06/25/21 2117  •  montelukast (SINGULAIR) tablet 10 mg, 10 mg, Oral, HS, Letty Chairez PA-C, 10 mg at 06/25/21 2116  •  potassium chloride (K-DUR,KLOR-CON) CR tablet 20 mEq, 20 mEq, Oral, BID (diuretic), Radha Chavarria MD  •  QUEtiapine (SEROquel) tablet 12 5 mg, 12 5 mg, Oral, HS, Anastasia Pacheco PA-C, 12 5 mg at 06/25/21 2115  •  sodium chloride tablet 2 g, 2 g, Oral, TID With Meals, Radha Chavarria MD, 2 g at 06/26/21 0736    OBJECTIVE     Current Weight: Weight - Scale: 96 kg (211 lb 10 3 oz)  /89 (BP Location: Right arm)   Pulse 87   Temp 98 6 °F (37 °C) (Oral)   Resp 18   Ht 5' (1 524 m)   Wt 96 kg (211 lb 10 3 oz)   SpO2 94%   BMI 41 33 kg/m²   Vitals:    06/26/21 0700   BP: 141/89   Pulse: 87   Resp: 18   Temp: 98 6 °F (37 °C)   SpO2: 94%     Body mass index is 41 33 kg/m²  Intake/Output Summary (Last 24 hours) at 6/26/2021 1133  Last data filed at 6/26/2021 0701  Gross per 24 hour   Intake 120 ml   Output 500 ml   Net -380 ml       PHYSICAL EXAMINATION     Physical Exam  Constitutional:       General: She is not in acute distress  HENT:      Head: Normocephalic and atraumatic  Eyes:      General: No scleral icterus  Neck:      Vascular: No JVD  Cardiovascular:      Rate and Rhythm: Normal rate  Pulmonary:      Effort: No accessory muscle usage or respiratory distress  Abdominal:      General: There is no distension  Palpations: Abdomen is soft  Musculoskeletal:      Right hand: No lacerations  Left hand: No lacerations  Cervical back: Neck supple  Skin:     General: Skin is warm  Comments: No Jaundice    Psychiatric:         Behavior: Behavior is not combative             LAB RESULTS     Results from last 7 days   Lab Units 06/26/21  0827 06/25/21  0447 06/24/21  0753 06/24/21  0025 06/23/21  2056 06/23/21  1056 06/22/21  0613 06/21/21  2142 06/19/21  1922   WBC Thousand/uL  --   --   --   --   --   --  11 65*  --  6 70   HEMOGLOBIN g/dL  --   --   --   --   --   --  14 7  --  14 4   HEMATOCRIT %  --   --   --   --   --   --  45 1  --  44 3   PLATELETS Thousands/uL  --   --   --   --   --   --  302  --  174   SODIUM mmol/L 131* 130* 131* 131* 129* 133*  --  122* 116*   POTASSIUM mmol/L 3 4* 3 6 3 6 3 2* 3 2* 3 5  --  3 8 3 5   CHLORIDE mmol/L 90* 88* 87* 88* 89* 89*  --  84* 77*   CO2 mmol/L 40* 42* 41* 39* 39* 37*  --  35* 35*   BUN mg/dL 13 14 16 18 18 15  --  9 16   CREATININE mg/dL 0 61 0 61 0 71 0 68 0 72 0 69  --  0 53* 0 69   EGFR ml/min/1 73sq m 88 88 82 85 81 84  --  92 84   CALCIUM mg/dL 8 9 8 7 9 1 8 7 8 5 9 1  --  8 2* 8 8   MAGNESIUM mg/dL  --   --   --   --   --   --   --   --  2 0       RADIOLOGY RESULTS      CTA ED chest PE study   Final Result by Lety Cabrales MD (06/19 6827)      No evidence of pulmonary embolism  Bilateral patchy groundglass attenuation with interlobular septal thickening likely represents pulmonary edema  Main pulmonary artery is dilated measuring 4 4 cm distended with pulmonary arterial hypertension  Correlate with echocardiogram       The study was marked in EPIC for immediate notification  Workstation performed: [id number]         XR chest 1 view portable   Final Result by Zara Trujillo MD (06/20 4332)      No acute cardiopulmonary disease  Redemonstration of pulmonary artery enlargement compatible with the history of pulmonary hypertension  Workstation performed: [id number]         CT head without contrast   Final Result by Ron Jeans, MD (06/19 6262)      No acute intracranial abnormality  Workstation performed: [id number]             PLAN / RECOMMENDATIONS      1  Hyponatremia  Chronic since duration is > 48 hours  Based on initial workup, 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 although exact etiology of SIADH has remained unclear  Patient has initially received 1 dose of tolvaptan and now on salt tablet at 2 g PO TID along with Lasix 20 mg PO BID  Current sodium level is 131 and patient is currently asymptomatic from hyponatremia perspective    Plan to continue salt tablet at current dose and also advised patient to liberalize salt intake in the diet  Recheck sodium level with AM labs if remains in the hospital     2  Hypokalemia  Multifactorial, current potassium is 3 4  Since patient is receiving Lasix, plan to start patient on potassium chloride 20 mEq PO BID today  Disposition:  Stable from renal standpoint for discharge  Awaiting placement  Overall above mentioned plan was also d/w Tha Jensen MD  Nephrology  6/26/2021        Portions of the record may have been created with voice recognition software  Occasional wrong word or "sound a like" substitutions may have occurred due to the inherent limitations of voice recognition software  Read the chart carefully and recognize, using context, where substitutions have occurred

## 2021-06-26 NOTE — ASSESSMENT & PLAN NOTE
· Urinalysis revealing moderate bacteria and white blood cells  · Completed 5 day course of cefepime

## 2021-06-26 NOTE — PROGRESS NOTES
[facility]  Progress Note - Cathy Ross 1945, 66 y o  female MRN: [id number]  Unit/Bed#: -Bam Encounter: [id number]  Primary Care Provider: Luz Calhoun MD   Date and time admitted to hospital: 6/19/2021  9:09 PM    * Metabolic encephalopathy  Assessment & Plan  · Patient's son reporting patient more confused at home and was found to be hypoglycemic around 55, has not been eating but taking diabetic meds  · CT head negative, EKG with sinus rhythm with PAC and T-wave inversion  · Most likely in setting of hypoglycemia, UTI, hyponatremia  · Hold home Ativan, Ambien, avoid opiates  · Has since resolved    UTI (urinary tract infection)  Assessment & Plan  · Urinalysis revealing moderate bacteria and white blood cells  · Completed 5 day course of cefepime    Acute diastolic congestive heart failure (HCC)  Assessment & Plan  · Noted on CT A, no PE, pulmonary edema with pulmonary hypertension  · Echocardiogram ef wnl but revealed diastolic dysfunction  · Will c/w further dieuresis  · Intake and output monitoring, daily weights  · Low-sodium diet with 1500 mL fluid restriction  · Patient newly requiring oxygen last evening, will repeat chest x-ray today  · Oxygen stable on 2 L    Hypokalemia  Assessment & Plan  3 4 today  Oral supplementation ordered  BMP in a m  Hyponatremia  Assessment & Plan  · Level on arrival 116, most likely in setting of persistent diarrhea and dehydration  · Initially treated with IVF with mild improvement of sodium level to 122 however was noted to be volume overloaded and IV     · Sodium stable at 131  · C/w lasix therapy  · 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  · Follow-up nephrology recs, Cleared for discharge from a Neph standpoint    Hypoglycemia-resolved as of 6/26/2021  Assessment & Plan  · Patient's son reporting low blood glucose level at home, on arrival blood glucose 73  · Continue to monitor blood glucose checks q i d , hypoglycemia protocol  · Will hold home oral diabetic medications  · Has since resolved          VTE Pharmacologic Prophylaxis:   Pharmacologic: Heparin  Mechanical VTE Prophylaxis in Place: Yes    Patient Centered Rounds: I have performed bedside rounds with nursing staff today  Discussions with Specialists or Other Care Team Provider:  Reviewed previous provider's notes discussed with case management primary RN    Education and Discussions with Family / Patient:  Discussed plan of care with patient and son denies any additional questions or concerns at this time    Time Spent for Care: 20 minutes  More than 50% of total time spent on counseling and coordination of care as described above  Current Length of Stay: 6 day(s)    Current Patient Status: Inpatient   Certification Statement: The patient will continue to require additional inpatient hospital stay due to Short-term rehab placement, monitoring hyponatremia    Discharge Plan / Estimated Discharge Date:  Once rehab bed is available      Code Status: Level 1 - Full Code      Subjective:   Offers minimal complaints denies any chest pain or chest tightness does report some mild shortness of breath denies any nausea vomiting diarrhea    Objective:     Vitals:   Temp (24hrs), Av 2 °F (36 8 °C), Min:97 5 °F (36 4 °C), Max:98 6 °F (37 °C)    Temp:  [97 5 °F (36 4 °C)-98 6 °F (37 °C)] 98 6 °F (37 °C)  HR:  [63-87] 87  Resp:  [17-18] 18  BP: (128-149)/(65-89) 141/89  SpO2:  [94 %-96 %] 94 %  Body mass index is 41 33 kg/m²  Input and Output Summary (last 24 hours): Intake/Output Summary (Last 24 hours) at 2021 1336  Last data filed at 2021 0900  Gross per 24 hour   Intake 360 ml   Output 500 ml   Net -140 ml       Physical Exam:     Physical Exam  Vitals and nursing note reviewed  HENT:      Head: Atraumatic  Cardiovascular:      Rate and Rhythm: Normal rate  Pulses: Normal pulses     Pulmonary:      Effort: Pulmonary effort is normal    Abdominal:      Palpations: Abdomen is soft  Musculoskeletal:         General: Normal range of motion  Skin:     General: Skin is warm and dry  Neurological:      General: No focal deficit present  Mental Status: She is alert  Mental status is at baseline  Psychiatric:         Mood and Affect: Mood normal          Thought Content: Thought content normal          Judgment: Judgment normal        Additional Data:     Labs:    Results from last 7 days   Lab Units 06/22/21  0613   WBC Thousand/uL 11 65*   HEMOGLOBIN g/dL 14 7   HEMATOCRIT % 45 1   PLATELETS Thousands/uL 302   NEUTROS PCT % 80*   LYMPHS PCT % 7*   MONOS PCT % 12   EOS PCT % 0     Results from last 7 days   Lab Units 06/26/21  0827 06/19/21  1922   POTASSIUM mmol/L 3 4* 3 5   CHLORIDE mmol/L 90* 77*   CO2 mmol/L 40* 35*   BUN mg/dL 13 16   CREATININE mg/dL 0 61 0 69   CALCIUM mg/dL 8 9 8 8   ALK PHOS U/L  --  45*   ALT U/L  --  36   AST U/L  --  50*           * I Have Reviewed All Lab Data Listed Above  * Additional Pertinent Lab Tests Reviewed: Ross 64 Admission Reviewed    Recent Cultures (last 7 days):     Results from last 7 days   Lab Units 06/19/21 2008   BLOOD CULTURE  No Growth After 5 Days  No Growth After 5 Days         Last 24 Hours Medication List:   Current Facility-Administered Medications   Medication Dose Route Frequency Provider Last Rate   • acetaminophen  650 mg Oral Q6H PRN Meri Gaytan PA-C     • albuterol  2 puff Inhalation Q6H PRN Meri Gaytan PA-C     • albuterol  2 5 mg Nebulization Q6H PRN Meri Gaytan PA-C     • atorvastatin  10 mg Oral Daily With Dinner Meri Gaytan PA-C     • furosemide  20 mg Oral BID (diuretic) Arturo Brody MD     • heparin (porcine)  5,000 Units Subcutaneous Q8H [address] Meri Gaytan PA-C     • insulin lispro  1-6 Units Subcutaneous TID AC Betty Kelley MD     • insulin lispro  1-6 Units Subcutaneous HS Betty Kelley MD     • montelukast  10 mg Oral HS Meri Gaytan PA-C     • potassium chloride  20 mEq Oral BID (diuretic) Michelle Boothe MD     • potassium chloride  40 mEq Oral BID RON Sutton     • QUEtiapine  12 5 mg Oral HS Anastasia Pacheco PA-C     • sodium chloride  2 g Oral TID With Meals Michelle Boothe MD          Today, Patient Was Seen By: RON Sutton    ** Please Note: Dragon 360 Dictation voice to text software may have been used in the creation of this document   **

## 2021-06-26 NOTE — PLAN OF CARE
Problem: INFECTION - ADULT  Goal: Absence or prevention of progression during hospitalization  Description: INTERVENTIONS:  - Assess and monitor for signs and symptoms of infection  - Monitor lab/diagnostic results  - Monitor all insertion sites, i e  indwelling lines, tubes, and drains  - Monitor endotracheal if appropriate and nasal secretions for changes in amount and color  - [place] appropriate cooling/warming therapies per order  - Administer medications as ordered  - Instruct and encourage patient and family to use good hand hygiene technique  - Identify and instruct in appropriate isolation precautions for identified infection/condition  Outcome: Progressing

## 2021-06-26 NOTE — CASE MANAGEMENT
Cm spoke to [facility] [address] and they will review and call CM back with decision if they can accept pt over the weekend -they are checking on insurance auth

## 2021-06-26 NOTE — ASSESSMENT & PLAN NOTE
· Noted on CT A, no PE, pulmonary edema with pulmonary hypertension  · Echocardiogram ef wnl but revealed diastolic dysfunction  · Will c/w further dieuresis  · Intake and output monitoring, daily weights  · Low-sodium diet with 1500 mL fluid restriction  · Patient newly requiring oxygen last evening, will repeat chest x-ray today  · Oxygen stable on 2 L

## 2021-06-27 LAB
ANION GAP SERPL CALCULATED.3IONS-SCNC: 3 MMOL/L (ref 4–13)
BUN SERPL-MCNC: 12 MG/DL (ref 5–25)
CALCIUM SERPL-MCNC: 8.8 MG/DL (ref 8.3–10.1)
CHLORIDE SERPL-SCNC: 95 MMOL/L (ref 100–108)
CO2 SERPL-SCNC: 35 MMOL/L (ref 21–32)
CREAT SERPL-MCNC: 0.5 MG/DL (ref 0.6–1.3)
ERYTHROCYTE [DISTWIDTH] IN BLOOD BY AUTOMATED COUNT: 16.3 % (ref 11.6–15.1)
GFR SERPL CREATININE-BSD FRML MDRD: 94 ML/MIN/1.73SQ M
GLUCOSE SERPL-MCNC: 129 MG/DL (ref 65–140)
GLUCOSE SERPL-MCNC: 151 MG/DL (ref 65–140)
GLUCOSE SERPL-MCNC: 183 MG/DL (ref 65–140)
GLUCOSE SERPL-MCNC: 198 MG/DL (ref 65–140)
GLUCOSE SERPL-MCNC: 203 MG/DL (ref 65–140)
HCT VFR BLD AUTO: 45.2 % (ref 34.8–46.1)
HGB BLD-MCNC: 14.1 G/DL (ref 11.5–15.4)
MCH RBC QN AUTO: 26.5 PG (ref 26.8–34.3)
MCHC RBC AUTO-ENTMCNC: 31.2 G/DL (ref 31.4–37.4)
MCV RBC AUTO: 85 FL (ref 82–98)
PLATELET # BLD AUTO: 313 THOUSANDS/UL (ref 149–390)
PMV BLD AUTO: 8.7 FL (ref 8.9–12.7)
POTASSIUM SERPL-SCNC: 4.9 MMOL/L (ref 3.5–5.3)
RBC # BLD AUTO: 5.32 MILLION/UL (ref 3.81–5.12)
SODIUM SERPL-SCNC: 133 MMOL/L (ref 136–145)
WBC # BLD AUTO: 8.54 THOUSAND/UL (ref 4.31–10.16)

## 2021-06-27 PROCEDURE — 82948 REAGENT STRIP/BLOOD GLUCOSE: CPT

## 2021-06-27 PROCEDURE — 80048 BASIC METABOLIC PNL TOTAL CA: CPT | Performed by: INTERNAL MEDICINE

## 2021-06-27 PROCEDURE — 99233 SBSQ HOSP IP/OBS HIGH 50: CPT | Performed by: INTERNAL MEDICINE

## 2021-06-27 PROCEDURE — 99232 SBSQ HOSP IP/OBS MODERATE 35: CPT | Performed by: NURSE PRACTITIONER

## 2021-06-27 PROCEDURE — C9113 INJ PANTOPRAZOLE SODIUM, VIA: HCPCS | Performed by: NURSE PRACTITIONER

## 2021-06-27 PROCEDURE — 85027 COMPLETE CBC AUTOMATED: CPT | Performed by: NURSE PRACTITIONER

## 2021-06-27 PROCEDURE — 85014 HEMATOCRIT: CPT | Performed by: NURSE PRACTITIONER

## 2021-06-27 PROCEDURE — 85018 HEMOGLOBIN: CPT | Performed by: NURSE PRACTITIONER

## 2021-06-27 RX ADMIN — INSULIN LISPRO 1 UNITS: 100 INJECTION, SOLUTION INTRAVENOUS; SUBCUTANEOUS at 17:02

## 2021-06-27 RX ADMIN — ATORVASTATIN CALCIUM 10 MG: 10 TABLET, FILM COATED ORAL at 17:03

## 2021-06-27 RX ADMIN — FUROSEMIDE 20 MG: 20 TABLET ORAL at 17:03

## 2021-06-27 RX ADMIN — HEPARIN SODIUM 5000 UNITS: 5000 INJECTION INTRAVENOUS; SUBCUTANEOUS at 15:16

## 2021-06-27 RX ADMIN — INSULIN LISPRO 2 UNITS: 100 INJECTION, SOLUTION INTRAVENOUS; SUBCUTANEOUS at 08:08

## 2021-06-27 RX ADMIN — FUROSEMIDE 20 MG: 20 TABLET ORAL at 08:09

## 2021-06-27 RX ADMIN — INSULIN LISPRO 1 UNITS: 100 INJECTION, SOLUTION INTRAVENOUS; SUBCUTANEOUS at 11:52

## 2021-06-27 RX ADMIN — Medication 2 G: at 17:03

## 2021-06-27 RX ADMIN — POTASSIUM CHLORIDE 20 MEQ: 1500 TABLET, EXTENDED RELEASE ORAL at 08:09

## 2021-06-27 RX ADMIN — Medication 2 G: at 11:51

## 2021-06-27 RX ADMIN — POTASSIUM CHLORIDE 20 MEQ: 1500 TABLET, EXTENDED RELEASE ORAL at 17:03

## 2021-06-27 RX ADMIN — HEPARIN SODIUM 5000 UNITS: 5000 INJECTION INTRAVENOUS; SUBCUTANEOUS at 06:52

## 2021-06-27 RX ADMIN — ACETAMINOPHEN 650 MG: 325 TABLET, FILM COATED ORAL at 21:49

## 2021-06-27 RX ADMIN — SODIUM CHLORIDE 8 MG/HR: 9 INJECTION, SOLUTION INTRAVENOUS at 23:11

## 2021-06-27 RX ADMIN — QUETIAPINE FUMARATE 12.5 MG: 25 TABLET ORAL at 21:49

## 2021-06-27 RX ADMIN — Medication 2 G: at 08:09

## 2021-06-27 RX ADMIN — ACETAMINOPHEN 650 MG: 325 TABLET, FILM COATED ORAL at 00:28

## 2021-06-27 RX ADMIN — MONTELUKAST SODIUM 10 MG: 10 TABLET, FILM COATED ORAL at 21:49

## 2021-06-27 NOTE — ASSESSMENT & PLAN NOTE
· Noted on CT A, no PE, pulmonary edema with pulmonary hypertension  · Echocardiogram ef wnl but revealed diastolic dysfunction  · Will c/w further dieuresis  · Intake and output monitoring, daily weights  · Low-sodium diet with 1500 mL fluid restriction  · Patient newly requiring oxygen last evening, will repeat chest x-ray today  · Oxygen stable on 2 L, encourage incentive spirometer

## 2021-06-27 NOTE — QUICK NOTE
Called to patient's bedside for concern for GI bleed  Moderate sized dark bloody and broke soft brown bowel movement observed  Patient complaining of some lower abdominal pain and discomfort    Started on Protonix drip, GI consult place, it q 6 hemoglobins ordered

## 2021-06-27 NOTE — PROGRESS NOTES
NEPHROLOGY PROGRESS NOTE    Patient: Jose Roa               Sex: female          DOA: 6/19/2021  5:47 PM   Date of Birth: @        Age: @        LOS:  LOS: 7 days   6/27/2021    REASON FOR THE CONSULTATION:  Further management of hyponatremia    HPI     This is a 66 y o  female admitted for Metabolic encephalopathy     SUBJECTIVE     - currently breathing is stable   Updated patient's has but at bedside today  Patient denies nausea, vomiting, headache or dizziness today    - Reviewed last 24 hrs events     CURRENT MEDICATIONS       Current Facility-Administered Medications:   •  acetaminophen (TYLENOL) tablet 650 mg, 650 mg, Oral, Q6H PRN, Allen Raymond PA-C, 650 mg at 06/27/21 0028  •  albuterol (PROVENTIL HFA,VENTOLIN HFA) inhaler 2 puff, 2 puff, Inhalation, Q6H PRN, Allen Raymond PA-C, 2 puff at 06/26/21 8347  •  albuterol inhalation solution 2 5 mg, 2 5 mg, Nebulization, Q6H PRN, Allen Raymond PA-C, 2 5 mg at 06/21/21 0101  •  atorvastatin (LIPITOR) tablet 10 mg, 10 mg, Oral, Daily With Dinner, Allen Raymond PA-C, 10 mg at 06/26/21 1603  •  furosemide (LASIX) tablet 20 mg, 20 mg, Oral, BID (diuretic), Amalia White MD, 20 mg at 06/27/21 0809  •  heparin (porcine) subcutaneous injection 5,000 Units, 5,000 Units, Subcutaneous, Q8H [address], Allen Raymond PA-C, 5,000 Units at 06/27/21 2007  •  insulin lispro (HumaLOG) 100 units/mL subcutaneous injection 1-6 Units, 1-6 Units, Subcutaneous, TID AC, 2 Units at 06/27/21 0808 **AND** Fingerstick Glucose (POCT), , , TID AC, Teddy Vasquez MD  •  insulin lispro (HumaLOG) 100 units/mL subcutaneous injection 1-6 Units, 1-6 Units, Subcutaneous, HS, Teddy Vasquez MD, 1 Units at 06/26/21 2126  •  montelukast (SINGULAIR) tablet 10 mg, 10 mg, Oral, HS, Letty Chairez PA-C, 10 mg at 06/26/21 2120  •  potassium chloride (K-DUR,KLOR-CON) CR tablet 20 mEq, 20 mEq, Oral, BID (diuretic), Mary Hackett MD, 20 mEq at 06/27/21 0809  •  QUEtiapine (SEROquel) tablet 12 5 mg, 12 5 mg, Oral, HS, Anastasia Pacheco PA-C, 12 5 mg at 06/26/21 2125  •  sodium chloride tablet 2 g, 2 g, Oral, TID With Meals, Agnes Watson MD, 2 g at 06/27/21 0809    OBJECTIVE     Current Weight: Weight - Scale: 96 kg (211 lb 10 3 oz)  /70   Pulse 86   Temp 97 8 °F (36 6 °C)   Resp 16   Ht 5' (1 524 m)   Wt 96 kg (211 lb 10 3 oz)   SpO2 97%   BMI 41 33 kg/m²   Vitals:    06/27/21 0742   BP: 149/70   Pulse: 86   Resp: 16   Temp: 97 8 °F (36 6 °C)   SpO2: 97%     Body mass index is 41 33 kg/m²  Intake/Output Summary (Last 24 hours) at 6/27/2021 1105  Last data filed at 6/27/2021 1008  Gross per 24 hour   Intake 360 ml   Output 2600 ml   Net -2240 ml       PHYSICAL EXAMINATION     Physical Exam  Constitutional:       General: She is not in acute distress  HENT:      Head: Normocephalic and atraumatic  Eyes:      General: No scleral icterus  Neck:      Vascular: No JVD  Cardiovascular:      Rate and Rhythm: Normal rate  Pulmonary:      Effort: No accessory muscle usage or respiratory distress  Abdominal:      General: There is no distension  Palpations: Abdomen is soft  Musculoskeletal:      Right hand: No lacerations  Left hand: No lacerations  Cervical back: Neck supple  Skin:     General: Skin is warm  Comments: No Jaundice    Psychiatric:         Behavior: Behavior is not combative             LAB RESULTS     Results from last 7 days   Lab Units 06/27/21  0510 06/26/21  0827 06/25/21  0447 06/24/21  0753 06/24/21  0025 06/23/21  2056 06/23/21  1056 06/22/21  0613   WBC Thousand/uL 8 54  --   --   --   --   --   --  11 65*   HEMOGLOBIN g/dL 14 1  --   --   --   --   --   --  14 7   HEMATOCRIT % 45 2  --   --   --   --   --   --  45 1   PLATELETS Thousands/uL 313  --   --   --   --   --   --  302   SODIUM mmol/L 133* 131* 130* 131* 131* 129* 133*  --    POTASSIUM mmol/L 4 9 3 4* 3 6 3 6 3 2* 3 2* 3 5  --    CHLORIDE mmol/L 95* 90* 88* 87* 88* 89* 89*  --    CO2 mmol/L 35* 40* 42* 41* 39* 39* 37*  --    BUN mg/dL 12 13 14 16 18 18 15  --    CREATININE mg/dL 0 50* 0 61 0 61 0 71 0 68 0 72 0 69  --    EGFR ml/min/1 73sq m 94 88 88 82 85 81 84  --    CALCIUM mg/dL 8 8 8 9 8 7 9 1 8 7 8 5 9 1  --        RADIOLOGY RESULTS      XR chest 1 view   Final Result by Keya Stein MD (06/27 1043)      No acute cardiopulmonary disease  Workstation performed: [id number]         CTA ED chest PE study   Final Result by Justin Steel MD (06/19 4937)      No evidence of pulmonary embolism  Bilateral patchy groundglass attenuation with interlobular septal thickening likely represents pulmonary edema  Main pulmonary artery is dilated measuring 4 4 cm distended with pulmonary arterial hypertension  Correlate with echocardiogram       The study was marked in EPIC for immediate notification  Workstation performed: [id number]         XR chest 1 view portable   Final Result by Roma Patino MD (06/20 1442)      No acute cardiopulmonary disease  Redemonstration of pulmonary artery enlargement compatible with the history of pulmonary hypertension  Workstation performed: [id number]         CT head without contrast   Final Result by Jeffery Wang MD (06/19 1842)      No acute intracranial abnormality  Workstation performed: [id number]             PLAN / RECOMMENDATIONS      1  Hyponatremia  Chronic since duration is > 48 hours  Based on initial workup, 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 although exact etiology of SIADH has remained unclear  Currently patient is on salt tablet 2 g PO TID along with Lasix 20 mg PO BID  Current sodium level is 133  Patient is currently asymptomatic from hyponatremia perspective  Plan to continue current dose of salt tablet and Lasix and monitor sodium level while in the hospital     2  Hypokalemia  Multifactorial, receiving potassium chloride 20 mEq PO BID with current potassium of 4 9    Continue current potassium supplementation and monitor potassium level  Disposition:  Stable from renal standpoint for discharge with current dose of Lasix and salt tablet  Awaiting placement  Overall above mentioned plan was also d/w Meron Neal MD  Nephrology  6/27/2021        Portions of the record may have been created with voice recognition software  Occasional wrong word or "sound a like" substitutions may have occurred due to the inherent limitations of voice recognition software  Read the chart carefully and recognize, using context, where substitutions have occurred

## 2021-06-27 NOTE — PROGRESS NOTES
[address] [facility]  Progress Note - Jeannie León 1945, 66 y o  female MRN: [id number]  Unit/Bed#: -01 Encounter: [id number]  Primary Care Provider: Chris Ashton MD   Date and time admitted to hospital: 6/19/2021  8:64 PM    * Metabolic encephalopathy  Assessment & Plan  · Patient's son reporting patient more confused at home and was found to be hypoglycemic around 55, has not been eating but taking diabetic meds  · CT head negative, EKG with sinus rhythm with PAC and T-wave inversion  · Most likely in setting of hypoglycemia, UTI, hyponatremia  · Hold home Ativan, Ambien, avoid opiates  · Has since resolved    UTI (urinary tract infection)  Assessment & Plan  · Urinalysis revealing moderate bacteria and white blood cells  · Completed 5 day course of cefepime    Acute diastolic congestive heart failure (HCC)  Assessment & Plan  · Noted on CT A, no PE, pulmonary edema with pulmonary hypertension  · Echocardiogram ef wnl but revealed diastolic dysfunction  · Will c/w further dieuresis  · Intake and output monitoring, daily weights  · Low-sodium diet with 1500 mL fluid restriction  · Patient newly requiring oxygen last evening, will repeat chest x-ray today  · Oxygen stable on 2 L, encourage incentive spirometer    Hypokalemia  Assessment & Plan  Resolved with oral supplementation    Hyponatremia  Assessment & Plan  · Level on arrival 116, most likely in setting of persistent diarrhea and dehydration  · Initially treated with IVF with mild improvement of sodium level to 122 however was noted to be volume overloaded and IV  · Sodium stable at 131  · C/w lasix therapy  · 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  · Follow-up nephrology recs, Cleared for discharge from a Neph standpoint         VTE Pharmacologic Prophylaxis:   Pharmacologic: Heparin  Mechanical VTE Prophylaxis in Place: Yes    Patient Centered Rounds: I have performed bedside rounds with nursing staff today      Discussions with Specialists or Other Care Team Provider:  Discussed with case management primary RN    Education and Discussions with Family / Patient:  Discussed plan of care with patient denies additional questions or concerns at this time son and daughter-in-law at bedside    Time Spent for Care: 20 minutes  More than 50% of total time spent on counseling and coordination of care as described above  Current Length of Stay: 7 day(s)    Current Patient Status: Inpatient   Certification Statement: The patient will continue to require additional inpatient hospital stay due to Awaiting placement    Discharge Plan / Estimated Discharge Date:  Placement      Code Status: Level 1 - Full Code      Subjective:   Patient a bed resting in chair encouraged to use incentive spirometer family at bedside helping with meals patient appears well today eager for discharge to rehab however still awaiting authorization    Objective:     Vitals:   Temp (24hrs), Av 9 °F (36 6 °C), Min:97 8 °F (36 6 °C), Max:98 °F (36 7 °C)    Temp:  [97 8 °F (36 6 °C)-98 °F (36 7 °C)] 97 8 °F (36 6 °C)  HR:  [73-90] 86  Resp:  [16-18] 16  BP: (135-150)/(70-81) 149/70  SpO2:  [97 %] 97 %  Body mass index is 41 33 kg/m²  Input and Output Summary (last 24 hours): Intake/Output Summary (Last 24 hours) at 2021 1013  Last data filed at 2021 1008  Gross per 24 hour   Intake 360 ml   Output 2600 ml   Net -2240 ml       Physical Exam:     Physical Exam  Vitals and nursing note reviewed  Constitutional:       Appearance: She is well-groomed and overweight  Cardiovascular:      Rate and Rhythm: Normal rate  Pulmonary:      Effort: Pulmonary effort is normal    Abdominal:      Palpations: Abdomen is soft  Musculoskeletal:         General: Normal range of motion  Skin:     General: Skin is warm and dry  Neurological:      General: No focal deficit present  Mental Status: She is alert  Mental status is at baseline     Psychiatric:         Mood and Affect: Mood normal          Thought Content: Thought content normal          Judgment: Judgment normal      Additional Data:     Labs:    Results from last 7 days   Lab Units 06/27/21  0510 06/22/21  0613   WBC Thousand/uL 8 54 11 65*   HEMOGLOBIN g/dL 14 1 14 7   HEMATOCRIT % 45 2 45 1   PLATELETS Thousands/uL 313 302   NEUTROS PCT %  --  80*   LYMPHS PCT %  --  7*   MONOS PCT %  --  12   EOS PCT %  --  0     Results from last 7 days   Lab Units 06/27/21  0510   POTASSIUM mmol/L 4 9   CHLORIDE mmol/L 95*   CO2 mmol/L 35*   BUN mg/dL 12   CREATININE mg/dL 0 50*   CALCIUM mg/dL 8 8           * I Have Reviewed All Lab Data Listed Above  * Additional Pertinent [facility] Tests [facility]ed: [facility]    Recent Cultures (last 7 days):           Last 24 Hours Medication List:   Current Facility-Administered Medications   Medication Dose Route Frequency Provider Last Rate   • acetaminophen  650 mg Oral Q6H PRN Aung BRIJESH Haque     • albuterol  2 puff Inhalation Q6H PRN Aung BRIJESH Haque     • albuterol  2 5 mg Nebulization Q6H PRN Aung Haque PA-C     • atorvastatin  10 mg Oral Daily With Dinner Aung Haque PA-C     • furosemide  20 mg Oral BID (diuretic) Jessica Willingham MD     • heparin (porcine)  5,000 Units Subcutaneous Q8H [address] Aung BRIJESH Haque     • insulin lispro  1-6 Units Subcutaneous TID AC Stella Eid MD     • insulin lispro  1-6 Units Subcutaneous HS Stella Eid MD     • montelukast  10 mg Oral HS Letty Chairez PA-C     • potassium chloride  20 mEq Oral BID (diuretic) Scar Lloyd MD     • QUEtiapine  12 5 mg Oral HS Anastasia Pacheco PA-C     • sodium chloride  2 g Oral TID With Meals Scar Lolyd MD          Today, Patient Was Seen By: RON Evans    ** Please Note: Dragon 360 Dictation voice to text software may have been used in the creation of this document   **

## 2021-06-28 PROBLEM — N39.0 UTI (URINARY TRACT INFECTION): Status: RESOLVED | Noted: 2021-06-20 | Resolved: 2021-06-28

## 2021-06-28 PROBLEM — E87.6 HYPOKALEMIA: Status: RESOLVED | Noted: 2021-06-26 | Resolved: 2021-06-28

## 2021-06-28 LAB
ANION GAP SERPL CALCULATED.3IONS-SCNC: 4 MMOL/L (ref 4–13)
BUN SERPL-MCNC: 26 MG/DL (ref 5–25)
CALCIUM SERPL-MCNC: 8.5 MG/DL (ref 8.3–10.1)
CHLORIDE SERPL-SCNC: 96 MMOL/L (ref 100–108)
CO2 SERPL-SCNC: 32 MMOL/L (ref 21–32)
CREAT SERPL-MCNC: 0.57 MG/DL (ref 0.6–1.3)
GFR SERPL CREATININE-BSD FRML MDRD: 90 ML/MIN/1.73SQ M
GLUCOSE SERPL-MCNC: 178 MG/DL (ref 65–140)
GLUCOSE SERPL-MCNC: 183 MG/DL (ref 65–140)
GLUCOSE SERPL-MCNC: 190 MG/DL (ref 65–140)
GLUCOSE SERPL-MCNC: 198 MG/DL (ref 65–140)
GLUCOSE SERPL-MCNC: 199 MG/DL (ref 65–140)
HCT VFR BLD AUTO: 36.3 % (ref 34.8–46.1)
HCT VFR BLD AUTO: 38.6 % (ref 34.8–46.1)
HCT VFR BLD AUTO: 40.1 % (ref 34.8–46.1)
HCT VFR BLD AUTO: 41.9 % (ref 34.8–46.1)
HGB BLD-MCNC: 11.1 G/DL (ref 11.5–15.4)
HGB BLD-MCNC: 12 G/DL (ref 11.5–15.4)
HGB BLD-MCNC: 12.4 G/DL (ref 11.5–15.4)
HGB BLD-MCNC: 13 G/DL (ref 11.5–15.4)
POTASSIUM SERPL-SCNC: 5.1 MMOL/L (ref 3.5–5.3)
SODIUM SERPL-SCNC: 132 MMOL/L (ref 136–145)

## 2021-06-28 PROCEDURE — 99222 1ST HOSP IP/OBS MODERATE 55: CPT | Performed by: INTERNAL MEDICINE

## 2021-06-28 PROCEDURE — 99233 SBSQ HOSP IP/OBS HIGH 50: CPT | Performed by: INTERNAL MEDICINE

## 2021-06-28 PROCEDURE — 80048 BASIC METABOLIC PNL TOTAL CA: CPT | Performed by: INTERNAL MEDICINE

## 2021-06-28 PROCEDURE — 99232 SBSQ HOSP IP/OBS MODERATE 35: CPT | Performed by: PHYSICIAN ASSISTANT

## 2021-06-28 PROCEDURE — 97535 SELF CARE MNGMENT TRAINING: CPT

## 2021-06-28 PROCEDURE — C9113 INJ PANTOPRAZOLE SODIUM, VIA: HCPCS | Performed by: NURSE PRACTITIONER

## 2021-06-28 PROCEDURE — 85018 HEMOGLOBIN: CPT | Performed by: NURSE PRACTITIONER

## 2021-06-28 PROCEDURE — 82948 REAGENT STRIP/BLOOD GLUCOSE: CPT

## 2021-06-28 PROCEDURE — 97110 THERAPEUTIC EXERCISES: CPT

## 2021-06-28 PROCEDURE — 85014 HEMATOCRIT: CPT | Performed by: NURSE PRACTITIONER

## 2021-06-28 PROCEDURE — 97129 THER IVNTJ 1ST 15 MIN: CPT

## 2021-06-28 RX ORDER — HYDROCORTISONE ACETATE 25 MG/1
25 SUPPOSITORY RECTAL 2 TIMES DAILY
Status: DISCONTINUED | OUTPATIENT
Start: 2021-06-28 | End: 2021-06-29 | Stop reason: HOSPADM

## 2021-06-28 RX ORDER — PANTOPRAZOLE SODIUM 40 MG/1
40 TABLET, DELAYED RELEASE ORAL
Status: DISCONTINUED | OUTPATIENT
Start: 2021-06-29 | End: 2021-06-29 | Stop reason: HOSPADM

## 2021-06-28 RX ADMIN — HYDROCORTISONE ACETATE 25 MG: 25 SUPPOSITORY RECTAL at 21:44

## 2021-06-28 RX ADMIN — QUETIAPINE FUMARATE 12.5 MG: 25 TABLET ORAL at 21:42

## 2021-06-28 RX ADMIN — FUROSEMIDE 20 MG: 20 TABLET ORAL at 17:44

## 2021-06-28 RX ADMIN — INSULIN LISPRO 2 UNITS: 100 INJECTION, SOLUTION INTRAVENOUS; SUBCUTANEOUS at 21:44

## 2021-06-28 RX ADMIN — MONTELUKAST SODIUM 10 MG: 10 TABLET, FILM COATED ORAL at 21:44

## 2021-06-28 RX ADMIN — HEPARIN SODIUM 5000 UNITS: 5000 INJECTION INTRAVENOUS; SUBCUTANEOUS at 14:09

## 2021-06-28 RX ADMIN — Medication 2 G: at 14:17

## 2021-06-28 RX ADMIN — INSULIN LISPRO 1 UNITS: 100 INJECTION, SOLUTION INTRAVENOUS; SUBCUTANEOUS at 08:03

## 2021-06-28 RX ADMIN — POTASSIUM CHLORIDE 20 MEQ: 1500 TABLET, EXTENDED RELEASE ORAL at 08:03

## 2021-06-28 RX ADMIN — FUROSEMIDE 20 MG: 20 TABLET ORAL at 08:03

## 2021-06-28 RX ADMIN — Medication 2 G: at 17:44

## 2021-06-28 RX ADMIN — ATORVASTATIN CALCIUM 10 MG: 10 TABLET, FILM COATED ORAL at 17:44

## 2021-06-28 RX ADMIN — INSULIN LISPRO 2 UNITS: 100 INJECTION, SOLUTION INTRAVENOUS; SUBCUTANEOUS at 14:08

## 2021-06-28 RX ADMIN — INSULIN LISPRO 1 UNITS: 100 INJECTION, SOLUTION INTRAVENOUS; SUBCUTANEOUS at 17:48

## 2021-06-28 RX ADMIN — Medication 2 G: at 08:04

## 2021-06-28 RX ADMIN — SODIUM CHLORIDE 8 MG/HR: 9 INJECTION, SOLUTION INTRAVENOUS at 10:38

## 2021-06-28 NOTE — PHYSICAL THERAPY NOTE
06/28/21 0907   PT Last Visit   PT Visit Date 06/28/21   Note Type   Note Type Treatment   Pain Assessment   Pain Assessment Tool Pain Assessment not indicated - pt denies pain   Restrictions/Precautions   Weight Bearing Precautions Per Order No   Other Precautions Cognitive; Bed Alarm;Multiple lines;O2;Fall Risk;Seizure   General   Chart Reviewed Yes   Response to Previous Treatment Patient with no complaints from previous session  Family/Caregiver Present No   Cognition   Overall Cognitive Status Impaired   Arousal/Participation Alert; Responsive; Cooperative   Attention Attends with cues to redirect   Orientation Level Oriented to person;Oriented to place; Disoriented to time;Disoriented to situation   Memory Decreased short term memory;Decreased recall of recent events;Decreased recall of precautions   Following Commands Follows one step commands with increased time or repetition   Comments pt agreeable to PT session   Bed Mobility   Rolling R 4  Minimal assistance   Additional items Assist x 1;Bedrails; Increased time required;Verbal cues   Rolling L 4  Minimal assistance   Additional items Assist x 1;Bedrails;HOB elevated; Increased time required;Verbal cues   Endurance Deficit   Endurance Deficit Yes   Activity Tolerance   Activity Tolerance Patient limited by fatigue   Nurse Made Aware RN made aware of outcomes   Exercises   Quad Sets Supine;20 reps;AROM; Bilateral   Heelslides Supine;20 reps;AROM; Bilateral   Glute Sets Supine;20 reps;AROM; Bilateral   Hip Flexion Supine;20 reps;AROM; Bilateral   Hip Abduction Supine;20 reps;AROM; Bilateral   Hip Adduction Supine;20 reps;AROM; Bilateral   Knee AROM Short Arc Quad Supine;20 reps;AROM; Bilateral   Ankle Pumps Supine;20 reps;AROM; Bilateral   Assessment   Prognosis Fair   Problem List Decreased strength;Decreased endurance; Impaired balance;Decreased mobility; Decreased cognition;Obesity   Assessment Pt seen for PT treatment session this date with interventions consisting of Therapeutic exercise consisting of: AROM 20 reps B LE in supine position and therapeutic activity consisting of training: bed mobility  Pt agreeable to PT treatment session upon arrival, pt found supine in bed w/ HOB elevated, in no apparent distress and responsive  In comparison to previous session, pt with improvements in strength and endurance  Post session: pt returned BTB, bed alarm engaged, all needs in reach and RN notified of session findings/recommendations  Continue to recommend post acute rehabilitation services at time of d/c in order to maximize pt's functional independence and safety w/ mobility  Pt continues to be functioning below baseline level, and remains limited 2* factors listed above and including decreased functional mobility, impaired balance, decreased activity tolerance and impaired cognition  PT will continue to see pt during current hospitalization in order to address the deficits listed above and provide interventions consistent w/ POC in effort to achieve STGs  Barriers to Discharge Inaccessible home environment;Decreased caregiver support   Goals   PT Treatment Day 2   Plan   Treatment/Interventions Functional transfer training;LE strengthening/ROM; Therapeutic exercise; Endurance training;Bed mobility;Gait training   Progress Slow progress, decreased activity tolerance   PT Frequency Other (Comment)  (3-5x/wk)   Recommendation   PT Discharge Recommendation Post acute rehabilitation services   PT - OK to Discharge Yes   Additional Comments when medically stable; if to PAR   Additional Comments 2 The patient's AM-PAC Basic Mobility Inpatient Short Form Low Function Raw Score 17 , Standardized Score is 27 46  A standardized score less 42 9 suggests the patient may benefit from discharge to post-acute rehab services  Please also refer to the recommendation of the Physical Therapist for safe discharge planning     AM-PAC Basic Mobility Inpatient   Turning in Bed Without Bedrails 3   Lying on Back to Sitting on Edge of Flat Bed 2   Moving Bed to Chair 2   Standing Up From Chair 2   Walk in Room 2   Climb 3-5 Stairs 1   Basic Mobility Inpatient Raw Score 12   Basic Mobility Standardized Score 32 23   Turning Head Towards Sound 3   Follow Simple Instructions 2   Low Function Basic Mobility Raw Score 17   Low Function Basic Mobility Standardized Score 27 46

## 2021-06-28 NOTE — ASSESSMENT & PLAN NOTE
· Noted on CTA with pulmonary edema with pulmonary hypertension, no PE noted  · Echocardiogram EF wnl but revealed diastolic dysfunction  · Nephrology consultation appreciated, remains on lasix 20 mg BID and is stable  · Intake and output monitoring, daily weights  · Low-sodium diet with 1500 mL fluid restriction  · Repeat chest imaging 6/26 showing no effusion and no infiltrate  · Oxygen stable on 2 L, encourage incentive spirometer use

## 2021-06-28 NOTE — ASSESSMENT & PLAN NOTE
· Patient's son reporting low blood glucose level at home 40s, on arrival blood glucose 73  · Continue to monitor blood glucose checks q i d , hypoglycemia protocol  · Will hold home oral diabetic medications  · Has since resolved

## 2021-06-28 NOTE — PLAN OF CARE
Problem: PHYSICAL THERAPY ADULT  Goal: Performs mobility at highest level of function for planned discharge setting  See evaluation for individualized goals  Description: Treatment/Interventions: Functional transfer training, LE strengthening/ROM, Therapeutic exercise, Endurance training, Patient/family training, Equipment eval/education, Bed mobility, Spoke to nursing, Spoke to case management, Family  Equipment Recommended:  (TBD)       See flowsheet documentation for full assessment, interventions and recommendations  Outcome: Progressing  Note: Prognosis: Fair  Problem List: Decreased strength, Decreased endurance, Impaired balance, Decreased mobility, Decreased cognition, Obesity  Assessment: Pt seen for PT treatment session this date with interventions consisting of Therapeutic exercise consisting of: AROM 20 reps B LE in supine position and therapeutic activity consisting of training: bed mobility  Pt agreeable to PT treatment session upon arrival, pt found supine in bed w/ HOB elevated, in no apparent distress and responsive  In comparison to previous session, pt with improvements in strength and endurance  Post session: pt returned BTB, bed alarm engaged, all needs in reach and RN notified of session findings/recommendations  Continue to recommend post acute rehabilitation services at time of d/c in order to maximize pt's functional independence and safety w/ mobility  Pt continues to be functioning below baseline level, and remains limited 2* factors listed above and including decreased functional mobility, impaired balance, decreased activity tolerance and impaired cognition  PT will continue to see pt during current hospitalization in order to address the deficits listed above and provide interventions consistent w/ POC in effort to achieve STGs    Barriers to Discharge: Inaccessible home environment, Decreased caregiver support        PT Discharge Recommendation: Post acute rehabilitation services PT - OK to Discharge: Yes    See flowsheet documentation for full assessment

## 2021-06-28 NOTE — CONSULTS
Consultation - [address] [facility]  Vimal Peters 66 y o  female MRN: [id number]  Unit/Bed#: -01 Encounter: [id number]         Reason for Consult / Principal Problem: Isolated bloody BM    HPI: Mrs Karley Moya is a 68-year-old female with history heart failure, type 2 diabetes and hyperlipidemia who presented to the hospital for change in mental status  GI was consulted as patient reportedly had a "moderate sized dark bloody and broke soft brown bowel movement observed " Hemoglobin continues to be normal at 13  She has no complaints at this time, patient's son at the bedside reports that she appears to be at her baseline  Patient's last colonoscopy was in 2018 with Dr Morales Adams revealing to 1 5 cm sessile polyps and diverticulosis  Internal hemorrhoids were also seen  Review of Systems:    CONSTITUTIONAL: Denies any fever, chills, or rigors  Good appetite, and no recent weight loss  HEENT: No earache or tinnitus  Denies hearing loss or visual disturbances  CARDIOVASCULAR: No chest pain or palpitations  RESPIRATORY: Denies any cough, hemoptysis, shortness of breath or dyspnea on exertion  GASTROINTESTINAL: As noted in the History of Present Illness  GENITOURINARY: No problems with urination  Denies any hematuria or dysuria  NEUROLOGIC: No dizziness or vertigo, denies headaches  MUSCULOSKELETAL: Denies any muscle or joint pain  SKIN: Denies skin rashes or itching  ENDOCRINE: Denies excessive thirst  Denies intolerance to heat or cold  PSYCHOSOCIAL: Denies depression or anxiety  Denies any recent memory loss  Historical Information   Past Medical History:   Diagnosis Date   • Asthma    • Diabetes mellitus ([facility] [address] )    • Glaucoma    • Hypertension    • Hypoglycemia 6/20/2021     Past Surgical History:   Procedure Laterality Date   • COLONOSCOPY N/A 12/8/2018    Procedure: COLONOSCOPY;  Surgeon: Shakir Gutierrez MD;  Location: [facility];   Service: Gastroenterology     Social History Social History     Substance and Sexual Activity   Alcohol Use Never     Social History     Substance and Sexual Activity   Drug Use No     Social History     Tobacco Use   Smoking Status Never Smoker   Smokeless Tobacco Never Used     No family history on file  Meds/Allergies     Current Facility-Administered Medications   Medication Dose Route Frequency   • acetaminophen (TYLENOL) tablet 650 mg  650 mg Oral Q6H PRN   • albuterol (PROVENTIL HFA,VENTOLIN HFA) inhaler 2 puff  2 puff Inhalation Q6H PRN   • albuterol inhalation solution 2 5 mg  2 5 mg Nebulization Q6H PRN   • atorvastatin (LIPITOR) tablet 10 mg  10 mg Oral Daily With Dinner   • furosemide (LASIX) tablet 20 mg  20 mg Oral BID (diuretic)   • heparin (porcine) subcutaneous injection 5,000 Units  5,000 Units Subcutaneous Q8H [address]   • hydrocortisone (ANUSOL-HC) rectal suppository 25 mg  25 mg Rectal BID   • insulin lispro (HumaLOG) 100 units/mL subcutaneous injection 1-6 Units  1-6 Units Subcutaneous TID AC   • insulin lispro (HumaLOG) 100 units/mL subcutaneous injection 1-6 Units  1-6 Units Subcutaneous HS   • montelukast (SINGULAIR) tablet 10 mg  10 mg Oral HS   • [START ON 6/29/2021] pantoprazole (PROTONIX) EC tablet 40 mg  40 mg Oral Early Morning   • QUEtiapine (SEROquel) tablet 12 5 mg  12 5 mg Oral HS   • sodium chloride tablet 2 g  2 g Oral TID With Meals       No Known Allergies      Objective     Blood pressure 116/84, pulse 79, temperature 98 1 °F (36 7 °C), resp  rate 18, height 5' (1 524 m), weight 96 kg (211 lb 10 3 oz), SpO2 98 %  Intake/Output Summary (Last 24 hours) at 6/28/2021 1551  Last data filed at 6/27/2021 2134  Gross per 24 hour   Intake 240 ml   Output 300 ml   Net -60 ml         PHYSICAL EXAM:      General Appearance:   Alert and oriented x 3   Cooperative, and in no respiratory distress   HEENT:   Normocephalic, atraumatic, anicteric      Neck:  Supple, symmetrical, trachea midline   Lungs:   Clear to auscultation bilaterally; no rales, rhonchi or wheezing; respirations unlabored    Heart[de-identified]   S1 and S2 normal; regular rate and rhythm; no murmur, rub, or gallop  Abdomen:   Soft, non-tender, non-distended; normal bowel sounds; no masses, no organomegaly    Genitalia:   Deferred    Rectal:   Brown stool in the rectal vault    Extremities:  No cyanosis, clubbing or edema    Pulses:  2+ and symmetric all extremities    Skin:  Skin color, texture, turgor normal, no rashes or lesions    Lymph nodes:  No palpable cervical or supraclavicular lymphadenopathy        Lab Results:   Results from last 7 days   Lab Units 06/28/21  1159 06/27/21  0510 06/22/21  0613   WBC Thousand/uL  --  8 54 11 65*   HEMOGLOBIN g/dL 13 0 14 1 14 7   HEMATOCRIT % 41 9 45 2 45 1   PLATELETS Thousands/uL  --  313 302   NEUTROS PCT %  --   --  80*   LYMPHS PCT %  --   --  7*   MONOS PCT %  --   --  12   EOS PCT %  --   --  0     Results from last 7 days   Lab Units 06/28/21  0449   POTASSIUM mmol/L 5 1   CHLORIDE mmol/L 96*   CO2 mmol/L 32   BUN mg/dL 26*   CREATININE mg/dL 0 57*   CALCIUM mg/dL 8 5               Imaging Studies: I have personally reviewed pertinent imaging studies  XR chest 1 view portable    Result Date: 6/20/2021  Impression: No acute cardiopulmonary disease  Redemonstration of pulmonary artery enlargement compatible with the history of pulmonary hypertension  Workstation performed: [id number]     CT head without contrast    Result Date: 6/19/2021  Impression: No acute intracranial abnormality  Workstation performed: [id number]     CTA ED chest PE study    Result Date: 6/19/2021  Impression: No evidence of pulmonary embolism  Bilateral patchy groundglass attenuation with interlobular septal thickening likely represents pulmonary edema  Main pulmonary artery is dilated measuring 4 4 cm distended with pulmonary arterial hypertension  Correlate with echocardiogram  The study was marked in EPIC for immediate notification   Workstation performed: [id number]       ASSESSMENT and PLAN:      1) Isolated bloody BM - Hemoglobin has been stable, no further episodes  May be secondary to internal hemorrhoids as no external ones were seen on rectal  Brown stool in the rectal vault  She had colonoscopy in 2018  - Continue to monitor hemoglobin and further episodes of rectal bleeding  - Start Anusol BID x 1 week   - No plans for repeat colonoscopy, discussed with patient's son at the bedside    The patient was seen and examined by Dr Carolyn Patel, all freeman medical decisions were made with Dr Carolyn Patel  Thank you for allowing us to participate in the care of this pleasant patient  GI will sign off  Please call with questions, thank you!

## 2021-06-28 NOTE — PROGRESS NOTES
NEPHROLOGY PROGRESS NOTE    Patient: Vimal Peters               Sex: female          DOA: 6/19/2021  5:47 PM   Date of Birth: @        Age: @        LOS:  LOS: 8 days   6/28/2021    REASON FOR THE CONSULTATION:  Further management of hyponatremia    HPI     This is a 66 y o  female admitted for Metabolic encephalopathy     SUBJECTIVE     - currently breathing is stable  Patient denies nausea, vomiting, headache or dizziness today    - Reviewed last 24 hrs events     CURRENT MEDICATIONS       Current Facility-Administered Medications:   •  acetaminophen (TYLENOL) tablet 650 mg, 650 mg, Oral, Q6H PRN, Steve Hernandez PA-C, 650 mg at 06/27/21 2149  •  albuterol (PROVENTIL HFA,VENTOLIN HFA) inhaler 2 puff, 2 puff, Inhalation, Q6H PRN, Steve Hernandez PA-C, 2 puff at 06/26/21 4010  •  albuterol inhalation solution 2 5 mg, 2 5 mg, Nebulization, Q6H PRN, Steve Hernandez PA-C, 2 5 mg at 06/21/21 0101  •  atorvastatin (LIPITOR) tablet 10 mg, 10 mg, Oral, Daily With Dinner, Steve Hernandez PA-C, 10 mg at 06/27/21 1703  •  furosemide (LASIX) tablet 20 mg, 20 mg, Oral, BID (diuretic), Roscoe Carter MD, 20 mg at 06/28/21 0803  •  heparin (porcine) subcutaneous injection 5,000 Units, 5,000 Units, Subcutaneous, Q8H [address], Steve Hernandez PA-C, 5,000 Units at 06/27/21 1516  •  insulin lispro (HumaLOG) 100 units/mL subcutaneous injection 1-6 Units, 1-6 Units, Subcutaneous, TID AC, 1 Units at 06/28/21 0803 **AND** Fingerstick Glucose (POCT), , , TID AC, Jeferson Urrutia MD  •  insulin lispro (HumaLOG) 100 units/mL subcutaneous injection 1-6 Units, 1-6 Units, Subcutaneous, HS, Jeferson Urrutia MD, 1 Units at 06/26/21 2126  •  montelukast (SINGULAIR) tablet 10 mg, 10 mg, Oral, HS, Letty Chairez PA-C, 10 mg at 06/27/21 2149  •  pantoprazole (PROTONIX) 80 mg in sodium chloride 0 9 % 100 mL infusion, 8 mg/hr, Intravenous, Continuous, RON Govea, Last Rate: 10 mL/hr at 06/28/21 1038, 8 mg/hr at 06/28/21 1038  •  QUEtiapine (SEROquel) tablet 12 5 mg, 12 5 mg, Oral, HS, Anastasia Pacheco PA-C, 12 5 mg at 06/27/21 2149  •  sodium chloride tablet 2 g, 2 g, Oral, TID With Meals, Kiera Carrasco MD, 2 g at 06/28/21 0804    OBJECTIVE     Current Weight: Weight - Scale: 96 kg (211 lb 10 3 oz)  /65   Pulse 97   Temp 98 °F (36 7 °C)   Resp 18   Ht 5' (1 524 m)   Wt 96 kg (211 lb 10 3 oz)   SpO2 97%   BMI 41 33 kg/m²   Vitals:    06/28/21 0754   BP:    Pulse: 97   Resp:    Temp:    SpO2: 97%     Body mass index is 41 33 kg/m²  Intake/Output Summary (Last 24 hours) at 6/28/2021 1304  Last data filed at 6/27/2021 2134  Gross per 24 hour   Intake 240 ml   Output 300 ml   Net -60 ml       PHYSICAL EXAMINATION     Physical Exam  Constitutional:       General: She is not in acute distress  HENT:      Head: Normocephalic and atraumatic  Eyes:      General: No scleral icterus  Neck:      Vascular: No JVD  Cardiovascular:      Rate and Rhythm: Normal rate  Pulmonary:      Effort: No accessory muscle usage or respiratory distress  Abdominal:      General: There is no distension  Palpations: Abdomen is soft  Musculoskeletal:      Right hand: No lacerations  Left hand: No lacerations  Cervical back: Neck supple  Skin:     General: Skin is warm  Comments: No Jaundice    Psychiatric:         Behavior: Behavior is not combative             LAB RESULTS     Results from last 7 days   Lab Units 06/28/21  1159 06/28/21  0449 06/27/21  2357 06/27/21  0510 06/26/21  0827 06/25/21  0447 06/24/21  0753 06/24/21  0025 06/23/21 2056 06/22/21  0613   WBC Thousand/uL  --   --   --  8 54  --   --   --   --   --  11 65*   HEMOGLOBIN g/dL 13 0 12 0 12 4 14 1  --   --   --   --   --  14 7   HEMATOCRIT % 41 9 38 6 40 1 45 2  --   --   --   --   --  45 1   PLATELETS Thousands/uL  --   --   --  313  --   --   --   --   --  302   SODIUM mmol/L  --  132*  --  133* 131* 130* 131* 131* 129*  --    POTASSIUM mmol/L  --  5 1  --  4 9 3  4* 3 6 3 6 3 2* 3 2*  --    CHLORIDE mmol/L  --  96*  --  95* 90* 88* 87* 88* 89*  --    CO2 mmol/L  --  32  --  35* 40* 42* 41* 39* 39*  --    BUN mg/dL  --  26*  --  12 13 14 16 18 18  --    CREATININE mg/dL  --  0 57*  --  0 50* 0 61 0 61 0 71 0 68 0 72  --    EGFR ml/min/1 73sq m  --  90  --  94 88 88 82 85 81  --    CALCIUM mg/dL  --  8 5  --  8 8 8 9 8 7 9 1 8 7 8 5  --        RADIOLOGY RESULTS      XR chest 1 view   Final Result by Eduarda Leslie MD (06/27 1043)      No acute cardiopulmonary disease  Workstation performed: [id number]         CTA ED chest PE study   Final Result by Breanna Higginbotham MD (06/19 5597)      No evidence of pulmonary embolism  Bilateral patchy groundglass attenuation with interlobular septal thickening likely represents pulmonary edema  Main pulmonary artery is dilated measuring 4 4 cm distended with pulmonary arterial hypertension  Correlate with echocardiogram       The study was marked in EPIC for immediate notification  Workstation performed: [id number]         XR chest 1 view portable   Final Result by Chucho Blandon MD (06/20 1442)      No acute cardiopulmonary disease  Redemonstration of pulmonary artery enlargement compatible with the history of pulmonary hypertension  Workstation performed: [id number]         CT head without contrast   Final Result by Claven Kussmaul, MD (06/19 3452)      No acute intracranial abnormality  Workstation performed: [id number]             PLAN / RECOMMENDATIONS      1  Hyponatremia  Chronic since duration is > 48 hours  Based on initial workup, 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 although exact etiology of SIADH remained unclear  Currently patient is on salt tablet 2 g PO TID along with Lasix 20 mg PO BID  Current sodium level is 132  Patient has remained asymptomatic from hyponatremia perspective    Continue salt tablet and Lasix for time and monitor sodium level while in the hospital     2  Hypokalemia  Multifactorial, earlier started on potassium chloride and current potassium is 5 1 which is on the higher side and will plan to stop potassium supplementation for time being  Disposition:  Stable from renal standpoint for discharge with current dose of Lasix and salt tablet  Awaiting placement  Overall above mentioned plan was also d/w Eddie Wilkinson MD  Nephrology  6/28/2021        Portions of the record may have been created with voice recognition software  Occasional wrong word or "sound a like" substitutions may have occurred due to the inherent limitations of voice recognition software  Read the chart carefully and recognize, using context, where substitutions have occurred

## 2021-06-28 NOTE — CASE MANAGEMENT
CM printed PT/OT notes from today and faxed to [phone] for review  [facility] is still pending  CM department will continue to follow patient through discharge

## 2021-06-28 NOTE — CASE MANAGEMENT
CM received phone call from patient's son following up on STR placement  CM to follow up with Chase Sherry and Therese and then call son back  CM called Rashid Tello from [facility] at [phone]  N[place]t  reported that she has not heard back regarding auth, but she is still able to accept patient  CM received email from Joshua reporting: Rec'd  6/27 @ 10:29am from Ivett @ [address] [place] [address]   [facility] regarding SNF IP NYYN#[id number] - she is requesting addt'l information can either call tele#[phone] or fax [phone]  Sent email to Joshua 6/28 @ 12:56pm     CM called [facility] at [phone] and spoke to [facility] Root reported that she needs additional clinicals  She will need updated PT/OT notes that include patient's current ambulation/mobility  Also, if patient is still on IV abx, she will need a note about the frequency of it and if patient will be discharged on it  If being discharged on it, [facility] needs CM to include the reason, such as "can't manage at home due to age/mentation " [facility] reported that these clinicals need to be faxed to [phone] and have the reference number included on the face sheet  [facility] reported that the PT note from today can be hand written on the OT note, because they aren't picky about it  CM TT PT Geovanna and asked if PT or OT could see patient quickly to determine how patient is doing with ambulation and mobility  PT Geovanna reported that PT Cleopatra saw patient earlier and will put in the note  CM to fax once available  CM called patient's son back to discuss  Son reported that he is okay with this plan and has no concerns regarding discharge  Son reported that patient's other son Mary Osman is at bedside and has patient's vaccination card  Son reported that patient is forgetful, so he prefers to only send Therese a copy if possible  CM met with patient's son Mary Osman at bedside and asked for the vaccination card  Mary Osman gave it to CM, and CM made a copy  CM faxed to [facility] at [phone]   [facility] department will continue to follow patient through discharge

## 2021-06-28 NOTE — ASSESSMENT & PLAN NOTE
· Patient's son reporting patient more confused at home and was found to be hypoglycemic around 55, has not been eating but taking diabetic meds  · CT head negative, EKG with sinus rhythm with PAC and T-wave inversion  · Most likely in setting of hypoglycemia, UTI, hyponatremia  · Holding home Ativan, Ambien, avoid opiates  · Has since resolved

## 2021-06-28 NOTE — PLAN OF CARE
Problem: OCCUPATIONAL THERAPY ADULT  Goal: Performs self-care activities at highest level of function for planned discharge setting  See evaluation for individualized goals  Description: Treatment Interventions: ADL retraining, Functional transfer training, UE strengthening/ROM, Endurance training, Cognitive reorientation, Patient/family training, Compensatory technique education, Activityengagement, Equipment evaluation/education          See flowsheet documentation for full assessment, interventions and recommendations  Outcome: Progressing  Note: Limitation: Decreased ADL status, Decreased UE strength, Decreased cognition, Decreased endurance, Decreased self-care trans, Decreased high-level ADLs  Prognosis: Good  Assessment: Patient participated in Skilled OT session (time 3543-1698) this date with interventions consisting of continued cognitive assessment and ADL retraining with the use of correct body mechanics  Patient agreeable to OT treatment session, upon arrival patient was found supine in bed  In comparison to previous session, patient with improvements in cognitive status (orientation) and functional activity tolerance*   Pt completed ADLs while seated upright in bed, requiring supervision for grooming, Min A for UB ADLs, Mod A for LB bathing, and Max A for LB dressing  No mobility or transfers assessed at this time due to pending GI consult for possible GI bleed  Performed short blessed test to screen for cognitive deficits  Pt able to state month and year  Pt looked at the clock to estimate the time - despite being told not to look at the clock  Pt appears to have difficulty with multi-step commands  Pt unable to count backwards from 20-1, stating " 20 -1-2-3-4-   "  When asked to state months of year backwards - she stated it is June 2021 - despite further clarification provided  She identified 0/5 items in name and address stated at the beginning of screen   Pt scored 21/28 indicating impairment consistent with dementia and further evaluation recommended  Since English is not pt primary language, it is possible that this had impact on her results  However she did not have difficulty following simple 1-step commands throughout ADL performance  Patient continues to be functioning below baseline level, occupational performance remains limited secondary to factors listed above and increased risk for falls and injury  From OT standpoint, recommendation at time of d/c would be post acute rehab  Patient to benefit from continued Occupational Therapy treatment while in the hospital to address deficits as defined above and maximize level of functional independence with ADLs and functional mobility        OT Discharge Recommendation: Post acute rehabilitation services  OT - OK to Discharge:  (once medically cleared)

## 2021-06-28 NOTE — ASSESSMENT & PLAN NOTE
· Urinalysis revealing moderate bacteria and white blood cells; Ucx not obtained   · Completed 5 day course of cefepime  · No complaints of urinary symptoms

## 2021-06-28 NOTE — OCCUPATIONAL THERAPY NOTE
OccupationalTherapy Progress Note     Patient Name: Abdiel Simpson  WUWWH'T Date: 6/28/2021  Problem List  Principal Problem:    Metabolic encephalopathy  Active Problems:    UTI (urinary tract infection)    Hyponatremia    Acute diastolic congestive heart failure ([facility] [address] )    Hypokalemia          06/28/21 0806   OT Last Visit   OT Visit Date 06/28/21   Note Type   Note Type Treatment for insurance authorization   Restrictions/Precautions   Weight Bearing Precautions Per Order No   Other Precautions Cognitive; Bed Alarm;Multiple lines;O2;Fall Risk;Seizure   General   Response to Previous Treatment Patient with no complaints from previous session   Pain Assessment   Pain Assessment Tool Pain Assessment not indicated - pt denies pain   Pain Score No Pain   ADL   Where Assessed Supine, bed   Grooming Assistance 5  Supervision/Setup   Grooming Deficit Verbal cueing;Supervision/safety   UB Bathing Assistance 4  Minimal Assistance   UB Bathing Deficit Supervision/safety;Verbal cueing; Increased time to complete   LB Bathing Assistance 3  Moderate Assistance   LB Bathing Deficit Right lower leg including foot; Left lower leg including foot; Increased time to complete;Supervision/safety;Verbal cueing   UB Dressing Assistance 4  Minimal Assistance   UB Dressing Deficit Fasteners;Pull around back   [facility] 2  Maximal Assistance   LB Dressing Deficit Don/doff R sock; Don/doff L sock   Toileting Assistance    (DNT)   Toileting Comments 97% spO2 on 2 L O2, hr 98bpm at start of session  No changes at end of session  Bed Mobility   Additional Comments Pt supine in bed at start of session and agreeable to OT  Pt pending GI consult due to potential GI bleeding - RN agreeable to bed level treatment session  Pt supine in bed at end of session with all needs met, call bell within reach, bed alarm active     Cognition   Overall Cognitive Status Impaired   Arousal/Participation Alert; Responsive; Cooperative   Attention Attends with cues to redirect   Orientation Level Oriented to person;Oriented to place;Oriented to time  (generally oriented to situation)   Memory Decreased short term memory;Decreased recall of recent events   Following Commands Follows one step commands with increased time or repetition   Comments Pt able to identify self by full name and birthdate  Performed short blessed test to screen for cognitive deficits  Pt able to state month and year  Pt looked at the clock to estimate the time - despite being told not to look at the clock  Pt appears to have difficulty with multi-step commands  Pt unable to count backwards from 20-1, stating " 20 -1-2-3-4-   "  When asked to state months of year backwards - she stated it is June 2021 - despite further clarification provided  She identified 0/5 items in name and address stated at the beginning of screen  Cognition Assessment Tools Other (Comment)  (SBT)   Score 21  (21/28 indicating impairment consistent with dementia)   Activity Tolerance   Activity Tolerance Patient tolerated treatment well   Medical Staff Made Aware RN verbalized pt appropriate for bed level tx session   Assessment   Assessment Patient participated in Skilled OT session (time 2413-8352) this date with interventions consisting of continued cognitive assessment and ADL retraining with the use of correct body mechanics  Patient agreeable to OT treatment session, upon arrival patient was found supine in bed  In comparison to previous session, patient with improvements in cognitive status (orientation) and functional activity tolerance*   Pt completed ADLs while seated upright in bed, requiring supervision for grooming, Min A for UB ADLs, Mod A for LB bathing, and Max A for LB dressing  No mobility or transfers assessed at this time due to pending GI consult for possible GI bleed  Performed short blessed test to screen for cognitive deficits   Pt able to state month and year  Pt looked at the clock to estimate the time - despite being told not to look at the clock  Pt appears to have difficulty with multi-step commands  Pt unable to count backwards from 20-1, stating " 20 -1-2-3-4-   "  When asked to state months of year backwards - she stated it is June 2021 - despite further clarification provided  She identified 0/5 items in name and address stated at the beginning of screen  Pt scored 21/28 indicating impairment consistent with dementia and further evaluation recommended  Since English is not pt primary language, it is possible that this had impact on her results  However she did not have difficulty following simple 1-step commands throughout ADL performance  Patient continues to be functioning below baseline level, occupational performance remains limited secondary to factors listed above and increased risk for falls and injury  From OT standpoint, recommendation at time of d/c would be post acute rehab  Patient to benefit from continued Occupational Therapy treatment while in the hospital to address deficits as defined above and maximize level of functional independence with ADLs and functional mobility  Plan   Treatment Interventions ADL retraining;Functional transfer training;UE strengthening/ROM; Endurance training;Cognitive reorientation;Patient/family training; Compensatory technique education; Activityengagement;Equipment evaluation/education   Goal Expiration Date 07/07/21   OT Treatment Day 1   OT Frequency 3-5x/wk   Recommendation   OT Discharge Recommendation Post acute rehabilitation services   OT - OK to Discharge   (once medically cleared)   AM-PAC Daily Activity Inpatient   Lower Body Dressing 2   Bathing 2   Toileting 2   Upper Body Dressing 3   Grooming 3   Eating 4   Daily Activity Raw Score 16   Daily Activity Standardized Score (Calc for Raw Score >=11) 35 96   AM-PAC Applied Cognition Inpatient   Following a Speech/Presentation 3 Understanding Ordinary Conversation 3   Taking Medications 2   Remembering Where Things Are Placed or Put Away 2   Remembering List of 4-5 Errands 2   Taking Care of Complicated Tasks 2   Applied Cognition Raw Score 14   Applied Cognition Standardized Score 32 02   Barthel Index   Feeding 10   Bathing 0   Grooming Score 0   Dressing Score 5   Bladder Score 5   Bowels Score 5   Toilet Use Score 5   Transfers (Bed/Chair) Score 5   Mobility (Level Surface) Score 0   Stairs Score 0   Barthel Index Score 35   Modified Edilma Scale   Modified Edilma Scale 4     Sandi Woods, OTR/L

## 2021-06-28 NOTE — ASSESSMENT & PLAN NOTE
· Level on arrival 116, most likely in setting of persistent diarrhea and dehydration  · Initially treated with IVF with mild improvement of sodium level to 122 however was noted to be volume overloaded and IVF discontinued, nephrology consultation appreciated  · Sodium stable at 132 today 6/28  · C/w lasix therapy and sodium chloride tablets 2g tid  · 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  · Stable from nephrology standpoint for discharge on current regimen lasix/salt tabs

## 2021-06-28 NOTE — PROGRESS NOTES
[facility] Progress Note - Kiki Hodgson 1945, 66 y o  female MRN: [id number]  Unit/Bed#: -01 Encounter: [id number]  Primary Care Provider: Saranya Whalen MD   Date and time admitted to hospital: 6/19/2021  9:94 PM    * Metabolic encephalopathy  Assessment & Plan  · Patient's son reporting patient more confused at home and was found to be hypoglycemic around 55, has not been eating but taking diabetic meds  · CT head negative, EKG with sinus rhythm with PAC and T-wave inversion  · Most likely in setting of hypoglycemia, UTI, hyponatremia  · Holding home Ativan, Ambien, avoid opiates  · Has since resolved    UTI (urinary tract infection)-resolved as of 6/28/2021  Assessment & Plan  · Urinalysis revealing moderate bacteria and white blood cells; Ucx not obtained   · Completed 5 day course of cefepime  · No complaints of urinary symptoms    Acute diastolic congestive heart failure ([address] )  Assessment & Plan  · Noted on CTA with pulmonary edema with pulmonary hypertension, no PE noted  · Echocardiogram EF wnl but revealed diastolic dysfunction  · Nephrology consultation appreciated, remains on lasix 20 mg BID and is stable  · Intake and output monitoring, daily weights  · Low-sodium diet with 1500 mL fluid restriction  · Repeat chest imaging 6/26 showing no effusion and no infiltrate  · Oxygen stable on 2 L, encourage incentive spirometer use    Hypokalemia-resolved as of 6/28/2021  Assessment & Plan  · Resolved with oral supplementation    Hyponatremia  Assessment & Plan  · Level on arrival 116, most likely in setting of persistent diarrhea and dehydration  · Initially treated with IVF with mild improvement of sodium level to 122 however was noted to be volume overloaded and IVF discontinued, nephrology consultation appreciated  · Sodium stable at 132 today 6/28  · C/w lasix therapy and sodium chloride tablets 2g tid  · 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  · Stable from nephrology standpoint for discharge on current regimen lasix/salt tabs     Hypoglycemia-resolved as of 2021  Assessment & Plan  · Patient's son reporting low blood glucose level at home 40s, on arrival blood glucose 73  · Continue to monitor blood glucose checks q i d , hypoglycemia protocol  · Will hold home oral diabetic medications  · Has since resolved        VTE Pharmacologic Prophylaxis: VTE Score: 4 Moderate Risk (Score 3-4) - Pharmacological DVT Prophylaxis Ordered: heparin  Patient Centered Rounds: I evaluated the patient without nursing staff present due to unavailable, dw nurse independent of exam  Discussions with Specialists or Other Care Team Provider: MICHAELA nephro     Education and Discussions with Family / Patient: Updated  (son) via phone  Time Spent for Care: 20 minutes  More than 50% of total time spent on counseling and coordination of care as described above  Current Length of Stay: 8 day(s)  Current Patient Status: Inpatient   Certification Statement: The patient will continue to require additional inpatient hospital stay due to awaiting STR  Discharge Plan: Anticipate discharge in 24-48 hrs to rehab facility  Code Status: Level 1 - Full Code    Subjective:   CC: My breathing is still [takes deep inhalation]    Patient seen this afternoon, sleeping but easily awoken  She feels ok, denies pain, denies shortness of breath but describes her breathing as short, but no worse than the last few days  Denies coughing  Her feet are cold, otherwise she is just waiting to go to rehab  Objective:     Vitals:   Temp (24hrs), Av 7 °F (37 1 °C), Min:98 °F (36 7 °C), Max:100 1 °F (37 8 °C)    Temp:  [98 °F (36 7 °C)-100 1 °F (37 8 °C)] 98 °F (36 7 °C)  HR:  [89-98] 97  Resp:  [17-18] 18  BP: (113-137)/(65-79) 130/70  SpO2:  [90 %-97 %] 97 %  Body mass index is 41 33 kg/m²  Input and Output Summary (last 24 hours):      Intake/Output Summary (Last 24 hours) at 2021 1515  Last data filed at 6/27/2021 2134  Gross per 24 hour   Intake 240 ml   Output 300 ml   Net -60 ml       Physical Exam:   Physical Exam  Vitals and nursing note reviewed  Constitutional:       General: She is not in acute distress  Appearance: She is not ill-appearing or toxic-appearing  Cardiovascular:      Rate and Rhythm: Normal rate and regular rhythm  Heart sounds: Normal heart sounds  Pulmonary:      Effort: Pulmonary effort is normal  No respiratory distress  Breath sounds: Normal breath sounds  Comments: O2 via NC   Abdominal:      General: Bowel sounds are normal  There is no distension  Palpations: Abdomen is soft  Musculoskeletal:      Right lower leg: No edema  Left lower leg: No edema  Neurological:      Mental Status: She is alert and oriented to person, place, and time     Psychiatric:         Mood and Affect: Mood normal          Behavior: Behavior normal         Additional Data:     Labs:  Results from last 7 days   Lab Units 06/28/21  1159 06/27/21  0510 06/22/21  0613   WBC Thousand/uL  --  8 54 11 65*   HEMOGLOBIN g/dL 13 0 14 1 14 7   HEMATOCRIT % 41 9 45 2 45 1   PLATELETS Thousands/uL  --  313 302   NEUTROS PCT %  --   --  80*   LYMPHS PCT %  --   --  7*   MONOS PCT %  --   --  12   EOS PCT %  --   --  0     Results from last 7 days   Lab Units 06/28/21  0449   SODIUM mmol/L 132*   POTASSIUM mmol/L 5 1   CHLORIDE mmol/L 96*   CO2 mmol/L 32   BUN mg/dL 26*   CREATININE mg/dL 0 57*   ANION GAP mmol/L 4   CALCIUM mg/dL 8 5   GLUCOSE RANDOM mg/dL 183*         Results from last 7 days   Lab Units 06/28/21  1139 06/28/21  0751 06/27/21  2033 06/27/21  1556 06/27/21  1108 06/27/21  0735 06/26/21  2102 06/26/21  1634 06/26/21  1115 06/26/21  0745 06/25/21  2055 06/25/21  1654   POC GLUCOSE mg/dl 199* 178* 129 183* 151* 203* 150* 190* 182* 161* 221* 199*               Lines/Drains:  Invasive Devices     Peripheral Intravenous Line            Peripheral IV 06/27/21 Right;Ventral (anterior) Forearm <1 day          Drain            External Urinary Catheter Small <1 day                      Imaging: Reviewed radiology reports from this admission including: chest xray 6/26    Recent Cultures (last 7 days):         Last 24 Hours Medication List:   Current Facility-Administered Medications   Medication Dose Route Frequency Provider Last Rate   • acetaminophen  650 mg Oral Q6H PRN Cici Kaufman PA-C     • albuterol  2 puff Inhalation Q6H PRN Cici Kaufman PA-C     • albuterol  2 5 mg Nebulization Q6H PRN Cici Kaufman PA-C     • atorvastatin  10 mg Oral Daily With Dinner Cici Kaufman PA-C     • furosemide  20 mg Oral BID (diuretic) Kelli Hernandez MD     • heparin (porcine)  5,000 Units Subcutaneous Q8H [facility] Cici Kaufman PA-C     • insulin lispro  1-6 Units Subcutaneous TID Danita Garner MD     • insulin lispro  1-6 Units Subcutaneous HS Briseyda Tristan MD     • montelukast  10 mg Oral HS Cici Kaufman PA-C     • pantoprozole (PROTONIX) infusion (Continuous)  8 mg/hr Intravenous Continuous Murtis Morning, CRNP 8 mg/hr (06/28/21 1038)   • QUEtiapine  12 5 mg Oral HS Anastasia Pacheco PA-C     • sodium chloride  2 g Oral TID With Meals Krystal Barlow MD          Today, Patient Was Seen By: Tabby Perkins PA-C    **Please Note: This note may have been constructed using a voice recognition system  **

## 2021-06-29 VITALS
DIASTOLIC BLOOD PRESSURE: 58 MMHG | BODY MASS INDEX: 36.4 KG/M2 | TEMPERATURE: 98.9 F | SYSTOLIC BLOOD PRESSURE: 121 MMHG | OXYGEN SATURATION: 99 % | RESPIRATION RATE: 17 BRPM | HEART RATE: 79 BPM | WEIGHT: 185.41 LBS | HEIGHT: 60 IN

## 2021-06-29 PROBLEM — G93.41 METABOLIC ENCEPHALOPATHY: Status: RESOLVED | Noted: 2021-06-20 | Resolved: 2021-06-29

## 2021-06-29 LAB
ANION GAP SERPL CALCULATED.3IONS-SCNC: 3 MMOL/L (ref 4–13)
BUN SERPL-MCNC: 17 MG/DL (ref 5–25)
CALCIUM SERPL-MCNC: 8.3 MG/DL (ref 8.3–10.1)
CHLORIDE SERPL-SCNC: 97 MMOL/L (ref 100–108)
CO2 SERPL-SCNC: 33 MMOL/L (ref 21–32)
CREAT SERPL-MCNC: 0.42 MG/DL (ref 0.6–1.3)
GFR SERPL CREATININE-BSD FRML MDRD: 99 ML/MIN/1.73SQ M
GLUCOSE SERPL-MCNC: 188 MG/DL (ref 65–140)
GLUCOSE SERPL-MCNC: 200 MG/DL (ref 65–140)
GLUCOSE SERPL-MCNC: 215 MG/DL (ref 65–140)
HCT VFR BLD AUTO: 38 % (ref 34.8–46.1)
HCT VFR BLD AUTO: 39.3 % (ref 34.8–46.1)
HGB BLD-MCNC: 11.8 G/DL (ref 11.5–15.4)
HGB BLD-MCNC: 12.1 G/DL (ref 11.5–15.4)
POTASSIUM SERPL-SCNC: 4.3 MMOL/L (ref 3.5–5.3)
SARS-COV-2 RNA RESP QL NAA+PROBE: NEGATIVE
SODIUM SERPL-SCNC: 133 MMOL/L (ref 136–145)

## 2021-06-29 PROCEDURE — U0003 INFECTIOUS AGENT DETECTION BY NUCLEIC ACID (DNA OR RNA); SEVERE ACUTE RESPIRATORY SYNDROME CORONAVIRUS 2 (SARS-COV-2) (CORONAVIRUS DISEASE [COVID-19]), AMPLIFIED PROBE TECHNIQUE, MAKING USE OF HIGH THROUGHPUT TECHNOLOGIES AS DESCRIBED BY CMS-2020-01-R: HCPCS | Performed by: PHYSICIAN ASSISTANT

## 2021-06-29 PROCEDURE — 99233 SBSQ HOSP IP/OBS HIGH 50: CPT | Performed by: INTERNAL MEDICINE

## 2021-06-29 PROCEDURE — 85018 HEMOGLOBIN: CPT | Performed by: NURSE PRACTITIONER

## 2021-06-29 PROCEDURE — U0005 INFEC AGEN DETEC AMPLI PROBE: HCPCS | Performed by: PHYSICIAN ASSISTANT

## 2021-06-29 PROCEDURE — 80048 BASIC METABOLIC PNL TOTAL CA: CPT | Performed by: INTERNAL MEDICINE

## 2021-06-29 PROCEDURE — 85014 HEMATOCRIT: CPT | Performed by: NURSE PRACTITIONER

## 2021-06-29 PROCEDURE — 82948 REAGENT STRIP/BLOOD GLUCOSE: CPT

## 2021-06-29 PROCEDURE — 99239 HOSP IP/OBS DSCHRG MGMT >30: CPT | Performed by: PHYSICIAN ASSISTANT

## 2021-06-29 RX ORDER — SODIUM CHLORIDE 1000 MG
2 TABLET, SOLUBLE MISCELLANEOUS
Refills: 0 | Status: ON HOLD
Start: 2021-06-29 | End: 2022-05-31 | Stop reason: SDUPTHER

## 2021-06-29 RX ORDER — FUROSEMIDE 20 MG/1
20 TABLET ORAL 2 TIMES DAILY
Refills: 0
Start: 2021-06-29 | End: 2021-08-06 | Stop reason: HOSPADM

## 2021-06-29 RX ORDER — QUETIAPINE FUMARATE 25 MG/1
12.5 TABLET, FILM COATED ORAL
Refills: 0
Start: 2021-06-29 | End: 2022-05-31

## 2021-06-29 RX ORDER — LORAZEPAM 0.5 MG/1
0.5 TABLET ORAL EVERY 6 HOURS PRN
Qty: 10 TABLET | Refills: 0 | Status: SHIPPED | OUTPATIENT
Start: 2021-06-29 | End: 2022-05-26

## 2021-06-29 RX ADMIN — INSULIN LISPRO 2 UNITS: 100 INJECTION, SOLUTION INTRAVENOUS; SUBCUTANEOUS at 12:33

## 2021-06-29 RX ADMIN — Medication 2 G: at 09:05

## 2021-06-29 RX ADMIN — FUROSEMIDE 20 MG: 20 TABLET ORAL at 09:05

## 2021-06-29 RX ADMIN — INSULIN LISPRO 2 UNITS: 100 INJECTION, SOLUTION INTRAVENOUS; SUBCUTANEOUS at 09:06

## 2021-06-29 RX ADMIN — PANTOPRAZOLE SODIUM 40 MG: 40 TABLET, DELAYED RELEASE ORAL at 05:23

## 2021-06-29 RX ADMIN — Medication 2 G: at 12:33

## 2021-06-29 RX ADMIN — ALBUTEROL SULFATE 2 PUFF: 90 AEROSOL, METERED RESPIRATORY (INHALATION) at 09:08

## 2021-06-29 RX ADMIN — HYDROCORTISONE ACETATE 25 MG: 25 SUPPOSITORY RECTAL at 09:07

## 2021-06-29 NOTE — CASE MANAGEMENT
CM received an email and TT from [facility] reporting that Flako Pastrana was approved  CM called patient's son to report  CM reviewed DCP and IMM  Son reported understanding of these rights and is agreeable with DC today  CM placed IMM in medical records  CM called [facility] and spoke to Ed  CM requested BLS transport for 1 pm or later today to [address] [place] [address]  [facility] to schedule and call CM back with transport time  CM completed medical necessity and placed in patient's binder  CM placed another copy in patient's binder  CM department will continue to follow patient through discharge

## 2021-06-29 NOTE — CASE MANAGEMENT
Per Charlee Bailey @ [facility] approved  Auth# [id number]  Valid 6/29/21 to 7/5/21  NRD: 7/6/21  Reviewer: Crystal Chacon [phone]

## 2021-06-29 NOTE — CASE MANAGEMENT
CM received TT from Allison at [facility] reporting that patient will be picked up by [facility] at 1 pm today  CM TT charge RN Ivana Martinez to report time  CM sent transport time to [address] [place] [address] via [employer]  CM called patient's son Julieta Miller at [phone] to report time  CM department will continue to follow patient through discharge

## 2021-06-29 NOTE — PROGRESS NOTES
NEPHROLOGY PROGRESS NOTE    Patient: Nelida Gottron               Sex: female          DOA: 6/19/2021  5:47 PM   Date of Birth: @        Age: @        LOS:  LOS: 9 days   6/29/2021    REASON FOR THE CONSULTATION:  Further management of hyponatremia  HPI     This is a 66 y o  female admitted for Metabolic encephalopathy     SUBJECTIVE     - breathing is stable and also found to be nonoliguric overnight  Patient denies nausea, vomiting, headache or dizziness today    - Reviewed last 24 hrs events     CURRENT MEDICATIONS       Current Facility-Administered Medications:   •  acetaminophen (TYLENOL) tablet 650 mg, 650 mg, Oral, Q6H PRN, Mitchell Godoy PA-C, 650 mg at 06/27/21 2149  •  albuterol (PROVENTIL HFA,VENTOLIN HFA) inhaler 2 puff, 2 puff, Inhalation, Q6H PRN, Mitchell Godoy PA-C, 2 puff at 06/29/21 4058  •  albuterol inhalation solution 2 5 mg, 2 5 mg, Nebulization, Q6H PRN, Mitchell Godoy PA-C, 2 5 mg at 06/21/21 0101  •  atorvastatin (LIPITOR) tablet 10 mg, 10 mg, Oral, Daily With Dinner, Mitchell Godoy PA-C, 10 mg at 06/28/21 1744  •  furosemide (LASIX) tablet 20 mg, 20 mg, Oral, BID (diuretic), Dorie Gallego MD, 20 mg at 06/29/21 2978  •  heparin (porcine) subcutaneous injection 5,000 Units, 5,000 Units, Subcutaneous, Q8H [address], Mitchell Godoy PA-C, 5,000 Units at 06/28/21 1409  •  hydrocortisone (ANUSOL-HC) rectal suppository 25 mg, 25 mg, Rectal, BID, FREDERICK LyC, 25 mg at 06/29/21 0907  •  insulin lispro (HumaLOG) 100 units/mL subcutaneous injection 1-6 Units, 1-6 Units, Subcutaneous, TID AC, 2 Units at 06/29/21 1233 **AND** Fingerstick Glucose (POCT), , , TID AC, Evangelina Velasco MD  •  insulin lispro (HumaLOG) 100 units/mL subcutaneous injection 1-6 Units, 1-6 Units, Subcutaneous, HS, Evangelina Velasco MD, 2 Units at 06/28/21 2144  •  montelukast (SINGULAIR) tablet 10 mg, 10 mg, Oral, HS, Letty Chairez PA-C, 10 mg at 06/28/21 2144  •  pantoprazole (PROTONIX) EC tablet 40 mg, 40 mg, Oral, Early Morning, Demetrius Manzo PA-C, 40 mg at 06/29/21 1063  •  QUEtiapine (SEROquel) tablet 12 5 mg, 12 5 mg, Oral, HS, Anastasia Pacheco PA-C, 12 5 mg at 06/28/21 2142  •  sodium chloride tablet 2 g, 2 g, Oral, TID With Meals, Jesus Marroquin MD, 2 g at 06/29/21 1233    Current Outpatient Medications:   •  albuterol (2 5 mg/3 mL) 0 083 % nebulizer solution, Take 2 5 mg by nebulization every 6 (six) hours as needed for wheezing, Disp: , Rfl:   •  albuterol (PROVENTIL HFA,VENTOLIN HFA) 90 mcg/act inhaler, Inhale 2 puffs every 6 (six) hours as needed for wheezing, Disp: , Rfl:   •  atorvastatin (LIPITOR) 10 mg tablet, Take 10 mg by mouth daily, Disp: , Rfl:   •  glimepiride (AMARYL) 4 mg tablet, Take 4 mg by mouth every morning before breakfast, Disp: , Rfl:   •  metFORMIN (GLUCOPHAGE) 500 mg tablet, Take 1 tablet (500 mg total) by mouth 2 (two) times a day with meals Resume on 12/11/18, Disp: , Rfl: 0  •  furosemide (LASIX) 20 mg tablet, Take 1 tablet (20 mg total) by mouth 2 (two) times a day, Disp: , Rfl: 0  •  LORazepam (ATIVAN) 0 5 mg tablet, Take 1 tablet (0 5 mg total) by mouth every 6 (six) hours as needed for anxiety, Disp: 10 tablet, Rfl: 0  •  montelukast (SINGULAIR) 10 mg tablet, Take 10 mg by mouth daily at bedtime, Disp: , Rfl:   •  PARoxetine (PAXIL) 10 mg tablet, Take 10 mg by mouth every morning, Disp: , Rfl:   •  QUEtiapine (SEROquel) 25 mg tablet, Take 0 5 tablets (12 5 mg total) by mouth daily at bedtime, Disp: , Rfl: 0  •  sitaGLIPtin (JANUVIA) 100 mg tablet, Take 100 mg by mouth daily, Disp: , Rfl:   •  sodium chloride 1 g tablet, Take 2 tablets (2 g total) by mouth 3 (three) times a day with meals, Disp: , Rfl: 0    OBJECTIVE     Current Weight: Weight - Scale: 84 1 kg (185 lb 6 5 oz)  /58   Pulse 79   Temp 98 9 °F (37 2 °C)   Resp 17   Ht 5' (1 524 m)   Wt 84 1 kg (185 lb 6 5 oz)   SpO2 99%   BMI 36 21 kg/m²   Vitals:    06/29/21 0850   BP: 121/58   Pulse:    Resp:    Temp: SpO2:      Body mass index is 36 21 kg/m²  Intake/Output Summary (Last 24 hours) at 6/29/2021 1409  Last data filed at 6/29/2021 1200  Gross per 24 hour   Intake 690 ml   Output 1750 ml   Net -1060 ml       PHYSICAL EXAMINATION     Physical Exam  Constitutional:       General: She is not in acute distress  HENT:      Head: Normocephalic and atraumatic  Eyes:      General: No scleral icterus  Neck:      Vascular: No JVD  Cardiovascular:      Rate and Rhythm: Normal rate  Pulmonary:      Effort: No accessory muscle usage or respiratory distress  Abdominal:      General: There is no distension  Palpations: Abdomen is soft  Musculoskeletal:      Right hand: No lacerations  Left hand: No lacerations  Cervical back: Neck supple  Skin:     General: Skin is warm  Comments: No Jaundice    Psychiatric:         Behavior: Behavior is not combative             LAB RESULTS     Results from last 7 days   Lab Units 06/29/21  0507 06/29/21  0254 06/28/21 2014 06/28/21  1159 06/28/21  0449 06/27/21  2357 06/27/21  0510 06/26/21  0827 06/25/21  0447 06/24/21  0753 06/24/21  0025   WBC Thousand/uL  --   --   --   --   --   --  8 54  --   --   --   --    HEMOGLOBIN g/dL 11 8 12 1 11 1* 13 0 12 0 12 4 14 1  --   --   --   --    HEMATOCRIT % 38 0 39 3 36 3 41 9 38 6 40 1 45 2  --   --   --   --    PLATELETS Thousands/uL  --   --   --   --   --   --  313  --   --   --   --    SODIUM mmol/L 133*  --   --   --  132*  --  133* 131* 130* 131* 131*   POTASSIUM mmol/L 4 3  --   --   --  5 1  --  4 9 3 4* 3 6 3 6 3 2*   CHLORIDE mmol/L 97*  --   --   --  96*  --  95* 90* 88* 87* 88*   CO2 mmol/L 33*  --   --   --  32  --  35* 40* 42* 41* 39*   BUN mg/dL 17  --   --   --  26*  --  12 13 14 16 18   CREATININE mg/dL 0 42*  --   --   --  0 57*  --  0 50* 0 61 0 61 0 71 0 68   EGFR ml/min/1 73sq m 99  --   --   --  90  --  94 88 88 82 85   CALCIUM mg/dL 8 3  --   --   --  8 5  --  8 8 8 9 8 7 9 1 8 7 RADIOLOGY RESULTS      XR chest 1 view   Final Result by Keya Stein MD (06/27 1043)      No acute cardiopulmonary disease  Workstation performed: [id number]         CTA ED chest PE study   Final Result by Justin Steel MD (06/19 5347)      No evidence of pulmonary embolism  Bilateral patchy groundglass attenuation with interlobular septal thickening likely represents pulmonary edema  Main pulmonary artery is dilated measuring 4 4 cm distended with pulmonary arterial hypertension  Correlate with echocardiogram       The study was marked in EPIC for immediate notification  Workstation performed: [id number]         XR chest 1 view portable   Final Result by Roma Patino MD (06/20 1442)      No acute cardiopulmonary disease  Redemonstration of pulmonary artery enlargement compatible with the history of pulmonary hypertension  Workstation performed: [id number]         CT head without contrast   Final Result by Jeffery Wang MD (06/19 1842)      No acute intracranial abnormality  Workstation performed: [id number]             PLAN / RECOMMENDATIONS      1  Hyponatremia  Chronic since duration is > 48 hours  Based on initial workup, 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 although exact etiology of SIADH has remained unclear  Patient is currently on salt tablet 2 g PO TID along with Lasix 20 mg PO BID  Current sodium is 133  Patient has remained asymptomatic from hyponatremia perspective  Plan to continue current dose of salt tablet with Lasix and monitor sodium level while in the hospital     Disposition:  Stable from renal standpoint for discharge with current dose of Lasix and salt tablet  Schedule outpatient Nephrology follow-up with us in 2 weeks  Please check BMP in 3-4 days upon discharge      Overall above mentioned plan was also d/w Tera Castleman, MD  Nephrology  6/29/2021        Portions of the record may have been created with voice recognition software  Occasional wrong word or "sound a like" substitutions may have occurred due to the inherent limitations of voice recognition software  Read the chart carefully and recognize, using context, where substitutions have occurred

## 2021-06-29 NOTE — TRANSPORTATION MEDICAL NECESSITY
Section I - General Information    Name of Patient: Aura De La Garza                 : 1945    Medicare #: [id number]  Transport Date: 21 (PCS is valid for round trips on this date and for all repetitive trips in the 60-day range as noted below )  Origin: [address]: [place]  Is the pt's stay covered under Medicare Part A (PPS/DRG)   []     Closest appropriate facility? If no, why is transport to more distant facility required? Yes  If hospice pt, is this transport related to pt's terminal illness? NA       Section II - Medical Necessity Questionnaire  Ambulance transportation is medically necessary only if other means of transport are contraindicated or would be potentially harmful to the patient  To meet this requirement, the patient must either be "bed confined" or suffer from a condition such that transport by means other than ambulance is contraindicated by the patient's condition  The following questions must be answered by the medical professional signing below for this form to be valid:    1)  Describe the MEDICAL CONDITION (physical and/or mental) of this patient AT [address] [place] [address] that requires the patient to be transported in an ambulance and why transport by other means is contraindicated by the patient's condition: Patient is confused and has impaired safety awareness  Patient requires a 2 person assist for transfers and mobility  Patient is limited by pain and fatigue and has decreased strength and endurance  2) Is the patient "bed confined" as defined below? No  To be "be confined" the patient must satisfy all three of the following conditions: (1) unable to get up from bed without Assistance; AND (2) unable to ambulate; AND (3) unable to sit in a chair or wheelchair      3) Can this patient safely be transported by car or wheelchair van (i e , seated during transport without a medical attendant or monitoring)? No    4) In addition to completing questions 1-3 above, please check any of the following conditions that apply*:   *Note: supporting documentation for any boxes checked must be maintained in the patient's medical records  If hosp-hosp transfer, describe services needed at 2nd facility not available at 1st facility? Patient is confused  Moderate/severe pain on movement   Medical attendant required   Unable to tolerate seated position for time needed to transport       Section III - Signature of Physician or Healthcare Professional  I certify that the above information is true and correct based on my evaluation of this patient, and represent that the patient requires transport by ambulance and that other forms of transport are contraindicated  I understand that this information will be used by the Centers for Medicare and Medicaid Services (CMS) to support the determination of medical necessity for ambulance services, and I represent that I have personal knowledge of the patient's condition at time of transport  []  If this box is checked, I also certify that the patient is physically or mentally incapable of signing the ambulance service's claim and that the institution with which I am affiliated has furnished care, services, or assistance to the patient  My signature below is made on behalf of the patient pursuant to 42 CFR §424 36(b)(4)  In accordance with 42 CFR §424 37, the specific reason(s) that the patient is physically or mentally incapable of signing the claim form is as follows: N/A        Signature of Physician* or Healthcare Professional__________________________________________    Signature Date 06/29/21 (For scheduled repetitive transports, this form is not valid for transports performed more than 60 days after this date)    Printed Name & Credentials of Physician or Healthcare Professional (MD, DO, RN, etc ) Trent Spangler, MSW    *Form must be signed by patient's attending physician for scheduled, repetitive transports   For non-repetitive, unscheduled ambulance transports, if unable to obtain the signature of the attending physician, any of the following may sign (choose appropriate option below)  [] Physician Assistant []  Clinical Nurse Specialist []  Registered Nurse  []  Nurse Practitioner  [x] Discharge Planner

## 2021-06-29 NOTE — DISCHARGE SUMMARY
[address] [facility]  Discharge- Mata Wilson 1945, 66 y o  female MRN: [id number]  Unit/Bed#: -01 Encounter: [id number]  Primary Care Provider: Juan Coker MD   Date and time admitted to hospital: 6/19/2021  4:69 PM    * Metabolic encephalopathy-resolved as of 6/29/2021  Assessment & Plan  · Patient's son reporting patient more confused at home and was found to be hypoglycemic around 55, has not been eating but taking diabetic meds  · CT head negative, EKG with sinus rhythm with PAC and T-wave inversion  · Most likely in setting of hypoglycemia, UTI, hyponatremia  · Holding home Ativan, Ambien, avoid opiates  · Has since resolved    Acute diastolic congestive heart failure ([facility] [address] )  Assessment & Plan  · Noted on CTA with pulmonary edema with pulmonary hypertension, no PE noted  · Echocardiogram EF wnl but revealed diastolic dysfunction  · Nephrology consultation appreciated, remains on lasix 20 mg BID and is stable  · Intake and output monitoring, daily weights  · Low-sodium diet with 1500 mL fluid restriction  · Repeat chest imaging 6/26 showing no effusion and no infiltrate  · Oxygen stable on 2 L, encourage incentive spirometer use    Hypokalemia-resolved as of 6/28/2021  Assessment & Plan  · Resolved with oral supplementation    Hyponatremia  Assessment & Plan  · Level on arrival 116, most likely in setting of persistent diarrhea and dehydration  · Initially treated with IVF with mild improvement of sodium level to 122 however was noted to be volume overloaded and IVF discontinued, nephrology consultation appreciated  · Sodium stable at 133  · C/w lasix therapy and sodium chloride tablets 2g tid  · 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  · Stable from nephrology standpoint for discharge on current regimen lasix/salt tabs         Medical Problems     Resolved Problems  Date Reviewed: 6/29/2021        Resolved    * (Principal) Metabolic encephalopathy 9/58/7192     Resolved by  Shay Rosario PA-C UTI (urinary tract infection) 6/28/2021     Resolved by  Janay Coto PA-C    Hypoglycemia 6/26/2021     Resolved by  Janay Coto PA-C    Hypokalemia 6/28/2021     Resolved by  Janay Coto PA-C              Discharging Physician / Practitioner: Janay Coto PA-C  PCP: Saima Boyce MD  Admission Date:   Admission Orders (From admission, onward)     Ordered        06/20/21 0417  Inpatient Admission  Once                   Discharge Date: 06/29/21    Consultations During Hospital Stay:  [address]  · IP CONSULT TO GASTROENTEROLOGY     Procedures Performed:   · None     Significant Findings / Test Results:   XR chest 1 view   Final Result by Yadiel Jara MD (06/27 1043)      No acute cardiopulmonary disease  Workstation performed: [id number]         CTA ED chest PE study   Final Result by Bertram Del Real MD (06/19 6037)      No evidence of pulmonary embolism  Bilateral patchy groundglass attenuation with interlobular septal thickening likely represents pulmonary edema  Main pulmonary artery is dilated measuring 4 4 cm distended with pulmonary arterial hypertension  Correlate with echocardiogram       The study was marked in EPIC for immediate notification  Workstation performed: [id number]         XR chest 1 view portable   Final Result by Jennifer Romano MD (06/20 1442)      No acute cardiopulmonary disease  Redemonstration of pulmonary artery enlargement compatible with the history of pulmonary hypertension  Workstation performed: [id number]         CT head without contrast   Final Result by Marcos Gerber MD (06/19 4762)      No acute intracranial abnormality  Workstation performed: [id number]              Incidental Findings:   · Hyponatremia      Test Results Pending at Discharge (will require follow up):    · None      Outpatient Tests Requested:  · PCP  · Nephro  · GI as needed     Complications:  None     Reason for Admission: altered mental status     Hospital Course:   Kavya Kaur is a 66 y o  female patient who originally presented to the hospital on 6/19/2021 due to altered mental status  Patient's past medical history significant for type 2 diabetes, asthma, and anxiety  She was found to be confused at home of the low blood glucose, for which her son brought her to the hospital   Patient was battling GI symptoms felt to be viral versus GI related, and had ingested salt water  She was found to be profoundly hyponatremic and Nephrology was consulted  There is also concern for a urinary tract infection based on urinalysis  Patient is now hemodynamically stable, sodium is stable  She is feeling much better and mentation is baseline  She is requiring rehab and will be discharged to [facility]  Please see above list of diagnoses and related plan for additional information  Condition at Discharge: good    Discharge Day Visit / Exam:   Subjective:  Patient seen this morning, she is feeling well  No chest pain or shortness of breath  No reflux  No abdominal pain  No difficulty with urination  Her son was concerned about some erythema around the buttocks from frequent bowel movements, for which she is receiving barrier cream   Patient denies any pain at the site  Vitals: Blood Pressure: 121/58 (06/29/21 0850)  Pulse: 79 (06/28/21 2258)  Temperature: 98 9 °F (37 2 °C) (06/28/21 2258)  Temp Source: Oral (06/28/21 1526)  Respirations: 17 (06/28/21 2258)  Height: 5' (152 4 cm) (06/20/21 2248)  Weight - Scale: 84 1 kg (185 lb 6 5 oz) (06/29/21 0600)  SpO2: 99 % (06/28/21 2258)  Exam:   Physical Exam  Vitals and nursing note reviewed  Constitutional:       General: She is not in acute distress  Appearance: She is not ill-appearing or toxic-appearing  Cardiovascular:      Rate and Rhythm: Normal rate and regular rhythm  Pulmonary:      Effort: Pulmonary effort is normal  No respiratory distress        Breath sounds: Normal breath sounds  Abdominal:      General: Bowel sounds are normal  There is no distension  Palpations: Abdomen is soft  Musculoskeletal:      Right lower leg: No edema  Left lower leg: No edema  Skin:     Coloration: Skin is not pale  Neurological:      Mental Status: She is alert and oriented to person, place, and time  Psychiatric:         Mood and Affect: Mood normal         Discussion with Family: Updated  (son) via phone  Vitor Malcolm     Discharge instructions/Information to patient and family:   See after visit summary for information provided to patient and family  Provisions for Follow-Up Care:  See after visit summary for information related to follow-up care and any pertinent home health orders  Disposition:   [facility] Readmission: none      Discharge Statement:  I spent approximately 40 minutes discharging the patient  This time was spent on the day of discharge  I had direct contact with the patient on the day of discharge  Greater than 50% of the total time was spent examining patient, answering all patient questions, arranging and discussing plan of care with patient as well as directly providing post-discharge instructions  Additional time then spent on discharge activities  Discharge Medications:  See after visit summary for reconciled discharge medications provided to patient and/or family        **Please Note: This note may have been constructed using a voice recognition system**

## 2021-06-30 NOTE — UTILIZATION REVIEW
Notification of Discharge   This is a Notification of Discharge from our facility [address]  Please be advised that this patient has been discharge from our facility  Below you will find the admission and discharge date and time including the patient’s disposition  UTILIZATION REVIEW CONTACT:  Yaya Judge  Utilization   Network Utilization Review Department  Phone: [phone] x carefully listen to the prompts  All voicemails are confidential   Email: [email or web address]     PHYSICIAN ADVISORY SERVICES:  FOR [id number] REVIEW - MEDICAL NECESSITY DENIAL  Phone: [phone]  Fax: [phone]  Email: [email or web address]     PRESENTATION DATE: 6/19/2021  5:47 PM  OBERVATION ADMISSION DATE:   INPATIENT ADMISSION DATE: 6/20/21  4:12 AM   DISCHARGE DATE: 6/29/2021  2:01 PM  DISPOSITION: Non [facility] SN[facility]/SNU Non [facility] SN[facility]/TC[facility]/SNU      IMPORTANT INFORMATION:  Send all requests for admission clinical reviews, approved or denied determinations and any other requests to dedicated fax number below belonging to the campus where the patient is receiving treatment   List of dedicated fax numbers:  [address] DENIALS (Administrative/Medical Necessity) [phone]   [address] [place] [address] (Maternity/NICU/Pediatrics) [phone]   [facility] [phone]   [facility] [phone]   [address] [phone]   [address] [facility] [phone]   [address] [place] [address] [phone]   [facility]  [phone]   [facility] [phone]   [address] [facility] [address] [facility] [phone]

## 2021-07-27 ENCOUNTER — HOSPITAL ENCOUNTER (INPATIENT)
Facility: HOSPITAL | Age: 76
LOS: 9 days | Discharge: NURSING, REHAB OR LONG-TERM CARE | DRG: 637 | End: 2021-08-06
Attending: EMERGENCY MEDICINE | Admitting: INTERNAL MEDICINE
Payer: COMMERCIAL

## 2021-07-27 ENCOUNTER — APPOINTMENT (EMERGENCY)
Dept: RADIOLOGY | Facility: HOSPITAL | Age: 76
DRG: 637 | End: 2021-07-27
Payer: COMMERCIAL

## 2021-07-27 DIAGNOSIS — R32 INCONTINENT OF URINE: ICD-10-CM

## 2021-07-27 DIAGNOSIS — R26.2 AMBULATORY DYSFUNCTION: Primary | ICD-10-CM

## 2021-07-27 DIAGNOSIS — R06.00 DYSPNEA: ICD-10-CM

## 2021-07-27 DIAGNOSIS — G93.41 METABOLIC ENCEPHALOPATHY: ICD-10-CM

## 2021-07-27 DIAGNOSIS — R53.1 WEAKNESS: ICD-10-CM

## 2021-07-27 DIAGNOSIS — N39.0 URINARY TRACT INFECTION: ICD-10-CM

## 2021-07-27 PROBLEM — E16.2 HYPOGLYCEMIA: Status: ACTIVE | Noted: 2021-07-27

## 2021-07-27 PROBLEM — J45.909 ASTHMA: Status: ACTIVE | Noted: 2017-01-18

## 2021-07-27 PROBLEM — I50.32 CHRONIC DIASTOLIC CONGESTIVE HEART FAILURE (HCC): Status: ACTIVE | Noted: 2021-07-27

## 2021-07-27 PROBLEM — I10 ESSENTIAL HYPERTENSION: Status: ACTIVE | Noted: 2021-07-27

## 2021-07-27 LAB
ALBUMIN SERPL BCP-MCNC: 3.1 G/DL (ref 3.5–5)
ALP SERPL-CCNC: 52 U/L (ref 46–116)
ALT SERPL W P-5'-P-CCNC: 31 U/L (ref 12–78)
ANION GAP SERPL CALCULATED.3IONS-SCNC: 10 MMOL/L (ref 4–13)
AST SERPL W P-5'-P-CCNC: 65 U/L (ref 5–45)
BACTERIA UR QL AUTO: NORMAL /HPF
BASOPHILS # BLD AUTO: 0.05 THOUSANDS/ΜL (ref 0–0.1)
BASOPHILS NFR BLD AUTO: 1 % (ref 0–1)
BILIRUB SERPL-MCNC: 0.61 MG/DL (ref 0.2–1)
BILIRUB UR QL STRIP: NEGATIVE
BUN SERPL-MCNC: 3 MG/DL (ref 5–25)
CALCIUM ALBUM COR SERPL-MCNC: 9.3 MG/DL (ref 8.3–10.1)
CALCIUM SERPL-MCNC: 8.6 MG/DL (ref 8.3–10.1)
CHLORIDE SERPL-SCNC: 95 MMOL/L (ref 100–108)
CLARITY UR: CLEAR
CO2 SERPL-SCNC: 30 MMOL/L (ref 21–32)
COLOR UR: ABNORMAL
CREAT SERPL-MCNC: 0.42 MG/DL (ref 0.6–1.3)
EOSINOPHIL # BLD AUTO: 0.06 THOUSAND/ΜL (ref 0–0.61)
EOSINOPHIL NFR BLD AUTO: 1 % (ref 0–6)
ERYTHROCYTE [DISTWIDTH] IN BLOOD BY AUTOMATED COUNT: 17 % (ref 11.6–15.1)
GFR SERPL CREATININE-BSD FRML MDRD: 99 ML/MIN/1.73SQ M
GLUCOSE SERPL-MCNC: 37 MG/DL (ref 65–140)
GLUCOSE SERPL-MCNC: 40 MG/DL (ref 65–140)
GLUCOSE SERPL-MCNC: 59 MG/DL (ref 65–140)
GLUCOSE UR STRIP-MCNC: NEGATIVE MG/DL
HCT VFR BLD AUTO: 39.9 % (ref 34.8–46.1)
HGB BLD-MCNC: 12.5 G/DL (ref 11.5–15.4)
HGB UR QL STRIP.AUTO: NEGATIVE
IMM GRANULOCYTES # BLD AUTO: 0.03 THOUSAND/UL (ref 0–0.2)
IMM GRANULOCYTES NFR BLD AUTO: 0 % (ref 0–2)
KETONES UR STRIP-MCNC: NEGATIVE MG/DL
LEUKOCYTE ESTERASE UR QL STRIP: ABNORMAL
LYMPHOCYTES # BLD AUTO: 1.45 THOUSANDS/ΜL (ref 0.6–4.47)
LYMPHOCYTES NFR BLD AUTO: 14 % (ref 14–44)
MCH RBC QN AUTO: 26.7 PG (ref 26.8–34.3)
MCHC RBC AUTO-ENTMCNC: 31.3 G/DL (ref 31.4–37.4)
MCV RBC AUTO: 85 FL (ref 82–98)
MONOCYTES # BLD AUTO: 1.12 THOUSAND/ΜL (ref 0.17–1.22)
MONOCYTES NFR BLD AUTO: 11 % (ref 4–12)
NEUTROPHILS # BLD AUTO: 7.94 THOUSANDS/ΜL (ref 1.85–7.62)
NEUTS SEG NFR BLD AUTO: 73 % (ref 43–75)
NITRITE UR QL STRIP: NEGATIVE
NON-SQ EPI CELLS URNS QL MICRO: NORMAL /HPF
NRBC BLD AUTO-RTO: 0 /100 WBCS
NT-PROBNP SERPL-MCNC: 2206 PG/ML
PH UR STRIP.AUTO: 7.5 [PH]
PLATELET # BLD AUTO: 423 THOUSANDS/UL (ref 149–390)
PMV BLD AUTO: 8.1 FL (ref 8.9–12.7)
POTASSIUM SERPL-SCNC: 3.3 MMOL/L (ref 3.5–5.3)
PROT SERPL-MCNC: 7.2 G/DL (ref 6.4–8.2)
PROT UR STRIP-MCNC: NEGATIVE MG/DL
RBC # BLD AUTO: 4.68 MILLION/UL (ref 3.81–5.12)
RBC #/AREA URNS AUTO: NORMAL /HPF
SODIUM SERPL-SCNC: 135 MMOL/L (ref 136–145)
SP GR UR STRIP.AUTO: 1.01 (ref 1–1.03)
TROPONIN I SERPL-MCNC: 0.04 NG/ML
TSH SERPL DL<=0.05 MIU/L-ACNC: 0.63 UIU/ML (ref 0.36–3.74)
UROBILINOGEN UR QL STRIP.AUTO: 0.2 E.U./DL
WBC # BLD AUTO: 10.65 THOUSAND/UL (ref 4.31–10.16)
WBC #/AREA URNS AUTO: NORMAL /HPF

## 2021-07-27 PROCEDURE — 80053 COMPREHEN METABOLIC PANEL: CPT | Performed by: EMERGENCY MEDICINE

## 2021-07-27 PROCEDURE — 99285 EMERGENCY DEPT VISIT HI MDM: CPT

## 2021-07-27 PROCEDURE — 81001 URINALYSIS AUTO W/SCOPE: CPT | Performed by: EMERGENCY MEDICINE

## 2021-07-27 PROCEDURE — 36415 COLL VENOUS BLD VENIPUNCTURE: CPT | Performed by: EMERGENCY MEDICINE

## 2021-07-27 PROCEDURE — 84443 ASSAY THYROID STIM HORMONE: CPT | Performed by: EMERGENCY MEDICINE

## 2021-07-27 PROCEDURE — 93005 ELECTROCARDIOGRAM TRACING: CPT

## 2021-07-27 PROCEDURE — 87086 URINE CULTURE/COLONY COUNT: CPT | Performed by: FAMILY MEDICINE

## 2021-07-27 PROCEDURE — 99285 EMERGENCY DEPT VISIT HI MDM: CPT | Performed by: EMERGENCY MEDICINE

## 2021-07-27 PROCEDURE — 82948 REAGENT STRIP/BLOOD GLUCOSE: CPT

## 2021-07-27 PROCEDURE — 71045 X-RAY EXAM CHEST 1 VIEW: CPT

## 2021-07-27 PROCEDURE — 85025 COMPLETE CBC W/AUTO DIFF WBC: CPT | Performed by: EMERGENCY MEDICINE

## 2021-07-27 PROCEDURE — 84484 ASSAY OF TROPONIN QUANT: CPT | Performed by: EMERGENCY MEDICINE

## 2021-07-27 PROCEDURE — 99220 PR INITIAL OBSERVATION CARE/DAY 70 MINUTES: CPT | Performed by: FAMILY MEDICINE

## 2021-07-27 PROCEDURE — 83880 ASSAY OF NATRIURETIC PEPTIDE: CPT | Performed by: EMERGENCY MEDICINE

## 2021-07-27 RX ORDER — SODIUM CHLORIDE 1000 MG
2 TABLET, SOLUBLE MISCELLANEOUS
Status: DISCONTINUED | OUTPATIENT
Start: 2021-07-28 | End: 2021-08-06 | Stop reason: HOSPADM

## 2021-07-27 RX ORDER — POTASSIUM CHLORIDE 20 MEQ/1
40 TABLET, EXTENDED RELEASE ORAL ONCE
Status: COMPLETED | OUTPATIENT
Start: 2021-07-27 | End: 2021-07-27

## 2021-07-27 RX ORDER — HEPARIN SODIUM 5000 [USP'U]/ML
5000 INJECTION, SOLUTION INTRAVENOUS; SUBCUTANEOUS EVERY 8 HOURS SCHEDULED
Status: DISCONTINUED | OUTPATIENT
Start: 2021-07-27 | End: 2021-07-28

## 2021-07-27 RX ORDER — ALBUTEROL SULFATE 2.5 MG/3ML
2.5 SOLUTION RESPIRATORY (INHALATION) EVERY 6 HOURS PRN
Status: DISCONTINUED | OUTPATIENT
Start: 2021-07-27 | End: 2021-08-06 | Stop reason: HOSPADM

## 2021-07-27 RX ORDER — ATORVASTATIN CALCIUM 10 MG/1
10 TABLET, FILM COATED ORAL DAILY
Status: DISCONTINUED | OUTPATIENT
Start: 2021-07-28 | End: 2021-08-06 | Stop reason: HOSPADM

## 2021-07-27 RX ORDER — ALBUTEROL SULFATE 90 UG/1
2 AEROSOL, METERED RESPIRATORY (INHALATION) EVERY 6 HOURS PRN
Status: DISCONTINUED | OUTPATIENT
Start: 2021-07-27 | End: 2021-08-06 | Stop reason: HOSPADM

## 2021-07-27 RX ORDER — PAROXETINE HYDROCHLORIDE 20 MG/1
10 TABLET, FILM COATED ORAL EVERY MORNING
Status: DISCONTINUED | OUTPATIENT
Start: 2021-07-28 | End: 2021-08-06 | Stop reason: HOSPADM

## 2021-07-27 RX ORDER — QUETIAPINE FUMARATE 25 MG/1
12.5 TABLET, FILM COATED ORAL
Status: DISCONTINUED | OUTPATIENT
Start: 2021-07-27 | End: 2021-08-06 | Stop reason: HOSPADM

## 2021-07-27 RX ORDER — MONTELUKAST SODIUM 10 MG/1
10 TABLET ORAL
Status: DISCONTINUED | OUTPATIENT
Start: 2021-07-27 | End: 2021-08-06 | Stop reason: HOSPADM

## 2021-07-27 RX ORDER — FUROSEMIDE 20 MG/1
20 TABLET ORAL 2 TIMES DAILY
Status: DISCONTINUED | OUTPATIENT
Start: 2021-07-27 | End: 2021-07-29

## 2021-07-27 RX ORDER — LORAZEPAM 0.5 MG/1
0.5 TABLET ORAL EVERY 6 HOURS PRN
Status: DISCONTINUED | OUTPATIENT
Start: 2021-07-27 | End: 2021-08-06 | Stop reason: HOSPADM

## 2021-07-27 RX ORDER — ACETAMINOPHEN 325 MG/1
650 TABLET ORAL EVERY 6 HOURS PRN
Status: DISCONTINUED | OUTPATIENT
Start: 2021-07-27 | End: 2021-08-06 | Stop reason: HOSPADM

## 2021-07-27 RX ADMIN — FUROSEMIDE 20 MG: 20 TABLET ORAL at 19:58

## 2021-07-27 RX ADMIN — POTASSIUM CHLORIDE 40 MEQ: 1500 TABLET, EXTENDED RELEASE ORAL at 19:58

## 2021-07-27 RX ADMIN — CEFTRIAXONE SODIUM 1000 MG: 10 INJECTION, POWDER, FOR SOLUTION INTRAVENOUS at 19:58

## 2021-07-27 NOTE — ASSESSMENT & PLAN NOTE
Wt Readings from Last 3 Encounters:   06/29/21 84 1 kg (185 lb 6 5 oz)   06/08/21 94 kg (207 lb 3 7 oz)   02/08/19 94 kg (207 lb 3 7 oz)     · Continue with Lasix  · Also note patient currently on salt tablets given recent history of hyponatremia  · Monitor electrolytes/renal function  · Monitor intake/output, daily weights  · 2D echo on 06/21/2021 notes EF 60% with grade 1 diastolic dysfunction  · Patient currently on 2 L oxygen via nasal cannula which were patient's son is her new normal since last hospitalization

## 2021-07-27 NOTE — H&P
[address] [facility]  H&P- Patricio Rey 1945, 66 y o  female MRN: [id number]  Unit/Bed#: ED 29 Encounter: [id number]  Primary Care Provider: Shane Dickens MD   Date and time admitted to hospital: 7/27/2021  4:09 PM    * Weakness  Assessment & Plan  52-year-old lady who was recently admitted from 06/19/2021 until 06/29/2021 secondary to acute metabolic encephalopathy secondary to hyponatremia/UTI/hypoglycemia and discharged to [address] [place] [address] rehab, recently discharged home 4 days ago, presents accompanied by her son due to symptoms of worsening weakness/worsening confusion and significant difficulty conducting activities of daily living  · Labs noted mild hypokalemia with potassium of 3 3  Sodium normal at 135  · TSH within normal limits  · UA notes small leukocyte esterase with 2-4 WBC and occasional bacteria  Urine culture ordered  Status post 1 dose of IV ceftriaxone in the ED  Holding off on further antibiotics unless patient spikes fever or elevation in white blood cell count or any new symptoms  · Chest x-ray negative for any acute findings (official radiology read pending)  · Patient was hypoglycemic on arrival with blood sugar 40  Likely contributing to patient's weakness  Received crackers/orange juice in ER  · Requested PT/OT evaluation  Hypoglycemia  Assessment & Plan  · Blood sugar 40 on arrival  · Holding patient's metformin/glimepiride/Januvia  · Regular diet ordered  · Accu-Cheks q 6 hours  Chronic diastolic congestive heart failure [facility])  Assessment & Plan  Wt Readings from Last 3 Encounters:   06/29/21 84 1 kg (185 lb 6 5 oz)   06/08/21 94 kg (207 lb 3 7 oz)   02/08/19 94 kg (207 lb 3 7 oz)     · Continue with Lasix  · Also note patient currently on salt tablets given recent history of hyponatremia  · Monitor electrolytes/renal function  · Monitor intake/output, daily weights  · 2D echo on 06/21/2021 notes EF 60% with grade 1 diastolic dysfunction    · Patient currently on 2 L oxygen via nasal cannula which were patient's son is her new normal since last hospitalization  Essential hypertension  Assessment & Plan  · Adequately controlled  · Currently on Lasix  Dyslipidemia  Assessment & Plan  · Continue with atorvastatin  Diabetes mellitus type 2 in obese [facility])  Assessment & Plan  Lab Results   Component Value Date    HGBA1C 6 8 (H) 01/12/2021       Recent Labs     07/27/21  1656 07/27/21  1717   POCGLU 37* 59*       Blood Sugar Average: Last 72 hrs:  (P) 48     · Patient hypoglycemic on arrival  · Holding oral hypoglycemic agents  · Monitoring Accu-Cheks  Asthma  Assessment & Plan  · Currently not in exacerbation  · On baseline 2 L oxygen via nasal cannula  · Continue with albuterol inhaler/albuterol nebulization  Depression  Assessment & Plan  · Continue with Paxil/Seroquel  Anxiety disorder  Assessment & Plan  · Continue with p r n  Xanax for anxiety  VTE Prophylaxis: Heparin  / sequential compression device   Code Status: level 1  POLST: POLST form is not discussed and not completed at this time  Discussion with family: son Terrie Luna by bedside    Anticipated Length of Stay:  Patient will be admitted on an Observation basis with an anticipated length of stay of  Less than 2 midnights  Justification for Hospital Stay: hypoglycemia, weakness    Total Time for Visit, including Counseling / Coordination of Care: 30 minutes  Greater than 50% of this total time spent on direct patient counseling and coordination of care      Chief Complaint:  weakness    History of Present Illness:    Carson Jean is a 66 y o  female with known history of chronic hypoxic respiratory failure secondary to asthma/chronic diastolic CHF, type 2 diabetes mellitus, hypertension, hyperlipidemia, asthma who was recently admitted at [facility] from [address] [place] [address] until 06/29/2021 secondary to altered mental status secondary to UTI/hyponatremia/pulmonary edema who was sent to rehab and return home 4 days ago presents accompanied by her son due to worsening weakness  Patient's son notes that patient is increasingly weak  He in fact believes she is weaker than when she presented to the hospital last time  Besides that patient has been acting more delirious in the last few days  He did note some increased urinary incontinence although he attributes it to the diuretic  Review of Systems:    Review of Systems   Constitutional: Positive for activity change and appetite change  Negative for chills and fever  HENT: Negative for ear pain and sore throat  Eyes: Negative for pain and visual disturbance  Respiratory: Positive for shortness of breath  Negative for cough  Cardiovascular: Negative for chest pain and palpitations  Gastrointestinal: Negative for abdominal pain, anal bleeding, blood in stool, nausea and vomiting  Genitourinary: Positive for frequency  Negative for dysuria and hematuria  Musculoskeletal: Negative for arthralgias and back pain  Skin: Negative for color change and rash  Neurological: Positive for weakness  Negative for seizures, syncope and headaches  All other systems reviewed and are negative  Past Medical and Surgical History:     Past Medical History:   Diagnosis Date    Asthma     Diabetes mellitus ([facility] [address] )     Glaucoma     Hypertension     Hypoglycemia 6/20/2021       Past Surgical History:   Procedure Laterality Date    COLONOSCOPY N/A 12/8/2018    Procedure: COLONOSCOPY;  Surgeon: Ahsan Olivares MD;  Location: [facility]; Service: Gastroenterology       Meds/Allergies:    Prior to Admission medications    Medication Sig Start Date End Date Taking?  Authorizing Provider   albuterol (2 5 mg/3 mL) 0 083 % nebulizer solution Take 2 5 mg by nebulization every 6 (six) hours as needed for wheezing    Historical Provider, MD   albuterol (PROVENTIL HFA,VENTOLIN HFA) 90 mcg/act inhaler Inhale 2 puffs every 6 (six) hours as needed for wheezing    Historical Provider, MD   atorvastatin (LIPITOR) 10 mg tablet Take 10 mg by mouth daily    Historical Provider, MD   furosemide (LASIX) 20 mg tablet Take 1 tablet (20 mg total) by mouth 2 (two) times a day 6/29/21   Anita Garcia PA-C   glimepiride (AMARYL) 4 mg tablet Take 4 mg by mouth every morning before breakfast    Historical Provider, MD   LORazepam (ATIVAN) 0 5 mg tablet Take 1 tablet (0 5 mg total) by mouth every 6 (six) hours as needed for anxiety 6/29/21   Anita Garcia PA-C   metFORMIN (GLUCOPHAGE) 500 mg tablet Take 1 tablet (500 mg total) by mouth 2 (two) times a day with meals Resume on 12/11/18 12/11/18   Katie Duran MD   montelukast (SINGULAIR) 10 mg tablet Take 10 mg by mouth daily at bedtime    Historical Provider, MD   PARoxetine (PAXIL) 10 mg tablet Take 10 mg by mouth every morning    Historical Provider, MD   QUEtiapine (SEROquel) 25 mg tablet Take 0 5 tablets (12 5 mg total) by mouth daily at bedtime 6/29/21   Bogdan Garcia PA-C   sitaGLIPtin (JANUVIA) 100 mg tablet Take 100 mg by mouth daily    Historical Provider, MD   sodium chloride 1 g tablet Take 2 tablets (2 g total) by mouth 3 (three) times a day with meals 6/29/21   Don Canas PA-C     I have reviewed home medications with patient family member      Allergies: No Known Allergies    Social History:     Marital Status: /Civil Union     Substance Use History:   Social History     Substance and Sexual Activity   Alcohol Use Never     Social History     Tobacco Use   Smoking Status Never Smoker   Smokeless Tobacco Never Used     Social History     Substance and Sexual Activity   Drug Use No       Family History:    non-contributory    Physical Exam:     Vitals:   Blood Pressure: 158/86 (07/27/21 1830)  Pulse: 94 (07/27/21 1831)  Temperature: 97 6 °F (36 4 °C) (07/27/21 1605)  Temp Source: Oral (07/27/21 1605)  Respirations: (!) 24 (07/27/21 1831)  SpO2: 98 % (07/27/21 1831)    Physical Exam  Vitals reviewed  Constitutional:       General: She is not in acute distress  Appearance: She is obese  She is not ill-appearing  HENT:      Head: Normocephalic and atraumatic  Nose: Nose normal  No congestion  Mouth/Throat:      Mouth: Mucous membranes are dry  Pharynx: Oropharynx is clear  Eyes:      Conjunctiva/sclera: Conjunctivae normal       Pupils: Pupils are equal, round, and reactive to light  Cardiovascular:      Rate and Rhythm: Normal rate and regular rhythm  Pulses: Normal pulses  Pulmonary:      Effort: Pulmonary effort is normal  No respiratory distress  Breath sounds: Normal breath sounds  No wheezing or rales  Abdominal:      General: Abdomen is flat  Bowel sounds are normal  There is no distension  Palpations: Abdomen is soft  Tenderness: There is no abdominal tenderness  There is no guarding  Musculoskeletal:         General: No swelling  Normal range of motion  Cervical back: Normal range of motion and neck supple  Skin:     General: Skin is warm and dry  Findings: No rash  Neurological:      General: No focal deficit present  Mental Status: She is alert  Comments: Oriented to place/person  Disoriented to time  Psychiatric:         Mood and Affect: Mood normal          Behavior: Behavior normal        Additional Data:     Lab Results: I have personally reviewed pertinent reports        Results from last 7 days   Lab Units 07/27/21  1644   WBC Thousand/uL 10 65*   HEMOGLOBIN g/dL 12 5   HEMATOCRIT % 39 9   PLATELETS Thousands/uL 423*   NEUTROS PCT % 73   LYMPHS PCT % 14   MONOS PCT % 11   EOS PCT % 1     Results from last 7 days   Lab Units 07/27/21  1644   SODIUM mmol/L 135*   POTASSIUM mmol/L 3 3*   CHLORIDE mmol/L 95*   CO2 mmol/L 30   BUN mg/dL 3*   CREATININE mg/dL 0 42*   ANION GAP mmol/L 10   CALCIUM mg/dL 8 6   ALBUMIN g/dL 3 1*   TOTAL BILIRUBIN mg/dL 0 61   ALK PHOS U/L 52   ALT U/L 31   AST U/L 65*   GLUCOSE RANDOM mg/dL 40*         Results from last 7 days   Lab Units 07/27/21  1717 07/27/21  1656   POC GLUCOSE mg/dl 59* 37*               Imaging: I have personally reviewed pertinent reports  XR chest 1 view portable   ED Interpretation by Kacie Schroeder MD (07/27 1854)   Poor inspiratory effort  No infiltrates  Allscripts / Epic Records Reviewed: Yes     ** Please Note: This note has been constructed using a voice recognition system   **

## 2021-07-27 NOTE — ASSESSMENT & PLAN NOTE
· Currently not in exacerbation  · On baseline 2 L oxygen via nasal cannula  · Continue with albuterol inhaler/albuterol nebulization

## 2021-07-27 NOTE — ASSESSMENT & PLAN NOTE
Lab Results   Component Value Date    HGBA1C 6 8 (H) 01/12/2021       Recent Labs     07/27/21  1656 07/27/21  1717   POCGLU 37* 59*       Blood Sugar Average: Last 72 hrs:  (P) 48     · Patient hypoglycemic on arrival  · Holding oral hypoglycemic agents  · Monitoring Accu-Cheks

## 2021-07-27 NOTE — ASSESSMENT & PLAN NOTE
· Blood sugar 40 on arrival  · Holding patient's metformin/glimepiride/Januvia  · Regular diet ordered  · Accu-Cheks q 6 hours

## 2021-07-27 NOTE — CASE MANAGEMENT
CM responded to Code R alert  RECENT ADMISSION: 6/19/2021 - 6/29/2021 (10 days)  [facility]    AGE:75  SEX:female  DX: metabolic Encephalopathy  OUTCOME: DC to SNF: [facility] ON D/C (previous admission):   Patient discharged to [address] [place] [address]  Patient has [employer] insurance and [address] secondary  Patient followed by Dharmesh Dominguez supportive care:[facility] [phone]      CURRENT F/U APPTS:   None listed  REASON FOR READMISSION:   Chief Complaint: Patient presents with:  Shortness of Breath: Pt's son reports pt was in rehab and sent home  Pt has she chronic asthma   Pt's son reports they can no longer care for her at home  INTERVENTIONS: patient admitted under OBS

## 2021-07-27 NOTE — ED PROVIDER NOTES
History  Chief Complaint   Patient presents with    Shortness of Breath     Pt's son reports pt was in rehab and sent home  Pt has she chronic asthma  Pt's son reports they can no longer care for her at home      Patient is a 53-year-old female  She has diabetes, congestive heart failure, metabolic encephalopathy, urinary tract infection, pulmonary hypertension, anxiety, depression, hyperlipidemia, asthma, glaucoma, and hyponatremia  She was admitted here back in June for metabolic encephalopathy  She had UTI at this time  There is hypoglycemia and hyponatremia  She also had acute congestive heart failure  She was also hyponatremic at 116  She was discharged to nursing home for rehabilitation  She was discharged on Friday  Family reports that she is not doing well at home  They cannot take care of her  She has been unable to ambulate  She has been incontinent  Although better than prior to admission, she is still altered  She has also been short of breath  Symptoms are moderately severe without aggravating or relieving factors  There has been no new fever  No new trauma  Prior to Admission Medications   Prescriptions Last Dose Informant Patient Reported? Taking?    LORazepam (ATIVAN) 0 5 mg tablet   No No   Sig: Take 1 tablet (0 5 mg total) by mouth every 6 (six) hours as needed for anxiety   PARoxetine (PAXIL) 10 mg tablet   Yes No   Sig: Take 10 mg by mouth every morning   QUEtiapine (SEROquel) 25 mg tablet   No No   Sig: Take 0 5 tablets (12 5 mg total) by mouth daily at bedtime   albuterol (2 5 mg/3 mL) 0 083 % nebulizer solution   Yes No   Sig: Take 2 5 mg by nebulization every 6 (six) hours as needed for wheezing   albuterol (PROVENTIL HFA,VENTOLIN HFA) 90 mcg/act inhaler   Yes No   Sig: Inhale 2 puffs every 6 (six) hours as needed for wheezing   atorvastatin (LIPITOR) 10 mg tablet   Yes No   Sig: Take 10 mg by mouth daily   furosemide (LASIX) 20 mg tablet   No No   Sig: Take 1 tablet (20 mg total) by mouth 2 (two) times a day   glimepiride (AMARYL) 4 mg tablet   Yes No   Sig: Take 4 mg by mouth every morning before breakfast   metFORMIN (GLUCOPHAGE) 500 mg tablet   No No   Sig: Take 1 tablet (500 mg total) by mouth 2 (two) times a day with meals Resume on 12/11/18   montelukast (SINGULAIR) 10 mg tablet   Yes No   Sig: Take 10 mg by mouth daily at bedtime   sitaGLIPtin (JANUVIA) 100 mg tablet   Yes No   Sig: Take 100 mg by mouth daily   sodium chloride 1 g tablet   No No   Sig: Take 2 tablets (2 g total) by mouth 3 (three) times a day with meals      Facility-Administered Medications: None       Past Medical History:   Diagnosis Date    Asthma     Diabetes mellitus ([facility] [address] )     Glaucoma     Hypertension     Hypoglycemia 6/20/2021       Past Surgical History:   Procedure Laterality Date    COLONOSCOPY N/A 12/8/2018    Procedure: COLONOSCOPY;  Surgeon: Evelyn Marquez MD;  Location: [facility]; Service: Gastroenterology       History reviewed  No pertinent family history  I have reviewed and agree with the history as documented  E-Cigarette/Vaping    E-Cigarette Use Never User      E-Cigarette/Vaping Substances     Social History     Tobacco Use    Smoking status: Never Smoker    Smokeless tobacco: Never Used   Vaping Use    Vaping Use: Never used   Substance Use Topics    Alcohol use: Never    Drug use: No       Review of Systems   Constitutional: Negative for chills and fever  HENT: Negative for rhinorrhea and sore throat  Eyes: Negative for pain, redness and visual disturbance  Respiratory: Positive for shortness of breath  Negative for cough  Cardiovascular: Positive for leg swelling  Negative for chest pain  Gastrointestinal: Negative for abdominal pain, diarrhea and vomiting  Endocrine: Negative for polydipsia and polyuria  Genitourinary: Positive for enuresis and frequency  Negative for dysuria, hematuria, vaginal bleeding and vaginal discharge  Musculoskeletal: Negative for back pain and neck pain  Skin: Negative for rash and wound  Allergic/Immunologic: Negative for immunocompromised state  Neurological: Negative for weakness, numbness and headaches  Hematological: Does not bruise/bleed easily  Psychiatric/Behavioral: Positive for confusion  Negative for hallucinations and suicidal ideas  All other systems reviewed and are negative  Physical Exam  Physical Exam  Vitals reviewed  Constitutional:       General: She is not in acute distress  Appearance: She is obese  HENT:      Head: Normocephalic and atraumatic  Nose: Nose normal       Mouth/Throat:      Mouth: Mucous membranes are moist    Eyes:      General:         Right eye: No discharge  Left eye: No discharge  Conjunctiva/sclera: Conjunctivae normal    Cardiovascular:      Rate and Rhythm: Normal rate and regular rhythm  Pulses: Normal pulses  Heart sounds: Normal heart sounds  No murmur heard  No friction rub  No gallop  Pulmonary:      Effort: Pulmonary effort is normal  No respiratory distress  Breath sounds: Normal breath sounds  No stridor  No wheezing, rhonchi or rales  Abdominal:      General: Bowel sounds are normal  There is no distension  Palpations: Abdomen is soft  Tenderness: There is no abdominal tenderness  There is no right CVA tenderness, left CVA tenderness, guarding or rebound  Musculoskeletal:         General: No swelling, tenderness, deformity or signs of injury  Normal range of motion  Cervical back: Normal range of motion and neck supple  No rigidity  Right lower leg: Edema present  Left lower leg: Edema present  Comments: No calf tenderness or unilateral leg swelling  Skin:     General: Skin is warm and dry  Coloration: Skin is not jaundiced  Findings: No rash  Neurological:      General: No focal deficit present  Mental Status: She is alert        Sensory: No sensory deficit  Motor: Motor function is intact     Psychiatric:         Mood and Affect: Mood normal          Behavior: Behavior normal          Vital Signs  ED Triage Vitals   Temperature Pulse Respirations Blood Pressure SpO2   07/27/21 1605 07/27/21 1603 07/27/21 1603 07/27/21 1603 07/27/21 1603   97 6 °F (36 4 °C) 104 18 149/77 93 %      Temp Source Heart Rate Source Patient Position - Orthostatic VS BP Location FiO2 (%)   07/27/21 1605 07/27/21 1603 07/27/21 1603 07/27/21 1603 --   Oral Monitor Sitting Left arm       Pain Score       --                  Vitals:    07/27/21 1700 07/27/21 1800 07/27/21 1830 07/27/21 1831   BP: 168/87 148/69 158/86    Pulse: 103 99 100 94   Patient Position - Orthostatic VS:   Lying          Visual Acuity      ED Medications  Medications   insulin lispro (HumaLOG) 100 units/mL subcutaneous injection 1-5 Units (has no administration in time range)   insulin lispro (HumaLOG) 100 units/mL subcutaneous injection 1-5 Units (has no administration in time range)   ceftriaxone (ROCEPHIN) 1 g/50 mL in dextrose IVPB (has no administration in time range)       Diagnostic Studies  Results Reviewed     Procedure Component Value Units Date/Time    Urine culture [[id number]]     Lab Status: No result Specimen: Urine, Clean Catch     Urine Microscopic [[id number]]  (Normal) Collected: 07/27/21 1749    Lab Status: Final result Specimen: Urine, Clean Catch Updated: 07/27/21 1800     RBC, UA 0-1 /hpf      WBC, UA 2-4 /hpf      Epithelial Cells Occasional /hpf      Bacteria, UA Occasional /hpf     UA w Reflex to Microscopic w Reflex to Culture [[id number]]  (Abnormal) Collected: 07/27/21 1749    Lab Status: Final result Specimen: Urine, Clean Catch Updated: 07/27/21 1755     Color, UA Light Yellow     Clarity, UA Clear     Specific Gravity, UA 1 015     pH, UA 7 5     Leukocytes, UA Small     Nitrite, UA Negative     Protein, UA Negative mg/dl      Glucose, UA Negative mg/dl      Ketones, UA Negative mg/dl      Urobilinogen, UA 0 2 E U /dl      Bilirubin, UA Negative     Blood, UA Negative    TSH [[id number]]  (Normal) Collected: 07/27/21 1644    Lab Status: Final result Specimen: Blood from Hand, Right Updated: 07/27/21 1723     TSH 3RD GENERATON 0 626 uIU/mL     Narrative:      Patients undergoing fluorescein dye angiography may retain small amounts of fluorescein in the body for 48-72 hours post procedure  Samples containing fluorescein can produce falsely depressed TSH values  If the patient had this procedure,a specimen should be resubmitted post fluorescein clearance        NT-BNP PRO [[id number]]  (Abnormal) Collected: 07/27/21 1644    Lab Status: Final result Specimen: Blood from Hand, Right Updated: 07/27/21 1723     NT-proBNP 2,206 pg/mL     Comprehensive metabolic panel [[id number]]  (Abnormal) Collected: 07/27/21 1644    Lab Status: Final result Specimen: Blood from Hand, Right Updated: 07/27/21 1723     Sodium 135 mmol/L      Potassium 3 3 mmol/L      Chloride 95 mmol/L      CO2 30 mmol/L      ANION GAP 10 mmol/L      BUN 3 mg/dL      Creatinine 0 42 mg/dL      Glucose 40 mg/dL      Calcium 8 6 mg/dL      Corrected Calcium 9 3 mg/dL      AST 65 U/L      ALT 31 U/L      Alkaline Phosphatase 52 U/L      Total Protein 7 2 g/dL      Albumin 3 1 g/dL      Total Bilirubin 0 61 mg/dL      eGFR 99 ml/min/1 73sq m     Narrative:      Mckenzie guidelines for Chronic Kidney Disease (CKD):     Stage 1 with normal or high GFR (GFR > 90 mL/min/1 73 square meters)    Stage 2 Mild CKD (GFR = 60-89 mL/min/1 73 square meters)    Stage 3A Moderate CKD (GFR = 45-59 mL/min/1 73 square meters)    Stage 3B Moderate CKD (GFR = 30-44 mL/min/1 73 square meters)    Stage 4 Severe CKD (GFR = 15-29 mL/min/1 73 square meters)    Stage 5 End Stage CKD (GFR <15 mL/min/1 73 square meters)  Note: GFR calculation is accurate only with a steady state creatinine    Fingerstick Glucose (POCT) [[id number]]  (Abnormal) Collected: 07/27/21 1717    Lab Status: Final result Updated: 07/27/21 1718     POC Glucose 59 mg/dl     Troponin I [[id number]]  (Normal) Collected: 07/27/21 1644    Lab Status: Final result Specimen: Blood from Arm, Right Updated: 07/27/21 1707     Troponin I 0 04 ng/mL     Fingerstick Glucose (POCT) [[id number]]  (Abnormal) Collected: 07/27/21 1656    Lab Status: Final result Updated: 07/27/21 1657     POC Glucose 37 mg/dl     CBC and differential [[id number]]  (Abnormal) Collected: 07/27/21 1644    Lab Status: Final result Specimen: Blood from Arm, Right Updated: 07/27/21 1649     WBC 10 65 Thousand/uL      RBC 4 68 Million/uL      Hemoglobin 12 5 g/dL      Hematocrit 39 9 %      MCV 85 fL      MCH 26 7 pg      MCHC 31 3 g/dL      RDW 17 0 %      MPV 8 1 fL      Platelets 028 Thousands/uL      nRBC 0 /100 WBCs      Neutrophils Relative 73 %      Immat GRANS % 0 %      Lymphocytes Relative 14 %      Monocytes Relative 11 %      Eosinophils Relative 1 %      Basophils Relative 1 %      Neutrophils Absolute 7 94 Thousands/µL      Immature Grans Absolute 0 03 Thousand/uL      Lymphocytes Absolute 1 45 Thousands/µL      Monocytes Absolute 1 12 Thousand/µL      Eosinophils Absolute 0 06 Thousand/µL      Basophils Absolute 0 05 Thousands/µL                  XR chest 1 view portable   ED Interpretation by Perry Becerra MD (07/27 7344)   Poor inspiratory effort  No infiltrates  Procedures  ECG 12 Lead Documentation Only    Date/Time: 7/27/2021 5:15 PM  Performed by: Perry Becerra MD  Authorized by: Perry Becerra MD     ECG reviewed by me, the ED Provider: yes    Patient location:  ED  Comments:      Sinus tachycardia with PAC  Right superior axis deviation  Pulmonary disease pattern  No acute ischemic ST or T-wave changes               ED Course               Identification of Seniors at Risk      Most Recent Value   (ISAR) Identification of Seniors at Risk   Before the illness or injury that brought you to the Emergency, did you need someone to help you on a regular basis? 1 Filed at: 07/27/2021 1604   In the last 24 hours, have you needed more help than usual?  1 Filed at: 07/27/2021 1604   Have you been hospitalized for one or more nights during the past 6 months? 1 Filed at: 07/27/2021 1604   In general, do you see well?  0 Filed at: 07/27/2021 1604   In general, do you have serious problems with your memory? 1 Filed at: 07/27/2021 1604   Do you take more than three different medications every day? 1 Filed at: 07/27/2021 1604   ISAR Score  5 Filed at: 07/27/2021 1604                    SBIRT 22yo+      Most Recent Value   SBIRT (23 yo +)   In order to provide better care to our patients, we are screening all of our patients for alcohol and drug use  Would it be okay to ask you these screening questions? Yes Filed at: 07/27/2021 1832   Initial Alcohol Screen: US AUDIT-C    1  How often do you have a drink containing alcohol?  0 Filed at: 07/27/2021 1832   2  How many drinks containing alcohol do you have on a typical day you are drinking? 0 Filed at: 07/27/2021 1832   3a  Male UNDER 65: How often do you have five or more drinks on one occasion? 0 Filed at: 07/27/2021 1832   3b  FEMALE Any Age, or MALE 65+: How often do you have 4 or more drinks on one occassion? 0 Filed at: 07/27/2021 1832   Audit-C Score  0 Filed at: 07/27/2021 1832   ELYSIA: How many times in the past year have you    Used an illegal drug or used a prescription medication for non-medical reasons? Never Filed at: 07/27/2021 1832                    MDM  Number of Diagnoses or Management Options  Diagnosis management comments: Family can no longer take care of patient at home as she cannot ambulate and is incontinent  She did have a few leukocytes in her urine  There may be urinary tract infection  Will cover with antibiotics  Chest x-ray was not convincing for significant pulmonary edema  Consulted with case management  They saw patient in the emergency room  She will be assigned to observation and case management will be looking into long-term placement  Amount and/or Complexity of Data Reviewed  Clinical lab tests: ordered and reviewed  Tests in the radiology section of CPT®: ordered and reviewed  Discuss the patient with other providers: yes  Independent visualization of images, tracings, or specimens: yes        Disposition  Final diagnoses:   Ambulatory dysfunction   Metabolic encephalopathy   Urinary tract infection   Incontinent of urine   Dyspnea     Time reflects when diagnosis was documented in both MDM as applicable and the Disposition within this note     Time User Action Codes Description Comment    7/27/2021  6:58 PM Chapincito Beckman Add [R26 2] Ambulatory dysfunction     7/27/2021  6:58 PM Chapincito Beckman Add [Q26 05] Metabolic encephalopathy     7/27/2021  6:58 PM Chapincito Beckman Add [N39 0] Urinary tract infection     7/27/2021  6:58 PM Chapincito Beckman Add [R32] Incontinent of urine     7/27/2021  6:58 PM Chapincito Beckman Add [R06 00] Dyspnea       ED Disposition     ED Disposition Condition Date/Time Comment    Admit Stable Tue Jul 27, 2021  6:58 PM         Follow-up Information    None         Patient's Medications   Discharge Prescriptions    No medications on file     No discharge procedures on file      PDMP Review     None          ED Provider  Electronically Signed by           Jeny Boss MD  07/27/21 4000

## 2021-07-27 NOTE — ASSESSMENT & PLAN NOTE
57-year-old lady who was recently admitted from 06/19/2021 until 06/29/2021 secondary to acute metabolic encephalopathy secondary to hyponatremia/UTI/hypoglycemia and discharged to [address] [place] [address] rehab, recently discharged home 4 days ago, presents accompanied by her son due to symptoms of worsening weakness/worsening confusion and significant difficulty conducting activities of daily living  · Labs noted mild hypokalemia with potassium of 3 3  Sodium normal at 135  · TSH within normal limits  · UA notes small leukocyte esterase with 2-4 WBC and occasional bacteria  Urine culture ordered  Status post 1 dose of IV ceftriaxone in the ED  Holding off on further antibiotics unless patient spikes fever or elevation in white blood cell count or any new symptoms  · Chest x-ray negative for any acute findings (official radiology read pending)  · Patient was hypoglycemic on arrival with blood sugar 40  Likely contributing to patient's weakness  Received crackers/orange juice in ER  · Requested PT/OT evaluation

## 2021-07-27 NOTE — ED NOTES
Provider aware of pt blood sugar  Pt awake and denies complaints, given orange juice and crackers  Will recheck CBG       aRlph Lovett RN  07/27/21 1583

## 2021-07-27 NOTE — CASE MANAGEMENT
CM reviewed chart, recently discharged from [address] [place] [address]  CM called Miriam Fletcher  @ [phone] to inquire if patient is candidate to return to [address] [place] [address], and what resources patient had set up at discharge   Await phone return call

## 2021-07-27 NOTE — CASE MANAGEMENT
LOS 1 hour  Patient is a 30 day readmission  Patient is not a bundle  CM met with patient and son Luz Bui at bedside to discuss discharge plan and readmission   Patient has just discharged from [address] after a 21 day stay  Son states that insurance cut them off  They did pay privately for a bed at [address], but then decided to try it at home  Patient was set up with [facility]  The [facility] was for today  Son reported that patient lives with his father (her ) in a ranch home with 2 JAX  AT baseline Patient uses a cane, walker, and a wheelchair depending on how patient is feeling that day and where she is going  Patient is usually okay with navigating stairs  Patient requires some assistance with ADLs, and her  and sons provide as much care as they can, but feel that her mobility is declining  Patient has Hx of STR at the [facility]  Patient fills her prescriptions at [facility] in [facility], and son denied any barriers to obtaining or affording prescriptions  Patient's PCP is Dr Boyd Gonsales  Son denied MH and SA Hx  Patient does not have a POA, but her son Michelle is her HCR and assists with medical decisions  Patient is retired and not able to drive  Her family assists with transportation and will provide transportation at discharge      CM reviewed discharge planning process including the following: identifying help at home, patient preference for discharge planning needs, pharmacy preference, and availability of treatment team to discuss questions or concerns patient and/or family may have regarding understanding medications and recognizing signs and symptoms once discharged  CM also encouraged patient to follow up with all recommended appointments after discharge  Patient advised of importance for patient and family to participate in managing patients medical well being      Son reported that the family has been discussing STR vs LTP    Son reported that they have been in touch with [facility] regarding LTC and would prefer there if possible since father is on a list for SR apartment housing there  Any local facility would be agreeable  Also in agreement with [facility] to search for LTP bed    CM sent referral to Local SNFs and [facility] at sons request

## 2021-07-27 NOTE — CASE MANAGEMENT
Referral to [facility] generated in [place] to inform agency patient is in ED    Referral to local SNFs and [address] generated in [place] for [address]

## 2021-07-28 LAB
ANION GAP SERPL CALCULATED.3IONS-SCNC: 7 MMOL/L (ref 4–13)
BUN SERPL-MCNC: 2 MG/DL (ref 5–25)
CALCIUM SERPL-MCNC: 8.1 MG/DL (ref 8.3–10.1)
CHLORIDE SERPL-SCNC: 98 MMOL/L (ref 100–108)
CO2 SERPL-SCNC: 32 MMOL/L (ref 21–32)
CREAT SERPL-MCNC: 0.4 MG/DL (ref 0.6–1.3)
ERYTHROCYTE [DISTWIDTH] IN BLOOD BY AUTOMATED COUNT: 16.9 % (ref 11.6–15.1)
GFR SERPL CREATININE-BSD FRML MDRD: 101 ML/MIN/1.73SQ M
GLUCOSE P FAST SERPL-MCNC: 73 MG/DL (ref 65–99)
GLUCOSE SERPL-MCNC: 106 MG/DL (ref 65–140)
GLUCOSE SERPL-MCNC: 144 MG/DL (ref 65–140)
GLUCOSE SERPL-MCNC: 184 MG/DL (ref 65–140)
GLUCOSE SERPL-MCNC: 208 MG/DL (ref 65–140)
GLUCOSE SERPL-MCNC: 49 MG/DL (ref 65–140)
GLUCOSE SERPL-MCNC: 67 MG/DL (ref 65–140)
GLUCOSE SERPL-MCNC: 73 MG/DL (ref 65–140)
GLUCOSE SERPL-MCNC: 81 MG/DL (ref 65–140)
GLUCOSE SERPL-MCNC: 89 MG/DL (ref 65–140)
HCT VFR BLD AUTO: 38.5 % (ref 34.8–46.1)
HGB BLD-MCNC: 12.1 G/DL (ref 11.5–15.4)
MCH RBC QN AUTO: 26.7 PG (ref 26.8–34.3)
MCHC RBC AUTO-ENTMCNC: 31.4 G/DL (ref 31.4–37.4)
MCV RBC AUTO: 85 FL (ref 82–98)
PLATELET # BLD AUTO: 393 THOUSANDS/UL (ref 149–390)
PMV BLD AUTO: 8.2 FL (ref 8.9–12.7)
POTASSIUM SERPL-SCNC: 2.9 MMOL/L (ref 3.5–5.3)
RBC # BLD AUTO: 4.53 MILLION/UL (ref 3.81–5.12)
SODIUM SERPL-SCNC: 137 MMOL/L (ref 136–145)
WBC # BLD AUTO: 9.12 THOUSAND/UL (ref 4.31–10.16)

## 2021-07-28 PROCEDURE — 82948 REAGENT STRIP/BLOOD GLUCOSE: CPT

## 2021-07-28 PROCEDURE — 99232 SBSQ HOSP IP/OBS MODERATE 35: CPT | Performed by: INTERNAL MEDICINE

## 2021-07-28 PROCEDURE — 97163 PT EVAL HIGH COMPLEX 45 MIN: CPT

## 2021-07-28 PROCEDURE — 80048 BASIC METABOLIC PNL TOTAL CA: CPT | Performed by: FAMILY MEDICINE

## 2021-07-28 PROCEDURE — 85027 COMPLETE CBC AUTOMATED: CPT | Performed by: FAMILY MEDICINE

## 2021-07-28 RX ORDER — SIMETHICONE 80 MG
80 TABLET,CHEWABLE ORAL EVERY 6 HOURS PRN
Status: DISCONTINUED | OUTPATIENT
Start: 2021-07-28 | End: 2021-08-06 | Stop reason: HOSPADM

## 2021-07-28 RX ORDER — MICONAZOLE NITRATE 20 MG/G
CREAM TOPICAL 2 TIMES DAILY
Status: DISCONTINUED | OUTPATIENT
Start: 2021-07-28 | End: 2021-08-06 | Stop reason: HOSPADM

## 2021-07-28 RX ORDER — HEPARIN SODIUM 5000 [USP'U]/ML
5000 INJECTION, SOLUTION INTRAVENOUS; SUBCUTANEOUS EVERY 8 HOURS SCHEDULED
Status: DISCONTINUED | OUTPATIENT
Start: 2021-07-28 | End: 2021-08-06 | Stop reason: HOSPADM

## 2021-07-28 RX ORDER — POLYETHYLENE GLYCOL 3350 17 G/17G
17 POWDER, FOR SOLUTION ORAL DAILY
Status: DISCONTINUED | OUTPATIENT
Start: 2021-07-28 | End: 2021-08-06 | Stop reason: HOSPADM

## 2021-07-28 RX ORDER — CALCIUM CARBONATE 200(500)MG
500 TABLET,CHEWABLE ORAL 3 TIMES DAILY PRN
Status: DISCONTINUED | OUTPATIENT
Start: 2021-07-28 | End: 2021-08-06 | Stop reason: HOSPADM

## 2021-07-28 RX ORDER — AMOXICILLIN 250 MG
1 CAPSULE ORAL 2 TIMES DAILY
Status: DISCONTINUED | OUTPATIENT
Start: 2021-07-28 | End: 2021-08-06 | Stop reason: HOSPADM

## 2021-07-28 RX ORDER — POTASSIUM CHLORIDE 20 MEQ/1
40 TABLET, EXTENDED RELEASE ORAL ONCE
Status: COMPLETED | OUTPATIENT
Start: 2021-07-28 | End: 2021-07-28

## 2021-07-28 RX ORDER — POTASSIUM CHLORIDE 20 MEQ/1
20 TABLET, EXTENDED RELEASE ORAL ONCE
Status: COMPLETED | OUTPATIENT
Start: 2021-07-28 | End: 2021-07-28

## 2021-07-28 RX ADMIN — MONTELUKAST SODIUM 10 MG: 10 TABLET, FILM COATED ORAL at 21:28

## 2021-07-28 RX ADMIN — MONTELUKAST SODIUM 10 MG: 10 TABLET, FILM COATED ORAL at 00:02

## 2021-07-28 RX ADMIN — QUETIAPINE FUMARATE 12.5 MG: 25 TABLET ORAL at 00:02

## 2021-07-28 RX ADMIN — MICONAZOLE NITRATE: 20 CREAM TOPICAL at 17:45

## 2021-07-28 RX ADMIN — ATORVASTATIN CALCIUM 10 MG: 10 TABLET, FILM COATED ORAL at 09:29

## 2021-07-28 RX ADMIN — HEPARIN SODIUM 5000 UNITS: 5000 INJECTION INTRAVENOUS; SUBCUTANEOUS at 21:27

## 2021-07-28 RX ADMIN — FUROSEMIDE 20 MG: 20 TABLET ORAL at 09:29

## 2021-07-28 RX ADMIN — QUETIAPINE FUMARATE 12.5 MG: 25 TABLET ORAL at 21:27

## 2021-07-28 RX ADMIN — FUROSEMIDE 20 MG: 20 TABLET ORAL at 17:45

## 2021-07-28 RX ADMIN — HEPARIN SODIUM 5000 UNITS: 5000 INJECTION INTRAVENOUS; SUBCUTANEOUS at 13:03

## 2021-07-28 RX ADMIN — Medication 2 G: at 16:44

## 2021-07-28 RX ADMIN — Medication 2 G: at 13:00

## 2021-07-28 RX ADMIN — POTASSIUM CHLORIDE 20 MEQ: 1500 TABLET, EXTENDED RELEASE ORAL at 16:48

## 2021-07-28 RX ADMIN — POLYETHYLENE GLYCOL 3350 17 G: 17 POWDER, FOR SOLUTION ORAL at 16:48

## 2021-07-28 RX ADMIN — PAROXETINE 10 MG: 20 TABLET, FILM COATED ORAL at 09:29

## 2021-07-28 RX ADMIN — MICONAZOLE NITRATE: 20 CREAM TOPICAL at 10:00

## 2021-07-28 RX ADMIN — POTASSIUM CHLORIDE 40 MEQ: 1500 TABLET, EXTENDED RELEASE ORAL at 09:28

## 2021-07-28 RX ADMIN — Medication 2 G: at 09:30

## 2021-07-28 RX ADMIN — HEPARIN SODIUM 5000 UNITS: 5000 INJECTION INTRAVENOUS; SUBCUTANEOUS at 00:02

## 2021-07-28 RX ADMIN — HEPARIN SODIUM 5000 UNITS: 5000 INJECTION INTRAVENOUS; SUBCUTANEOUS at 06:17

## 2021-07-28 RX ADMIN — SIMETHICONE 80 MG: 80 TABLET, CHEWABLE ORAL at 00:16

## 2021-07-28 RX ADMIN — ANTACID TABLETS 500 MG: 500 TABLET, CHEWABLE ORAL at 21:27

## 2021-07-28 RX ADMIN — DOCUSATE SODIUM AND SENNOSIDES 1 TABLET: 8.6; 5 TABLET, FILM COATED ORAL at 16:44

## 2021-07-28 NOTE — PLAN OF CARE
Problem: PHYSICAL THERAPY ADULT  Goal: Performs mobility at highest level of function for planned discharge setting  See evaluation for individualized goals  Description: Treatment/Interventions: Functional transfer training, LE strengthening/ROM, Therapeutic exercise, Endurance training, Patient/family training, Bed mobility, Gait training, Spoke to nursing          See flowsheet documentation for full assessment, interventions and recommendations  Note: Prognosis: Good  Problem List: Decreased strength, Decreased endurance, Impaired balance, Decreased mobility, Pain  Assessment: Pt is 66year old female seen for PT evaluation s/p admit to [facility] on 7/27/2021 with Weakness  PT consulted to assess pt's functional mobility and d/c needs  Order placed for PT eval and tx, with ambulate patient order  Comorbidities affecting pt's physical performance at time of assessment include asthma, anxiety disorder, type 2 DM, dyslipidemia, depression, chronic diastolic CHF, essential hypertension, and hypoglycemia  PTA, pt was requiring assist for mobility and was ambulating with a walker  Personal factors affecting pt at time of IE include ambulating with an assistive device, stairs to enter home, inability to ambulate household distances, inability to navigate level surfaces without external assistance, limited home support, preferred language not [place] (language barrier), positive fall history, inability to perform IADLs, and inability to perform ADLs  Please find objective findings from PT assessment regarding body systems outlined above with impairments and limitations including weakness, impaired balance, decreased endurance, gait deviations, pain, decreased activity tolerance, decreased functional mobility tolerance, and fall risk  The following objective measures performed on IE also reveal limitations: Barthel Index: 40/100 and Modified Abell: 4 (moderate/severe disability)   Pt's clinical presentation is currently unstable/unpredictable seen in pt's presentation of need for ongoing medical management/monitoring, pt is a fall risk, and pt requires cues/assist for safety with functional mobility  Pt to benefit from continued PT tx to address deficits as defined above and maximize level of functional independent mobility and consistency  From PT/mobility standpoint, recommendation at time of d/c would be STR pending progress in order to facilitate return to PLOF  Barriers to Discharge: Inaccessible home environment, Decreased caregiver support     PT Discharge Recommendation: Post acute rehabilitation services     PT - OK to Discharge: Yes (when medically cleared, if to STR)    See flowsheet documentation for full assessment

## 2021-07-28 NOTE — UTILIZATION REVIEW
OBSERVATION 7/27/21 @ 1845 CONVERTED TO INPATIENT FOR 7/28/21 @ 1424 DUE TO   Initial Clinical Review  07/28/21 1424  Inpatient Admission Once     Transfer Service: General Medicine       Question Answer Comment   Level of Care Med Surg    Estimated length of stay More than 2 Midnights    Certification I certify that inpatient services are medically necessary for this patient for a duration of greater than two midnights  See H&P and MD Progress Notes for additional information about the patient's course of treatment  07/28/21        Admission: Date/Time/Statement:       ED Arrival Information     Expected Arrival Acuity    - 7/27/2021 15:51 Urgent         Means of arrival Escorted by Service Admission type    Wheelchair Family Member General Medicine Urgent         Arrival complaint    SOB altered mental status        Chief Complaint   Patient presents with    Shortness of Breath     Pt's son reports pt was in rehab and sent home  Pt has she chronic asthma  Pt's son reports they can no longer care for her at home        Initial Presentation: 66year old female to the ED from home with complaints of shortness of breath, weakness, altered mental status since being discharged from rehab facility 4 days prior  Admitted under observation then converted to inpatient  for weakness, hypoglycemia  Inpatient from 5/71/82-5/53 for metabolic encephalopathy, hyponatremia  ON arrival, glucose was 40  Given crackers and orange juice  HOld oral hypoglycemics  Regular diet  Check glucose every 6 hours  GCS 14  PT/OT eval     7/28 Update: Continue with accuchecks  Noted to have hypokalemia  Urin cultures pending    PT/OT eval      ED Triage Vitals   Temperature Pulse Respirations Blood Pressure SpO2   07/27/21 1605 07/27/21 1603 07/27/21 1603 07/27/21 1603 07/27/21 1603   97 6 °F (36 4 °C) 104 18 149/77 93 %      Temp Source Heart Rate Source Patient Position - Orthostatic VS BP Location FiO2 (%)   07/27/21 1605 07/27/21 1603 07/27/21 1603 07/27/21 1603 --   Oral Monitor Sitting Left arm       Pain Score       07/27/21 2257       No Pain          Wt Readings from Last 1 Encounters:   07/27/21 78 5 kg (173 lb 1 oz)     Time  Temp  Pulse  Resp  BP  MAP (mmHg)  SpO2  Calculated FIO2 (%) - Nasal Cannula  Nasal Cannula O2 Flow Rate (L/min)  O2 Device  Patient Position - Orthostatic VS   07/28/21 0626  98 2 °F (36 8 °C)  86  19  164/98  120  98 %  --  --  --  --   07/27/21 22:57:51  98 7 °F (37 1 °C)  --  17  160/101Abnormal   121  97 %  28  2 L/min  Nasal cannula  --   07/27/21 2215  --  86  22  161/83  --  100 %  --  --  None (Room air)  --   07/27/21 1930  --  84  20  155/87  128  100 %  28  2 L/min  Nasal cannula  --   07/27/21 1831  --  94  24Abnormal   --  --  98 %  28  2 L/min  Nasal cannula  --   07/27/21 1830  --  100  22  158/86  116  96 %  28  2 L/min  Nasal cannula  Lying   07/27/21 1800  --  99  18  148/69  99  97 %  --  --  None (Room air)  --   07/27/21 1730  --  --  --  --  --  --  --  --  None (Room air)  --   07/27/21 1700  --  103  20  168/87  120  99 %  --  --  None (Room air)  --   07/27/21 1605  97 6 °F (36 4 °C)  --  --  --  --  --  --  --  --  --   07/27/21 1603  --  104  18  149/77  --  93 %  --  --  None (Room air)  Sitting        Additional Vital Signs    :Pertinent Labs/Diagnostic Test Results:   7/27 EKG: Comments:      Sinus tachycardia with PAC   Right superior axis deviation   Pulmonary   disease pattern   No acute ischemic ST or T-wave changes  7/28 CXR: No acute disease  Redemonstration of pulmonary artery enlargement compatible with pulmonary hypertension         Results from last 7 days   Lab Units 07/28/21  0443 07/27/21  1644   WBC Thousand/uL 9 12 10 65*   HEMOGLOBIN g/dL 12 1 12 5   HEMATOCRIT % 38 5 39 9   PLATELETS Thousands/uL 393* 423*   NEUTROS ABS Thousands/µL  --  7 94*         Results from last 7 days   Lab Units 07/28/21  0443 07/27/21  1644   SODIUM mmol/L 137 135*   POTASSIUM mmol/L 2  9* 3 3*   CHLORIDE mmol/L 98* 95*   CO2 mmol/L 32 30   ANION GAP mmol/L 7 10   BUN mg/dL 2* 3*   CREATININE mg/dL 0 40* 0 42*   EGFR ml/min/1 73sq m 101 99   CALCIUM mg/dL 8 1* 8 6     Results from last 7 days   Lab Units 07/27/21  1644   AST U/L 65*   ALT U/L 31   ALK PHOS U/L 52   TOTAL PROTEIN g/dL 7 2   ALBUMIN g/dL 3 1*   TOTAL BILIRUBIN mg/dL 0 61     Results from last 7 days   Lab Units 07/28/21  0743 07/28/21  0616 07/28/21  0442 07/28/21  0135 07/28/21  0021 07/27/21  1717 07/27/21  1656   POC GLUCOSE mg/dl 89 106 67 81 49* 59* 37*     Results from last 7 days   Lab Units 07/28/21  0443 07/27/21  1644   GLUCOSE RANDOM mg/dL 73 40*         Results from last 7 days   Lab Units 07/27/21  1644   TROPONIN I ng/mL 0 04             Results from last 7 days   Lab Units 07/27/21  1644   TSH 3RD GENERATON uIU/mL 0 626         Results from last 7 days   Lab Units 07/27/21  1644   NT-PRO BNP pg/mL 2,206*       Results from last 7 days   Lab Units 07/27/21  1749   CLARITY UA  Clear   COLOR UA  Light Yellow   SPEC GRAV UA  1 015   PH UA  7 5   GLUCOSE UA mg/dl Negative   KETONES UA mg/dl Negative   BLOOD UA  Negative   PROTEIN UA mg/dl Negative   NITRITE UA  Negative   BILIRUBIN UA  Negative   UROBILINOGEN UA E U /dl 0 2   LEUKOCYTES UA  Small*   WBC UA /hpf 2-4   RBC UA /hpf 0-1   BACTERIA UA /hpf Occasional   EPITHELIAL CELLS WET PREP /hpf Occasional   ED Treatment:   Medication Administration from 07/27/2021 1551 to 07/27/2021 2245       Date/Time Order Dose Route Action     07/27/2021 1958 furosemide (LASIX) tablet 20 mg 20 mg Oral Given     07/27/2021 1958 potassium chloride (K-DUR,KLOR-CON) CR tablet 40 mEq 40 mEq Oral Given     07/27/2021 1958 ceftriaxone (ROCEPHIN) 1 g/50 mL in dextrose IVPB 1,000 mg Intravenous New Bag        Past Medical History:   Diagnosis Date    Asthma     Diabetes mellitus ([address] )     Glaucoma     Hypertension     Hypoglycemia 6/20/2021       Admitting Diagnosis: Metabolic encephalopathy [G93 41]  Incontinent of urine [R32]  Dyspnea [R06 00]  SOB (shortness of breath) [R06 02]  Urinary tract infection [N39 0]  Ambulatory dysfunction [R26 2]  Age/Sex: 66 y o  female  Admission Orders:  UP and OOB  SCDS  Urine culture  Scheduled Medications:  atorvastatin, 10 mg, Oral, Daily  furosemide, 20 mg, Oral, BID  heparin (porcine), 5,000 Units, Subcutaneous, Q8H [address]  ARLEY ANTIFUNGAL, , Topical, BID  montelukast, 10 mg, Oral, HS  PARoxetine, 10 mg, Oral, QAM  potassium chloride, 20 mEq, Oral, Once  QUEtiapine, 12 5 mg, Oral, HS  sodium chloride, 2 g, Oral, TID With Meals      Continuous IV Infusions:     PRN Meds:  acetaminophen, 650 mg, Oral, Q6H PRN  albuterol, 2 puff, Inhalation, Q6H PRN  albuterol, 2 5 mg, Nebulization, Q6H PRN  LORazepam, 0 5 mg, Oral, Q6H PRN  simethicone, 80 mg, Oral, Q6H PRN        None    Network Utilization Review Department  ATTENTION: Please call with any questions or concerns to [phone] and carefully listen to the prompts so that you are directed to the right person  All voicemails are confidential   Gregg Boston all requests for admission clinical reviews, approved or denied determinations and any other requests to dedicated fax number below belonging to the campus where the patient is receiving treatment   List of dedicated fax numbers for the Facilities:  [address] [place] [address] DENIALS (Administrative/Medical Necessity) [phone]   [address] (Maternity/NICU/Pediatrics) [phone]   [address]   [address] [address] [facility] [address] [address] [address] [facility] [phone]   [place] [address] [phone]

## 2021-07-28 NOTE — ASSESSMENT & PLAN NOTE
"Chief Complaint   Patient presents with     Ear Problem     Patient is here with both parents. Mom states the patient will struggle on and off when his ears are plugged. Mom reports the patient has had two sets of tubes, she is unsure if they are still in place. Parents deny drainage. Mom states she has no other concerns at this time.        Ht 4' 1.5\" (125.7 cm)   Wt 73 lb 12.8 oz (33.5 kg)   BMI 21.18 kg/m      Opal Rain LPN  " · Continue with Paxil/Seroquel

## 2021-07-28 NOTE — ASSESSMENT & PLAN NOTE
· Blood sugar 40 on arrival  · Holding patient's metformin/glimepiride/Januvia  · Will dc glimepiride on dc  · Regular diet ordered  · Accu-Cheks q 6 hours

## 2021-07-28 NOTE — PHYSICAL THERAPY NOTE
Physical Therapy Evaluation     Patient's Name: Jaida Lew    Admitting Diagnosis  Metabolic encephalopathy [Y45 47]  Incontinent of urine [R32]  Dyspnea [R06 00]  SOB (shortness of breath) [R06 02]  Urinary tract infection [N39 0]  Ambulatory dysfunction [R26 2]    Problem List  Patient Active Problem List   Diagnosis    Acute pulmonary insufficiency    Asthma    Obesity    Anxiety disorder    Diabetes mellitus type 2 in obese ([facility] )    Dyslipidemia    Depression    Accelerated hypertension    Bleeding per rectum    GI bleed    Hyponatremia    Acute diastolic congestive heart failure (HCC)    Pulmonary hypertension (HCC)    Chronic diastolic congestive heart failure ([facility] )    Essential hypertension    Weakness    Hypoglycemia     Past Medical History  Past Medical History:   Diagnosis Date    Asthma     Diabetes mellitus ([facility] )     Glaucoma     Hypertension     Hypoglycemia 6/20/2021     Past Surgical History  Past Surgical History:   Procedure Laterality Date    COLONOSCOPY N/A 12/8/2018    Procedure: COLONOSCOPY;  Surgeon: Patti Virgen MD;  Location: [facility]; Service: Gastroenterology      07/28/21 0808   PT Last Visit   PT Visit Date 07/28/21   Note Type   Note type Evaluation   Pain Assessment   Pain Assessment Tool 0-10   Pain Score 3  (0/10 at rest; 3/10 with activity)   Pain Location/Orientation Location: Back   Hospital Pain Intervention(s) Repositioned; Ambulation/increased activity   Home Living   Type of Home House  [facility])   Home Layout One level;Stairs to enter with rails  ([address])   [address] Walker;Cane;Wheelchair-manual   Additional Comments Pt ambulates with a walker  Prior Function   Level of [place] Needs assistance with ADLs and functional mobility; Needs assistance with IADLs   Lives With Spouse   Receives Help From Family   ADL Assistance Needs assistance   IADLs Needs assistance   Falls in the last 6 months 1 to 4   Comments Information on home set up and PLOF obtained from patient and chart review; Pt recently at [facility] for STR and discharged home; pt currently requiring increased assistance   Restrictions/Precautions   Weight Bearing Precautions Per Order No   Other Precautions Cognitive; Chair Alarm; Bed Alarm; Fall Risk;Pain;Multiple lines   General   Family/Caregiver Present No   Cognition   Overall Cognitive Status   (questionable)   Arousal/Participation Alert   Orientation Level Oriented X4   Memory Decreased short term memory;Decreased recall of recent events   Following Commands Follows multistep commands with increased time or repetition   Comments Pt agreeable to PT    RLE Assessment   RLE Assessment X   Strength RLE   RLE Overall Strength 3+/5   LLE Assessment   LLE Assessment X   Strength LLE   LLE Overall Strength 3+/5   Light Touch   RLE Light Touch Grossly intact   LLE Light Touch Grossly intact   Bed Mobility   Supine to Sit 4  Minimal assistance   Additional items Assist x 1;HOB elevated; Bedrails; Increased time required;Verbal cues;LE management   Transfers   Sit to Stand 3  Moderate assistance   Additional items Assist x 1; Increased time required;Verbal cues   Stand to Sit 3  Moderate assistance   Additional items Assist x 1; Armrests; Increased time required;Verbal cues   Ambulation/Elevation   Gait pattern Excessively slow; Step to;Short stride; Shuffling   Gait Assistance 4  Minimal assist   Additional items Assist x 1;Verbal cues   Assistive Device Rolling walker   Distance 3 feet, bed to chair   Stair Management Assistance Not tested   Balance   Static Sitting Fair +   Dynamic Sitting Fair   Static Standing Fair -   Dynamic Standing Poor +   Ambulatory Poor +   Endurance Deficit   Endurance Deficit Yes   Activity Tolerance   Activity Tolerance Patient tolerated treatment well   Nurse Made Aware Discussed case with CANDACE Hoang; post session pt was left seated in recliner in NAD, all belongings within reach, +chair alarm   Assessment   Prognosis Good   Problem List Decreased strength;Decreased endurance; Impaired balance;Decreased mobility;Pain   Assessment Pt is 66year old female seen for PT evaluation s/p admit to [facility] on 7/27/2021 with Weakness  PT consulted to assess pt's functional mobility and d/c needs  Order placed for PT eval and tx, with ambulate patient order  Comorbidities affecting pt's physical performance at time of assessment include asthma, anxiety disorder, type 2 DM, dyslipidemia, depression, chronic diastolic CHF, essential hypertension, and hypoglycemia  PTA, pt was requiring assist for mobility and was ambulating with a walker  Personal factors affecting pt at time of IE include ambulating with an assistive device, stairs to enter home, inability to ambulate household distances, inability to navigate level surfaces without external assistance, limited home support, preferred language not [place] (language barrier), positive fall history, inability to perform IADLs, and inability to perform ADLs  Please find objective findings from PT assessment regarding body systems outlined above with impairments and limitations including weakness, impaired balance, decreased endurance, gait deviations, pain, decreased activity tolerance, decreased functional mobility tolerance, and fall risk  The following objective measures performed on IE also reveal limitations: Barthel Index: 40/100 and Modified [place]: 4 (moderate/severe disability)  Pt's clinical presentation is currently unstable/unpredictable seen in pt's presentation of need for ongoing medical management/monitoring, pt is a fall risk, and pt requires cues/assist for safety with functional mobility  Pt to benefit from continued PT tx to address deficits as defined above and maximize level of functional independent mobility and consistency   From PT/mobility standpoint, recommendation at time of d/c would be STR pending progress in order to facilitate return to PLOF  Barriers to Discharge Inaccessible home environment;Decreased caregiver support   Goals   STG Expiration Date 08/07/21   Short Term Goal #1 In 7-10 days: Increase bilateral LE strength 1/2 grade to facilitate independent mobility, Perform all bed mobility tasks with close supervision to decrease caregiver burden, Perform all transfers with CG assist to improve independence, Ambulate > 100 ft  with RW with CG assist w/o LOB and w/ normalized gait pattern 100% of the time and Increase all balance 1/2 grade to decrease risk for falls   Plan   Treatment/Interventions Functional transfer training;LE strengthening/ROM; Therapeutic exercise; Endurance training;Patient/family training;Bed mobility;Gait training;Spoke to nursing   PT Frequency Other (Comment)  (3-5x/wk)   Recommendation   PT Discharge Recommendation Post acute rehabilitation services   PT - OK to Discharge Yes  (when medically cleared, if to STR)   AM-PAC Basic Mobility Inpatient   Turning in Bed Without Bedrails 3   Lying on Back to Sitting on Edge of Flat Bed 3   Moving Bed to Chair 3   Standing Up From Chair 2   Walk in Room 2   Climb 3-5 Stairs 1   Basic Mobility Inpatient Raw Score 14   Basic Mobility Standardized Score 35 55   Modified [place] Scale   Modified [place] Scale 4   Barthel Index   Feeding 5   Bathing 0   Grooming Score 0   Dressing Score 5   Bladder Score 5   Bowels Score 10   Toilet Use Score 5   Transfers (Bed/Chair) Score 10   Mobility (Level Surface) Score 0   Stairs Score 0   Barthel Index Score 40     Jessica Almeida, PT, DPT

## 2021-07-28 NOTE — ASSESSMENT & PLAN NOTE
Wt Readings from Last 3 Encounters:   07/27/21 78 5 kg (173 lb 1 oz)   06/29/21 84 1 kg (185 lb 6 5 oz)   06/08/21 94 kg (207 lb 3 7 oz)     · Continue with Lasix  · Also note patient currently on salt tablets given recent history of hyponatremia  · Monitor electrolytes/renal function  · Monitor intake/output, daily weights  · 2D echo on 06/21/2021 notes EF 60% with grade 1 diastolic dysfunction  · Patient currently on 2 L oxygen via nasal cannula which were patient's son is her new normal since last hospitalization

## 2021-07-28 NOTE — ASSESSMENT & PLAN NOTE
75-year-old lady who was recently admitted from 06/19/2021 until 06/29/2021 secondary to acute metabolic encephalopathy secondary to hyponatremia/UTI/hypoglycemia and discharged to [address] [place] [address] rehab, recently discharged home 4 days ago, presents accompanied by her son due to symptoms of worsening weakness/worsening confusion and significant difficulty conducting activities of daily living  · Labs noted mild hypokalemia with potassium of 3 3  Sodium normal at 135  · TSH within normal limits  · UA notes small leukocyte esterase with 2-4 WBC and occasional bacteria  Urine culture ordered  Status post 1 dose of IV ceftriaxone in the ED  Holding off on further antibiotics unless patient spikes fever or elevation in white blood cell count or any new symptoms  · Chest x-ray negative for any acute findings (official radiology read pending)  · Patient was hypoglycemic on arrival with blood sugar 40  Likely contributing to patient's weakness  Received crackers/orange juice in ER  · Requested PT/OT evaluation

## 2021-07-28 NOTE — PROGRESS NOTES
[address] [facility]  Progress Note - Nichelle Reyez 1945, 66 y o  female MRN: [id number]  Unit/Bed#: -01 Encounter: [id number]  Primary Care Provider: Kaycee Diaz MD   Date and time admitted to hospital: 7/27/2021  4:09 PM    Hypoglycemia  Assessment & Plan  · Blood sugar 40 on arrival  · Holding patient's metformin/glimepiride/Januvia  · Will dc glimepiride on dc  · Regular diet ordered  · Accu-Cheks q 6 hours  Essential hypertension  Assessment & Plan  · Adequately controlled  · Currently on Lasix  Chronic diastolic congestive heart failure [facility])  Assessment & Plan  Wt Readings from Last 3 Encounters:   07/27/21 78 5 kg (173 lb 1 oz)   06/29/21 84 1 kg (185 lb 6 5 oz)   06/08/21 94 kg (207 lb 3 7 oz)     · Continue with Lasix  · Also note patient currently on salt tablets given recent history of hyponatremia  · Monitor electrolytes/renal function  · Monitor intake/output, daily weights  · 2D echo on 06/21/2021 notes EF 60% with grade 1 diastolic dysfunction  · Patient currently on 2 L oxygen via nasal cannula which were patient's son is her new normal since last hospitalization  Depression  Assessment & Plan  · Continue with Paxil/Seroquel  Dyslipidemia  Assessment & Plan  · Continue with atorvastatin  Diabetes mellitus type 2 in obese [facility])  Assessment & Plan  Lab Results   Component Value Date    HGBA1C 6 8 (H) 01/12/2021       Recent Labs     07/28/21  0135 07/28/21  0442 07/28/21  0616 07/28/21  0743   POCGLU 81 67 106 89       Blood Sugar Average: Last 72 hrs:  (P) 69 [id number]     · Patient hypoglycemic on arrival  · Holding oral hypoglycemic agents  · Monitoring Accu-Cheks  Anxiety disorder  Assessment & Plan  · Continue with p r n  Xanax for anxiety  Asthma  Assessment & Plan  · Currently not in exacerbation  · On baseline 2 L oxygen via nasal cannula  · Continue with albuterol inhaler/albuterol nebulization      * Weakness  Assessment & Plan  75-year-old lady who was recently admitted from 2021 until 2021 secondary to acute metabolic encephalopathy secondary to hyponatremia/UTI/hypoglycemia and discharged to [address] [place] [address] rehab, recently discharged home 4 days ago, presents accompanied by her son due to symptoms of worsening weakness/worsening confusion and significant difficulty conducting activities of daily living  · Labs noted mild hypokalemia with potassium of 3 3  Sodium normal at 135  · TSH within normal limits  · UA notes small leukocyte esterase with 2-4 WBC and occasional bacteria  Urine culture ordered  Status post 1 dose of IV ceftriaxone in the ED  Holding off on further antibiotics unless patient spikes fever or elevation in white blood cell count or any new symptoms  · Chest x-ray negative for any acute findings (official radiology read pending)  · Patient was hypoglycemic on arrival with blood sugar 40  Likely contributing to patient's weakness  Received crackers/orange juice in ER  · Requested PT/OT evaluation  VTE Pharmacologic Prophylaxis:   Pharmacologic: Heparin  Mechanical VTE Prophylaxis in Place: Yes    Patient Centered Rounds: I have performed bedside rounds with nursing staff today  Discussions with Specialists or Other Care Team Provider: cm, nursing    Education and Discussions with Family / Patient: pt    Time Spent for Care: 30 minutes  More than 50% of total time spent on counseling and coordination of care as described above      Current Length of Stay: 0 day(s)    Current Patient Status: Observation   Certification Statement: The patient will continue to require additional inpatient hospital stay due to see above    Discharge Plan: see above    Code Status: Level 1 - Full Code      Subjective:   No acute complaints    Objective:     Vitals:   Temp (24hrs), Av 2 °F (36 8 °C), Min:97 6 °F (36 4 °C), Max:98 7 °F (37 1 °C)    Temp:  [97 6 °F (36 4 °C)-98 7 °F (37 1 °C)] 98 2 °F (36 8 °C)  HR:  [] 86  Resp:  [17-24] 19  BP: (148-168)/() 164/98  SpO2:  [93 %-100 %] 98 %  Body mass index is 33 8 kg/m²  Input and Output Summary (last 24 hours): Intake/Output Summary (Last 24 hours) at 7/28/2021 0850  Last data filed at 7/28/2021 0300  Gross per 24 hour   Intake 50 ml   Output 900 ml   Net -850 ml       Physical Exam:     Physical Exam  Constitutional:       General: She is not in acute distress  Appearance: She is well-developed  She is not diaphoretic  HENT:      Head: Normocephalic and atraumatic  Nose: Nose normal       Mouth/Throat:      Pharynx: No oropharyngeal exudate  Eyes:      General: No scleral icterus  Right eye: No discharge  Left eye: No discharge  Conjunctiva/sclera: Conjunctivae normal    Neck:      Thyroid: No thyromegaly  Vascular: No JVD  Cardiovascular:      Rate and Rhythm: Normal rate and regular rhythm  Heart sounds: Normal heart sounds  No murmur heard  No friction rub  No gallop  Pulmonary:      Effort: Pulmonary effort is normal  No respiratory distress  Breath sounds: Normal breath sounds  No wheezing or rales  Chest:      Chest wall: No tenderness  Abdominal:      General: Bowel sounds are normal  There is no distension  Palpations: Abdomen is soft  Tenderness: There is no abdominal tenderness  There is no guarding or rebound  Musculoskeletal:         General: No tenderness or deformity  Normal range of motion  Cervical back: Normal range of motion and neck supple  Skin:     General: Skin is warm and dry  Findings: No erythema or rash  Neurological:      Mental Status: She is alert  Mental status is at baseline  Cranial Nerves: No cranial nerve deficit  Sensory: No sensory deficit  Motor: No abnormal muscle tone        Coordination: Coordination normal            Additional Data:     Labs:    Results from last 7 days   Lab Units 07/28/21  9316 07/27/21  1644   WBC Thousand/uL 9 12 10 65*   HEMOGLOBIN g/dL 12 1 12 5   HEMATOCRIT % 38 5 39 9   PLATELETS Thousands/uL 393* 423*   NEUTROS PCT %  --  73   LYMPHS PCT %  --  14   MONOS PCT %  --  11   EOS PCT %  --  1     Results from last 7 days   Lab Units 07/28/21  0443 07/27/21  1644   SODIUM mmol/L 137 135*   POTASSIUM mmol/L 2 9* 3 3*   CHLORIDE mmol/L 98* 95*   CO2 mmol/L 32 30   BUN mg/dL 2* 3*   CREATININE mg/dL 0 40* 0 42*   ANION GAP mmol/L 7 10   CALCIUM mg/dL 8 1* 8 6   ALBUMIN g/dL  --  3 1*   TOTAL BILIRUBIN mg/dL  --  0 61   ALK PHOS U/L  --  52   ALT U/L  --  31   AST U/L  --  65*   GLUCOSE RANDOM mg/dL 73 40*         Results from last 7 days   Lab Units 07/28/21  0743 07/28/21  0616 07/28/21  0442 07/28/21  0135 07/28/21  0021 07/27/21  1717 07/27/21  1656   POC GLUCOSE mg/dl 89 106 67 81 49* 59* 37*                   * I Have Reviewed All Lab Data Listed Above  * Additional Pertinent Lab Tests Reviewed:  All Labs Within Last 24 Hours Reviewed    Imaging:    Imaging Reports Reviewed Today Include: na  Imaging Personally Reviewed by Myself Includes:  na    Recent Cultures (last 7 days):           Last 24 Hours Medication List:   Current Facility-Administered Medications   Medication Dose Route Frequency Provider Last Rate    acetaminophen  650 mg Oral Q6H PRN Martin Oneal MD      albuterol  2 puff Inhalation Q6H PRN Martin Oneal MD      albuterol  2 5 mg Nebulization Q6H PRN Martin Oneal MD      atorvastatin  10 mg Oral Daily Martin Oneal MD      furosemide  20 mg Oral BID Martin Oneal MD      heparin (porcine)  5,000 Units Subcutaneous Q8H [facility] Martin Oneal MD      LORazepam  0 5 mg Oral Q6H PRN Martin Oneal MD      montelukast  10 mg Oral HS Martin Oneal MD      PARoxetine  10 mg Oral QAM Martin Oneal MD      potassium chloride  20 mEq Oral Once Jonnanisha Parker MD      potassium chloride  40 mEq Oral Once Jonn Tayel, MD      QUEtiapine  12 5 mg Oral  [facility]      simethicone  80 mg Oral Q6H PRN Elizabeth Majano PA-C      sodium chloride  2 g Oral TID With Meals Martin Oneal MD          Today, Patient Was Seen By: Pérez Burnham MD    ** Please Note: Dictation voice to text software may have been used in the creation of this document   **

## 2021-07-28 NOTE — WOUND OSTOMY CARE
Progress Note - Wound   Kayla Morales 66 y o  female MRN: [id number]  Unit/Bed#: -01 Encounter: [id number]      Assessment:   Patient admitted due to weakness  History of asthma, diabetes, glaucoma, HTN  Wound care consulted for sacral wound  Patient agreeable to assessment, alert and oriented x3, assist of 1 to stand with walker for assessment, OOB to chair with EHOB cushion in place, wedges at bedside for turning and repositioning, heels elevated, incontinent of bowel and bladder, pure-wick in place for urine management, is dependent for care  1  MASD mid sacrum/intergluteal crease- Wound is round in shape, moist, partial thickness, 100% blanchable beefy red tissue, scant amount of serous drainage  Natalie-wound is dry, intact, scaly skin, noted to have a fungal rash with satellite lesions, blanchable red skin  Family member states the rash began while patient was at the nursing home  2  Bilateral buttock and heels are dry, intact, blanchable pink skin  3  The fungal rash extends onto the posterior upper thigh, the skin is dry and intact, with scaly skin  Educated patient on importance of frequent offloading of pressure via turning, repositioning, and weight-shifting  Verbalized understanding of plan of care  No induration, fluctuance, odor, warmth, redness, or purulence noted to the above noted wound  Patient tolerated well, denies pain to the wound  Primary nurse aware of plan of care  See flow sheets for more detailed assessment findings  Will follow along  Skin care plans:  1-Apply ARLEY to sacrum, buttocks, and posterior upper thigh BID and PRN  2-Hydraguard to bilateral heel BID and PRN  3-Elevate heels to offload pressure  4-Ehob cushion when out of bed  5-Turn/repoisiton q2h or when medically stable for pressure re-distribution on skin  6-Moisturize skin daily with skin nourishing cream   7-Wound care will follow weekly, tiger text with questions or concerns       Wound 07/27/21 MASSEAN Sacrum Inner (Active)   Wound Image   07/28/21 0857   Wound Description Beefy red;Pink 07/28/21 0857   Natalie-wound Assessment Dry; Intact;Rash;Scaly;Pink 07/28/21 0857   Wound Length (cm) 0 3 cm 07/28/21 0857   Wound Width (cm) 0 3 cm 07/28/21 0857   Wound Depth (cm) 0 1 cm 07/28/21 0857   Wound Surface Area (cm^2) 0 09 cm^2 07/28/21 0857   Wound Volume (cm^3) 0 009 cm^3 07/28/21 0857   Calculated Wound Volume (cm^3) 0 01 cm^3 07/28/21 0857   Drainage Amount Scant 07/28/21 0857   Drainage Description Serous 07/28/21 0857   Non-staged Wound Description Partial thickness 07/28/21 0857   Treatments Cleansed;Site care 07/28/21 0857   Dressing Other (Comment) 07/28/21 0857   Wound packed?  No 07/28/21 0857   Patient Tolerance Tolerated well 07/28/21 0857     Call or tigertext with any questions  Wound Care will continue to follow    Brandy Mcdaniel RN BSN  Wound care

## 2021-07-28 NOTE — PLAN OF CARE
Problem: Potential for Falls  Goal: Patient will remain free of falls  Description: INTERVENTIONS:  - Educate patient/family on patient safety including physical limitations  - Instruct patient to call for assistance with activity   - Consult OT/PT to assist with strengthening/mobility   - Keep Call bell within reach  - Keep bed low and locked with side rails adjusted as appropriate  - Keep care items and personal belongings within reach  - Initiate and maintain comfort rounds  - Make Fall Risk Sign visible to staff  - Offer Toileting every 2 Hours, in advance of need  - Initiate/Maintain bed alarm  - Obtain necessary fall risk management equipment: yellow socks and bracelet   - Apply yellow socks and bracelet for high fall risk patients  - Consider moving patient to room near nurses station  Outcome: Progressing     Problem: MOBILITY - ADULT  Goal: Maintain or return to baseline ADL function  Description: INTERVENTIONS:  -  Assess patient's ability to carry out ADLs; assess patient's baseline for ADL function and identify physical deficits which impact ability to perform ADLs (bathing, care of mouth/teeth, toileting, grooming, dressing, etc )  - Assess/evaluate cause of self-care deficits   - Assess range of motion  - Assess patient's mobility; develop plan if impaired  - Assess patient's need for assistive devices and provide as appropriate  - Encourage maximum independence but intervene and supervise when necessary  - Involve family in performance of ADLs  - Assess for home care needs following discharge   - Consider OT consult to assist with ADL evaluation and planning for discharge  - Provide patient education as appropriate  Outcome: Progressing  Goal: Maintains/Returns to pre admission functional level  Description: INTERVENTIONS:  - Perform BMAT or MOVE assessment daily    - Set and communicate daily mobility goal to care team and patient/family/caregiver     - Collaborate with rehabilitation services on mobility goals if consulted  - Perform Range of Motion 4 times a day  - Reposition patient every 2 hours    - Dangle patient 4 times a day  - Stand patient 4 times a day  - Ambulate patient 3 times a day  - Out of bed to chair 1 times a day   - Out of bed for meals 3 times a day  - Out of bed for toileting  - Record patient progress and toleration of activity level   Outcome: Progressing

## 2021-07-29 LAB
ANION GAP SERPL CALCULATED.3IONS-SCNC: 6 MMOL/L (ref 4–13)
ATRIAL RATE: 110 BPM
BACTERIA UR CULT: NORMAL
BASOPHILS # BLD AUTO: 0.06 THOUSANDS/ΜL (ref 0–0.1)
BASOPHILS NFR BLD AUTO: 1 % (ref 0–1)
BUN SERPL-MCNC: 3 MG/DL (ref 5–25)
CALCIUM SERPL-MCNC: 8.6 MG/DL (ref 8.3–10.1)
CHLORIDE SERPL-SCNC: 96 MMOL/L (ref 100–108)
CO2 SERPL-SCNC: 29 MMOL/L (ref 21–32)
CREAT SERPL-MCNC: 0.61 MG/DL (ref 0.6–1.3)
EOSINOPHIL # BLD AUTO: 0.21 THOUSAND/ΜL (ref 0–0.61)
EOSINOPHIL NFR BLD AUTO: 3 % (ref 0–6)
ERYTHROCYTE [DISTWIDTH] IN BLOOD BY AUTOMATED COUNT: 17.3 % (ref 11.6–15.1)
GFR SERPL CREATININE-BSD FRML MDRD: 88 ML/MIN/1.73SQ M
GLUCOSE SERPL-MCNC: 155 MG/DL (ref 65–140)
GLUCOSE SERPL-MCNC: 156 MG/DL (ref 65–140)
GLUCOSE SERPL-MCNC: 157 MG/DL (ref 65–140)
GLUCOSE SERPL-MCNC: 162 MG/DL (ref 65–140)
GLUCOSE SERPL-MCNC: 167 MG/DL (ref 65–140)
GLUCOSE SERPL-MCNC: 201 MG/DL (ref 65–140)
GLUCOSE SERPL-MCNC: 215 MG/DL (ref 65–140)
HCT VFR BLD AUTO: 43.6 % (ref 34.8–46.1)
HGB BLD-MCNC: 13.3 G/DL (ref 11.5–15.4)
IMM GRANULOCYTES # BLD AUTO: 0.03 THOUSAND/UL (ref 0–0.2)
IMM GRANULOCYTES NFR BLD AUTO: 0 % (ref 0–2)
LYMPHOCYTES # BLD AUTO: 2.47 THOUSANDS/ΜL (ref 0.6–4.47)
LYMPHOCYTES NFR BLD AUTO: 30 % (ref 14–44)
MCH RBC QN AUTO: 26.9 PG (ref 26.8–34.3)
MCHC RBC AUTO-ENTMCNC: 30.5 G/DL (ref 31.4–37.4)
MCV RBC AUTO: 88 FL (ref 82–98)
MONOCYTES # BLD AUTO: 1.04 THOUSAND/ΜL (ref 0.17–1.22)
MONOCYTES NFR BLD AUTO: 13 % (ref 4–12)
NEUTROPHILS # BLD AUTO: 4.46 THOUSANDS/ΜL (ref 1.85–7.62)
NEUTS SEG NFR BLD AUTO: 53 % (ref 43–75)
NRBC BLD AUTO-RTO: 0 /100 WBCS
P AXIS: 80 DEGREES
PLATELET # BLD AUTO: 405 THOUSANDS/UL (ref 149–390)
PMV BLD AUTO: 8.7 FL (ref 8.9–12.7)
POTASSIUM SERPL-SCNC: 3.7 MMOL/L (ref 3.5–5.3)
PR INTERVAL: 178 MS
QRS AXIS: 263 DEGREES
QRSD INTERVAL: 88 MS
QT INTERVAL: 356 MS
QTC INTERVAL: 481 MS
RBC # BLD AUTO: 4.95 MILLION/UL (ref 3.81–5.12)
SODIUM SERPL-SCNC: 131 MMOL/L (ref 136–145)
T WAVE AXIS: 56 DEGREES
VENTRICULAR RATE: 110 BPM
WBC # BLD AUTO: 8.27 THOUSAND/UL (ref 4.31–10.16)

## 2021-07-29 PROCEDURE — 82948 REAGENT STRIP/BLOOD GLUCOSE: CPT

## 2021-07-29 PROCEDURE — 99232 SBSQ HOSP IP/OBS MODERATE 35: CPT | Performed by: INTERNAL MEDICINE

## 2021-07-29 PROCEDURE — 97167 OT EVAL HIGH COMPLEX 60 MIN: CPT

## 2021-07-29 PROCEDURE — 97110 THERAPEUTIC EXERCISES: CPT

## 2021-07-29 PROCEDURE — 85025 COMPLETE CBC W/AUTO DIFF WBC: CPT | Performed by: INTERNAL MEDICINE

## 2021-07-29 PROCEDURE — 93010 ELECTROCARDIOGRAM REPORT: CPT | Performed by: INTERNAL MEDICINE

## 2021-07-29 PROCEDURE — 80048 BASIC METABOLIC PNL TOTAL CA: CPT | Performed by: INTERNAL MEDICINE

## 2021-07-29 PROCEDURE — 97530 THERAPEUTIC ACTIVITIES: CPT

## 2021-07-29 RX ORDER — FUROSEMIDE 20 MG/1
20 TABLET ORAL DAILY
Status: DISCONTINUED | OUTPATIENT
Start: 2021-07-30 | End: 2021-07-29

## 2021-07-29 RX ADMIN — Medication 2 G: at 16:27

## 2021-07-29 RX ADMIN — MONTELUKAST SODIUM 10 MG: 10 TABLET, FILM COATED ORAL at 21:21

## 2021-07-29 RX ADMIN — Medication 2 G: at 12:33

## 2021-07-29 RX ADMIN — HEPARIN SODIUM 5000 UNITS: 5000 INJECTION INTRAVENOUS; SUBCUTANEOUS at 14:31

## 2021-07-29 RX ADMIN — POLYETHYLENE GLYCOL 3350 17 G: 17 POWDER, FOR SOLUTION ORAL at 09:19

## 2021-07-29 RX ADMIN — PAROXETINE 10 MG: 20 TABLET, FILM COATED ORAL at 09:19

## 2021-07-29 RX ADMIN — ATORVASTATIN CALCIUM 10 MG: 10 TABLET, FILM COATED ORAL at 09:20

## 2021-07-29 RX ADMIN — ANTACID TABLETS 500 MG: 500 TABLET, CHEWABLE ORAL at 19:37

## 2021-07-29 RX ADMIN — FUROSEMIDE 20 MG: 20 TABLET ORAL at 09:18

## 2021-07-29 RX ADMIN — INSULIN LISPRO 1 UNITS: 100 INJECTION, SOLUTION INTRAVENOUS; SUBCUTANEOUS at 21:55

## 2021-07-29 RX ADMIN — MICONAZOLE NITRATE: 20 CREAM TOPICAL at 18:27

## 2021-07-29 RX ADMIN — DOCUSATE SODIUM AND SENNOSIDES 1 TABLET: 8.6; 5 TABLET, FILM COATED ORAL at 09:19

## 2021-07-29 RX ADMIN — Medication 2 G: at 09:18

## 2021-07-29 RX ADMIN — HEPARIN SODIUM 5000 UNITS: 5000 INJECTION INTRAVENOUS; SUBCUTANEOUS at 05:55

## 2021-07-29 RX ADMIN — HEPARIN SODIUM 5000 UNITS: 5000 INJECTION INTRAVENOUS; SUBCUTANEOUS at 21:21

## 2021-07-29 RX ADMIN — MICONAZOLE NITRATE: 20 CREAM TOPICAL at 09:20

## 2021-07-29 RX ADMIN — QUETIAPINE FUMARATE 12.5 MG: 25 TABLET ORAL at 21:21

## 2021-07-29 NOTE — OCCUPATIONAL THERAPY NOTE
Occupational Therapy Evaluation        Patient Name: Glen Ferro  BWMHE'I Date: 7/29/2021 07/29/21 0839   OT Last Visit   OT Visit Date 07/29/21   Note Type   Note type Evaluation   Restrictions/Precautions   Weight Bearing Precautions Per Order No   Other Precautions Cognitive; Bed Alarm;Telemetry;O2;Fall Risk   Pain Assessment   Pain Score No Pain   Home Living   Type of [address] One level;Performs ADLs on one level;Stairs to enter with rails  (2 JAX)   Bathroom Accessibility Accessible   Home Equipment Walker;Cane   Additional Comments Patient ambulates with walker   Prior Function   Level of [place] Needs assistance with ADLs and functional mobility; Needs assistance with IADLs   Lives With Spouse   Receives Help From Family   ADL Assistance Needs assistance   IADLs Needs assistance   Falls in the last 6 months 1 to 4   Comments Information on home set up and PLOF obtained from patient and chart review; Pt recently at [facility] for STR and discharged home; pt currently requiring increased assistance   Lifestyle   Autonomy Patient unable to provide accurate information on PLOF of Home set up, chart review indicates that patient lives with her spouse, needed assistance with self care and functional mobility was at [facility] , returned home, Son reported patient requires increased assistance  Patient lives in a one story house with 2 JAX, ambulates with walker at baseline     Reciprocal Relationships Supportive family   Psychosocial   Psychosocial (WDL) WDL   ADL   Eating Assistance 4  Minimal Assistance   Grooming Assistance 4  Minimal Assistance   UB Bathing Assistance 3  Moderate Assistance   LB Bathing Assistance 2  Maximal Assistance   UB Dressing Assistance 3  Moderate Assistance   LB Dressing Assistance 2  Maximal Assistance   Toileting Assistance  2  Maximal Assistance   Functional Assistance 2  Maximal Assistance   Bed Mobility   Rolling R 4  Minimal assistance Additional items Assist x 1;HOB elevated; Bedrails; Increased time required;Verbal cues   Rolling L 4  Minimal assistance   Additional items Assist x 1; Increased time required;Verbal cues;LE management   Supine to Sit 4  Minimal assistance   Additional items Assist x 1; Increased time required;Verbal cues   Transfers   Sit to Stand 3  Moderate assistance   Additional items Assist x 1; Increased time required;Verbal cues   Stand to Sit 4  Minimal assistance   Additional items Assist x 1; Increased time required;Verbal cues   Additional Comments patient completed lateral- side steps to the head of the bed  with mod assist of 1 using RW  Functional Mobility   Functional Mobility 3  Moderate assistance   Additional items Rolling walker   Balance   Static Sitting Fair +   Dynamic Sitting Fair   Static Standing Fair -   Dynamic Standing Poor +   Activity Tolerance   Activity Tolerance Patient tolerated treatment well   RUE Assessment   RUE Assessment X  (AROM grossly WFL, strength deficit)   RUE Strength   RUE Overall Strength Deficits  (3+/5)   LUE Assessment   LUE Assessment X  (AROM grossly WFL, strength deficit)   LUE Strength   LUE Overall Strength Deficits  (3+/5)   Hand Function   Gross Motor Coordination Impaired   Fine Motor Coordination Functional   Sensation   Light Touch No apparent deficits  (BUEs)   Cognition   Overall Cognitive Status Impaired   Arousal/Participation Alert; Responsive   Attention Attends with cues to redirect   Orientation Level Oriented to person;Oriented to place;Oriented to situation  (inconsistent orientation to time)   Memory Decreased short term memory;Decreased recall of recent events   Following Commands Follows one step commands with increased time or repetition   Assessment   Limitation Decreased ADL status; Decreased Safe judgement during ADL;Decreased endurance;Decreased self-care trans;Decreased high-level ADLs   Prognosis Good   Assessment Patient is a 66 y o  female seen for OT evaluation s/p admit to [facility] on 7/27/2021 w/Weakness  Commorbidities affecting patient's functional performance at time of assessment include: asthma, anxiety disorder, type 2 DM, dyslipidemia, depression, chronic diastolic CHF, essential hypertension, and hypoglycemia  Orders placed for OT evaluation and treatment  Performed at least two patient identifiers during session including name and wristband  Prior to admission, Patient unable to provide accurate information on PLOF of Home set up, chart review indicates that patient lives with her spouse, needed assistance with self care and functional mobility was at [facility] , returned home, Son reported patient requires increased assistance  Patient lives in a one story house with 2 JAX, ambulates with walker at baseline  Personal factors affecting patient at time of initial evaluation include: limited caregiver support, steps to enter, limited insight into deficits, decreased initiation and engagement, difficulty performing ADLs and difficulty performing IADLs  Upon evaluation, patient requires moderate assist for UB ADLs, maximal assist for LB ADLs, transfers and functional ambulation in room and bathroom with maximal assist, with the use of Rolling Walker  Patient is alert, oriented to name,, oriented to place, and disoriented to situation, inconsistent to time  Occupational performance is affected by the following deficits: orientation, attention span, decreased functional use of BUEs, decreased muscle strength, degenerative arthritic joint changes, impaired gross motor coordination, dynamic sit/ stand balance deficit with poor standing tolerance time for self care and functional mobility, decreased activity tolerance and decreased safety awareness    Patient to benefit from continued Occupational Therapy treatment while in the hospital to address deficits as defined above and maximize level of functional independence with ADLs and functional mobility  Occupational Performance areas to address include: bathing/ shower, dressing, toilet hygiene, transfer to all surfaces, functional ambulation and functional mobility  From OT standpoint, recommendation at time of d/c would be Short Term Rehab  Plan   Treatment Interventions ADL retraining;Functional transfer training;UE strengthening/ROM; Endurance training;Cognitive reorientation;Patient/family training;Equipment evaluation/education; Compensatory technique education;Continued evaluation; Energy conservation; Activityengagement   Goal Expiration Date 08/12/21   OT Frequency 3-5x/wk   Recommendation   OT Discharge Recommendation Post acute rehabilitation services   AM-PAC Daily Activity Inpatient   Lower Body Dressing 2   Bathing 2   Toileting 2   Upper Body Dressing 2   Grooming 3   Eating 3   Daily Activity Raw Score 14   Daily Activity Standardized Score (Calc for Raw Score >=11) 33 39   AM-PAC Applied Cognition Inpatient   Following a Speech/Presentation 3   Understanding Ordinary Conversation 3   Taking Medications 2   Remembering Where Things Are Placed or Put Away 2   Remembering List of 4-5 Errands 2   Taking Care of Complicated Tasks 1   Applied Cognition Raw Score 13   Applied Cognition Standardized Score 30 46   Barthel Index   Feeding 5   Bathing 0   Grooming Score 0   Dressing Score 5   Bladder Score 5   Bowels Score 5   Toilet Use Score 5   Transfers (Bed/Chair) Score 10   Mobility (Level Surface) Score 0   Stairs Score 0   Barthel Index Score 35         1 - Patient will verbalize and demonstrate use of energy conservation/ deep breathing technique and work simplification skills during functional activity with no verbal cues  2 - Patient will verbalize and demonstrate good body mechanics and joint protection techniques during  ADLs/ IADLs with no verbal cues      3 - Patient will increase OOB/ sitting tolerance to 2-4 hours per day for increased participation in self care and leisure tasks with no s/s of exertion  4 - Patient will increase standing tolerance time to 5  minutes with unilateral UE support to complete sink level ADLs@ mod I level  5 - Patient will increase sitting tolerance at edge of bed to 20 minutes to complete UB ADLs @ set up assist level  6 - Patient will transfer bed to Chair / toilet at Set up assist level with AD as indicated  7 - Patient will complete UB ADLs with set up assist      8 - Patient will complete LB ADLs with min assist with the use of adaptive equipment       9 - Patient will complete toileting hygiene with set up assist/ supervision for thoroughness    10 - Patient/ Family  will demonstrate competency with UE Home Exercise Program

## 2021-07-29 NOTE — PLAN OF CARE
Problem: Potential for Falls  Goal: Patient will remain free of falls  Description: INTERVENTIONS:  - Educate patient/family on patient safety including physical limitations  - Instruct patient to call for assistance with activity   - Consult OT/PT to assist with strengthening/mobility   - Keep Call bell within reach  - Keep bed low and locked with side rails adjusted as appropriate  - Keep care items and personal belongings within reach  - Initiate and maintain comfort rounds  - Make Fall Risk Sign visible to staff  - Offer Toileting every 2 Hours, in advance of need  - Initiate/Maintain BED alarm  - Obtain necessary fall risk management equipment: YELLOW SOCKS AND BRACELET  - Apply yellow socks and bracelet for high fall risk patients  - Consider moving patient to room near nurses station  Outcome: Progressing     Problem: MOBILITY - ADULT  Goal: Maintain or return to baseline ADL function  Description: INTERVENTIONS:  -  Assess patient's ability to carry out ADLs; assess patient's baseline for ADL function and identify physical deficits which impact ability to perform ADLs (bathing, care of mouth/teeth, toileting, grooming, dressing, etc )  - Assess/evaluate cause of self-care deficits   - Assess range of motion  - Assess patient's mobility; develop plan if impaired  - Assess patient's need for assistive devices and provide as appropriate  - Encourage maximum independence but intervene and supervise when necessary  - Involve family in performance of ADLs  - Assess for home care needs following discharge   - Consider OT consult to assist with ADL evaluation and planning for discharge  - Provide patient education as appropriate  Outcome: Progressing  Goal: Maintains/Returns to pre admission functional level  Description: INTERVENTIONS:  - Perform BMAT or MOVE assessment daily    - Set and communicate daily mobility goal to care team and patient/family/caregiver     - Collaborate with rehabilitation services on mobility goals if consulted  - Perform Range of Motion 4 times a day  - Reposition patient every 2 hours    - Dangle patient 4 times a day  - Stand patient 4 times a day  - Ambulate patient 4 times a day  - Out of bed to chair 3 times a day   - Out of bed for meals 3 times a day  - Out of bed for toileting  - Record patient progress and toleration of activity level   Outcome: Progressing     Problem: Prexisting or High Potential for Compromised Skin Integrity  Goal: Skin integrity is maintained or improved  Description: INTERVENTIONS:  - Identify patients at risk for skin breakdown  - Assess and monitor skin integrity  - Assess and monitor nutrition and hydration status  - Monitor labs   - Assess for incontinence   - Turn and reposition patient  - Assist with mobility/ambulation  - Relieve pressure over bony prominences  - Avoid friction and shearing  - Provide appropriate hygiene as needed including keeping skin clean and dry  - Evaluate need for skin moisturizer/barrier cream  - Collaborate with interdisciplinary team   - Patient/family teaching  - Consider wound care consult   Outcome: Progressing

## 2021-07-29 NOTE — PHYSICAL THERAPY NOTE
Physical Therapy Treatment Note     07/29/21 1306   PT Last Visit   PT Visit Date 07/29/21   Note Type   Note Type Treatment   Pain Assessment   Pain Assessment Tool 0-10   Pain Score No Pain   Restrictions/Precautions   Weight Bearing Precautions Per Order No   Other Precautions Cognitive; Chair Alarm; Bed Alarm;Multiple lines;O2;Fall Risk   General   Chart Reviewed Yes   Response to Previous Treatment Patient with no complaints from previous session  Family/Caregiver Present No   Cognition   Overall Cognitive Status Impaired   Arousal/Participation Alert; Responsive; Cooperative   Attention Attends with cues to redirect   Orientation Level Oriented to person;Oriented to place;Oriented to situation  (oriented to month and year)   Memory Decreased short term memory;Decreased recall of recent events   Following Commands Follows multistep commands with increased time or repetition   Comments Pt agreeable to PT treatment session  Subjective   Subjective "I feel tired "   Bed Mobility   Supine to Sit 4  Minimal assistance   Additional items Assist x 1;HOB elevated; Bedrails; Increased time required;Verbal cues;LE management   Transfers   Sit to Stand 3  Moderate assistance   Additional items Assist x 1; Increased time required;Verbal cues   Stand to Sit 3  Moderate assistance   Additional items Assist x 1; Increased time required;Verbal cues   Ambulation/Elevation   Gait pattern Excessively slow; Step to;Short stride; Shuffling   Gait Assistance 3  Moderate assist   Additional items Assist x 1;Verbal cues   Assistive Device Rolling walker   Distance 3 feet, bed to chair   Stair Management Assistance Not tested   Balance   Static Sitting Fair +   Dynamic Sitting Fair   Static Standing Fair -   Dynamic Standing Poor +   Ambulatory Poor   Endurance Deficit   Endurance Deficit Yes   Activity Tolerance   Activity Tolerance Patient tolerated treatment well   Nurse Made Aware Discussed case with CANDACE Cabezas; post session pt was left seated in recliner in NAD, all belongings within reach, +chair alarm   Exercises   Quad Sets Supine;20 reps;AROM; Bilateral   Heelslides Supine;20 reps;AROM; Bilateral   Hip Abduction Supine;20 reps;AROM; Bilateral   Ankle Pumps Supine;20 reps;AROM; Bilateral   Balance training  Pt performed 4x sit to stand transfers; pt performed static and dynamic functional standing balance during self care   Assessment   Prognosis Good   Problem List Decreased strength;Decreased endurance; Impaired balance;Decreased mobility   Assessment Chart reviewed  Pt was received supine in bed in NAD and agreeable to PT session  Pt was seen for physical therapy on this date with interventions consisting of therapeutic activity including bed mobility and sit to stand transfers, therapeutic exercises consisting of AROM, 20 reps, bilateral lower extremities in the supine position, and gait training with emphasis on improving pt's ability to ambulate level surfaces 3 feet with RW and moderate assist provided by therapist  In comparison to previous sessions pt required a slight increase in assistance with sit to stand transfers and ambulation  Pt tolerated therapeutic exercise well without complaints of pain  Pt performed 4x sit to stand transfers and requires cues for upright posture and proper hand placement  Pt performed static and dynamic functional standing balance as she was assisted with self care  Post session pt was left seated in recliner in NAD, +chair alarm  Pt continues to be functioning below baseline level and remains limited secondary to decreased lower extremity strength, impaired balance, decreased endurance, gait deviations, and decreased functional mobility  Continue to recommend STR at time of discharge to maximize pt's functional independence and safety with mobility   PT will continue to see pt while here in order to address the deficits listed above and provide interventions consistent with the POC in effort to achieve short term goals  Barriers to Discharge Inaccessible home environment;Decreased caregiver support   Goals   STG Expiration Date 08/07/21   Plan   Treatment/Interventions Functional transfer training;LE strengthening/ROM; Therapeutic exercise; Endurance training;Patient/family training;Bed mobility;Gait training;Spoke to nursing;OT;Family   Progress Progressing toward goals   PT Frequency Other (Comment)  (3-5x/wk)   Recommendation   PT Discharge Recommendation Post acute rehabilitation services   PT - OK to Discharge Yes  (when medically cleared, if to STR)   AM-PAC Basic Mobility Inpatient   Turning in Bed Without Bedrails 3   Lying on Back to Sitting on Edge of Flat Bed 3   Moving Bed to Chair 2   Standing Up From Chair 2   Walk in Room 2   Climb 3-5 Stairs 1   Basic Mobility Inpatient Raw Score 13   Basic Mobility Standardized Score 33 99     Amaya Spencer, PT, DPT    Time of PT treatment session: 6491-1604  42 minutes

## 2021-07-29 NOTE — ASSESSMENT & PLAN NOTE
Lab Results   Component Value Date    HGBA1C 6 8 (H) 01/12/2021       Recent Labs     07/28/21  1547 07/28/21 2050 07/29/21  0026 07/29/21  0618   POCGLU 208* 184* 162* 155*       Blood Sugar Average: Last 72 hrs:  (P) 111 75     · Patient hypoglycemic on arrival  · Holding oral hypoglycemic agents  · Monitoring Accu-Cheks

## 2021-07-29 NOTE — UTILIZATION REVIEW
Continued Stay Review    Date: 7/29                         Current Patient Class: Inpatient  Current Level of Care: Med/surg    HPI:75 y o  female initially admitted on 7/28     Assessment/Plan:   REgular diet  Currently on [facility]  Has weakness  Per PT/OT eval, recommend STR     Vital Signs:   Date/Time  Temp  Pulse  Resp  BP  MAP (mmHg)  SpO2  Calculated FIO2 (%) - Nasal Cannula  Nasal Cannula O2 Flow Rate (L/min)  O2 Device  Patient Position - Orthostatic VS   07/29/21 15:30:43  98 3 °F (36 8 °C)  89  20  154/100  118  97 %  --  --  --  --   07/29/21 07:23:29  98 1 °F (36 7 °C)  76  18  122/81  95  98 %  --  --  --  --   07/28/21 2237  --  --  --  --  --  96 %  28  2 L/min  Nasal cannula  --   07/28/21 21:33:37  98 5 °F (36 9 °C)  89  --  165/98  120  96 %               Pertinent Labs/Diagnostic Results:       Results from last 7 days   Lab Units 07/29/21  0926 07/28/21  0443 07/27/21  1644   WBC Thousand/uL 8 27 9 12 10 65*   HEMOGLOBIN g/dL 13 3 12 1 12 5   HEMATOCRIT % 43 6 38 5 39 9   PLATELETS Thousands/uL 405* 393* 423*   NEUTROS ABS Thousands/µL 4 46  --  7 94*         Results from last 7 days   Lab Units 07/29/21  0926 07/28/21  0443 07/27/21  1644   SODIUM mmol/L 131* 137 135*   POTASSIUM mmol/L 3 7 2 9* 3 3*   CHLORIDE mmol/L 96* 98* 95*   CO2 mmol/L 29 32 30   ANION GAP mmol/L 6 7 10   BUN mg/dL 3* 2* 3*   CREATININE mg/dL 0 61 0 40* 0 42*   EGFR ml/min/1 73sq m 88 101 99   CALCIUM mg/dL 8 6 8 1* 8 6     Results from last 7 days   Lab Units 07/27/21  1644   AST U/L 65*   ALT U/L 31   ALK PHOS U/L 52   TOTAL PROTEIN g/dL 7 2   ALBUMIN g/dL 3 1*   TOTAL BILIRUBIN mg/dL 0 61     Results from last 7 days   Lab Units 07/29/21  1601 07/29/21  1110 07/29/21  0618 07/29/21  0026 07/28/21  2050 07/28/21  1547 07/28/21  1119 07/28/21  0743 07/28/21  0616 07/28/21  0442 07/28/21  0135 07/28/21  0021   POC GLUCOSE mg/dl 167* 156* 155* 162* 184* 208* 144* 89 106 67 81 49*     Results from last 7 days   Lab Units 07/29/21  0926 07/28/21  0443 07/27/21  1644   GLUCOSE RANDOM mg/dL 215* 73 40*         Results from last 7 days   Lab Units 07/27/21  1644   TROPONIN I ng/mL 0 04     Results from last 7 days   Lab Units 07/27/21  1644   TSH 3RD GENERATON uIU/mL 0 626         Results from last 7 days   Lab Units 07/27/21  1644   NT-PRO BNP pg/mL 2,206*         Results from last 7 days   Lab Units 07/27/21  1749   CLARITY UA  Clear   COLOR UA  Light Yellow   SPEC GRAV UA  1 015   PH UA  7 5   GLUCOSE UA mg/dl Negative   KETONES UA mg/dl Negative   BLOOD UA  Negative   PROTEIN UA mg/dl Negative   NITRITE UA  Negative   BILIRUBIN UA  Negative   UROBILINOGEN UA E U /dl 0 2   LEUKOCYTES UA  Small*   WBC UA /hpf 2-4   RBC UA /hpf 0-1   BACTERIA UA /hpf Occasional   EPITHELIAL CELLS WET PREP /hpf Occasional     Results from last 7 days   Lab Units 07/27/21  1958   URINE CULTURE  >100,000 cfu/ml          Medications:   Scheduled Medications:  atorvastatin, 10 mg, Oral, Daily  heparin (porcine), 5,000 Units, Subcutaneous, Q8H [address]  ARLEY ANTIFUNGAL, , Topical, BID  montelukast, 10 mg, Oral, HS  PARoxetine, 10 mg, Oral, QAM  polyethylene glycol, 17 g, Oral, Daily  QUEtiapine, 12 5 mg, Oral, HS  senna-docusate sodium, 1 tablet, Oral, BID  sodium chloride, 2 g, Oral, TID With Meals      Continuous IV Infusions:     PRN Meds:  acetaminophen, 650 mg, Oral, Q6H PRN  albuterol, 2 puff, Inhalation, Q6H PRN  albuterol, 2 5 mg, Nebulization, Q6H PRN  calcium carbonate, 500 mg, Oral, TID PRN  LORazepam, 0 5 mg, Oral, Q6H PRN  simethicone, 80 mg, Oral, Q6H PRN        Discharge Plan: D  Network Utilization Review Department  ATTENTION: Please call with any questions or concerns to [phone] and carefully listen to the prompts so that you are directed to the right person   All voicemails are confidential   Ceci Mateo all requests for admission clinical reviews, approved or denied determinations and any other requests to dedicated fax number below belonging to the campus where the patient is receiving treatment   List of dedicated fax numbers for the Facilities:  [address] [place] [address] DENIALS (Administrative/Medical Necessity) [phone]   [address] (Maternity/NICU/Pediatrics) [phone]   [address] Genaro Caldera 1371 [address] [place] [address] Ivis Sarah Goddard 1481 [address] 3536 [facility] 951 [phone]

## 2021-07-29 NOTE — PROGRESS NOTES
[address] [facility]  Progress Note - Kayla Morales 1945, 66 y o  female MRN: [id number]  Unit/Bed#: -01 Encounter: [id number]  Primary Care Provider: Christine Simmons MD   Date and time admitted to hospital: 7/27/2021  4:09 PM    Hypoglycemia  Assessment & Plan  · Blood sugar 40 on arrival  · Holding patient's metformin/glimepiride/Januvia  · Will dc glimepiride on dc  · Regular diet ordered  · Accu-Cheks q 6 hours  Essential hypertension  Assessment & Plan  · Adequately controlled  · Currently on Lasix  Chronic diastolic congestive heart failure [facility])  Assessment & Plan  Wt Readings from Last 3 Encounters:   07/29/21 79 6 kg (175 lb 7 8 oz)   06/29/21 84 1 kg (185 lb 6 5 oz)   06/08/21 94 kg (207 lb 3 7 oz)     · Continue with Lasix  · Also note patient currently on salt tablets given recent history of hyponatremia  · Monitor electrolytes/renal function  · Monitor intake/output, daily weights  · 2D echo on 06/21/2021 notes EF 60% with grade 1 diastolic dysfunction  · Patient currently on 2 L oxygen via nasal cannula which were patient's son is her new normal since last hospitalization  Depression  Assessment & Plan  · Continue with Paxil/Seroquel  Dyslipidemia  Assessment & Plan  · Continue with atorvastatin  Diabetes mellitus type 2 in obese [facility])  Assessment & Plan  Lab Results   Component Value Date    HGBA1C 6 8 (H) 01/12/2021       Recent Labs     07/28/21  1547 07/28/21  2050 07/29/21  0026 07/29/21  0618   POCGLU 208* 184* 162* 155*       Blood Sugar Average: Last 72 hrs:  (P) 111 75     · Patient hypoglycemic on arrival  · Holding oral hypoglycemic agents  · Monitoring Accu-Cheks  Anxiety disorder  Assessment & Plan  · Continue with p r n  Xanax for anxiety  Asthma  Assessment & Plan  · Currently not in exacerbation  · On baseline 2 L oxygen via nasal cannula  · Continue with albuterol inhaler/albuterol nebulization      * Weakness  Assessment & Plan  64-year-old lady who was recently admitted from 2021 until 2021 secondary to acute metabolic encephalopathy secondary to hyponatremia/UTI/hypoglycemia and discharged to [address] [place] [address] rehab, recently discharged home 4 days ago, presents accompanied by her son due to symptoms of worsening weakness/worsening confusion and significant difficulty conducting activities of daily living  · Labs noted mild hypokalemia with potassium of 3 3  Sodium normal at 135  · TSH within normal limits  · PT/OT recommending rehab        VTE Pharmacologic Prophylaxis:   Pharmacologic: Heparin  Mechanical VTE Prophylaxis in Place: Yes    Patient Centered Rounds: I have performed bedside rounds with nursing staff today  Discussions with Specialists or Other Care Team Provider: cm, nursing    Education and Discussions with Family / Patient: c    Time Spent for Care: 30 minutes  More than 50% of total time spent on counseling and coordination of care as described above  Current Length of Stay: 1 day(s)    Current Patient Status: Inpatient   Certification Statement: The patient will continue to require additional inpatient hospital stay due to See above    Discharge Plan:  See above    Code Status: Level 1 - Full Code      Subjective:   No acute complaints    Objective:     Vitals:   Temp (24hrs), Av 3 °F (36 8 °C), Min:98 1 °F (36 7 °C), Max:98 5 °F (36 9 °C)    Temp:  [98 1 °F (36 7 °C)-98 5 °F (36 9 °C)] 98 1 °F (36 7 °C)  HR:  [76-95] 76  Resp:  [18] 18  BP: (122-165)/(81-98) 122/81  SpO2:  [96 %-98 %] 98 %  Body mass index is 34 27 kg/m²  Input and Output Summary (last 24 hours): Intake/Output Summary (Last 24 hours) at 2021 0857  Last data filed at 2021 2237  Gross per 24 hour   Intake 360 ml   Output --   Net 360 ml       Physical Exam:     Physical Exam  Constitutional:       General: She is not in acute distress  Appearance: She is well-developed  She is not diaphoretic     HENT: Head: Normocephalic and atraumatic  Nose: Nose normal       Mouth/Throat:      Pharynx: No oropharyngeal exudate  Eyes:      General: No scleral icterus  Right eye: No discharge  Left eye: No discharge  Conjunctiva/sclera: Conjunctivae normal    Neck:      Thyroid: No thyromegaly  Vascular: No JVD  Cardiovascular:      Rate and Rhythm: Normal rate and regular rhythm  Heart sounds: Normal heart sounds  No murmur heard  No friction rub  No gallop  Pulmonary:      Effort: Pulmonary effort is normal  No respiratory distress  Breath sounds: Normal breath sounds  No wheezing or rales  Chest:      Chest wall: No tenderness  Abdominal:      General: Bowel sounds are normal  There is no distension  Palpations: Abdomen is soft  Tenderness: There is no abdominal tenderness  There is no guarding or rebound  Musculoskeletal:         General: No tenderness or deformity  Normal range of motion  Cervical back: Normal range of motion and neck supple  Skin:     General: Skin is warm and dry  Findings: No erythema or rash  Neurological:      Mental Status: She is alert  Mental status is at baseline  Cranial Nerves: No cranial nerve deficit  Sensory: No sensory deficit  Motor: No abnormal muscle tone        Coordination: Coordination normal          Additional Data:     Labs:    Results from last 7 days   Lab Units 07/28/21  0443 07/27/21  1644   WBC Thousand/uL 9 12 10 65*   HEMOGLOBIN g/dL 12 1 12 5   HEMATOCRIT % 38 5 39 9   PLATELETS Thousands/uL 393* 423*   NEUTROS PCT %  --  73   LYMPHS PCT %  --  14   MONOS PCT %  --  11   EOS PCT %  --  1     Results from last 7 days   Lab Units 07/28/21  0443 07/27/21  1644   SODIUM mmol/L 137 135*   POTASSIUM mmol/L 2 9* 3 3*   CHLORIDE mmol/L 98* 95*   CO2 mmol/L 32 30   BUN mg/dL 2* 3*   CREATININE mg/dL 0 40* 0 42*   ANION GAP mmol/L 7 10   CALCIUM mg/dL 8 1* 8 6   ALBUMIN g/dL  --  3 1*   TOTAL BILIRUBIN mg/dL  --  0 61   ALK PHOS U/L  --  52   ALT U/L  --  31   AST U/L  --  65*   GLUCOSE RANDOM mg/dL 73 40*         Results from last 7 days   Lab Units 07/29/21  0618 07/29/21  0026 07/28/21  2050 07/28/21  1547 07/28/21  1119 07/28/21  0743 07/28/21  0616 07/28/21  0442 07/28/21  0135 07/28/21  0021 07/27/21  1717 07/27/21  1656   POC GLUCOSE mg/dl 155* 162* 184* 208* 144* 89 106 67 81 49* 59* 37*                   * I Have Reviewed All Lab Data Listed Above  * Additional Pertinent Lab Tests Reviewed: All Labs Within Last 24 Hours Reviewed    Imaging:    Imaging Reports Reviewed Today Include: na  Imaging Personally Reviewed by Myself Includes:  na    Recent Cultures (last 7 days):           Last 24 Hours Medication List:   Current Facility-Administered Medications   Medication Dose Route Frequency Provider Last Rate    acetaminophen  650 mg Oral Q6H PRN Maribel Carr MD      albuterol  2 puff Inhalation Q6H PRN Maribel Carr MD      albuterol  2 5 mg Nebulization Q6H PRN Maribel Carr MD      atorvastatin  10 mg Oral Daily Maribel Carr MD      calcium carbonate  500 mg Oral TID PRN Nabeel Edward PA-C      furosemide  20 mg Oral BID Maribel Carr MD      heparin (porcine)  5,000 Units Subcutaneous Q8H [address] Maribel Carr MD      LORazepam  0 5 mg Oral Q6H PRN Maribel Carr MD     Learta January ARLEY ANTIFUNGAL   Topical BID Jonn Parker MD      montelukast  10 mg Oral HS Maribel Carr MD      PARoxetine  10 mg Oral QAM Maribel Carr MD      polyethylene glycol  17 g Oral Daily Jonn Parker MD      QUEtiapine  12 5 mg Oral HS Maribel Carr MD      senna-docusate sodium  1 tablet Oral BID Jonn Parker MD      simethicone  80 mg Oral Q6H PRN Lorena Jean-Baptiste PA-C      sodium chloride  2 g Oral TID With Meals Maribel Carr MD          Today, Patient Was Seen By: Octavio Padron MD    ** Please Note: Dictation voice to text software may have been used in the creation of this document   **

## 2021-07-29 NOTE — ASSESSMENT & PLAN NOTE
Wt Readings from Last 3 Encounters:   07/29/21 79 6 kg (175 lb 7 8 oz)   06/29/21 84 1 kg (185 lb 6 5 oz)   06/08/21 94 kg (207 lb 3 7 oz)     · Continue with Lasix  · Also note patient currently on salt tablets given recent history of hyponatremia  · Monitor electrolytes/renal function  · Monitor intake/output, daily weights  · 2D echo on 06/21/2021 notes EF 60% with grade 1 diastolic dysfunction  · Patient currently on 2 L oxygen via nasal cannula which were patient's son is her new normal since last hospitalization

## 2021-07-29 NOTE — ASSESSMENT & PLAN NOTE
25-year-old lady who was recently admitted from 06/19/2021 until 06/29/2021 secondary to acute metabolic encephalopathy secondary to hyponatremia/UTI/hypoglycemia and discharged to [address] [place] [address] rehab, recently discharged home 4 days ago, presents accompanied by her son due to symptoms of worsening weakness/worsening confusion and significant difficulty conducting activities of daily living  · Labs noted mild hypokalemia with potassium of 3 3  Sodium normal at 135    · TSH within normal limits  · PT/OT recommending rehab

## 2021-07-29 NOTE — PLAN OF CARE
Problem: OCCUPATIONAL THERAPY ADULT  Goal: Performs self-care activities at highest level of function for planned discharge setting  See evaluation for individualized goals  Description: Treatment Interventions: ADL retraining, Functional transfer training, UE strengthening/ROM, Endurance training, Cognitive reorientation, Patient/family training, Equipment evaluation/education, Compensatory technique education, Continued evaluation, Energy conservation, Activityengagement          See flowsheet documentation for full assessment, interventions and recommendations  Note: Limitation: Decreased ADL status, Decreased Safe judgement during ADL, Decreased endurance, Decreased self-care trans, Decreased high-level ADLs  Prognosis: Good  Assessment: Patient is a 66 y o  female seen for OT evaluation s/p admit to [id number] [place] [address] on 7/27/2021 w/Weakness  Commorbidities affecting patient's functional performance at time of assessment include: asthma, anxiety disorder, type 2 DM, dyslipidemia, depression, chronic diastolic CHF, essential hypertension, and hypoglycemia  Orders placed for OT evaluation and treatment  Performed at least two patient identifiers during session including name and wristband  Prior to admission, Patient unable to provide accurate information on PLOF of Home set up, chart review indicates that patient lives with her spouse, needed assistance with self care and functional mobility was at [employer] , returned home, Son reported patient requires increased assistance  Patient lives in a one story house with 2 JAX, ambulates with walker at baseline  Personal factors affecting patient at time of initial evaluation include: limited caregiver support, steps to enter, limited insight into deficits, decreased initiation and engagement, difficulty performing ADLs and difficulty performing IADLs   Upon evaluation, patient requires moderate assist for UB ADLs, maximal assist for LB ADLs, transfers and functional ambulation in room and bathroom with maximal assist, with the use of Rolling Walker  Patient is alert, oriented to name,, oriented to place, and disoriented to situation, inconsistent to time  Occupational performance is affected by the following deficits: orientation, attention span, decreased functional use of BUEs, decreased muscle strength, degenerative arthritic joint changes, impaired gross motor coordination, dynamic sit/ stand balance deficit with poor standing tolerance time for self care and functional mobility, decreased activity tolerance and decreased safety awareness  Patient to benefit from continued Occupational Therapy treatment while in the hospital to address deficits as defined above and maximize level of functional independence with ADLs and functional mobility  Occupational Performance areas to address include: bathing/ shower, dressing, toilet hygiene, transfer to all surfaces, functional ambulation and functional mobility  From OT standpoint, recommendation at time of d/c would be Short Term Rehab         OT Discharge Recommendation: Post acute rehabilitation services

## 2021-07-29 NOTE — PLAN OF CARE
Problem: PHYSICAL THERAPY ADULT  Goal: Performs mobility at highest level of function for planned discharge setting  See evaluation for individualized goals  Description: Treatment/Interventions: Functional transfer training, LE strengthening/ROM, Therapeutic exercise, Endurance training, Patient/family training, Bed mobility, Gait training, Spoke to nursing          See flowsheet documentation for full assessment, interventions and recommendations  Note: Prognosis: Good  Problem List: Decreased strength, Decreased endurance, Impaired balance, Decreased mobility  Assessment: Chart reviewed  Pt was received supine in bed in NAD and agreeable to PT session  Pt was seen for physical therapy on this date with interventions consisting of therapeutic activity including bed mobility and sit to stand transfers, therapeutic exercises consisting of AROM, 20 reps, bilateral lower extremities in the supine position, and gait training with emphasis on improving pt's ability to ambulate level surfaces 3 feet with RW and moderate assist provided by therapist  In comparison to previous sessions pt required a slight increase in assistance with sit to stand transfers and ambulation  Pt tolerated therapeutic exercise well without complaints of pain  Pt performed 4x sit to stand transfers and requires cues for upright posture and proper hand placement  Pt performed static and dynamic functional standing balance as she was assisted with self care  Post session pt was left seated in recliner in NAD, +chair alarm  Pt continues to be functioning below baseline level and remains limited secondary to decreased lower extremity strength, impaired balance, decreased endurance, gait deviations, and decreased functional mobility  Continue to recommend STR at time of discharge to maximize pt's functional independence and safety with mobility   PT will continue to see pt while here in order to address the deficits listed above and provide interventions consistent with the POC in effort to achieve short term goals  Barriers to Discharge: Inaccessible home environment, Decreased caregiver support     PT Discharge Recommendation: Post acute rehabilitation services     PT - OK to Discharge: Yes (when medically cleared, if to STR)    See flowsheet documentation for full assessment

## 2021-07-30 LAB
ANION GAP SERPL CALCULATED.3IONS-SCNC: 8 MMOL/L (ref 4–13)
BASOPHILS # BLD AUTO: 0.04 THOUSANDS/ΜL (ref 0–0.1)
BASOPHILS NFR BLD AUTO: 1 % (ref 0–1)
BUN SERPL-MCNC: 3 MG/DL (ref 5–25)
CALCIUM SERPL-MCNC: 8.7 MG/DL (ref 8.3–10.1)
CHLORIDE SERPL-SCNC: 100 MMOL/L (ref 100–108)
CO2 SERPL-SCNC: 31 MMOL/L (ref 21–32)
CREAT SERPL-MCNC: 0.45 MG/DL (ref 0.6–1.3)
EOSINOPHIL # BLD AUTO: 0.21 THOUSAND/ΜL (ref 0–0.61)
EOSINOPHIL NFR BLD AUTO: 3 % (ref 0–6)
ERYTHROCYTE [DISTWIDTH] IN BLOOD BY AUTOMATED COUNT: 17 % (ref 11.6–15.1)
GFR SERPL CREATININE-BSD FRML MDRD: 97 ML/MIN/1.73SQ M
GLUCOSE SERPL-MCNC: 135 MG/DL (ref 65–140)
GLUCOSE SERPL-MCNC: 149 MG/DL (ref 65–140)
GLUCOSE SERPL-MCNC: 152 MG/DL (ref 65–140)
GLUCOSE SERPL-MCNC: 185 MG/DL (ref 65–140)
GLUCOSE SERPL-MCNC: 229 MG/DL (ref 65–140)
HCT VFR BLD AUTO: 38.5 % (ref 34.8–46.1)
HGB BLD-MCNC: 11.9 G/DL (ref 11.5–15.4)
IMM GRANULOCYTES # BLD AUTO: 0.02 THOUSAND/UL (ref 0–0.2)
IMM GRANULOCYTES NFR BLD AUTO: 0 % (ref 0–2)
LYMPHOCYTES # BLD AUTO: 1.87 THOUSANDS/ΜL (ref 0.6–4.47)
LYMPHOCYTES NFR BLD AUTO: 28 % (ref 14–44)
MCH RBC QN AUTO: 26.7 PG (ref 26.8–34.3)
MCHC RBC AUTO-ENTMCNC: 30.9 G/DL (ref 31.4–37.4)
MCV RBC AUTO: 87 FL (ref 82–98)
MONOCYTES # BLD AUTO: 0.92 THOUSAND/ΜL (ref 0.17–1.22)
MONOCYTES NFR BLD AUTO: 14 % (ref 4–12)
NEUTROPHILS # BLD AUTO: 3.66 THOUSANDS/ΜL (ref 1.85–7.62)
NEUTS SEG NFR BLD AUTO: 54 % (ref 43–75)
NRBC BLD AUTO-RTO: 0 /100 WBCS
PLATELET # BLD AUTO: 400 THOUSANDS/UL (ref 149–390)
PMV BLD AUTO: 8.5 FL (ref 8.9–12.7)
POTASSIUM SERPL-SCNC: 3.3 MMOL/L (ref 3.5–5.3)
RBC # BLD AUTO: 4.45 MILLION/UL (ref 3.81–5.12)
SODIUM SERPL-SCNC: 139 MMOL/L (ref 136–145)
WBC # BLD AUTO: 6.72 THOUSAND/UL (ref 4.31–10.16)

## 2021-07-30 PROCEDURE — 85025 COMPLETE CBC W/AUTO DIFF WBC: CPT | Performed by: INTERNAL MEDICINE

## 2021-07-30 PROCEDURE — 99232 SBSQ HOSP IP/OBS MODERATE 35: CPT | Performed by: INTERNAL MEDICINE

## 2021-07-30 PROCEDURE — 82948 REAGENT STRIP/BLOOD GLUCOSE: CPT

## 2021-07-30 PROCEDURE — 80048 BASIC METABOLIC PNL TOTAL CA: CPT | Performed by: INTERNAL MEDICINE

## 2021-07-30 RX ORDER — POTASSIUM CHLORIDE 20 MEQ/1
40 TABLET, EXTENDED RELEASE ORAL ONCE
Status: COMPLETED | OUTPATIENT
Start: 2021-07-30 | End: 2021-07-30

## 2021-07-30 RX ADMIN — QUETIAPINE FUMARATE 12.5 MG: 25 TABLET ORAL at 21:12

## 2021-07-30 RX ADMIN — Medication 2 G: at 13:06

## 2021-07-30 RX ADMIN — MONTELUKAST SODIUM 10 MG: 10 TABLET, FILM COATED ORAL at 21:11

## 2021-07-30 RX ADMIN — LORAZEPAM 0.5 MG: 0.5 TABLET ORAL at 21:11

## 2021-07-30 RX ADMIN — INSULIN LISPRO 1 UNITS: 100 INJECTION, SOLUTION INTRAVENOUS; SUBCUTANEOUS at 07:50

## 2021-07-30 RX ADMIN — PAROXETINE 10 MG: 20 TABLET, FILM COATED ORAL at 09:49

## 2021-07-30 RX ADMIN — POTASSIUM CHLORIDE 40 MEQ: 1500 TABLET, EXTENDED RELEASE ORAL at 07:49

## 2021-07-30 RX ADMIN — HEPARIN SODIUM 5000 UNITS: 5000 INJECTION INTRAVENOUS; SUBCUTANEOUS at 21:12

## 2021-07-30 RX ADMIN — HEPARIN SODIUM 5000 UNITS: 5000 INJECTION INTRAVENOUS; SUBCUTANEOUS at 06:21

## 2021-07-30 RX ADMIN — MICONAZOLE NITRATE: 20 CREAM TOPICAL at 18:15

## 2021-07-30 RX ADMIN — ATORVASTATIN CALCIUM 10 MG: 10 TABLET, FILM COATED ORAL at 09:49

## 2021-07-30 RX ADMIN — ANTACID TABLETS 500 MG: 500 TABLET, CHEWABLE ORAL at 21:11

## 2021-07-30 RX ADMIN — MICONAZOLE NITRATE: 20 CREAM TOPICAL at 09:50

## 2021-07-30 RX ADMIN — INSULIN LISPRO 2 UNITS: 100 INJECTION, SOLUTION INTRAVENOUS; SUBCUTANEOUS at 18:17

## 2021-07-30 RX ADMIN — INSULIN LISPRO 2 UNITS: 100 INJECTION, SOLUTION INTRAVENOUS; SUBCUTANEOUS at 13:06

## 2021-07-30 RX ADMIN — Medication 2 G: at 07:49

## 2021-07-30 RX ADMIN — HEPARIN SODIUM 5000 UNITS: 5000 INJECTION INTRAVENOUS; SUBCUTANEOUS at 13:06

## 2021-07-30 RX ADMIN — Medication 2 G: at 18:15

## 2021-07-30 NOTE — CASE MANAGEMENT
CM spoke to  [employer]  Son verified bed acceptance by family for [facility]  He confirmed that he spoke to them about applying for MA for SNF and possible LTP  IMM completed with son  Verbalized understanding

## 2021-07-30 NOTE — ASSESSMENT & PLAN NOTE
75-year-old lady who was recently admitted from 06/19/2021 until 06/29/2021 secondary to acute metabolic encephalopathy secondary to hyponatremia/UTI/hypoglycemia and discharged to [address] [place] [address] rehab, recently discharged home 4 days ago, presents accompanied by her son due to symptoms of worsening weakness/worsening confusion and significant difficulty conducting activities of daily living  · Labs noted mild hypokalemia with potassium of 3 3  Sodium normal at 135    · TSH within normal limits  · PT/OT recommending rehab

## 2021-07-30 NOTE — ASSESSMENT & PLAN NOTE
· Blood sugar 40 on arrival  · Holding patient's metformin/glimepiride/Januvia  · Has since resolved  · Will dc glimepiride on dc  · Regular diet ordered  · Accu-Cheks q 6 hours

## 2021-07-30 NOTE — PROGRESS NOTES
[facility]  Progress Note - Danita Lezama 1945, 66 y o  female MRN: [id number]  Unit/Bed#: -01 Encounter: [id number]  Primary Care Provider: Dorothy Walton MD   Date and time admitted to hospital: 7/27/2021  4:09 PM    Hypoglycemia  Assessment & Plan  · Blood sugar 40 on arrival  · Holding patient's metformin/glimepiride/Januvia  · Has since resolved  · Will dc glimepiride on dc  · Regular diet ordered  · Accu-Cheks q 6 hours  Essential hypertension  Assessment & Plan  · Adequately controlled  · Currently on Lasix  Chronic diastolic congestive heart failure [facility])  Assessment & Plan  Wt Readings from Last 3 Encounters:   07/30/21 80 1 kg (176 lb 9 4 oz)   06/29/21 84 1 kg (185 lb 6 5 oz)   06/08/21 94 kg (207 lb 3 7 oz)     · Continue with Lasix  · Also note patient currently on salt tablets given recent history of hyponatremia  · Monitor electrolytes/renal function  · Monitor intake/output, daily weights  · 2D echo on 06/21/2021 notes EF 60% with grade 1 diastolic dysfunction  · Patient currently on 2 L oxygen via nasal cannula which were patient's son is her new normal since last hospitalization  Depression  Assessment & Plan  · Continue with Paxil/Seroquel  Diabetes mellitus type 2 in obese [facility])  Assessment & Plan  Lab Results   Component Value Date    HGBA1C 6 8 (H) 01/12/2021       Recent Labs     07/29/21  1601 07/29/21  1812 07/29/21  2119 07/30/21  0643   POCGLU 167* 201* 157* 152*       Blood Sugar Average: Last 72 hrs:  (P) 000 [id number]     · Patient hypoglycemic on arrival  · Holding oral hypoglycemic agents  · Monitoring Accu-Cheks  Anxiety disorder  Assessment & Plan  · Continue with p r n  Xanax for anxiety  Asthma  Assessment & Plan  · Currently not in exacerbation  · On baseline 2 L oxygen via nasal cannula  · Continue with albuterol inhaler/albuterol nebulization      * Weakness  Assessment & Plan  64-year-old lady who was recently admitted from 2021 until 2021 secondary to acute metabolic encephalopathy secondary to hyponatremia/UTI/hypoglycemia and discharged to [address] [place] [address] rehab, recently discharged home 4 days ago, presents accompanied by her son due to symptoms of worsening weakness/worsening confusion and significant difficulty conducting activities of daily living  · Labs noted mild hypokalemia with potassium of 3 3  Sodium normal at 135  · TSH within normal limits  · PT/OT recommending rehab        VTE Pharmacologic Prophylaxis:   Pharmacologic: Heparin  Mechanical VTE Prophylaxis in Place: Yes    Patient Centered Rounds: I have performed bedside rounds with nursing staff today  Discussions with Specialists or Other Care Team Provider: cm, nursing    Education and Discussions with Family / Patient: pt    Time Spent for Care: 30 minutes  More than 50% of total time spent on counseling and coordination of care as described above  Current Length of Stay: 2 day(s)    Current Patient Status: Inpatient   Certification Statement: The patient will continue to require additional inpatient hospital stay due to see above    Discharge Plan: see above    Code Status: Level 1 - Full Code      Subjective:   No acute complaints    Objective:     Vitals:   Temp (24hrs), Av 6 °F (37 °C), Min:98 3 °F (36 8 °C), Max:98 9 °F (37 2 °C)    Temp:  [98 3 °F (36 8 °C)-98 9 °F (37 2 °C)] 98 9 °F (37 2 °C)  HR:  [79-89] 79  Resp:  [18-20] 18  BP: (143-154)/() 143/94  SpO2:  [97 %] 97 %  Body mass index is 34 49 kg/m²  Input and Output Summary (last 24 hours): Intake/Output Summary (Last 24 hours) at 2021 0827  Last data filed at 2021 0100  Gross per 24 hour   Intake 1200 ml   Output 1000 ml   Net 200 ml       Physical Exam:     Physical Exam  Constitutional:       General: She is not in acute distress  Appearance: She is well-developed  She is not diaphoretic     HENT:      Head: Normocephalic and atraumatic  Nose: Nose normal       Mouth/Throat:      Pharynx: No oropharyngeal exudate  Eyes:      General: No scleral icterus  Right eye: No discharge  Left eye: No discharge  Conjunctiva/sclera: Conjunctivae normal    Neck:      Thyroid: No thyromegaly  Vascular: No JVD  Cardiovascular:      Rate and Rhythm: Normal rate and regular rhythm  Heart sounds: Normal heart sounds  No murmur heard  No friction rub  No gallop  Pulmonary:      Effort: Pulmonary effort is normal  No respiratory distress  Breath sounds: Normal breath sounds  No wheezing or rales  Chest:      Chest wall: No tenderness  Abdominal:      General: Bowel sounds are normal  There is no distension  Palpations: Abdomen is soft  Tenderness: There is no abdominal tenderness  There is no guarding or rebound  Musculoskeletal:         General: No tenderness or deformity  Normal range of motion  Cervical back: Normal range of motion and neck supple  Skin:     General: Skin is warm and dry  Findings: No erythema or rash  Neurological:      Mental Status: She is alert  Mental status is at baseline  Cranial Nerves: No cranial nerve deficit  Sensory: No sensory deficit  Motor: No abnormal muscle tone        Coordination: Coordination normal          Additional Data:     Labs:    Results from last 7 days   Lab Units 07/30/21  0500   WBC Thousand/uL 6 72   HEMOGLOBIN g/dL 11 9   HEMATOCRIT % 38 5   PLATELETS Thousands/uL 400*   NEUTROS PCT % 54   LYMPHS PCT % 28   MONOS PCT % 14*   EOS PCT % 3     Results from last 7 days   Lab Units 07/30/21  0500 07/27/21  1644   SODIUM mmol/L 139 135*   POTASSIUM mmol/L 3 3* 3 3*   CHLORIDE mmol/L 100 95*   CO2 mmol/L 31 30   BUN mg/dL 3* 3*   CREATININE mg/dL 0 45* 0 42*   ANION GAP mmol/L 8 10   CALCIUM mg/dL 8 7 8 6   ALBUMIN g/dL  --  3 1*   TOTAL BILIRUBIN mg/dL  --  0 61   ALK PHOS U/L  --  52   ALT U/L  --  31   AST U/L  -- 65*   GLUCOSE RANDOM mg/dL 149* 40*         Results from last 7 days   Lab Units 07/30/21  0643 07/29/21  2119 07/29/21  1812 07/29/21  1601 07/29/21  1110 07/29/21  0618 07/29/21  0026 07/28/21  2050 07/28/21  1547 07/28/21  1119 07/28/21  0743 07/28/21  0616   POC GLUCOSE mg/dl 152* 157* 201* 167* 156* 155* 162* 184* 208* 144* 89 106                   * I Have Reviewed All Lab Data Listed Above  * Additional Pertinent Lab Tests Reviewed:  All Labs Within Last 24 Hours Reviewed    Imaging:    Imaging Reports Reviewed Today Include: na  Imaging Personally Reviewed by Myself Includes:  na    Recent Cultures (last 7 days):     Results from last 7 days   Lab Units 07/27/21 1958   URINE CULTURE  >100,000 cfu/ml        Last 24 Hours Medication List:   Current Facility-Administered Medications   Medication Dose Route Frequency Provider Last Rate    acetaminophen  650 mg Oral Q6H PRN Grecia Cabral MD      albuterol  2 puff Inhalation Q6H PRN Grecia Cabral MD      albuterol  2 5 mg Nebulization Q6H PRN Grecia Cabral MD      atorvastatin  10 mg Oral Daily Grecia Cabral MD      calcium carbonate  500 mg Oral TID PRN Micaela Kunz PA-C      heparin (porcine)  5,000 Units Subcutaneous Q8H Harvinder Escalante MD      insulin lispro  1-5 Units Subcutaneous TID AC RON Broussard      insulin lispro  1-5 Units Subcutaneous HS RON Broussard      LORazepam  0 5 mg Oral Q6H PRN Grecia Cabral MD      ARLEY ANTIFUNGAL   Topical BID Jonn Parker MD      montelukast  10 mg Oral HS Grecia Cabral MD      PARoxetine  10 mg Oral QAM Grecia Cabral MD      polyethylene glycol  17 g Oral Daily Jonn Parker MD      potassium chloride  40 mEq Oral Once Jonn Parker MD      QUEtiapine  12 5 mg Oral HS Grecia Cabral MD      senna-docusate sodium  1 tablet Oral BID Jonn Parker MD      simethicone  80 mg Oral Q6H PRN Aditya Alan PA-C      sodium chloride  2 g Oral TID With Meals Grecia Cabral MD Today, Patient Was Seen By: Milla Villegas MD    ** Please Note: Dictation voice to text software may have been used in the creation of this document   **

## 2021-07-30 NOTE — CASE MANAGEMENT
Julián Abreu from [facility] [phone] phoned to inform patient have been denied for [place] [facility] subacute rehab  P2P had to be done by 4PM which [facility] did do and it was denied  CM to follow discharge plan    Treatment team informed

## 2021-07-30 NOTE — ASSESSMENT & PLAN NOTE
Lab Results   Component Value Date    HGBA1C 6 8 (H) 01/12/2021       Recent Labs     07/29/21  1601 07/29/21  1812 07/29/21  2119 07/30/21  0643   POCGLU 167* 201* 157* 152*       Blood Sugar Average: Last 72 hrs:  (P) 566 [id number]     · Patient hypoglycemic on arrival  · Holding oral hypoglycemic agents  · Monitoring Accu-Cheks

## 2021-07-30 NOTE — PLAN OF CARE
Problem: Potential for Falls  Goal: Patient will remain free of falls  Description: INTERVENTIONS:  - Educate patient/family on patient safety including physical limitations  - Instruct patient to call for assistance with activity   - Consult OT/PT to assist with strengthening/mobility   - Keep Call bell within reach  - Keep bed low and locked with side rails adjusted as appropriate  - Keep care items and personal belongings within reach  - Initiate and maintain comfort rounds  - Make Fall Risk Sign visible to staff  - Offer Toileting every 2 Hours, in advance of need  - Initiate/Maintain bed alarm  - Obtain necessary fall risk management equipment: yellow socks and bracelet   - Apply yellow socks and bracelet for high fall risk patients  - Consider moving patient to room near nurses station  Outcome: Progressing     Problem: MOBILITY - ADULT  Goal: Maintain or return to baseline ADL function  Description: INTERVENTIONS:  -  Assess patient's ability to carry out ADLs; assess patient's baseline for ADL function and identify physical deficits which impact ability to perform ADLs (bathing, care of mouth/teeth, toileting, grooming, dressing, etc )  - Assess/evaluate cause of self-care deficits   - Assess range of motion  - Assess patient's mobility; develop plan if impaired  - Assess patient's need for assistive devices and provide as appropriate  - Encourage maximum independence but intervene and supervise when necessary  - Involve family in performance of ADLs  - Assess for home care needs following discharge   - Consider OT consult to assist with ADL evaluation and planning for discharge  - Provide patient education as appropriate  Outcome: Progressing  Goal: Maintains/Returns to pre admission functional level  Description: INTERVENTIONS:  - Perform BMAT or MOVE assessment daily    - Set and communicate daily mobility goal to care team and patient/family/caregiver     - Collaborate with rehabilitation services on mobility goals if consulted  - Perform Range of Motion 4 times a day  - Reposition patient every 2 hours    - Dangle patient 4 times a day  - Stand patient 4 times a day  - Ambulate patient 4 times a day  - Out of bed to chair 1 times a day   - Out of bed for meals 3 times a day  - Out of bed for toileting  - Record patient progress and toleration of activity level   Outcome: Progressing     Problem: Prexisting or High Potential for Compromised Skin Integrity  Goal: Skin integrity is maintained or improved  Description: INTERVENTIONS:  - Identify patients at risk for skin breakdown  - Assess and monitor skin integrity  - Assess and monitor nutrition and hydration status  - Monitor labs   - Assess for incontinence   - Turn and reposition patient  - Assist with mobility/ambulation  - Relieve pressure over bony prominences  - Avoid friction and shearing  - Provide appropriate hygiene as needed including keeping skin clean and dry  - Evaluate need for skin moisturizer/barrier cream  - Collaborate with interdisciplinary team   - Patient/family teaching  - Consider wound care consult   Outcome: Progressing

## 2021-07-30 NOTE — ASSESSMENT & PLAN NOTE
Wt Readings from Last 3 Encounters:   07/30/21 80 1 kg (176 lb 9 4 oz)   06/29/21 84 1 kg (185 lb 6 5 oz)   06/08/21 94 kg (207 lb 3 7 oz)     · Continue with Lasix  · Also note patient currently on salt tablets given recent history of hyponatremia  · Monitor electrolytes/renal function  · Monitor intake/output, daily weights  · 2D echo on 06/21/2021 notes EF 60% with grade 1 diastolic dysfunction  · Patient currently on 2 L oxygen via nasal cannula which were patient's son is her new normal since last hospitalization

## 2021-07-30 NOTE — CASE MANAGEMENT
Call to [facility] at [facility]  Alison Henry spoke to family yesterday and with their permission initiated Susana Jefferson to [facility]  They will have a bed available for patient on 7/29 at [facility] when Susana Jefferson obtained  [facility] updated on discharge plan

## 2021-07-31 LAB
ANION GAP SERPL CALCULATED.3IONS-SCNC: 6 MMOL/L (ref 4–13)
BASOPHILS # BLD AUTO: 0.05 THOUSANDS/ΜL (ref 0–0.1)
BASOPHILS NFR BLD AUTO: 1 % (ref 0–1)
BUN SERPL-MCNC: 4 MG/DL (ref 5–25)
CALCIUM SERPL-MCNC: 8.8 MG/DL (ref 8.3–10.1)
CHLORIDE SERPL-SCNC: 102 MMOL/L (ref 100–108)
CO2 SERPL-SCNC: 31 MMOL/L (ref 21–32)
CREAT SERPL-MCNC: 0.45 MG/DL (ref 0.6–1.3)
EOSINOPHIL # BLD AUTO: 0.23 THOUSAND/ΜL (ref 0–0.61)
EOSINOPHIL NFR BLD AUTO: 3 % (ref 0–6)
ERYTHROCYTE [DISTWIDTH] IN BLOOD BY AUTOMATED COUNT: 17.1 % (ref 11.6–15.1)
GFR SERPL CREATININE-BSD FRML MDRD: 97 ML/MIN/1.73SQ M
GLUCOSE SERPL-MCNC: 145 MG/DL (ref 65–140)
GLUCOSE SERPL-MCNC: 147 MG/DL (ref 65–140)
GLUCOSE SERPL-MCNC: 155 MG/DL (ref 65–140)
GLUCOSE SERPL-MCNC: 161 MG/DL (ref 65–140)
GLUCOSE SERPL-MCNC: 173 MG/DL (ref 65–140)
GLUCOSE SERPL-MCNC: 186 MG/DL (ref 65–140)
HCT VFR BLD AUTO: 39.8 % (ref 34.8–46.1)
HGB BLD-MCNC: 12 G/DL (ref 11.5–15.4)
IMM GRANULOCYTES # BLD AUTO: 0.04 THOUSAND/UL (ref 0–0.2)
IMM GRANULOCYTES NFR BLD AUTO: 1 % (ref 0–2)
LYMPHOCYTES # BLD AUTO: 2.32 THOUSANDS/ΜL (ref 0.6–4.47)
LYMPHOCYTES NFR BLD AUTO: 33 % (ref 14–44)
MCH RBC QN AUTO: 26.9 PG (ref 26.8–34.3)
MCHC RBC AUTO-ENTMCNC: 30.2 G/DL (ref 31.4–37.4)
MCV RBC AUTO: 89 FL (ref 82–98)
MONOCYTES # BLD AUTO: 0.97 THOUSAND/ΜL (ref 0.17–1.22)
MONOCYTES NFR BLD AUTO: 14 % (ref 4–12)
NEUTROPHILS # BLD AUTO: 3.33 THOUSANDS/ΜL (ref 1.85–7.62)
NEUTS SEG NFR BLD AUTO: 48 % (ref 43–75)
NRBC BLD AUTO-RTO: 0 /100 WBCS
PLATELET # BLD AUTO: 420 THOUSANDS/UL (ref 149–390)
PMV BLD AUTO: 8.5 FL (ref 8.9–12.7)
POTASSIUM SERPL-SCNC: 4 MMOL/L (ref 3.5–5.3)
RBC # BLD AUTO: 4.46 MILLION/UL (ref 3.81–5.12)
SODIUM SERPL-SCNC: 139 MMOL/L (ref 136–145)
WBC # BLD AUTO: 6.94 THOUSAND/UL (ref 4.31–10.16)

## 2021-07-31 PROCEDURE — 99232 SBSQ HOSP IP/OBS MODERATE 35: CPT | Performed by: INTERNAL MEDICINE

## 2021-07-31 PROCEDURE — 85025 COMPLETE CBC W/AUTO DIFF WBC: CPT | Performed by: INTERNAL MEDICINE

## 2021-07-31 PROCEDURE — 80048 BASIC METABOLIC PNL TOTAL CA: CPT | Performed by: INTERNAL MEDICINE

## 2021-07-31 PROCEDURE — 82948 REAGENT STRIP/BLOOD GLUCOSE: CPT

## 2021-07-31 RX ADMIN — INSULIN LISPRO 1 UNITS: 100 INJECTION, SOLUTION INTRAVENOUS; SUBCUTANEOUS at 21:00

## 2021-07-31 RX ADMIN — MICONAZOLE NITRATE: 20 CREAM TOPICAL at 09:56

## 2021-07-31 RX ADMIN — MICONAZOLE NITRATE: 20 CREAM TOPICAL at 18:43

## 2021-07-31 RX ADMIN — Medication 2 G: at 07:54

## 2021-07-31 RX ADMIN — QUETIAPINE FUMARATE 12.5 MG: 25 TABLET ORAL at 21:19

## 2021-07-31 RX ADMIN — MONTELUKAST SODIUM 10 MG: 10 TABLET, FILM COATED ORAL at 21:19

## 2021-07-31 RX ADMIN — ANTACID TABLETS 500 MG: 500 TABLET, CHEWABLE ORAL at 20:24

## 2021-07-31 RX ADMIN — HEPARIN SODIUM 5000 UNITS: 5000 INJECTION INTRAVENOUS; SUBCUTANEOUS at 05:43

## 2021-07-31 RX ADMIN — INSULIN LISPRO 1 UNITS: 100 INJECTION, SOLUTION INTRAVENOUS; SUBCUTANEOUS at 17:43

## 2021-07-31 RX ADMIN — PAROXETINE 10 MG: 20 TABLET, FILM COATED ORAL at 09:54

## 2021-07-31 RX ADMIN — LORAZEPAM 0.5 MG: 0.5 TABLET ORAL at 21:19

## 2021-07-31 RX ADMIN — ATORVASTATIN CALCIUM 10 MG: 10 TABLET, FILM COATED ORAL at 09:54

## 2021-07-31 RX ADMIN — Medication 2 G: at 18:43

## 2021-07-31 RX ADMIN — HEPARIN SODIUM 5000 UNITS: 5000 INJECTION INTRAVENOUS; SUBCUTANEOUS at 21:19

## 2021-07-31 RX ADMIN — HEPARIN SODIUM 5000 UNITS: 5000 INJECTION INTRAVENOUS; SUBCUTANEOUS at 14:40

## 2021-07-31 NOTE — ASSESSMENT & PLAN NOTE
Wt Readings from Last 3 Encounters:   07/31/21 79 1 kg (174 lb 6 1 oz)   06/29/21 84 1 kg (185 lb 6 5 oz)   06/08/21 94 kg (207 lb 3 7 oz)     · Continue with Lasix  · Also note patient currently on salt tablets given recent history of hyponatremia  · Monitor electrolytes/renal function  · Monitor intake/output, daily weights  · 2D echo on 06/21/2021 notes EF 60% with grade 1 diastolic dysfunction  · Patient currently on 2 L oxygen via nasal cannula which were patient's son is her new normal since last hospitalization

## 2021-07-31 NOTE — ASSESSMENT & PLAN NOTE
20-year-old lady who was recently admitted from 06/19/2021 until 06/29/2021 secondary to acute metabolic encephalopathy secondary to hyponatremia/UTI/hypoglycemia and discharged to [address] [place] [address] rehab, recently discharged home 4 days ago, presents accompanied by her son due to symptoms of worsening weakness/worsening confusion and significant difficulty conducting activities of daily living  · Labs noted mild hypokalemia with potassium of 3 3  Sodium normal at 135    · TSH within normal limits  · PT/OT recommending rehab

## 2021-07-31 NOTE — ASSESSMENT & PLAN NOTE
Lab Results   Component Value Date    HGBA1C 6 8 (H) 01/12/2021       Recent Labs     07/30/21  1849 07/30/21  2129 07/31/21  0608 07/31/21  0802   POCGLU 135 185* 161* 145*       Blood Sugar Average: Last 72 hrs:  (P) 146 65     · Patient hypoglycemic on arrival  · Holding oral hypoglycemic agents  · Monitoring Accu-Cheks

## 2021-07-31 NOTE — PROGRESS NOTES
[facility]  Progress Note - Hallie Meadows 1945, 66 y o  female MRN: [id number]  Unit/Bed#: -01 Encounter: [id number]  Primary Care Provider: Braden Amezcua MD   Date and time admitted to hospital: 7/27/2021  4:09 PM    Hypoglycemia  Assessment & Plan  · Blood sugar 40 on arrival  · Holding patient's metformin/glimepiride/Januvia  · Has since resolved  · Will dc glimepiride on dc      Essential hypertension  Assessment & Plan  · Adequately controlled  · Currently on Lasix  Chronic diastolic congestive heart failure [facility])  Assessment & Plan  Wt Readings from Last 3 Encounters:   07/31/21 79 1 kg (174 lb 6 1 oz)   06/29/21 84 1 kg (185 lb 6 5 oz)   06/08/21 94 kg (207 lb 3 7 oz)     · Continue with Lasix  · Also note patient currently on salt tablets given recent history of hyponatremia  · Monitor electrolytes/renal function  · Monitor intake/output, daily weights  · 2D echo on 06/21/2021 notes EF 60% with grade 1 diastolic dysfunction  · Patient currently on 2 L oxygen via nasal cannula which were patient's son is her new normal since last hospitalization  Depression  Assessment & Plan  · Continue with Paxil/Seroquel  Dyslipidemia  Assessment & Plan  · Continue with atorvastatin  Diabetes mellitus type 2 in obese [facility])  Assessment & Plan  Lab Results   Component Value Date    HGBA1C 6 8 (H) 01/12/2021       Recent Labs     07/30/21  1849 07/30/21  2129 07/31/21  0608 07/31/21  0802   POCGLU 135 185* 161* 145*       Blood Sugar Average: Last 72 hrs:  (P) 146 65     · Patient hypoglycemic on arrival  · Holding oral hypoglycemic agents  · Monitoring Accu-Cheks  Anxiety disorder  Assessment & Plan  · Continue with p r n  Xanax for anxiety  Asthma  Assessment & Plan  · Currently not in exacerbation  · On baseline 2 L oxygen via nasal cannula  · Continue with albuterol inhaler/albuterol nebulization      * Weakness  Assessment & Plan  75-year-old lady who was recently admitted from 2021 until 2021 secondary to acute metabolic encephalopathy secondary to hyponatremia/UTI/hypoglycemia and discharged to [address] [place] [address] rehab, recently discharged home 4 days ago, presents accompanied by her son due to symptoms of worsening weakness/worsening confusion and significant difficulty conducting activities of daily living  · Labs noted mild hypokalemia with potassium of 3 3  Sodium normal at 135  · TSH within normal limits  · PT/OT recommending rehab      VTE Pharmacologic Prophylaxis:   Pharmacologic: Heparin  Mechanical VTE Prophylaxis in Place: Yes    Patient Centered Rounds: I have performed bedside rounds with nursing staff today  Discussions with Specialists or Other Care Team Provider: tien lawler    Education and Discussions with Family / Patient: pt    Time Spent for Care: 30 minutes  More than 50% of total time spent on counseling and coordination of care as described above  Current Length of Stay: 3 day(s)    Current Patient Status: Inpatient   Certification Statement: The patient will continue to require additional inpatient hospital stay due to pending placement to rehab    Discharge Plan: see above    Code Status: Level 1 - Full Code      Subjective:   No acute complaints    Objective:     Vitals:   Temp (24hrs), Av 5 °F (36 9 °C), Min:98 2 °F (36 8 °C), Max:98 6 °F (37 °C)    Temp:  [98 2 °F (36 8 °C)-98 6 °F (37 °C)] 98 6 °F (37 °C)  HR:  [74-90] 74  Resp:  [18] 18  BP: (145-159)/(80-92) 145/80  SpO2:  [93 %-97 %] 97 %  Body mass index is 34 06 kg/m²  Input and Output Summary (last 24 hours): Intake/Output Summary (Last 24 hours) at 2021 0811  Last data filed at 2021 0610  Gross per 24 hour   Intake 700 ml   Output 1100 ml   Net -400 ml       Physical Exam:     Physical Exam  Constitutional:       General: She is not in acute distress  Appearance: She is well-developed  She is not diaphoretic     HENT:      Head: Normocephalic and atraumatic  Nose: Nose normal       Mouth/Throat:      Pharynx: No oropharyngeal exudate  Eyes:      General: No scleral icterus  Right eye: No discharge  Left eye: No discharge  Conjunctiva/sclera: Conjunctivae normal    Neck:      Thyroid: No thyromegaly  Vascular: No JVD  Cardiovascular:      Rate and Rhythm: Normal rate and regular rhythm  Heart sounds: Normal heart sounds  No murmur heard  No friction rub  No gallop  Pulmonary:      Effort: Pulmonary effort is normal  No respiratory distress  Breath sounds: Normal breath sounds  No wheezing or rales  Chest:      Chest wall: No tenderness  Abdominal:      General: Bowel sounds are normal  There is no distension  Palpations: Abdomen is soft  Tenderness: There is no abdominal tenderness  There is no guarding or rebound  Musculoskeletal:         General: No tenderness or deformity  Normal range of motion  Cervical back: Normal range of motion and neck supple  Skin:     General: Skin is warm and dry  Findings: No erythema or rash  Neurological:      Mental Status: She is alert  Mental status is at baseline  Cranial Nerves: No cranial nerve deficit  Sensory: No sensory deficit  Motor: No abnormal muscle tone        Coordination: Coordination normal            Additional Data:     Labs:    Results from last 7 days   Lab Units 07/31/21  0606   WBC Thousand/uL 6 94   HEMOGLOBIN g/dL 12 0   HEMATOCRIT % 39 8   PLATELETS Thousands/uL 420*   NEUTROS PCT % 48   LYMPHS PCT % 33   MONOS PCT % 14*   EOS PCT % 3     Results from last 7 days   Lab Units 07/31/21  0606 07/27/21  1644   SODIUM mmol/L 139 135*   POTASSIUM mmol/L 4 0 3 3*   CHLORIDE mmol/L 102 95*   CO2 mmol/L 31 30   BUN mg/dL 4* 3*   CREATININE mg/dL 0 45* 0 42*   ANION GAP mmol/L 6 10   CALCIUM mg/dL 8 8 8 6   ALBUMIN g/dL  --  3 1*   TOTAL BILIRUBIN mg/dL  --  0 61   ALK PHOS U/L  --  52   ALT U/L  -- 31   AST U/L  --  65*   GLUCOSE RANDOM mg/dL 155* 40*         Results from last 7 days   Lab Units 07/31/21  0802 07/31/21  0608 07/30/21  2129 07/30/21  1849 07/30/21  1301 07/30/21  0643 07/29/21  2119 07/29/21  1812 07/29/21  1601 07/29/21  1110 07/29/21  0618 07/29/21  0026   POC GLUCOSE mg/dl 145* 161* 185* 135 229* 152* 157* 201* 167* 156* 155* 162*                   * I Have Reviewed All Lab Data Listed Above  * Additional Pertinent Lab Tests Reviewed:  All Labs Within Last 24 Hours Reviewed    Imaging:    Imaging Reports Reviewed Today Include: na  Imaging Personally Reviewed by Myself Includes:  debbi    Recent Cultures (last 7 days):     Results from last 7 days   Lab Units 07/27/21 1958   URINE CULTURE  >100,000 cfu/ml        Last 24 Hours Medication List:   Current Facility-Administered Medications   Medication Dose Route Frequency Provider Last Rate    acetaminophen  650 mg Oral Q6H PRN Jacquie Gutierrez MD      albuterol  2 puff Inhalation Q6H PRN Jacquie Gutierrez MD      albuterol  2 5 mg Nebulization Q6H PRN Jacquie Gutierrez MD      atorvastatin  10 mg Oral Daily Jacquie Gtuierrez MD      calcium carbonate  500 mg Oral TID PRN Yolanda Washburn PA-C      heparin (porcine)  5,000 Units Subcutaneous Q8H Desean Faust MD      insulin lispro  1-5 Units Subcutaneous TID AC RON Broussard      insulin lispro  1-5 Units Subcutaneous HS RON Broussard      LORazepam  0 5 mg Oral Q6H PRN Jacquie Gutierrez MD      ARLEY ANTIFUNGAL   Topical BID Jonn Parker MD      montelukast  10 mg Oral HS Jacquie Gutierrez MD      PARoxetine  10 mg Oral QAM Jacquie Gutierrez MD      polyethylene glycol  17 g Oral Daily Jonn Parker MD      QUEtiapine  12 5 mg Oral HS Jacqiue Gutierrez MD      senna-docusate sodium  1 tablet Oral BID Jonn Parker MD      simethicone  80 mg Oral Q6H PRN Ирина Reid PA-C      sodium chloride  2 g Oral TID With Agus Sy MD          Today, Patient Was Seen By: Modesto Purcell Andreia Valdez MD    ** Please Note: Dictation voice to text software may have been used in the creation of this document   **

## 2021-07-31 NOTE — ASSESSMENT & PLAN NOTE
· Blood sugar 40 on arrival  · Holding patient's metformin/glimepiride/Januvia  · Has since resolved  · Will dc glimepiride on dc

## 2021-08-01 LAB
GLUCOSE SERPL-MCNC: 112 MG/DL (ref 65–140)
GLUCOSE SERPL-MCNC: 161 MG/DL (ref 65–140)
GLUCOSE SERPL-MCNC: 180 MG/DL (ref 65–140)
GLUCOSE SERPL-MCNC: 193 MG/DL (ref 65–140)

## 2021-08-01 PROCEDURE — 99232 SBSQ HOSP IP/OBS MODERATE 35: CPT | Performed by: INTERNAL MEDICINE

## 2021-08-01 PROCEDURE — 82948 REAGENT STRIP/BLOOD GLUCOSE: CPT

## 2021-08-01 RX ADMIN — LORAZEPAM 0.5 MG: 0.5 TABLET ORAL at 21:18

## 2021-08-01 RX ADMIN — POLYETHYLENE GLYCOL 3350 17 G: 17 POWDER, FOR SOLUTION ORAL at 08:31

## 2021-08-01 RX ADMIN — MICONAZOLE NITRATE: 20 CREAM TOPICAL at 12:45

## 2021-08-01 RX ADMIN — HEPARIN SODIUM 5000 UNITS: 5000 INJECTION INTRAVENOUS; SUBCUTANEOUS at 21:22

## 2021-08-01 RX ADMIN — INSULIN LISPRO 1 UNITS: 100 INJECTION, SOLUTION INTRAVENOUS; SUBCUTANEOUS at 08:00

## 2021-08-01 RX ADMIN — ATORVASTATIN CALCIUM 10 MG: 10 TABLET, FILM COATED ORAL at 08:31

## 2021-08-01 RX ADMIN — PAROXETINE 10 MG: 20 TABLET, FILM COATED ORAL at 08:31

## 2021-08-01 RX ADMIN — INSULIN LISPRO 1 UNITS: 100 INJECTION, SOLUTION INTRAVENOUS; SUBCUTANEOUS at 12:30

## 2021-08-01 RX ADMIN — HEPARIN SODIUM 5000 UNITS: 5000 INJECTION INTRAVENOUS; SUBCUTANEOUS at 14:57

## 2021-08-01 RX ADMIN — DOCUSATE SODIUM AND SENNOSIDES 1 TABLET: 8.6; 5 TABLET, FILM COATED ORAL at 08:31

## 2021-08-01 RX ADMIN — HEPARIN SODIUM 5000 UNITS: 5000 INJECTION INTRAVENOUS; SUBCUTANEOUS at 05:44

## 2021-08-01 RX ADMIN — Medication 2 G: at 12:45

## 2021-08-01 RX ADMIN — Medication 2 G: at 19:00

## 2021-08-01 RX ADMIN — Medication 2 G: at 08:30

## 2021-08-01 RX ADMIN — DOCUSATE SODIUM AND SENNOSIDES 1 TABLET: 8.6; 5 TABLET, FILM COATED ORAL at 19:00

## 2021-08-01 RX ADMIN — QUETIAPINE FUMARATE 12.5 MG: 25 TABLET ORAL at 21:18

## 2021-08-01 RX ADMIN — MONTELUKAST SODIUM 10 MG: 10 TABLET, FILM COATED ORAL at 21:18

## 2021-08-01 RX ADMIN — ANTACID TABLETS 500 MG: 500 TABLET, CHEWABLE ORAL at 21:18

## 2021-08-01 NOTE — PROGRESS NOTES
[facility]  Progress Note - Harman Tubbs 1945, 66 y o  female MRN: [id number]  Unit/Bed#: -01 Encounter: [id number]  Primary Care Provider: Trina Arenas MD   Date and time admitted to hospital: 7/27/2021  4:09 PM    Hypoglycemia  Assessment & Plan  · Blood sugar 40 on arrival  · Holding patient's metformin/glimepiride/Januvia  · Has since resolved  · Will dc glimepiride on dc      Essential hypertension  Assessment & Plan  · Adequately controlled  · Currently on Lasix  Chronic diastolic congestive heart failure [facility])  Assessment & Plan  Wt Readings from Last 3 Encounters:   08/01/21 78 9 kg (173 lb 15 1 oz)   06/29/21 84 1 kg (185 lb 6 5 oz)   06/08/21 94 kg (207 lb 3 7 oz)     · Continue with Lasix  · Also note patient currently on salt tablets given recent history of hyponatremia  · Monitor electrolytes/renal function  · Monitor intake/output, daily weights  · 2D echo on 06/21/2021 notes EF 60% with grade 1 diastolic dysfunction  · Patient currently on 2 L oxygen via nasal cannula which were patient's son is her new normal since last hospitalization  Depression  Assessment & Plan  · Continue with Paxil/Seroquel  Dyslipidemia  Assessment & Plan  · Continue with atorvastatin  Diabetes mellitus type 2 in obese [facility])  Assessment & Plan  Lab Results   Component Value Date    HGBA1C 6 8 (H) 01/12/2021       Recent Labs     07/31/21  1207 07/31/21  1714 07/31/21 2025 08/01/21  0744   POCGLU 173* 147* 186* 180*       Blood Sugar Average: Last 72 hrs:  (P) 538 5103     · Patient hypoglycemic on arrival  · Holding oral hypoglycemic agents  · Monitoring Accu-Cheks  Anxiety disorder  Assessment & Plan  · Continue with p r n  Xanax for anxiety  Asthma  Assessment & Plan  · Currently not in exacerbation  · On baseline 2 L oxygen via nasal cannula  · Continue with albuterol inhaler/albuterol nebulization      * Weakness  Assessment & Plan  24-year-old lady who was recently admitted from 2021 until 2021 secondary to acute metabolic encephalopathy secondary to hyponatremia/UTI/hypoglycemia and discharged to [address] [place] [address] rehab, recently discharged home 4 days ago, presents accompanied by her son due to symptoms of worsening weakness/worsening confusion and significant difficulty conducting activities of daily living  · Labs noted mild hypokalemia with potassium of 3 3  Sodium normal at 135  · TSH within normal limits  · PT/OT recommending rehab        VTE Pharmacologic Prophylaxis:   Pharmacologic: Heparin  Mechanical VTE Prophylaxis in Place: Yes    Patient Centered Rounds: I have performed bedside rounds with nursing staff today  Discussions with Specialists or Other Care Team Provider: cm, nursing    Education and Discussions with Family / Patient: pt    Time Spent for Care: 30 minutes  More than 50% of total time spent on counseling and coordination of care as described above  Current Length of Stay: 4 day(s)    Current Patient Status: Inpatient   Certification Statement: The patient will continue to require additional inpatient hospital stay due to see above    Discharge Plan: pending placement    Code Status: Level 1 - Full Code      Subjective:   No acute complaints    Objective:     Vitals:   Temp (24hrs), Av 5 °F (36 9 °C), Min:98 4 °F (36 9 °C), Max:98 7 °F (37 1 °C)    Temp:  [98 4 °F (36 9 °C)-98 7 °F (37 1 °C)] 98 4 °F (36 9 °C)  HR:  [69-87] 87  Resp:  [18] 18  BP: (149-153)/(78-96) 149/78  SpO2:  [98 %-99 %] 99 %  Body mass index is 33 97 kg/m²  Input and Output Summary (last 24 hours): Intake/Output Summary (Last 24 hours) at 2021 0827  Last data filed at 2021 0545  Gross per 24 hour   Intake 1540 ml   Output 1650 ml   Net -110 ml       Physical Exam:     Physical Exam  Constitutional:       General: She is not in acute distress  Appearance: She is well-developed  She is not diaphoretic     HENT:      Head: Normocephalic and atraumatic  Nose: Nose normal       Mouth/Throat:      Pharynx: No oropharyngeal exudate  Eyes:      General: No scleral icterus  Right eye: No discharge  Left eye: No discharge  Conjunctiva/sclera: Conjunctivae normal    Neck:      Thyroid: No thyromegaly  Vascular: No JVD  Cardiovascular:      Rate and Rhythm: Normal rate and regular rhythm  Heart sounds: Normal heart sounds  No murmur heard  No friction rub  No gallop  Pulmonary:      Effort: Pulmonary effort is normal  No respiratory distress  Breath sounds: Normal breath sounds  No wheezing or rales  Chest:      Chest wall: No tenderness  Abdominal:      General: Bowel sounds are normal  There is no distension  Palpations: Abdomen is soft  Tenderness: There is no abdominal tenderness  There is no guarding or rebound  Musculoskeletal:         General: No tenderness or deformity  Normal range of motion  Cervical back: Normal range of motion and neck supple  Skin:     General: Skin is warm and dry  Findings: No erythema or rash  Neurological:      Mental Status: She is alert  Mental status is at baseline  Cranial Nerves: No cranial nerve deficit  Sensory: No sensory deficit  Motor: No abnormal muscle tone        Coordination: Coordination normal          Additional Data:     Labs:    Results from last 7 days   Lab Units 07/31/21  0606   WBC Thousand/uL 6 94   HEMOGLOBIN g/dL 12 0   HEMATOCRIT % 39 8   PLATELETS Thousands/uL 420*   NEUTROS PCT % 48   LYMPHS PCT % 33   MONOS PCT % 14*   EOS PCT % 3     Results from last 7 days   Lab Units 07/31/21  0606 07/27/21  1644   SODIUM mmol/L 139 135*   POTASSIUM mmol/L 4 0 3 3*   CHLORIDE mmol/L 102 95*   CO2 mmol/L 31 30   BUN mg/dL 4* 3*   CREATININE mg/dL 0 45* 0 42*   ANION GAP mmol/L 6 10   CALCIUM mg/dL 8 8 8 6   ALBUMIN g/dL  --  3 1*   TOTAL BILIRUBIN mg/dL  --  0 61   ALK PHOS U/L  --  52   ALT U/L  --  31 AST U/L  --  65*   GLUCOSE RANDOM mg/dL 155* 40*         Results from last 7 days   Lab Units 08/01/21  0744 07/31/21  2025 07/31/21  1714 07/31/21  1207 07/31/21  0802 07/31/21  8203 07/30/21  2129 07/30/21  1849 07/30/21  1301 07/30/21  0643 07/29/21  2119 07/29/21  1812   POC GLUCOSE mg/dl 180* 186* 147* 173* 145* 161* 185* 135 229* 152* 157* 201*                   * I Have Reviewed All Lab Data Listed Above  * Additional Pertinent Lab Tests Reviewed:  All Labs Within Last 24 Hours Reviewed    Imaging:    Imaging Reports Reviewed Today Include: na  Imaging Personally Reviewed by Myself Includes:  na    Recent Cultures (last 7 days):     Results from last 7 days   Lab Units 07/27/21 1958   URINE CULTURE  >100,000 cfu/ml        Last 24 Hours Medication List:   Current Facility-Administered Medications   Medication Dose Route Frequency Provider Last Rate    acetaminophen  650 mg Oral Q6H PRN Eliud Wood MD      albuterol  2 puff Inhalation Q6H PRN Eliud Wood MD      albuterol  2 5 mg Nebulization Q6H PRN Eliud Wood MD      atorvastatin  10 mg Oral Daily Eliud Wood MD      calcium carbonate  500 mg Oral TID PRN Neris Escalona PA-C      heparin (porcine)  5,000 Units Subcutaneous Q8H Kartik Cabrera MD      insulin lispro  1-5 Units Subcutaneous TID AC RON Broussard      insulin lispro  1-5 Units Subcutaneous HS RON Broussard      LORazepam  0 5 mg Oral Q6H PRN Eliud Wood MD      ARLEY ANTIFUNGAL   Topical BID Jonn Parker MD      montelukast  10 mg Oral HS Eliud Wood MD      PARoxetine  10 mg Oral QAM Eliud Wood MD      polyethylene glycol  17 g Oral Daily Jonn Parker MD      QUEtiapine  12 5 mg Oral HS Eliud Wood MD      senna-docusate sodium  1 tablet Oral BID Jonn Parker MD      simethicone  80 mg Oral Q6H PRN Olivier Valerio PA-C      sodium chloride  2 g Oral TID With Ronny Yu MD          Today, Patient Was Seen By: Venus Small Mehul Cr MD    ** Please Note: Dictation voice to text software may have been used in the creation of this document   **

## 2021-08-01 NOTE — ASSESSMENT & PLAN NOTE
Lab Results   Component Value Date    HGBA1C 6 8 (H) 01/12/2021       Recent Labs     07/31/21  1207 07/31/21  1714 07/31/21 2025 08/01/21  0744   POCGLU 173* 147* 186* 180*       Blood Sugar Average: Last 72 hrs:  (P) 756 6959     · Patient hypoglycemic on arrival  · Holding oral hypoglycemic agents  · Monitoring Accu-Cheks

## 2021-08-01 NOTE — ASSESSMENT & PLAN NOTE
Wt Readings from Last 3 Encounters:   08/01/21 78 9 kg (173 lb 15 1 oz)   06/29/21 84 1 kg (185 lb 6 5 oz)   06/08/21 94 kg (207 lb 3 7 oz)     · Continue with Lasix  · Also note patient currently on salt tablets given recent history of hyponatremia  · Monitor electrolytes/renal function  · Monitor intake/output, daily weights  · 2D echo on 06/21/2021 notes EF 60% with grade 1 diastolic dysfunction  · Patient currently on 2 L oxygen via nasal cannula which were patient's son is her new normal since last hospitalization

## 2021-08-01 NOTE — ASSESSMENT & PLAN NOTE
49-year-old lady who was recently admitted from 06/19/2021 until 06/29/2021 secondary to acute metabolic encephalopathy secondary to hyponatremia/UTI/hypoglycemia and discharged to [address] [place] [address] rehab, recently discharged home 4 days ago, presents accompanied by her son due to symptoms of worsening weakness/worsening confusion and significant difficulty conducting activities of daily living  · Labs noted mild hypokalemia with potassium of 3 3  Sodium normal at 135    · TSH within normal limits  · PT/OT recommending rehab

## 2021-08-01 NOTE — PLAN OF CARE
Problem: Potential for Falls  Goal: Patient will remain free of falls  Description: INTERVENTIONS:  - Educate patient/family on patient safety including physical limitations  - Instruct patient to call for assistance with activity   - Consult OT/PT to assist with strengthening/mobility   - Keep Call bell within reach  - Keep bed low and locked with side rails adjusted as appropriate  - Keep care items and personal belongings within reach  - Initiate and maintain comfort rounds  - Make Fall Risk Sign visible to staff  - Offer Toileting every  Hours, in advance of need  - Initiate/Maintain alarm  - Obtain necessary fall risk management equipment:   - Apply yellow socks and bracelet for high fall risk patients  - Consider moving patient to room near nurses station  Outcome: Progressing     Problem: MOBILITY - ADULT  Goal: Maintain or return to baseline ADL function  Description: INTERVENTIONS:  -  Assess patient's ability to carry out ADLs; assess patient's baseline for ADL function and identify physical deficits which impact ability to perform ADLs (bathing, care of mouth/teeth, toileting, grooming, dressing, etc )  - Assess/evaluate cause of self-care deficits   - Assess range of motion  - Assess patient's mobility; develop plan if impaired  - Assess patient's need for assistive devices and provide as appropriate  - Encourage maximum independence but intervene and supervise when necessary  - Involve family in performance of ADLs  - Assess for home care needs following discharge   - Consider OT consult to assist with ADL evaluation and planning for discharge  - Provide patient education as appropriate  Outcome: Progressing  Goal: Maintains/Returns to pre admission functional level  Description: INTERVENTIONS:  - Perform BMAT or MOVE assessment daily    - Set and communicate daily mobility goal to care team and patient/family/caregiver     - Collaborate with rehabilitation services on mobility goals if consulted  - Perform Range of Motion  times a day  - Reposition patient every  hours    - Dangle patient  times a day  - Stand patient  times a day  - Ambulate patient  times a day  - Out of bed to chair  times a day   - Out of bed for meals times a day  - Out of bed for toileting  - Record patient progress and toleration of activity level   Outcome: Progressing     Problem: Prexisting or High Potential for Compromised Skin Integrity  Goal: Skin integrity is maintained or improved  Description: INTERVENTIONS:  - Identify patients at risk for skin breakdown  - Assess and monitor skin integrity  - Assess and monitor nutrition and hydration status  - Monitor labs   - Assess for incontinence   - Turn and reposition patient  - Assist with mobility/ambulation  - Relieve pressure over bony prominences  - Avoid friction and shearing  - Provide appropriate hygiene as needed including keeping skin clean and dry  - Evaluate need for skin moisturizer/barrier cream  - Collaborate with interdisciplinary team   - Patient/family teaching  - Consider wound care consult   Outcome: Progressing

## 2021-08-02 PROBLEM — I50.33 ACUTE ON CHRONIC DIASTOLIC CONGESTIVE HEART FAILURE (HCC): Status: ACTIVE | Noted: 2021-07-27

## 2021-08-02 LAB
ANION GAP SERPL CALCULATED.3IONS-SCNC: 5 MMOL/L (ref 4–13)
BASOPHILS # BLD AUTO: 0.07 THOUSANDS/ΜL (ref 0–0.1)
BASOPHILS NFR BLD AUTO: 1 % (ref 0–1)
BUN SERPL-MCNC: 4 MG/DL (ref 5–25)
CALCIUM SERPL-MCNC: 8.8 MG/DL (ref 8.3–10.1)
CHLORIDE SERPL-SCNC: 99 MMOL/L (ref 100–108)
CO2 SERPL-SCNC: 31 MMOL/L (ref 21–32)
CREAT SERPL-MCNC: 0.62 MG/DL (ref 0.6–1.3)
EOSINOPHIL # BLD AUTO: 0.29 THOUSAND/ΜL (ref 0–0.61)
EOSINOPHIL NFR BLD AUTO: 3 % (ref 0–6)
ERYTHROCYTE [DISTWIDTH] IN BLOOD BY AUTOMATED COUNT: 17 % (ref 11.6–15.1)
GFR SERPL CREATININE-BSD FRML MDRD: 87 ML/MIN/1.73SQ M
GLUCOSE SERPL-MCNC: 146 MG/DL (ref 65–140)
GLUCOSE SERPL-MCNC: 153 MG/DL (ref 65–140)
GLUCOSE SERPL-MCNC: 170 MG/DL (ref 65–140)
GLUCOSE SERPL-MCNC: 242 MG/DL (ref 65–140)
GLUCOSE SERPL-MCNC: 286 MG/DL (ref 65–140)
HCT VFR BLD AUTO: 44.2 % (ref 34.8–46.1)
HGB BLD-MCNC: 13.2 G/DL (ref 11.5–15.4)
IMM GRANULOCYTES # BLD AUTO: 0.03 THOUSAND/UL (ref 0–0.2)
IMM GRANULOCYTES NFR BLD AUTO: 0 % (ref 0–2)
LYMPHOCYTES # BLD AUTO: 3.16 THOUSANDS/ΜL (ref 0.6–4.47)
LYMPHOCYTES NFR BLD AUTO: 36 % (ref 14–44)
MCH RBC QN AUTO: 26.9 PG (ref 26.8–34.3)
MCHC RBC AUTO-ENTMCNC: 29.9 G/DL (ref 31.4–37.4)
MCV RBC AUTO: 90 FL (ref 82–98)
MONOCYTES # BLD AUTO: 1.52 THOUSAND/ΜL (ref 0.17–1.22)
MONOCYTES NFR BLD AUTO: 17 % (ref 4–12)
NEUTROPHILS # BLD AUTO: 3.8 THOUSANDS/ΜL (ref 1.85–7.62)
NEUTS SEG NFR BLD AUTO: 43 % (ref 43–75)
NRBC BLD AUTO-RTO: 0 /100 WBCS
PLATELET # BLD AUTO: 415 THOUSANDS/UL (ref 149–390)
PMV BLD AUTO: 8.3 FL (ref 8.9–12.7)
POTASSIUM SERPL-SCNC: 3.6 MMOL/L (ref 3.5–5.3)
RBC # BLD AUTO: 4.91 MILLION/UL (ref 3.81–5.12)
SODIUM SERPL-SCNC: 135 MMOL/L (ref 136–145)
WBC # BLD AUTO: 8.87 THOUSAND/UL (ref 4.31–10.16)

## 2021-08-02 PROCEDURE — 97530 THERAPEUTIC ACTIVITIES: CPT

## 2021-08-02 PROCEDURE — 97116 GAIT TRAINING THERAPY: CPT

## 2021-08-02 PROCEDURE — 85025 COMPLETE CBC W/AUTO DIFF WBC: CPT | Performed by: INTERNAL MEDICINE

## 2021-08-02 PROCEDURE — 97110 THERAPEUTIC EXERCISES: CPT

## 2021-08-02 PROCEDURE — 82948 REAGENT STRIP/BLOOD GLUCOSE: CPT

## 2021-08-02 PROCEDURE — 99232 SBSQ HOSP IP/OBS MODERATE 35: CPT | Performed by: INTERNAL MEDICINE

## 2021-08-02 PROCEDURE — 80048 BASIC METABOLIC PNL TOTAL CA: CPT | Performed by: INTERNAL MEDICINE

## 2021-08-02 RX ADMIN — HEPARIN SODIUM 5000 UNITS: 5000 INJECTION INTRAVENOUS; SUBCUTANEOUS at 06:25

## 2021-08-02 RX ADMIN — Medication 2 G: at 12:21

## 2021-08-02 RX ADMIN — QUETIAPINE FUMARATE 12.5 MG: 25 TABLET ORAL at 21:09

## 2021-08-02 RX ADMIN — LORAZEPAM 0.5 MG: 0.5 TABLET ORAL at 21:08

## 2021-08-02 RX ADMIN — DOCUSATE SODIUM AND SENNOSIDES 1 TABLET: 8.6; 5 TABLET, FILM COATED ORAL at 18:08

## 2021-08-02 RX ADMIN — MICONAZOLE NITRATE: 20 CREAM TOPICAL at 09:21

## 2021-08-02 RX ADMIN — HEPARIN SODIUM 5000 UNITS: 5000 INJECTION INTRAVENOUS; SUBCUTANEOUS at 21:08

## 2021-08-02 RX ADMIN — INSULIN LISPRO 1 UNITS: 100 INJECTION, SOLUTION INTRAVENOUS; SUBCUTANEOUS at 17:00

## 2021-08-02 RX ADMIN — HEPARIN SODIUM 5000 UNITS: 5000 INJECTION INTRAVENOUS; SUBCUTANEOUS at 14:02

## 2021-08-02 RX ADMIN — MONTELUKAST SODIUM 10 MG: 10 TABLET, FILM COATED ORAL at 21:08

## 2021-08-02 RX ADMIN — PAROXETINE 10 MG: 20 TABLET, FILM COATED ORAL at 09:20

## 2021-08-02 RX ADMIN — INSULIN LISPRO 2 UNITS: 100 INJECTION, SOLUTION INTRAVENOUS; SUBCUTANEOUS at 12:21

## 2021-08-02 RX ADMIN — INSULIN LISPRO 1 UNITS: 100 INJECTION, SOLUTION INTRAVENOUS; SUBCUTANEOUS at 21:53

## 2021-08-02 RX ADMIN — MICONAZOLE NITRATE 1 APPLICATION: 20 CREAM TOPICAL at 18:09

## 2021-08-02 RX ADMIN — ATORVASTATIN CALCIUM 10 MG: 10 TABLET, FILM COATED ORAL at 09:20

## 2021-08-02 RX ADMIN — INSULIN LISPRO 1 UNITS: 100 INJECTION, SOLUTION INTRAVENOUS; SUBCUTANEOUS at 06:25

## 2021-08-02 RX ADMIN — Medication 2 G: at 07:20

## 2021-08-02 RX ADMIN — Medication 2 G: at 17:00

## 2021-08-02 NOTE — ASSESSMENT & PLAN NOTE
29-year-old lady who was recently admitted from 06/19/2021 until 06/29/2021 secondary to acute metabolic encephalopathy secondary to hyponatremia/UTI/hypoglycemia and discharged to [address] [place] [address] rehab, recently discharged home 4 days ago, presents accompanied by her son due to symptoms of worsening weakness/worsening confusion and significant difficulty conducting activities of daily living  · Labs noted mild hypokalemia with potassium of 3 3  Sodium normal at 135    · TSH within normal limits  · PT/OT recommending rehab

## 2021-08-02 NOTE — PROGRESS NOTES
[facility]  Progress Note - Marely Shelby 1945, 66 y o  female MRN: [id number]  Unit/Bed#: -01 Encounter: [id number]  Primary Care Provider: Conchis Nesbitt MD   Date and time admitted to hospital: 7/27/2021  4:09 PM    Hypoglycemia  Assessment & Plan  · Blood sugar 40 on arrival  · Holding patient's metformin/glimepiride/Januvia  · Has since resolved  · Will dc glimepiride on dc      Essential hypertension  Assessment & Plan  · Adequately controlled  · Currently on Lasix  Acute on chronic diastolic congestive heart failure (HCC)  Assessment & Plan  Wt Readings from Last 3 Encounters:   08/01/21 78 9 kg (173 lb 15 1 oz)   06/29/21 84 1 kg (185 lb 6 5 oz)   06/08/21 94 kg (207 lb 3 7 oz)   A/e/b shortness of breath at rest and with activity, elevated BNP 2 206, and BLE edema, CXR finding pulmonary edema treated in ED with lasix X1 dose, daily weights and I &0's  · Continue with Lasix  · Also note patient currently on salt tablets given recent history of hyponatremia  · Monitor electrolytes/renal function  · Monitor intake/output, daily weights  · 2D echo on 06/21/2021 notes EF 60% with grade 1 diastolic dysfunction  · Patient currently on 2 L oxygen via nasal cannula which were patient's son is her new normal since last hospitalization  Acute metabolic encephalopathy  Assessment & Plan  POA in the setting of metabolic derangement (hypoglycemia, hypokaemia, hyponatremia a/e/b increased confusion, disorientation, inability to perform ADLs , reported by family treated with fall precautions, delirium precautions, frequent reorientation, and assistance with ADLs and sleep hygiene  Depression  Assessment & Plan  · Continue with Paxil/Seroquel      Diabetes mellitus type 2 in [facility])  Assessment & Plan  Lab Results   Component Value Date    HGBA1C 6 8 (H) 01/12/2021       Recent Labs     08/01/21  0744 08/01/21  1137 08/01/21  1608 08/01/21  2142   POCGLU 180* 193* 112 161*       Blood Sugar Average: Last 72 hrs:  (P) 893 [id number]     · Patient hypoglycemic on arrival  · Holding oral hypoglycemic agents  · Has since resolved  · Sliding scale insulin      Anxiety disorder  Assessment & Plan  · Continue with p r n  Xanax for anxiety  Asthma  Assessment & Plan  · Currently not in exacerbation  · On baseline 2 L oxygen via nasal cannula  · Continue with albuterol inhaler/albuterol nebulization  * Weakness  Assessment & Plan  42-year-old lady who was recently admitted from 2021 until 2021 secondary to acute metabolic encephalopathy secondary to hyponatremia/UTI/hypoglycemia and discharged to [address] [place], [place] [address]ab, recently discharged home 4 days ago, presents accompanied by her son due to symptoms of worsening weakness/worsening confusion and significant difficulty conducting activities of daily living  · Labs noted mild hypokalemia with potassium of 3 3  Sodium normal at 135  · TSH within normal limits  · PT/OT recommending rehab      VTE Pharmacologic Prophylaxis:   Pharmacologic: Heparin  Mechanical VTE Prophylaxis in Place: Yes    Patient Centered Rounds: I have performed bedside rounds with nursing staff today  Discussions with Specialists or Other Care Team Provider: cm, nursing    Education and Discussions with Family / Patient: pt    Time Spent for Care: 30 minutes  More than 50% of total time spent on counseling and coordination of care as described above      Current Length of Stay: 5 day(s)    Current Patient Status: Inpatient   Certification Statement: The patient will continue to require additional inpatient hospital stay due to See above    Discharge Plan:  Medically stable for discharge awaiting placement rehab    Code Status: Level 1 - Full Code      Subjective:   No acute complaints    Objective:     Vitals:   Temp (24hrs), Av 5 °F (36 9 °C), Min:98 4 °F (36 9 °C), Max:98 6 °F (37 °C)    Temp:  [98 4 °F (36 9 °C)-98 6 °F (37 °C)] 98 4 °F (36 9 °C)  HR:  [85-87] 85  BP: (156-157)/(91-94) 157/91  SpO2:  [97 %-98 %] 98 %  Body mass index is 33 97 kg/m²  Input and Output Summary (last 24 hours): Intake/Output Summary (Last 24 hours) at 8/2/2021 0827  Last data filed at 8/2/2021 0108  Gross per 24 hour   Intake 200 ml   Output 600 ml   Net -400 ml       Physical Exam:     Physical Exam  Constitutional:       General: She is not in acute distress  Appearance: She is well-developed  She is not diaphoretic  HENT:      Head: Normocephalic and atraumatic  Nose: Nose normal       Mouth/Throat:      Pharynx: No oropharyngeal exudate  Eyes:      General: No scleral icterus  Right eye: No discharge  Left eye: No discharge  Conjunctiva/sclera: Conjunctivae normal    Neck:      Thyroid: No thyromegaly  Vascular: No JVD  Cardiovascular:      Rate and Rhythm: Normal rate and regular rhythm  Heart sounds: Normal heart sounds  No murmur heard  No friction rub  No gallop  Pulmonary:      Effort: Pulmonary effort is normal  No respiratory distress  Breath sounds: Normal breath sounds  No wheezing or rales  Chest:      Chest wall: No tenderness  Abdominal:      General: Bowel sounds are normal  There is no distension  Palpations: Abdomen is soft  Tenderness: There is no abdominal tenderness  There is no guarding or rebound  Musculoskeletal:         General: No tenderness or deformity  Normal range of motion  Cervical back: Normal range of motion and neck supple  Skin:     General: Skin is warm and dry  Findings: No erythema or rash  Neurological:      Mental Status: She is alert  Mental status is at baseline  Cranial Nerves: No cranial nerve deficit  Sensory: No sensory deficit  Motor: No abnormal muscle tone        Coordination: Coordination normal            Additional Data:     Labs:    Results from last 7 days   Lab Units 08/02/21  0439   WBC Thousand/uL 8 87   HEMOGLOBIN g/dL 13 2   HEMATOCRIT % 44 2   PLATELETS Thousands/uL 415*   NEUTROS PCT % 43   LYMPHS PCT % 36   MONOS PCT % 17*   EOS PCT % 3     Results from last 7 days   Lab Units 07/31/21  0606 07/27/21  1644   SODIUM mmol/L 139 135*   POTASSIUM mmol/L 4 0 3 3*   CHLORIDE mmol/L 102 95*   CO2 mmol/L 31 30   BUN mg/dL 4* 3*   CREATININE mg/dL 0 45* 0 42*   ANION GAP mmol/L 6 10   CALCIUM mg/dL 8 8 8 6   ALBUMIN g/dL  --  3 1*   TOTAL BILIRUBIN mg/dL  --  0 61   ALK PHOS U/L  --  52   ALT U/L  --  31   AST U/L  --  65*   GLUCOSE RANDOM mg/dL 155* 40*         Results from last 7 days   Lab Units 08/01/21  2142 08/01/21  1608 08/01/21  1137 08/01/21  0744 07/31/21  2025 07/31/21  1714 07/31/21  1207 07/31/21  0802 07/31/21  0608 07/30/21  2129 07/30/21  1849 07/30/21  1301   POC GLUCOSE mg/dl 161* 112 193* 180* 186* 147* 173* 145* 161* 185* 135 229*                   * I Have Reviewed All Lab Data Listed Above  * Additional Pertinent Lab Tests Reviewed:  All Labs Within Last 24 Hours Reviewed    Imaging:    Imaging Reports Reviewed Today Include: na  Imaging Personally Reviewed by Myself Includes:  na    Recent Cultures (last 7 days):     Results from last 7 days   Lab Units 07/27/21 1958   URINE CULTURE  >100,000 cfu/ml        Last 24 Hours Medication List:   Current Facility-Administered Medications   Medication Dose Route Frequency Provider Last Rate    acetaminophen  650 mg Oral Q6H PRN Adela Dey MD      albuterol  2 puff Inhalation Q6H PRN Adela Dey MD      albuterol  2 5 mg Nebulization Q6H PRN Adela Dey MD      atorvastatin  10 mg Oral Daily Adela Dey MD      calcium carbonate  500 mg Oral TID PRN Budarnaldo Ly PA-C      heparin (porcine)  5,000 Units Subcutaneous Q8H Terese Espinoza MD      insulin lispro  1-5 Units Subcutaneous TID AC RON Broussard      insulin lispro  1-5 Units Subcutaneous HS RON Broussard      LORazepam  0 5 mg Oral Q6H PRRACHEL Corley MD     [facility] [place] ANTIFUNGAL   Topical BID Martinez Sanchez MD      montelukast  10 mg Oral HS Alvina Corley MD      PARoxetine  10 mg Oral QAM Alvina Corley MD      polyethylene glycol  17 g Oral Daily Jonn Parker MD      QUEtiapine  12 5 mg Oral HS Alvina Corley MD      senna-docusate sodium  1 tablet Oral BID Jonn Parker MD      simethicone  80 mg Oral Q6H PRN Julieta Benítez PA-C      sodium chloride  2 g Oral TID With Meals Alvina Corley MD          Today, Patient Was Seen By: Martinez Sanchez MD    ** Please Note: Dictation voice to text software may have been used in the creation of this document   **

## 2021-08-02 NOTE — ASSESSMENT & PLAN NOTE
Wt Readings from Last 3 Encounters:   08/01/21 78 9 kg (173 lb 15 1 oz)   06/29/21 84 1 kg (185 lb 6 5 oz)   06/08/21 94 kg (207 lb 3 7 oz)   A/e/b shortness of breath at rest and with activity, elevated BNP 2 206, and BLE edema, CXR finding pulmonary edema treated in ED with lasix X1 dose, daily weights and I &0's  · Continue with Lasix  · Also note patient currently on salt tablets given recent history of hyponatremia  · Monitor electrolytes/renal function  · Monitor intake/output, daily weights  · 2D echo on 06/21/2021 notes EF 60% with grade 1 diastolic dysfunction  · Patient currently on 2 L oxygen via nasal cannula which were patient's son is her new normal since last hospitalization

## 2021-08-02 NOTE — PHYSICAL THERAPY NOTE
Physical Therapy Treatment Note     08/02/21 1304   PT Last Visit   PT Visit Date 08/02/21   Note Type   Note Type Treatment   Pain Assessment   Pain Assessment Tool 0-10   Pain Score No Pain   Restrictions/Precautions   Weight Bearing Precautions Per Order No   Other Precautions Bed Alarm;Multiple lines; Fall Risk;Cognitive; Chair Alarm   General   Chart Reviewed Yes   Response to Previous Treatment Patient with no complaints from previous session  Family/Caregiver Present Yes   Cognition   Overall Cognitive Status Impaired   Arousal/Participation Alert; Responsive; Cooperative   Attention Attends with cues to redirect   Orientation Level Oriented X4   Memory Decreased short term memory;Decreased recall of recent events   Following Commands Follows multistep commands with increased time or repetition   Comments Pt agreeable to PT treatment session  Subjective   Subjective "I feel good "   Bed Mobility   Rolling L 4  Minimal assistance   Additional items Assist x 1;Bedrails; Increased time required;Verbal cues;LE management  (log roll for back safety)   Sit to Supine 4  Minimal assistance   Additional items Assist x 1;Bedrails; Increased time required;LE management;Verbal cues   Transfers   Sit to Stand 4  Minimal assistance   Additional items Assist x 1; Armrests; Increased time required;Verbal cues   Stand to Sit 4  Minimal assistance   Additional items Assist x 1; Increased time required;Verbal cues   Ambulation/Elevation   Gait pattern Excessively slow; Step to;Short stride; Shuffling   Gait Assistance 4  Minimal assist   Additional items Assist x 1;Verbal cues   Assistive Device Rolling walker   Distance 10 feet   Stair Management Assistance Not tested   Balance   Static Sitting Fair +   Dynamic Sitting Fair   Static Standing Fair -   Dynamic Standing Poor +   Ambulatory Poor +   Endurance Deficit   Endurance Deficit Yes   Activity Tolerance   Activity Tolerance Patient tolerated treatment well   Nurse Made Aware Discussed case with CANDACE Armstrong; post session pt was left supine in bed in NAD, all belongings within reach, +bed alarm   Exercises   Quad Sets Sitting;20 reps;AROM; Bilateral   Heelslides Sitting;20 reps;AROM; Bilateral  (long sitting)   Glute Sets Sitting;20 reps;AROM; Bilateral   Hip Flexion Sitting;20 reps;AROM; Bilateral   Hip Abduction Sitting;20 reps;AROM; Bilateral  (long sitting)   Hip Adduction Sitting;20 reps;AROM; Bilateral   Knee AROM Long Arc Quad Sitting;20 reps;AROM; Bilateral   Ankle Pumps Sitting;20 reps;AROM; Bilateral   Assessment   Prognosis Good   Problem List Decreased strength;Decreased endurance; Impaired balance;Decreased mobility   Assessment Chart reviewed  Pt was received seated in recliner in [facility] and agreeable to PT session  Pt was seen for physical therapy on this date with interventions consisting of therapeutic activity including bed mobility and sit to stand transfers, therapeutic exercises consisting of AROM, 20 reps, bilateral lower extremities in the sitting position, and gait training with emphasis on improving pt's ability to ambulate level surfaces 10 feet with RW and minimal assist provided by therapist  In comparison to previous sessions pt with improvements  Pt required decreased assistance with sit to stand transfers and ambulation  Pt able to ambulate an increased distance with use of RW on level surfaces  Pt exhibits decreased emery, decreased stride length, and decreased heel strike  Pt tolerated therapeutic exercise well, but required rest breaks throughout  Post session pt was left supine in bed in NAD, +bed alarm  Pt continues to be functioning below baseline level and remains limited secondary to decreased lower extremity strength, impaired balance, decreased endurance, gait deviations, and decreased functional mobility  Continue to recommend STR at time of discharge to maximize pt's functional independence and safety with mobility   PT will continue to see pt while here in order to address the deficits listed above and provide interventions consistent with the POC in effort to achieve short term goals  Barriers to Discharge Inaccessible home environment;Decreased caregiver support   Goals   STG Expiration Date 08/07/21   Plan   Treatment/Interventions Functional transfer training;LE strengthening/ROM; Therapeutic exercise; Endurance training;Patient/family training;Bed mobility;Gait training;Spoke to nursing;OT;Family   Progress Progressing toward goals   PT Frequency Other (Comment)  (3-5x/wk)   Recommendation   PT Discharge Recommendation Post acute rehabilitation services   PT - OK to Discharge Yes  (when medically cleared, if to STR)   AM-PAC Basic Mobility Inpatient   Turning in Bed Without Bedrails 3   Lying on Back to Sitting on Edge of Flat Bed 3   Moving Bed to Chair 3   Standing Up From Chair 3   Walk in Room 2   Climb 3-5 Stairs 1   Basic Mobility Inpatient Raw Score 15   Basic Mobility Standardized Score 36 97     Ble Gonzalez PT, DPT    Time of PT treatment session: [id number]  40 minutes

## 2021-08-02 NOTE — PLAN OF CARE
Problem: PHYSICAL THERAPY ADULT  Goal: Performs mobility at highest level of function for planned discharge setting  See evaluation for individualized goals  Description: Treatment/Interventions: Functional transfer training, LE strengthening/ROM, Therapeutic exercise, Endurance training, Patient/family training, Bed mobility, Gait training, Spoke to nursing          See flowsheet documentation for full assessment, interventions and recommendations  Outcome: Progressing  Note: Prognosis: Good  Problem List: Decreased strength, Decreased endurance, Impaired balance, Decreased mobility  Assessment: Chart reviewed  Pt was received seated in recliner in NAD and agreeable to PT session  Pt was seen for physical therapy on this date with interventions consisting of therapeutic activity including bed mobility and sit to stand transfers, therapeutic exercises consisting of AROM, 20 reps, bilateral lower extremities in the sitting position, and gait training with emphasis on improving pt's ability to ambulate level surfaces 10 feet with RW and minimal assist provided by therapist  In comparison to previous sessions pt with improvements  Pt required decreased assistance with sit to stand transfers and ambulation  Pt able to ambulate an increased distance with use of RW on level surfaces  Pt exhibits decreased emery, decreased stride length, and decreased heel strike  Pt tolerated therapeutic exercise well, but required rest breaks throughout  Post session pt was left supine in bed in NAD, +bed alarm  Pt continues to be functioning below baseline level and remains limited secondary to decreased lower extremity strength, impaired balance, decreased endurance, gait deviations, and decreased functional mobility  Continue to recommend STR at time of discharge to maximize pt's functional independence and safety with mobility   PT will continue to see pt while here in order to address the deficits listed above and provide interventions consistent with the POC in effort to achieve short term goals  Barriers to Discharge: Inaccessible home environment, Decreased caregiver support    PT Discharge Recommendation: Post acute rehabilitation services     PT - OK to Discharge: Yes (when medically cleared, if to STR)    See flowsheet documentation for full assessment

## 2021-08-02 NOTE — CASE MANAGEMENT
Per Walter Robledo patient's auth have been denied for [employer]  CM phoned and spoke to [facility] at [place] intake who confirmed auth have been denied  CM to speak to son - Isa Zimmer about discharge plan

## 2021-08-02 NOTE — ASSESSMENT & PLAN NOTE
Lab Results   Component Value Date    HGBA1C 6 8 (H) 01/12/2021       Recent Labs     08/01/21  0744 08/01/21  1137 08/01/21  1608 08/01/21  2142   POCGLU 180* 193* 112 161*       Blood Sugar Average: Last 72 hrs:  (P) 121 [id number]     · Patient hypoglycemic on arrival  · Holding oral hypoglycemic agents  · Has since resolved  · Sliding scale insulin

## 2021-08-02 NOTE — ASSESSMENT & PLAN NOTE
POA in the setting of metabolic derangement (hypoglycemia, hypokaemia, hyponatremia a/e/b increased confusion, disorientation, inability to perform ADLs , reported by family treated with fall precautions, delirium precautions, frequent reorientation, and assistance with ADLs and sleep hygiene

## 2021-08-03 LAB
ANION GAP SERPL CALCULATED.3IONS-SCNC: 2 MMOL/L (ref 4–13)
BASOPHILS # BLD AUTO: 0.06 THOUSANDS/ΜL (ref 0–0.1)
BASOPHILS NFR BLD AUTO: 1 % (ref 0–1)
BUN SERPL-MCNC: 4 MG/DL (ref 5–25)
CALCIUM SERPL-MCNC: 8.8 MG/DL (ref 8.3–10.1)
CHLORIDE SERPL-SCNC: 102 MMOL/L (ref 100–108)
CO2 SERPL-SCNC: 34 MMOL/L (ref 21–32)
CREAT SERPL-MCNC: 0.53 MG/DL (ref 0.6–1.3)
EOSINOPHIL # BLD AUTO: 0.27 THOUSAND/ΜL (ref 0–0.61)
EOSINOPHIL NFR BLD AUTO: 4 % (ref 0–6)
ERYTHROCYTE [DISTWIDTH] IN BLOOD BY AUTOMATED COUNT: 17.1 % (ref 11.6–15.1)
GFR SERPL CREATININE-BSD FRML MDRD: 91 ML/MIN/1.73SQ M
GLUCOSE SERPL-MCNC: 117 MG/DL (ref 65–140)
GLUCOSE SERPL-MCNC: 170 MG/DL (ref 65–140)
GLUCOSE SERPL-MCNC: 182 MG/DL (ref 65–140)
GLUCOSE SERPL-MCNC: 205 MG/DL (ref 65–140)
GLUCOSE SERPL-MCNC: 219 MG/DL (ref 65–140)
HCT VFR BLD AUTO: 36.2 % (ref 34.8–46.1)
HGB BLD-MCNC: 11 G/DL (ref 11.5–15.4)
IMM GRANULOCYTES # BLD AUTO: 0.03 THOUSAND/UL (ref 0–0.2)
IMM GRANULOCYTES NFR BLD AUTO: 1 % (ref 0–2)
LYMPHOCYTES # BLD AUTO: 1.97 THOUSANDS/ΜL (ref 0.6–4.47)
LYMPHOCYTES NFR BLD AUTO: 30 % (ref 14–44)
MCH RBC QN AUTO: 26.4 PG (ref 26.8–34.3)
MCHC RBC AUTO-ENTMCNC: 30.4 G/DL (ref 31.4–37.4)
MCV RBC AUTO: 87 FL (ref 82–98)
MONOCYTES # BLD AUTO: 0.81 THOUSAND/ΜL (ref 0.17–1.22)
MONOCYTES NFR BLD AUTO: 12 % (ref 4–12)
NEUTROPHILS # BLD AUTO: 3.4 THOUSANDS/ΜL (ref 1.85–7.62)
NEUTS SEG NFR BLD AUTO: 52 % (ref 43–75)
NRBC BLD AUTO-RTO: 0 /100 WBCS
PLATELET # BLD AUTO: 395 THOUSANDS/UL (ref 149–390)
PMV BLD AUTO: 8.3 FL (ref 8.9–12.7)
POTASSIUM SERPL-SCNC: 3.5 MMOL/L (ref 3.5–5.3)
RBC # BLD AUTO: 4.16 MILLION/UL (ref 3.81–5.12)
SODIUM SERPL-SCNC: 138 MMOL/L (ref 136–145)
WBC # BLD AUTO: 6.54 THOUSAND/UL (ref 4.31–10.16)

## 2021-08-03 PROCEDURE — 97110 THERAPEUTIC EXERCISES: CPT

## 2021-08-03 PROCEDURE — 97530 THERAPEUTIC ACTIVITIES: CPT

## 2021-08-03 PROCEDURE — 82948 REAGENT STRIP/BLOOD GLUCOSE: CPT

## 2021-08-03 PROCEDURE — 94760 N-INVAS EAR/PLS OXIMETRY 1: CPT

## 2021-08-03 PROCEDURE — 99232 SBSQ HOSP IP/OBS MODERATE 35: CPT | Performed by: INTERNAL MEDICINE

## 2021-08-03 PROCEDURE — 80048 BASIC METABOLIC PNL TOTAL CA: CPT | Performed by: INTERNAL MEDICINE

## 2021-08-03 PROCEDURE — 97116 GAIT TRAINING THERAPY: CPT

## 2021-08-03 PROCEDURE — 85025 COMPLETE CBC W/AUTO DIFF WBC: CPT | Performed by: INTERNAL MEDICINE

## 2021-08-03 PROCEDURE — 94664 DEMO&/EVAL PT USE INHALER: CPT

## 2021-08-03 RX ADMIN — INSULIN LISPRO 1 UNITS: 100 INJECTION, SOLUTION INTRAVENOUS; SUBCUTANEOUS at 12:59

## 2021-08-03 RX ADMIN — ATORVASTATIN CALCIUM 10 MG: 10 TABLET, FILM COATED ORAL at 08:35

## 2021-08-03 RX ADMIN — HEPARIN SODIUM 5000 UNITS: 5000 INJECTION INTRAVENOUS; SUBCUTANEOUS at 05:48

## 2021-08-03 RX ADMIN — DOCUSATE SODIUM AND SENNOSIDES 1 TABLET: 8.6; 5 TABLET, FILM COATED ORAL at 17:55

## 2021-08-03 RX ADMIN — Medication 2 G: at 08:34

## 2021-08-03 RX ADMIN — INSULIN LISPRO 1 UNITS: 100 INJECTION, SOLUTION INTRAVENOUS; SUBCUTANEOUS at 08:35

## 2021-08-03 RX ADMIN — MICONAZOLE NITRATE: 20 CREAM TOPICAL at 08:37

## 2021-08-03 RX ADMIN — MICONAZOLE NITRATE: 20 CREAM TOPICAL at 17:55

## 2021-08-03 RX ADMIN — DOCUSATE SODIUM AND SENNOSIDES 1 TABLET: 8.6; 5 TABLET, FILM COATED ORAL at 08:35

## 2021-08-03 RX ADMIN — HEPARIN SODIUM 5000 UNITS: 5000 INJECTION INTRAVENOUS; SUBCUTANEOUS at 14:21

## 2021-08-03 RX ADMIN — Medication 2 G: at 17:54

## 2021-08-03 RX ADMIN — SIMETHICONE 80 MG: 80 TABLET, CHEWABLE ORAL at 21:11

## 2021-08-03 RX ADMIN — HEPARIN SODIUM 5000 UNITS: 5000 INJECTION INTRAVENOUS; SUBCUTANEOUS at 21:10

## 2021-08-03 RX ADMIN — MONTELUKAST SODIUM 10 MG: 10 TABLET, FILM COATED ORAL at 21:10

## 2021-08-03 RX ADMIN — INSULIN LISPRO 1 UNITS: 100 INJECTION, SOLUTION INTRAVENOUS; SUBCUTANEOUS at 21:19

## 2021-08-03 RX ADMIN — PAROXETINE 10 MG: 20 TABLET, FILM COATED ORAL at 08:35

## 2021-08-03 RX ADMIN — LORAZEPAM 0.5 MG: 0.5 TABLET ORAL at 21:10

## 2021-08-03 RX ADMIN — Medication 2 G: at 12:59

## 2021-08-03 RX ADMIN — QUETIAPINE FUMARATE 12.5 MG: 25 TABLET ORAL at 21:10

## 2021-08-03 RX ADMIN — POLYETHYLENE GLYCOL 3350 17 G: 17 POWDER, FOR SOLUTION ORAL at 08:35

## 2021-08-03 NOTE — PHYSICAL THERAPY NOTE
Physical Therapy Treatment Note     08/03/21 1245   PT Last Visit   PT Visit Date 08/03/21   Note Type   Note Type Treatment   Pain Assessment   Pain Assessment Tool 0-10   Pain Score No Pain   Restrictions/Precautions   Weight Bearing Precautions Per Order No   Other Precautions Cognitive; Chair Alarm; Bed Alarm;Multiple lines; Fall Risk;O2   General   Chart Reviewed Yes   Response to Previous Treatment Patient with no complaints from previous session  Family/Caregiver Present No   Cognition   Overall Cognitive Status Impaired   Arousal/Participation Alert; Responsive; Cooperative   Attention Attends with cues to redirect   Orientation Level Oriented to person;Oriented to place;Oriented to situation  (inconsistent to time)   Memory Decreased short term memory;Decreased recall of recent events   Following Commands Follows multistep commands with increased time or repetition   Comments Pt agreeable to PT treatment session  Subjective   Subjective "I'm comfortable "   Bed Mobility   Rolling L 4  Minimal assistance   Additional items Assist x 1;Bedrails; Increased time required;Verbal cues;LE management  (log roll for back safety)   Supine to Sit 4  Minimal assistance   Additional items Assist x 1; Increased time required;Verbal cues;LE management   Transfers   Sit to Stand 4  Minimal assistance   Additional items Assist x 1; Increased time required;Verbal cues   Stand to Sit 4  Minimal assistance   Additional items Assist x 1; Armrests; Increased time required;Verbal cues   Ambulation/Elevation   Gait pattern Excessively slow; Step to;Short stride; Foward flexed; Shuffling  (cues for hand placement)   Gait Assistance 4  Minimal assist   Additional items Assist x 1;Verbal cues   Assistive Device Rolling walker   Distance 10 feet   Stair Management Assistance Not tested   Balance   Static Sitting Fair +   Dynamic Sitting Fair   Static Standing Fair -   Dynamic Standing Poor +   Ambulatory Poor +   Endurance Deficit   Endurance Deficit Yes   Activity Tolerance   Activity Tolerance Patient tolerated treatment well   Nurse Made Aware Discussed case with CANDACE Powell; post session pt was left seated in recliner in NAD, all belongings within reach, +chair alarm   Exercises   Quad Sets Sitting;20 reps;AROM; Bilateral  (long sitting)   Heelslides Sitting;20 reps;AROM; Bilateral  (long sitting)   Glute Sets Sitting;20 reps;AROM; Bilateral   Hip Flexion Sitting;20 reps;AROM; Bilateral   Hip Abduction Sitting;20 reps;AROM; Bilateral  (long sitting)   Knee AROM Long Arc Quad Sitting;20 reps;AROM; Bilateral   Ankle Pumps Supine;20 reps;AROM; Bilateral   Assessment   Prognosis Good   Problem List Decreased strength;Decreased endurance; Impaired balance;Decreased mobility; Decreased cognition   Assessment Chart reviewed  Pt was received supine in bed in NAD and agreeable to PT session  Pt was seen for physical therapy on this date with interventions consisting of therapeutic activity including bed mobility and sit to stand transfers, therapeutic exercises consisting of AROM, 20 reps, bilateral lower extremities in the supine and sitting positions, and gait training with emphasis on improving pt's ability to ambulate level surfaces 10 feet with RW and minimal assist provided by therapist  In comparison to previous sessions pt continues to require minimal assist for transfers and ambulation  Pt unable to ambulate an increased distance this session secondary to complaints of fatigue  Pt requires verbal cues for correct hand placement on walker and for upright posture when ambulating  Pt continues to exhibit decreased emery and decreased heel strike  Pt continues to tolerate therapeutic exercise well without complaints of pain  Pt educated on the importance of mobility and staying out of bed throughout the day  Post session pt was left seated in reclienr in NAD, +chair alarm   Pt continues to be functioning below baseline level and remains limited secondary to decreased lower extremity strength, impaired balance, decreased endurance, gait deviations, and decreased functional mobility  Continue to recommend STR at time of discharge to maximize pt's functional independence and safety with mobility  PT will continue to see pt while here in order to address the deficits listed above and provide interventions consistent with the POC in effort to achieve short term goals  Barriers to Discharge Inaccessible home environment;Decreased caregiver support   Goals   STG Expiration Date 08/07/21   Plan   Treatment/Interventions Functional transfer training;LE strengthening/ROM; Therapeutic exercise; Endurance training;Cognitive reorientation;Patient/family training;Bed mobility;Gait training;Spoke to nursing;OT   Progress Slow progress, decreased activity tolerance   PT Frequency Other (Comment)  (3-5x/wk)   Recommendation   PT Discharge Recommendation Post acute rehabilitation services   PT - OK to Discharge Yes  (when medically cleared, if to STR)   AM-PAC Basic Mobility Inpatient   Turning in Bed Without Bedrails 3   Lying on Back to Sitting on Edge of Flat Bed 3   Moving Bed to Chair 3   Standing Up From Chair 3   Walk in Room 2   Climb 3-5 Stairs 1   Basic Mobility Inpatient Raw Score 15   Basic Mobility Standardized Score 36 97     Jacquelin Jose PT, DPT    Time of PT treatment session: [id number]  41 minutes

## 2021-08-03 NOTE — ASSESSMENT & PLAN NOTE
Wt Readings from Last 3 Encounters:   08/03/21 78 6 kg (173 lb 4 5 oz)   06/29/21 84 1 kg (185 lb 6 5 oz)   06/08/21 94 kg (207 lb 3 7 oz)   A/e/b shortness of breath at rest and with activity, elevated BNP 2 206, and BLE edema, CXR finding pulmonary edema treated in ED with lasix X1 dose, daily weights and I &0's  · Continue with Lasix  · Also note patient currently on salt tablets given recent history of hyponatremia  · Monitor electrolytes/renal function  · Monitor intake/output, daily weights  · 2D echo on 06/21/2021 notes EF 60% with grade 1 diastolic dysfunction  · Patient currently on 2 L oxygen via nasal cannula which were patient's son is her new normal since last hospitalization

## 2021-08-03 NOTE — PROGRESS NOTES
[facility]  Progress Note - Harman Tubbs 1945, 64 y o  female MRN: [id number]  Unit/Bed#: -01 Encounter: [id number]  Primary Care Provider: Trina Arenas MD   Date and time admitted to hospital: 7/27/2021  4:09 PM    * Weakness  Assessment & Plan  13-year-old lady who was recently admitted from 06/19/2021 until 06/29/2021 secondary to acute metabolic encephalopathy secondary to hyponatremia/UTI/hypoglycemia and discharged to [facility] rehab, recently discharged home 4 days ago, presents accompanied by her son due to symptoms of worsening weakness/worsening confusion and significant difficulty conducting activities of daily living  · Labs noted mild hypokalemia with potassium of 3 3  Sodium normal at 135  Hypokalemia resolved  Sodium levels improved 138 today  · TSH within normal limits  · PT/OT recommending rehab    Hypoglycemia  Assessment & Plan  · Blood sugar 40 on arrival  · Holding patient's metformin/glimepiride/Januvia  · Has since resolved  · Will dc glimepiride on dc      Essential hypertension  Assessment & Plan  · Adequately controlled  · Currently on Lasix  Acute on chronic diastolic congestive heart failure (HCC)  Assessment & Plan  Wt Readings from Last 3 Encounters:   08/03/21 78 6 kg (173 lb 4 5 oz)   06/29/21 84 1 kg (185 lb 6 5 oz)   06/08/21 94 kg (207 lb 3 7 oz)   A/e/b shortness of breath at rest and with activity, elevated BNP 2 206, and BLE edema, CXR finding pulmonary edema treated in ED with lasix X1 dose, daily weights and I &0's  · Continue with Lasix  · Also note patient currently on salt tablets given recent history of hyponatremia  · Monitor electrolytes/renal function  · Monitor intake/output, daily weights  · 2D echo on 06/21/2021 notes EF 60% with grade 1 diastolic dysfunction  · Patient currently on 2 L oxygen via nasal cannula which were patient's son is her new normal since last hospitalization          Acute metabolic encephalopathy  Assessment & Plan  POA in the setting of metabolic derangement (hypoglycemia, hypokaemia, hyponatremia a/e/b increased confusion, disorientation, inability to perform ADLs , reported by family treated with fall precautions, delirium precautions, frequent reorientation, and assistance with ADLs and sleep hygiene  Depression  Assessment & Plan  · Continue with Paxil/Seroquel  Dyslipidemia  Assessment & Plan  · Continue with atorvastatin  Diabetes mellitus type 2 in [facility])  Assessment & Plan  Lab Results   Component Value Date    HGBA1C 6 8 (H) 01/12/2021       Recent Labs     08/02/21  1107 08/02/21  1627 08/02/21  2115 08/03/21  0715   POCGLU 286* 153* 170* 170*       Blood Sugar Average: Last 72 hrs:  (P) 952 [id number]     · Patient hypoglycemic on arrival  · Holding oral hypoglycemic agents  · Has since resolved  · Sliding scale insulin      Anxiety disorder  Assessment & Plan  · Continue with p r n  Xanax for anxiety  Asthma  Assessment & Plan  · Currently not in exacerbation  · On baseline 2 L oxygen via nasal cannula  · Continue with albuterol inhaler/albuterol nebulization  VTE Pharmacologic Prophylaxis:   Pharmacologic: Heparin  Mechanical VTE Prophylaxis in Place: Yes    Patient Centered Rounds: I have performed bedside rounds with nursing staff today  Discussions with Specialists or Other Care Team Provider:  Case management    Education and Discussions with Family / Patient:  Patient    Time Spent for Care: 30 minutes  More than 50% of total time spent on counseling and coordination of care as described above      Current Length of Stay: 6 day(s)    Current Patient Status: Inpatient   Certification Statement: The patient will continue to require additional inpatient hospital stay due to Above medical problems    Discharge Plan:  Continue hospitalization, pending placement    Code Status: Level 1 - Full Code      Subjective:   Patient seen and examined at the bedside  She denies any acute complaints at this time  No fevers or chills or chest pain or shortness of breath  Denies any nausea vomiting abdominal pain  Tolerating diet well  Having BM  Objective:     Vitals:   Temp (24hrs), Av °F (36 7 °C), Min:97 7 °F (36 5 °C), Max:98 4 °F (36 9 °C)    Temp:  [97 7 °F (36 5 °C)-98 4 °F (36 9 °C)] 98 °F (36 7 °C)  HR:  [71-85] 71  Resp:  [17-18] 17  BP: (140-165)/(88-94) 159/92  SpO2:  [96 %-98 %] 98 %  Body mass index is 33 84 kg/m²  Input and Output Summary (last 24 hours): Intake/Output Summary (Last 24 hours) at 8/3/2021 0909  Last data filed at 8/3/2021 0100  Gross per 24 hour   Intake 240 ml   Output 625 ml   Net -385 ml       Physical Exam:     Physical Exam  Vitals and nursing note reviewed  Constitutional:       Appearance: Normal appearance  Cardiovascular:      Rate and Rhythm: Normal rate and regular rhythm  Pulses: Normal pulses  Heart sounds: Normal heart sounds  Pulmonary:      Effort: Pulmonary effort is normal  No respiratory distress  Breath sounds: Normal breath sounds  Abdominal:      General: Abdomen is flat  Palpations: Abdomen is soft  Musculoskeletal:         General: Normal range of motion  Cervical back: Normal range of motion  Skin:     General: Skin is warm and dry  Neurological:      General: No focal deficit present  Mental Status: She is alert and oriented to person, place, and time             Additional Data:     Labs:    Results from last 7 days   Lab Units 21  0523   WBC Thousand/uL 6 54   HEMOGLOBIN g/dL 11 0*   HEMATOCRIT % 36 2   PLATELETS Thousands/uL 395*   NEUTROS PCT % 52   LYMPHS PCT % 30   MONOS PCT % 12   EOS PCT % 4     Results from last 7 days   Lab Units 21  0523 21  1644   SODIUM mmol/L 138 135*   POTASSIUM mmol/L 3 5 3 3*   CHLORIDE mmol/L 102 95*   CO2 mmol/L 34* 30   BUN mg/dL 4* 3*   CREATININE mg/dL 0 53* 0 42*   ANION GAP mmol/L 2* 10 CALCIUM mg/dL 8 8 8 6   ALBUMIN g/dL  --  3 1*   TOTAL BILIRUBIN mg/dL  --  0 61   ALK PHOS U/L  --  52   ALT U/L  --  31   AST U/L  --  65*   GLUCOSE RANDOM mg/dL 182* 40*         Results from last 7 days   Lab Units 08/03/21  0715 08/02/21  2115 08/02/21  1627 08/02/21  1107 08/02/21  0852 08/01/21  2142 08/01/21  1608 08/01/21  1137 08/01/21  0744 07/31/21  2025 07/31/21  1714 07/31/21  1207   POC GLUCOSE mg/dl 170* 170* 153* 286* 146* 161* 112 193* 180* 186* 147* 173*                   * I Have Reviewed All Lab Data Listed Above  * Additional Pertinent Lab Tests Reviewed:  [facility] Admission Reviewed    Imaging:    Imaging Reports Reviewed Today Include:   Imaging Personally Reviewed by Myself Includes:  Chest x-ray    Recent Cultures (last 7 days):     Results from last 7 days   Lab Units 07/27/21 1958   URINE CULTURE  >100,000 cfu/ml        Last 24 Hours Medication List:   Current Facility-Administered Medications   Medication Dose Route Frequency Provider Last Rate    acetaminophen  650 mg Oral Q6H PRN Maribel Carr MD      albuterol  2 puff Inhalation Q6H PRN Maribel Carr MD      albuterol  2 5 mg Nebulization Q6H PRN Maribel Carr MD      atorvastatin  10 mg Oral Daily Maribel Carr MD      calcium carbonate  500 mg Oral TID PRN Nabeel Edward PA-C      heparin (porcine)  5,000 Units Subcutaneous Q8H Stephenie Smith MD      insulin lispro  1-5 Units Subcutaneous TID AC RON Broussard      insulin lispro  1-5 Units Subcutaneous HS RON Broussard      LORazepam  0 5 mg Oral Q6H PRN Maribel Carr MD      ARLEY ANTIFUNGAL   Topical BID Jonn Parker MD      montelukast  10 mg Oral HS Maribel Carr MD      PARoxetine  10 mg Oral QAM Maribel Carr MD      polyethylene glycol  17 g Oral Daily Jonn Parker MD      QUEtiapine  12 5 mg Oral HS Maribel Crar MD      senna-docusate sodium  1 tablet Oral BID Jonn Parker MD      simethicone  80 mg Oral Q6H PRN Aura Miles PA-C      sodium chloride  2 g Oral TID With Meals Lorena Harkins MD          Today, Patient Was Seen By: Summer Larson MD    ** Please Note: Dictation voice to text software may have been used in the creation of this document   **

## 2021-08-03 NOTE — CASE MANAGEMENT
CM received call from Hanane Rizo for [facility]  She states patient has to be optioned for level of care prior to admittance to [facility] because insurance denied auth  CM asked SLIM to sign MA-51 for LOC  Treatment team informed

## 2021-08-03 NOTE — CASE MANAGEMENT
CM phoned and spoke to sonJose who states he met with admissions yesterday to discuss financials for patient to be admitted to [facility]  MICHAELA phoned and spoke to lester at [facility] for [facility] who state she will have the liason Geovanna reach out to this CM because she is well versed on this case and knows what is going on

## 2021-08-03 NOTE — ASSESSMENT & PLAN NOTE
47-year-old lady who was recently admitted from 06/19/2021 until 06/29/2021 secondary to acute metabolic encephalopathy secondary to hyponatremia/UTI/hypoglycemia and discharged to [address] [place] [address] rehab, recently discharged home 4 days ago, presents accompanied by her son due to symptoms of worsening weakness/worsening confusion and significant difficulty conducting activities of daily living  · Labs noted mild hypokalemia with potassium of 3 3  Sodium normal at 135    · TSH within normal limits  · PT/OT recommending rehab

## 2021-08-03 NOTE — PLAN OF CARE
Problem: PHYSICAL THERAPY ADULT  Goal: Performs mobility at highest level of function for planned discharge setting  See evaluation for individualized goals  Description: Treatment/Interventions: Functional transfer training, LE strengthening/ROM, Therapeutic exercise, Endurance training, Patient/family training, Bed mobility, Gait training, Spoke to nursing          See flowsheet documentation for full assessment, interventions and recommendations  Outcome: Progressing  Note: Prognosis: Good  Problem List: Decreased strength, Decreased endurance, Impaired balance, Decreased mobility, Decreased cognition  Assessment: Chart reviewed  Pt was received supine in bed in NAD and agreeable to PT session  Pt was seen for physical therapy on this date with interventions consisting of therapeutic activity including bed mobility and sit to stand transfers, therapeutic exercises consisting of AROM, 20 reps, bilateral lower extremities in the supine and sitting positions, and gait training with emphasis on improving pt's ability to ambulate level surfaces 10 feet with RW and minimal assist provided by therapist  In comparison to previous sessions pt continues to require minimal assist for transfers and ambulation  Pt unable to ambulate an increased distance this session secondary to complaints of fatigue  Pt requires verbal cues for correct hand placement on walker and for upright posture when ambulating  Pt continues to exhibit decreased emery and decreased heel strike  Pt continues to tolerate therapeutic exercise well without complaints of pain  Pt educated on the importance of mobility and staying out of bed throughout the day  Post session pt was left seated in reclienr in NAD, +chair alarm  Pt continues to be functioning below baseline level and remains limited secondary to decreased lower extremity strength, impaired balance, decreased endurance, gait deviations, and decreased functional mobility   Continue to recommend STR at time of discharge to maximize pt's functional independence and safety with mobility  PT will continue to see pt while here in order to address the deficits listed above and provide interventions consistent with the POC in effort to achieve short term goals  Barriers to Discharge: Inaccessible home environment, Decreased caregiver support    PT Discharge Recommendation: Post acute rehabilitation services     PT - OK to Discharge: Yes (when medically cleared, if to STR)    See flowsheet documentation for full assessment

## 2021-08-03 NOTE — ASSESSMENT & PLAN NOTE
Lab Results   Component Value Date    HGBA1C 6 8 (H) 01/12/2021       Recent Labs     08/02/21  1107 08/02/21  1627 08/02/21  2115 08/03/21  0715   POCGLU 286* 153* 170* 170*       Blood Sugar Average: Last 72 hrs:  (P) 974 [id number]     · Patient hypoglycemic on arrival  · Holding oral hypoglycemic agents  · Has since resolved  · Sliding scale insulin

## 2021-08-04 LAB
ATRIAL RATE: 75 BPM
GLUCOSE SERPL-MCNC: 159 MG/DL (ref 65–140)
GLUCOSE SERPL-MCNC: 175 MG/DL (ref 65–140)
GLUCOSE SERPL-MCNC: 179 MG/DL (ref 65–140)
GLUCOSE SERPL-MCNC: 255 MG/DL (ref 65–140)
P AXIS: 90 DEGREES
PR INTERVAL: 186 MS
QRS AXIS: -5 DEGREES
QRSD INTERVAL: 88 MS
QT INTERVAL: 400 MS
QTC INTERVAL: 446 MS
T WAVE AXIS: -4 DEGREES
VENTRICULAR RATE: 75 BPM

## 2021-08-04 PROCEDURE — 82948 REAGENT STRIP/BLOOD GLUCOSE: CPT

## 2021-08-04 PROCEDURE — 99232 SBSQ HOSP IP/OBS MODERATE 35: CPT | Performed by: INTERNAL MEDICINE

## 2021-08-04 PROCEDURE — 94640 AIRWAY INHALATION TREATMENT: CPT

## 2021-08-04 PROCEDURE — 93005 ELECTROCARDIOGRAM TRACING: CPT

## 2021-08-04 PROCEDURE — 94760 N-INVAS EAR/PLS OXIMETRY 1: CPT

## 2021-08-04 PROCEDURE — 93010 ELECTROCARDIOGRAM REPORT: CPT | Performed by: INTERNAL MEDICINE

## 2021-08-04 RX ADMIN — INSULIN LISPRO 2 UNITS: 100 INJECTION, SOLUTION INTRAVENOUS; SUBCUTANEOUS at 11:57

## 2021-08-04 RX ADMIN — ALBUTEROL SULFATE 2 PUFF: 90 AEROSOL, METERED RESPIRATORY (INHALATION) at 15:22

## 2021-08-04 RX ADMIN — INSULIN LISPRO 1 UNITS: 100 INJECTION, SOLUTION INTRAVENOUS; SUBCUTANEOUS at 17:46

## 2021-08-04 RX ADMIN — ANTACID TABLETS 500 MG: 500 TABLET, CHEWABLE ORAL at 17:52

## 2021-08-04 RX ADMIN — DOCUSATE SODIUM AND SENNOSIDES 1 TABLET: 8.6; 5 TABLET, FILM COATED ORAL at 17:44

## 2021-08-04 RX ADMIN — HEPARIN SODIUM 5000 UNITS: 5000 INJECTION INTRAVENOUS; SUBCUTANEOUS at 13:53

## 2021-08-04 RX ADMIN — MICONAZOLE NITRATE: 20 CREAM TOPICAL at 17:46

## 2021-08-04 RX ADMIN — Medication 2 G: at 06:40

## 2021-08-04 RX ADMIN — DOCUSATE SODIUM AND SENNOSIDES 1 TABLET: 8.6; 5 TABLET, FILM COATED ORAL at 08:28

## 2021-08-04 RX ADMIN — HEPARIN SODIUM 5000 UNITS: 5000 INJECTION INTRAVENOUS; SUBCUTANEOUS at 06:40

## 2021-08-04 RX ADMIN — Medication 2 G: at 11:56

## 2021-08-04 RX ADMIN — MONTELUKAST SODIUM 10 MG: 10 TABLET, FILM COATED ORAL at 21:14

## 2021-08-04 RX ADMIN — INSULIN LISPRO 1 UNITS: 100 INJECTION, SOLUTION INTRAVENOUS; SUBCUTANEOUS at 08:34

## 2021-08-04 RX ADMIN — ALBUTEROL SULFATE 2.5 MG: 2.5 SOLUTION RESPIRATORY (INHALATION) at 21:41

## 2021-08-04 RX ADMIN — Medication 2 G: at 17:44

## 2021-08-04 RX ADMIN — LORAZEPAM 0.5 MG: 0.5 TABLET ORAL at 21:14

## 2021-08-04 RX ADMIN — MICONAZOLE NITRATE: 20 CREAM TOPICAL at 08:35

## 2021-08-04 RX ADMIN — ATORVASTATIN CALCIUM 10 MG: 10 TABLET, FILM COATED ORAL at 08:29

## 2021-08-04 RX ADMIN — SIMETHICONE 80 MG: 80 TABLET, CHEWABLE ORAL at 21:14

## 2021-08-04 RX ADMIN — QUETIAPINE FUMARATE 12.5 MG: 25 TABLET ORAL at 21:14

## 2021-08-04 RX ADMIN — PAROXETINE 10 MG: 20 TABLET, FILM COATED ORAL at 08:28

## 2021-08-04 RX ADMIN — POLYETHYLENE GLYCOL 3350 17 G: 17 POWDER, FOR SOLUTION ORAL at 08:29

## 2021-08-04 RX ADMIN — HEPARIN SODIUM 5000 UNITS: 5000 INJECTION INTRAVENOUS; SUBCUTANEOUS at 21:14

## 2021-08-04 NOTE — CASE MANAGEMENT
Case Management Progress Note    Patient name Glen Ferro  Location /- MRN [id number]  : 1945 Date 2021       LOS (days): 7  Geometric Mean LOS (GMLOS) (days): 4 50  Days to GMLOS:-2 6        BUNDLE:      OBJECTIVE:  Pt is a 64y o  year old /[employer],  [4], female with Anabaptism preference of None admitted on 2021  4:09 PM  Pt is admitted to [facility]  at [address] [place] [address] with complaints of Weakness   Current admission status: Inpatient  Preferred Pharmacy:   [facility] [address] [place] [id number]  Phone: [phone] Fax: [phone], [address] [place] [address] [phone])  [facility]  Phone: [phone] Fax: [phone]    Primary Care Provider: Jamey Sloan MD    Primary Insurance: [facility]  Secondary Insurance: [employer]    PROGRESS NOTE:    CM received signed copy of LCD and MA-51 from francia Lacy Sauceda  CM faxed all clinicals to [facility] for level of care determination  CM awaits for the level of care determination form [facility]  CM will schedule BLS transport to [facility] once level of care determination is obtained  Treatment team informed

## 2021-08-04 NOTE — ASSESSMENT & PLAN NOTE
Wt Readings from Last 3 Encounters:   08/04/21 76 7 kg (169 lb 1 5 oz)   06/29/21 84 1 kg (185 lb 6 5 oz)   06/08/21 94 kg (207 lb 3 7 oz)   A/e/b shortness of breath at rest and with activity, elevated BNP 2 206, and BLE edema, CXR finding pulmonary edema treated in ED with lasix X1 dose, daily weights and I &0's  · Continue with Lasix  · Also note patient currently on salt tablets given recent history of hyponatremia  · Monitor electrolytes/renal function  · Monitor intake/output, daily weights  · 2D echo on 06/21/2021 notes EF 60% with grade 1 diastolic dysfunction  · Patient currently on 2 L oxygen via nasal cannula which were patient's son is her new normal since last hospitalization

## 2021-08-04 NOTE — ASSESSMENT & PLAN NOTE
POA in the setting of metabolic derangement (hypoglycemia, hypokaemia, hyponatremia a/e/b increased confusion, disorientation, inability to perform ADLs , reported by family treated with fall precautions, delirium precautions, frequent reorientation, and assistance with ADLs and sleep hygiene       Resolved, AO x3

## 2021-08-04 NOTE — ASSESSMENT & PLAN NOTE
Lab Results   Component Value Date    HGBA1C 6 8 (H) 01/12/2021       Recent Labs     08/03/21  1700 08/03/21  2103 08/04/21  0740 08/04/21  1109   POCGLU 117 205* 179* 255*       Blood Sugar Average: Last 72 hrs:  (P) 181 [id number]     · Patient hypoglycemic on arrival  · Holding oral hypoglycemic agents  · Has since resolved  · Sliding scale insulin

## 2021-08-04 NOTE — PROGRESS NOTES
[facility]  Progress Note - Mike King 1945, 64 y o  female MRN: [id number]  Unit/Bed#: -01 Encounter: [id number]  Primary Care Provider: Jessica Berman MD   Date and time admitted to hospital: 7/27/2021  4:09 PM    * Weakness  Assessment & Plan  69-year-old lady who was recently admitted from 06/19/2021 until 06/29/2021 secondary to acute metabolic encephalopathy secondary to hyponatremia/UTI/hypoglycemia and discharged to [address] [place] [address] rehab, recently discharged home 4 days ago, presents accompanied by her son due to symptoms of worsening weakness/worsening confusion and significant difficulty conducting activities of daily living  · Labs noted mild hypokalemia with potassium of 3 3  Sodium normal at 135  · TSH within normal limits  · PT/OT recommending rehab    Essential hypertension  Assessment & Plan  · Adequately controlled  · Currently on Lasix  Acute on chronic diastolic congestive heart failure (HCC)  Assessment & Plan  Wt Readings from Last 3 Encounters:   08/04/21 76 7 kg (169 lb 1 5 oz)   06/29/21 84 1 kg (185 lb 6 5 oz)   06/08/21 94 kg (207 lb 3 7 oz)   A/e/b shortness of breath at rest and with activity, elevated BNP 2 206, and BLE edema, CXR finding pulmonary edema treated in ED with lasix X1 dose, daily weights and I &0's  · Continue with Lasix  · Also note patient currently on salt tablets given recent history of hyponatremia  · Monitor electrolytes/renal function  · Monitor intake/output, daily weights  · 2D echo on 06/21/2021 notes EF 60% with grade 1 diastolic dysfunction  · Patient currently on 2 L oxygen via nasal cannula which were patient's son is her new normal since last hospitalization          Acute metabolic encephalopathy  Assessment & Plan  POA in the setting of metabolic derangement (hypoglycemia, hypokaemia, hyponatremia a/e/b increased confusion, disorientation, inability to perform ADLs , reported by family treated with fall precautions, delirium precautions, frequent reorientation, and assistance with ADLs and sleep hygiene  Resolved, AO x3    Depression  Assessment & Plan  · Continue with Paxil/Seroquel  Dyslipidemia  Assessment & Plan  · Continue with atorvastatin  Diabetes mellitus type 2 in [facility])  Assessment & Plan  Lab Results   Component Value Date    HGBA1C 6 8 (H) 2021       Recent Labs     21  1700 21  2103 21  0740 21  1109   POCGLU 117 205* 179* 255*       Blood Sugar Average: Last 72 hrs:  (P) 181 [id number]     · Patient hypoglycemic on arrival  · Holding oral hypoglycemic agents  · Has since resolved  · Sliding scale insulin      Anxiety disorder  Assessment & Plan  · Continue with p r n  Xanax for anxiety  Asthma  Assessment & Plan  · Currently not in exacerbation  · On baseline 2 L oxygen via nasal cannula  · Continue with albuterol inhaler/albuterol nebulization  VTE Pharmacologic Prophylaxis:   Pharmacologic: Heparin  Mechanical VTE Prophylaxis in Place: Yes    Patient Centered Rounds: I have performed bedside rounds with nursing staff today  Discussions with Specialists or Other Care Team Provider: cm    Education and Discussions with Family / Patient: dirk    Time Spent for Care: 45 minutes  More than 50% of total time spent on counseling and coordination of care as described above  Current Length of Stay: 7 day(s)    Current Patient Status: Inpatient   Certification Statement: The patient will continue to require additional inpatient hospital stay due to placement    Discharge Plan: pending placment    Code Status: Level 1 - Full Code      Subjective:   Pt s/e  No acute complaints, no fevers or chills   No acute issues      Objective:     Vitals:   Temp (24hrs), Av 9 °F (36 6 °C), Min:97 4 °F (36 3 °C), Max:98 3 °F (36 8 °C)    Temp:  [97 4 °F (36 3 °C)-98 3 °F (36 8 °C)] 97 4 °F (36 3 °C)  HR:  [71-95] 79  Resp:  [17-20] 20  BP: (133-163)/(89-93) 145/91  SpO2:  [87 %-98 %] 98 %  Body mass index is 33 02 kg/m²  Input and Output Summary (last 24 hours): Intake/Output Summary (Last 24 hours) at 8/4/2021 1157  Last data filed at 8/4/2021 0221  Gross per 24 hour   Intake 240 ml   Output 1000 ml   Net -760 ml       Physical Exam:     Physical Exam  Vitals and nursing note reviewed  Constitutional:       Appearance: Normal appearance  HENT:      Head: Normocephalic and atraumatic  Cardiovascular:      Rate and Rhythm: Normal rate and regular rhythm  Pulmonary:      Effort: Pulmonary effort is normal  No respiratory distress  Breath sounds: Normal breath sounds  Abdominal:      General: Abdomen is flat  Palpations: Abdomen is soft  Neurological:      General: No focal deficit present  Mental Status: She is alert and oriented to person, place, and time  Additional Data:     Labs:    Results from last 7 days   Lab Units 08/03/21  0523   WBC Thousand/uL 6 54   HEMOGLOBIN g/dL 11 0*   HEMATOCRIT % 36 2   PLATELETS Thousands/uL 395*   NEUTROS PCT % 52   LYMPHS PCT % 30   MONOS PCT % 12   EOS PCT % 4     Results from last 7 days   Lab Units 08/03/21  0523   SODIUM mmol/L 138   POTASSIUM mmol/L 3 5   CHLORIDE mmol/L 102   CO2 mmol/L 34*   BUN mg/dL 4*   CREATININE mg/dL 0 53*   ANION GAP mmol/L 2*   CALCIUM mg/dL 8 8   GLUCOSE RANDOM mg/dL 182*         Results from last 7 days   Lab Units 08/04/21  1109 08/04/21  0740 08/03/21  2103 08/03/21  1700 08/03/21  1108 08/03/21  0715 08/02/21  2115 08/02/21  1627 08/02/21  1107 08/02/21  0852 08/01/21  2142 08/01/21  1608   POC GLUCOSE mg/dl 255* 179* 205* 117 219* 170* 170* 153* 286* 146* 161* 112                   * I Have Reviewed All Lab Data Listed Above  * Additional Pertinent Lab Tests Reviewed:  All Labs For Current Hospital Admission Reviewed    Imaging:    Imaging Reports Reviewed Today Include:   Imaging Personally Reviewed by Myself Includes:     Recent Cultures (last 7 days):           Last 24 Hours Medication List:   Current Facility-Administered Medications   Medication Dose Route Frequency Provider Last Rate    acetaminophen  650 mg Oral Q6H PRN Barry Jules MD      albuterol  2 puff Inhalation Q6H PRN Barry Jules MD      albuterol  2 5 mg Nebulization Q6H PRN Barry Jules MD      atorvastatin  10 mg Oral Daily Barry Jules MD      calcium carbonate  500 mg Oral TID PRN Elsy Adam PA-C      heparin (porcine)  5,000 Units Subcutaneous Q8H [facility] Barry Jules MD      insulin lispro  1-5 Units Subcutaneous TID AC RON Broussard      insulin lispro  1-5 Units Subcutaneous HS RON Broussard      LORazepam  0 5 mg Oral Q6H PRN Barry Jules MD      ARLEY ANTIFUNGAL   Topical BID Jonn Parker MD      montelukast  10 mg Oral HS Barry Jules MD      PARoxetine  10 mg Oral QAM Barry Jules MD      polyethylene glycol  17 g Oral Daily Jonn Parker MD      QUEtiapine  12 5 mg Oral HS Barry Jules MD      senna-docusate sodium  1 tablet Oral BID Jonn Parker MD      simethicone  80 mg Oral Q6H PRN Donna Brown PA-C      sodium chloride  2 g Oral TID With Meals Barry Jules MD          Today, Patient Was Seen By: Reinier Mcginnis MD    ** Please Note: Dictation voice to text software may have been used in the creation of this document   **

## 2021-08-04 NOTE — ASSESSMENT & PLAN NOTE
43-year-old lady who was recently admitted from 06/19/2021 until 06/29/2021 secondary to acute metabolic encephalopathy secondary to hyponatremia/UTI/hypoglycemia and discharged to [address] [place] [address] rehab, recently discharged home 4 days ago, presents accompanied by her son due to symptoms of worsening weakness/worsening confusion and significant difficulty conducting activities of daily living  · Labs noted mild hypokalemia with potassium of 3 3  Sodium normal at 135    · TSH within normal limits  · PT/OT recommending rehab

## 2021-08-04 NOTE — WOUND OSTOMY CARE
Progress Note - Wound   Elvie Carpenter 64 y o  female MRN: [id number]  Unit/Bed#: -01 Encounter: [id number]      Assessment:   Patient admitted due to weakness  History of asthma, diabetes, glaucoma, HTN  Wound care follow up for MASD of sacrum/buttock/thighs  Patient agreeable to assessment, alert and oriented x3, assist of 1 to turn for assessment, wedges in use for turning and repositioning, heels elevated, incontinent of bowel and bladder, pure-wick in place for urine management, is dependent for care       1  MASD mid sacrum- Skin on assessment is dry and intact, blanchable pink skin, scaly skin, with mild fungal rash still present on bilateral buttock and posterior upper thighs  No open aspects       2  Bilateral buttock and heels are dry, intact, blanchable pink skin      Educated patient on importance of frequent offloading of pressure via turning, repositioning, and weight-shifting  Verbalized understanding of plan of care      No induration, fluctuance, odor, warmth, redness, or purulence noted to the above noted wound  Patient tolerated well, denies pain to the wound  Primary nurse aware of plan of care  See flow sheets for more detailed assessment findings       Skin care plans:  1-Apply ARLEY to sacrum, buttocks, and posterior upper thigh BID and PRN  2-Hydraguard to bilateral heel BID and PRN  3-Elevate heels to offload pressure  4-Ehob cushion when out of bed  5-Turn/repoisiton q2h or when medically stable for pressure re-distribution on skin  6-Moisturize skin daily with skin nourishing cream   7-Wound care will sign off, tiger text with questions or concerns  Wound 07/27/21 MASD Sacrum Inner (Active)   Wound Image   08/04/21 1104   Wound Description Dry; Intact; Pink 08/04/21 1104   Natalie-wound Assessment Dry; Intact;Scaly;Rash 08/04/21 1104   Wound Length (cm) 0 cm 07/28/21 1104   Wound Width (cm) 0 cm 08/04/21 1104   Wound Depth (cm) 0 cm 08/04/21 1104   Wound Surface Area (cm^2)     Wound Volume (cm^3)     Calculated Wound Volume (cm^3)     Drainage Amount None 08/04/21 1104   Drainage Description     Non-staged Wound Description Not applicable 32/55/78 5077   Treatments Cleansed;Site care 08/04/21 1104   Dressing Other (Comment) 08/04/21 1104   Wound packed?      Patient Tolerance Tolerated well 08/04/21 1104   Dressing Status       Call or tigertext with any questions  Wound Care will sign off    Vahid Roach RN BSN  Wound care

## 2021-08-05 LAB
GLUCOSE SERPL-MCNC: 159 MG/DL (ref 65–140)
GLUCOSE SERPL-MCNC: 168 MG/DL (ref 65–140)
GLUCOSE SERPL-MCNC: 170 MG/DL (ref 65–140)
GLUCOSE SERPL-MCNC: 180 MG/DL (ref 65–140)

## 2021-08-05 PROCEDURE — 94664 DEMO&/EVAL PT USE INHALER: CPT

## 2021-08-05 PROCEDURE — 97116 GAIT TRAINING THERAPY: CPT

## 2021-08-05 PROCEDURE — 82948 REAGENT STRIP/BLOOD GLUCOSE: CPT

## 2021-08-05 PROCEDURE — 99239 HOSP IP/OBS DSCHRG MGMT >30: CPT | Performed by: NURSE PRACTITIONER

## 2021-08-05 PROCEDURE — 94760 N-INVAS EAR/PLS OXIMETRY 1: CPT

## 2021-08-05 PROCEDURE — 97530 THERAPEUTIC ACTIVITIES: CPT

## 2021-08-05 PROCEDURE — 97110 THERAPEUTIC EXERCISES: CPT

## 2021-08-05 RX ORDER — AMOXICILLIN 250 MG
1 CAPSULE ORAL 2 TIMES DAILY
Qty: 30 TABLET | Refills: 0
Start: 2021-08-05 | End: 2022-05-26

## 2021-08-05 RX ADMIN — MONTELUKAST SODIUM 10 MG: 10 TABLET, FILM COATED ORAL at 21:21

## 2021-08-05 RX ADMIN — Medication 2 G: at 08:21

## 2021-08-05 RX ADMIN — INSULIN LISPRO 1 UNITS: 100 INJECTION, SOLUTION INTRAVENOUS; SUBCUTANEOUS at 07:59

## 2021-08-05 RX ADMIN — INSULIN LISPRO 1 UNITS: 100 INJECTION, SOLUTION INTRAVENOUS; SUBCUTANEOUS at 12:29

## 2021-08-05 RX ADMIN — SIMETHICONE 80 MG: 80 TABLET, CHEWABLE ORAL at 20:17

## 2021-08-05 RX ADMIN — QUETIAPINE FUMARATE 12.5 MG: 25 TABLET ORAL at 21:21

## 2021-08-05 RX ADMIN — HEPARIN SODIUM 5000 UNITS: 5000 INJECTION INTRAVENOUS; SUBCUTANEOUS at 21:20

## 2021-08-05 RX ADMIN — INSULIN LISPRO 1 UNITS: 100 INJECTION, SOLUTION INTRAVENOUS; SUBCUTANEOUS at 00:15

## 2021-08-05 RX ADMIN — LORAZEPAM 0.5 MG: 0.5 TABLET ORAL at 21:21

## 2021-08-05 RX ADMIN — INSULIN LISPRO 1 UNITS: 100 INJECTION, SOLUTION INTRAVENOUS; SUBCUTANEOUS at 21:25

## 2021-08-05 RX ADMIN — Medication 2 G: at 17:20

## 2021-08-05 RX ADMIN — PAROXETINE 10 MG: 20 TABLET, FILM COATED ORAL at 08:22

## 2021-08-05 RX ADMIN — Medication 2 G: at 12:34

## 2021-08-05 RX ADMIN — MICONAZOLE NITRATE: 20 CREAM TOPICAL at 13:28

## 2021-08-05 RX ADMIN — DOCUSATE SODIUM AND SENNOSIDES 1 TABLET: 8.6; 5 TABLET, FILM COATED ORAL at 17:20

## 2021-08-05 RX ADMIN — HEPARIN SODIUM 5000 UNITS: 5000 INJECTION INTRAVENOUS; SUBCUTANEOUS at 13:28

## 2021-08-05 RX ADMIN — DOCUSATE SODIUM AND SENNOSIDES 1 TABLET: 8.6; 5 TABLET, FILM COATED ORAL at 08:22

## 2021-08-05 RX ADMIN — POLYETHYLENE GLYCOL 3350 17 G: 17 POWDER, FOR SOLUTION ORAL at 08:22

## 2021-08-05 RX ADMIN — ATORVASTATIN CALCIUM 10 MG: 10 TABLET, FILM COATED ORAL at 08:22

## 2021-08-05 RX ADMIN — HEPARIN SODIUM 5000 UNITS: 5000 INJECTION INTRAVENOUS; SUBCUTANEOUS at 06:42

## 2021-08-05 RX ADMIN — INSULIN LISPRO 1 UNITS: 100 INJECTION, SOLUTION INTRAVENOUS; SUBCUTANEOUS at 16:58

## 2021-08-05 NOTE — DISCHARGE SUMMARY
[facility]  Discharge- Carson Jean 1945, 64 y o  female MRN: [id number]  Unit/Bed#: -01 Encounter: [id number]  Primary Care Provider: Lieutenant Yamilka MD   Date and time admitted to hospital: 7/27/2021  4:09 PM    Acute metabolic encephalopathy  Assessment & Plan  POA in the setting of metabolic derangement (hypoglycemia, hypokaemia, hyponatremia a/e/b increased confusion, disorientation, inability to perform ADLs , reported by family treated with fall precautions, delirium precautions, frequent reorientation, and assistance with ADLs and sleep hygiene  Resolved, AO x3    * Weakness  Assessment & Plan  25-year-old lady who was recently admitted from 06/19/2021 until 06/29/2021 secondary to acute metabolic encephalopathy secondary to hyponatremia/UTI/hypoglycemia and discharged to [address] [place] [address] rehab, recently discharged home 4 days ago, presents accompanied by her son due to symptoms of worsening weakness/worsening confusion and significant difficulty conducting activities of daily living  · Labs noted mild hypokalemia with potassium of 3 3  Sodium normal at 135  · TSH within normal limits  · PT/OT recommending rehab    Hypoglycemia  Assessment & Plan  · Blood sugar 40 on arrival  · Holding patient's metformin/glimepiride/Januvia  · Has since resolved  · Will dc glimepiride on dc      Essential hypertension  Assessment & Plan  · Adequately controlled  · Currently on Lasix  Acute on chronic diastolic congestive heart failure (HCC)  Assessment & Plan  Wt Readings from Last 3 Encounters:   08/05/21 76 5 kg (168 lb 10 4 oz)   06/29/21 84 1 kg (185 lb 6 5 oz)   06/08/21 94 kg (207 lb 3 7 oz)   A/e/b shortness of breath at rest and with activity, elevated BNP 2 206, and BLE edema, CXR finding pulmonary edema treated in ED with lasix X1 dose, daily weights and I &0's  · Continue with Lasix      · Also note patient currently on salt tablets given recent history of hyponatremia  · Monitor electrolytes/renal function  · Monitor intake/output, daily weights  · 2D echo on 06/21/2021 notes EF 60% with grade 1 diastolic dysfunction  · Patient currently on 2 L oxygen via nasal cannula which were patient's son is her new normal since last hospitalization  Depression  Assessment & Plan  · Continue with Paxil/Seroquel  Dyslipidemia  Assessment & Plan  · Continue with atorvastatin  Diabetes mellitus type 2 in obese [facility])  Assessment & Plan  Lab Results   Component Value Date    HGBA1C 6 8 (H) 01/12/2021       Recent Labs     08/04/21  1535 08/04/21  2123 08/05/21  0753 08/05/21  1201   POCGLU 159* 175* 159* 180*     Blood Sugar Average: Last 72 hrs:  (P) 170 [id number]   · Patient hypoglycemic on arrival  · Holding oral hypoglycemic agents  · Has since resolved  · Sliding scale insulin    Anxiety disorder  Assessment & Plan  · Continue with p r n  Xanax for anxiety  Asthma  Assessment & Plan  · Currently not in exacerbation  · On baseline 2 L oxygen via nasal cannula  · Continue with albuterol inhaler/albuterol nebulization  Discharging Physician / Practitioner: Calvin Powell  PCP: Marcia Duran MD  Admission Date:   Admission Orders (From admission, onward)     Ordered        07/28/21 1423  Inpatient Admission  Once         07/27/21 1845  Place in Observation  Once                   Discharge Date: 08/05/21    Medical Problems     Resolved Problems  Date Reviewed: 8/4/2021    None                Consultations During Hospital Stay:  ·  None    Procedures Performed:   · Chest x-ray    Significant Findings / Test Results:      XR chest 1 view portable   ED Interpretation by Perry Becerra MD (07/27 1329)   Poor inspiratory effort  No infiltrates  Final Result by Tahmina Lee MD (07/28 7439)      No acute disease  Redemonstration of pulmonary artery enlargement compatible with pulmonary hypertension                    Workstation performed: [id number]         ·   ·     Incidental Findings:   · none     Test Results Pending at Discharge (will require follow up):   · none     Outpatient Tests Requested:  · none    Complications:  none    Reason for Admission:    Chief Complaint   Patient presents with    Shortness of Breath     Pt's son reports pt was in rehab and sent home  Pt has she chronic asthma  Pt's son reports they can no longer care for her at home          Hospital Course:     Thierno Zapata is a 64 y o  female patient who originally presented to the hospital on 7/27/2021 due to shortness of breath  Patient was recently at rehab and was discharged home when she got home she was experiencing some shortness of breath so they brought her back to the hospital   Patient's shortness of breath essentially resolved on admission or workup was largely negative however she does have significant ambulatory dysfunction and weakness she was evaluated by Physical therapy will be transferred back to short-term rehab      Please see above list of diagnoses and related plan for additional information  Condition at Discharge: stable     Discharge Day Visit / Exam:     Subjective:  Denies any chest pain chest tightness shortness of breath or difficulty breathing  Vitals: Blood Pressure: 145/78 (08/05/21 0625)  Pulse: 67 (08/05/21 0625)  Temperature: 99 1 °F (37 3 °C) (08/05/21 0625)  Temp Source: Oral (08/05/21 0625)  Respirations: 18 (08/05/21 0625)  Height: 5' (152 4 cm) (07/27/21 2257)  Weight - Scale: 76 5 kg (168 lb 10 4 oz) (08/05/21 0600)  SpO2: 94 % (08/05/21 0716)  Exam:   Physical Exam  Vitals and nursing note reviewed  HENT:      Head: Normocephalic  Cardiovascular:      Rate and Rhythm: Normal rate  Pulses: Normal pulses  Pulmonary:      Effort: Pulmonary effort is normal    Abdominal:      Palpations: Abdomen is soft  Musculoskeletal:         General: Normal range of motion  Skin:     General: Skin is warm     Neurological: General: No focal deficit present  Mental Status: She is alert  Mental status is at baseline  Psychiatric:         Mood and Affect: Mood normal          Thought Content: Thought content normal          Judgment: Judgment normal        Discussion with Family:  Case management discussed with family    Discharge instructions/Information to patient and family:   See after visit summary for information provided to patient and family  Provisions for Follow-Up Care:  See after visit summary for information related to follow-up care and any pertinent home health orders  Disposition:     Acute Rehab at [address] [place] [address] to [facility] SNF:   · [facility]  [facility] - Not Applicable to this Patient    Planned Readmission:  No     Discharge Statement:  I spent 50 minutes discharging the patient  This time was spent on the day of discharge  I had direct contact with the patient on the day of discharge  Greater than 50% of the total time was spent examining patient, answering all patient questions, arranging and discussing plan of care with patient as well as directly providing post-discharge instructions  Additional time then spent on discharge activities  Discharge Medications:  See after visit summary for reconciled discharge medications provided to patient and family        ** Please Note: This note has been constructed using a voice recognition system **

## 2021-08-05 NOTE — PHYSICAL THERAPY NOTE
Physical Therapy Treatment Note     08/05/21 1250   PT Last Visit   PT Visit Date 08/05/21   Note Type   Note Type Treatment   Pain Assessment   Pain Assessment Tool 0-10   Pain Score No Pain   Restrictions/Precautions   Weight Bearing Precautions Per Order No   Other Precautions Chair Alarm; Bed Alarm;Cognitive;O2;Fall Risk   General   Chart Reviewed Yes   Response to Previous Treatment Patient with no complaints from previous session  Family/Caregiver Present No   Cognition   Overall Cognitive Status Impaired   Arousal/Participation Alert; Responsive; Cooperative   Attention Attends with cues to redirect   Orientation Level Oriented to person;Oriented to place;Oriented to situation  (inconsistent to time)   Memory Decreased short term memory;Decreased recall of recent events   Following Commands Follows multistep commands with increased time or repetition   Comments Pt agreeable to PT treatment session  Subjective   Subjective "I've been in the chair since this morning "   Bed Mobility   Rolling L 5  Supervision   Additional items Assist x 1;Bedrails; Increased time required;Verbal cues   Supine to Sit 5  Supervision   Additional items Assist x 1;Bedrails; Increased time required;Verbal cues   Sit to Supine 4  Minimal assistance   Additional items Assist x 1;Bedrails; Increased time required;LE management;Verbal cues   Transfers   Sit to Stand 4  Minimal assistance   Additional items Assist x 1; Armrests; Increased time required;Verbal cues  (mod assist x 1 from lower chair)   Stand to Sit 4  Minimal assistance   Additional items Assist x 1; Armrests; Increased time required;Verbal cues   Ambulation/Elevation   Gait pattern Excessively slow; Step to;Short stride; Foward flexed; Shuffling   Gait Assistance 4  Minimal assist   Additional items Assist x 1;Verbal cues   Assistive Device Rolling walker   Distance 10 feet x 2 trials   Stair Management Assistance Not tested   Balance   Static Sitting Fair +   Dynamic Sitting Fair   Static Standing Fair -   Dynamic Standing Poor +   Ambulatory Poor +   Endurance Deficit   Endurance Deficit Yes   Endurance Deficit Description Pt was received on 2L O2 via NC; SpO2 >90% throughout the session  Activity Tolerance   Activity Tolerance Patient tolerated treatment well   Nurse Made Aware Discussed case with CANDACE Corbett; post session pt was left supine in bed in NAD, all belongings within reach, +bed alarm   Exercises   Quad Sets Sitting;20 reps;AROM; Bilateral  (long sitting)   Heelslides Sitting;20 reps;AROM; Bilateral  (long sitting)   Glute Sets Sitting;20 reps;AROM; Bilateral   Hip Flexion Sitting;20 reps;AROM; Bilateral   Hip Abduction Sitting;20 reps;AROM; Bilateral  (long sitting)   Hip Adduction Sitting;20 reps;AROM; Bilateral   Knee AROM Long Arc Quad Sitting;20 reps;AROM; Bilateral   Ankle Pumps Sitting;20 reps;AROM; Bilateral   Assessment   Prognosis Good   Problem List Decreased strength;Decreased endurance; Impaired balance;Decreased mobility; Decreased cognition   Assessment Chart reviewed  Pt was received supine in bed in NAD and agreeable to PT session  Pt was seen for physical therapy on this date with interventions consisting of therapeutic activity including bed mobility and sit to stand transfers, therapeutic exercises consisting of AROM, 20 reps, bilateral lower extremities in the sitting position, and gait training with emphasis on improving pt's ability to ambulate level surfaces 10 feet x 2 trials with RW and minimal assist provided by therapist  In comparison to previous sessions pt with improvements and able to ambulate two gait trials  Pt continues to require verbal cues for upright posture and exhibits decreased emery, decreased stride length, and decreased heel strike during ambulation  Pt required slightly increased assistance with sit to stand transfer from a lower surface   Pt tolerated therapeutic exercise well without complaints of pain, however continues to require seated rest breaks throughout  Pt was received on 2L O2 via NC, SpO2 >90% throughout the session and pt without complaints of SOB  Pt's goals were updated this session  Post session pt was left supine in bed in NAD, +bed alarm  Pt continues to be functioning below baseline level and remains limited secondary to decreased lower extremity strength, impaired balance, decreased endurance, gait deviations, and decreased functional mobility  Continue to recommend STR at time of discharge to maximize pt's functional independence and safety with mobility  PT will continue to see pt while here in order to address the deficits listed above and provide interventions consistent with the POC in effort to achieve short term goals  Barriers to Discharge Inaccessible home environment;Decreased caregiver support   Goals   STG Expiration Date 08/15/21   Short Term Goal #1 In 7-10 days: Increase bilateral LE strength 1/2 grade to facilitate independent mobility, Perform all bed mobility tasks with close supervision to decrease caregiver burden, Perform all transfers with CG assist to improve independence, Ambulate > 100 ft  with RW with CG assist w/o LOB and w/ normalized gait pattern 100% of the time and Increase all balance 1/2 grade to decrease risk for falls   Plan   Treatment/Interventions Functional transfer training;LE strengthening/ROM; Therapeutic exercise;Cognitive reorientation; Endurance training;Patient/family training;Bed mobility;Gait training;Spoke to nursing;OT   Progress Progressing toward goals   PT Frequency Other (Comment)  (3-5x/wk)   Recommendation   PT Discharge Recommendation Post acute rehabilitation services   PT - OK to Discharge Yes  (when medically cleared, if to STR)   AM-PAC Basic Mobility Inpatient   Turning in Bed Without Bedrails 3   Lying on Back to Sitting on Edge of Flat Bed 3   Moving Bed to Chair 3   Standing Up From Chair 3   Walk in Room 2   Climb 3-5 Stairs 1   Basic Mobility Inpatient Raw Score 15   Basic Mobility Standardized Score 36 97     Alfonso Gomez, PT , DPT    Time of PT treatment session: 5968-3154  41 minutes

## 2021-08-05 NOTE — PLAN OF CARE
Problem: Potential for Falls  Goal: Patient will remain free of falls  Description: INTERVENTIONS:  - Educate patient/family on patient safety including physical limitations  - Instruct patient to call for assistance with activity   - Consult OT/PT to assist with strengthening/mobility   - Keep Call bell within reach  - Keep bed low and locked with side rails adjusted as appropriate  - Keep care items and personal belongings within reach  - Initiate and maintain comfort rounds  - Make Fall Risk Sign visible to staff  - Offer Toileting every  Hours, in advance of need  - Initiate/Maintain alarm  - Obtain necessary fall risk management equipment:   - Apply yellow socks and bracelet for high fall risk patients  - Consider moving patient to room near nurses station  Outcome: Progressing     Problem: MOBILITY - ADULT  Goal: Maintain or return to baseline ADL function  Description: INTERVENTIONS:  -  Assess patient's ability to carry out ADLs; assess patient's baseline for ADL function and identify physical deficits which impact ability to perform ADLs (bathing, care of mouth/teeth, toileting, grooming, dressing, etc )  - Assess/evaluate cause of self-care deficits   - Assess range of motion  - Assess patient's mobility; develop plan if impaired  - Assess patient's need for assistive devices and provide as appropriate  - Encourage maximum independence but intervene and supervise when necessary  - Involve family in performance of ADLs  - Assess for home care needs following discharge   - Consider OT consult to assist with ADL evaluation and planning for discharge  - Provide patient education as appropriate  Outcome: Progressing  Goal: Maintains/Returns to pre admission functional level  Description: INTERVENTIONS:  - Perform BMAT or MOVE assessment daily    - Set and communicate daily mobility goal to care team and patient/family/caregiver     - Collaborate with rehabilitation services on mobility goals if consulted  - Perform Range of Motion  times a day  - Reposition patient every  hours    - Dangle patient  times a day  - Stand patient  times a day  - Ambulate patient  times a day  - Out of bed to chair  times a day   - Out of bed for meal times a day  - Out of bed for toileting  - Record patient progress and toleration of activity level   Outcome: Progressing     Problem: Prexisting or High Potential for Compromised Skin Integrity  Goal: Skin integrity is maintained or improved  Description: INTERVENTIONS:  - Identify patients at risk for skin breakdown  - Assess and monitor skin integrity  - Assess and monitor nutrition and hydration status  - Monitor labs   - Assess for incontinence   - Turn and reposition patient  - Assist with mobility/ambulation  - Relieve pressure over bony prominences  - Avoid friction and shearing  - Provide appropriate hygiene as needed including keeping skin clean and dry  - Evaluate need for skin moisturizer/barrier cream  - Collaborate with interdisciplinary team   - Patient/family teaching  - Consider wound care consult   Outcome: Progressing

## 2021-08-05 NOTE — ASSESSMENT & PLAN NOTE
68-year-old lady who was recently admitted from 06/19/2021 until 06/29/2021 secondary to acute metabolic encephalopathy secondary to hyponatremia/UTI/hypoglycemia and discharged to [facility] rehab, recently discharged home 4 days ago, presents accompanied by her son due to symptoms of worsening weakness/worsening confusion and significant difficulty conducting activities of daily living  · Labs noted mild hypokalemia with potassium of 3 3  Sodium normal at 135    · TSH within normal limits  · PT/OT recommending rehab

## 2021-08-05 NOTE — DISCHARGE INSTRUCTIONS
Anxiety   WHAT YOU SHOULD KNOW:   Anxiety is a condition that causes you to feel excessive worry, uneasiness, or fear  Family or work stress, smoking, caffeine, and alcohol can increase your risk for anxiety  Certain medicines or health conditions can also increase your risk  Anxiety may begin gradually, and can become a long-term condition if it is not managed or treated  AFTER YOU LEAVE:   Medicines:   · Medicines  can help you feel more calm and relaxed, and decrease your symptoms  · Take your medicine as directed  Contact your healthcare provider if you think your medicine is not helping or if you have side effects  Tell him if you are allergic to any medicine  Keep a list of the medicines, vitamins, and herbs you take  Include the amounts, and when and why you take them  Bring the list or the pill bottles to follow-up visits  Carry your medicine list with you in case of an emergency  Follow up with your healthcare provider within 2 weeks or as directed:  Write down your questions so you remember to ask them during your visits  Manage anxiety:   · Go to counseling as directed  Cognitive behavioral therapy can help you understand and change how you react to events that trigger your symptoms  · Find ways to manage your symptoms  Activities such as exercise, meditation, or listening to music can help you relax  · Practice deep breathing  Breathing can change how your body reacts to stress  Focus on taking slow, deep breaths several times a day, or during an anxiety attack  Breathe in through your nose, and out through your mouth  · Avoid caffeine  Caffeine can make your symptoms worse  Avoid foods or drinks that are meant to increase your energy level  · Limit or avoid alcohol  Ask your healthcare provider if alcohol is safe for you  You may not be able to drink alcohol if you take certain anxiety or depression medicines  Limit alcohol to 1 drink per day if you are a woman   Limit alcohol to 2 drinks per day if you are a man  A drink of alcohol is 12 ounces of beer, 5 ounces of wine, or 1½ ounces of liquor  Contact your healthcare provider if:   · Your symptoms get worse or do not get better with treatment  · You think your medicine may be causing side effects  · Your anxiety keeps you from doing your regular daily activities  · You have new symptoms since your last visit  · You have questions or concerns about your condition or care  Seek care immediately or call 911 if:   · You have chest pain, tightness, or heaviness that may spread to your shoulders, arms, jaw, neck, or back  · You feel like hurting yourself or someone else  · You feel dizzy, lightheaded, or faint  © 2014 [address] is for End User's use only and may not be sold, redistributed or otherwise used for commercial purposes  All illustrations and images included in CareNotes® are the copyrighted property of A D A M , Inc  or [employer]  The above information is an  only  It is not intended as medical advice for individual conditions or treatments  Talk to your doctor, nurse or pharmacist before following any medical regimen to see if it is safe and effective for you  Chronic Hypertension, Ambulatory Care   GENERAL INFORMATION:   Chronic hypertension  is a long-term condition in which your blood pressure (BP) is higher than normal  Your BP is the force of your blood moving against the walls of your arteries  Hypertension is a BP of 140/90 or higher     Common symptoms include the following:   · Headache     · Blurred vision    · Chest pain     · Dizziness or weakness     · Trouble breathing     · Nosebleeds  Seek immediate care for the following symptoms:   · Severe headache or vision loss    · Weakness in an arm or leg    · Confusion or difficulty speaking    · Discomfort in your chest that feels like squeezing, pressure, fullness, or pain    · Suddenly feeling lightheaded or trouble breathing    · Pain or discomfort in your back, neck, jaw, stomach, or arm  Treatment for chronic hypertension  may include medicine to lower your BP  You may also need to make lifestyle changes  Take your medicine exactly as directed  Manage chronic hypertension:   · Take your BP at home  Sit and rest for 5 minutes before you take your BP  Extend your arm and support it on a flat surface  Your arm should be at the same level as your heart  Follow the directions that came with your BP monitor  If possible, take at least 2 BP readings each time  Take your BP at least twice a day at the same times each day, such as morning and evening  Keep a log of your BP readings and bring it to your follow-up visits  · Eat less sodium (salt)  Do not add sodium to your food  Limit foods that are high in sodium, such as canned foods, potato chips, and cold cuts  Your healthcare provider may suggest that you follow the [address] [place] [address] Street  The plan is low in sodium, unhealthy fats, and total fat  It is high in potassium, calcium, and fiber  · Exercise regularly  Exercise at least 30 minutes per day, on most days of the week  This will help decrease your BP  Ask your healthcare provider about the best exercise plan for you  · Limit alcohol  Women should limit alcohol to 1 drink a day  Men should limit alcohol to 2 drinks a day  A drink of alcohol is 12 ounces of beer, 5 ounces of wine, or 1½ ounces of liquor  · Do not smoke  If you smoke, it is never too late to quit  Smoking can increase your BP  Smoking also worsens other health conditions you may have that can increase your risk for hypertension  Ask your healthcare provider for information if you need help quitting  Follow up with your healthcare provider as directed: You will need to return to have your BP checked and to have other lab tests done  Write down your questions so you remember to ask them during your visits  CARE AGREEMENT:   You have the right to help plan your care  Learn about your health condition and how it may be treated  Discuss treatment options with your caregivers to decide what care you want to receive  You always have the right to refuse treatment  The above information is an  only  It is not intended as medical advice for individual conditions or treatments  Talk to your doctor, nurse or pharmacist before following any medical regimen to see if it is safe and effective for you  © 2014 [address] is for End User's use only and may not be sold, redistributed or otherwise used for commercial purposes  All illustrations and images included in CareNotes® are the copyrighted property of A D A M , Inc  or [employer]  Generalized Anxiety Disorder   WHAT YOU NEED TO KNOW:   What is generalized anxiety disorder (KITTY)? KITTY is a condition that causes you to worry more than normal  It also causes you to feel fear in most situations  You are worried or afraid even without a cause  You may worry about your health, job, money, and relationships  It is hard for you to control your worry and feel calm  KITTY prevents you from doing daily activities  It may also prevent you from spending time with family and friends  Without treatment, your anxiety may get worse  What increases my risk for KITTY? · Family or work stress    · A family history of an anxiety disorder    · A medical condition, such as diabetes or depression    · Smoking, caffeine, and alcohol or drug use    · Being female    · Age 21 to 27    What other signs and symptoms may occur with KITTY?    · Fatigue or muscle tightness    · Shaking, restlessness, or irritability    · Problems focusing    · Trouble sleeping    · Feeling jumpy, easily startled, or dizzy    · Nausea, vomiting, diarrhea, or sweating    · Rapid heartbeat or shortness of breath    What do I need to tell my healthcare provider about my anxiety? Tell your healthcare provider when your symptoms began and what triggers them  Tell your provider if anxiety affects your daily activities  Your provider will also ask about your medical history and if you have family members with a similar condition  Tell your provider about your past and present alcohol, nicotine, or drug use  What can I do to manage anxiety? You may get medicines to help you feel calm and relaxed, and to decrease your symptoms  Medicines are usually given together with therapy or other treatments  The following can help you manage anxiety:  · Talk to someone about your anxiety  Your healthcare provider may suggest counseling  Cognitive behavioral therapy can help you understand and change how you react to events  It can also help you understand what triggers your symptoms  You might feel more comfortable talking with a friend or family member about your anxiety  Choose someone you know will be supportive and encouraging  · Keep a journal of your symptoms  Write down what you were doing before your symptoms started  Also write down what made the anxiety better or worse  Bring this journal with you to your follow-up appointments  · Find ways to relax  Activities such as yoga, meditation, or listening to music can help you relax  Spend time with friends, or do things you enjoy  · Practice deep breathing  Deep breathing can help you relax when you feel anxious  Focus on taking slow, deep breaths several times a day, or during an anxiety attack  Slowly breathe in through your nose  Pause, then slowly breathe out through your mouth  Try to breathe out longer than you breathed in  · Create a regular sleep routine  Regular sleep can help you feel calmer during the day  Go to sleep and wake up at the same times every day  Do not watch television or use the computer right before bed  Your room should be comfortable, dark, and quiet  · Eat a variety of healthy foods  Healthy foods include fruits, vegetables, low-fat dairy products, lean meats, fish, whole-grain breads, and cooked beans  Healthy foods can help you feel less anxious and have more energy  · Exercise regularly  Exercise can increase your energy level  Exercise may also lift your mood and help you sleep better  Your healthcare provider can help you create an exercise plan  · Do not smoke  Nicotine and other chemicals in cigarettes and cigars can increase anxiety  Ask your healthcare provider for information if you currently smoke and need help to quit  E-cigarettes or smokeless tobacco still contain nicotine  Talk to your healthcare provider before you use these products  · Do not have caffeine  Caffeine can make your symptoms worse  Do not have foods or drinks that are meant to increase your energy level  · Limit or do not drink alcohol  Ask your healthcare provider if alcohol is safe for you  Also ask how much is safe  You may not be able to drink alcohol if you take certain anxiety or depression medicines  · Do not use drugs  Drugs can make your anxiety worse  It can also make anxiety hard to manage  Talk to your healthcare provider if you use drugs and want help to quit  Call your local emergency number (911 in the [address] [place] [address],[address]) for any of the following:   · You have chest pain, tightness, or heaviness that may spread to your shoulders, arms, jaw, neck, or back  · You feel like hurting yourself or someone else  When should I call my doctor? · Your symptoms get worse or do not get better with treatment  · You have new symptoms since your last visit  · You have questions or concerns about your condition or care  CARE AGREEMENT:   You have the right to help plan your care  Learn about your health condition and how it may be treated  Discuss treatment options with your healthcare providers to decide what care you want to receive  You always have the right to refuse treatment  The above information is an  only  It is not intended as medical advice for individual conditions or treatments  Talk to your doctor, nurse or pharmacist before following any medical regimen to see if it is safe and effective for you  © Copyright [employer] 2021 Information is for End User's use only and may not be sold, redistributed or otherwise used for commercial purposes   All illustrations and images included in CareNotes® are the copyrighted property of A D A M , Inc  or [address] [place] [address]

## 2021-08-05 NOTE — ASSESSMENT & PLAN NOTE
Wt Readings from Last 3 Encounters:   08/05/21 76 5 kg (168 lb 10 4 oz)   06/29/21 84 1 kg (185 lb 6 5 oz)   06/08/21 94 kg (207 lb 3 7 oz)   A/e/b shortness of breath at rest and with activity, elevated BNP 2 206, and BLE edema, CXR finding pulmonary edema treated in ED with lasix X1 dose, daily weights and I &0's  · Continue with Lasix  · Also note patient currently on salt tablets given recent history of hyponatremia  · Monitor electrolytes/renal function  · Monitor intake/output, daily weights  · 2D echo on 06/21/2021 notes EF 60% with grade 1 diastolic dysfunction  · Patient currently on 2 L oxygen via nasal cannula which were patient's son is her new normal since last hospitalization

## 2021-08-05 NOTE — PLAN OF CARE
Problem: PHYSICAL THERAPY ADULT  Goal: Performs mobility at highest level of function for planned discharge setting  See evaluation for individualized goals  Description: Treatment/Interventions: Functional transfer training, LE strengthening/ROM, Therapeutic exercise, Endurance training, Patient/family training, Bed mobility, Gait training, Spoke to nursing          See flowsheet documentation for full assessment, interventions and recommendations  Outcome: Progressing  Note: Prognosis: Good  Problem List: Decreased strength, Decreased endurance, Impaired balance, Decreased mobility, Decreased cognition  Assessment: Chart reviewed  Pt was received supine in bed in NAD and agreeable to PT session  Pt was seen for physical therapy on this date with interventions consisting of therapeutic activity including bed mobility and sit to stand transfers, therapeutic exercises consisting of AROM, 20 reps, bilateral lower extremities in the sitting position, and gait training with emphasis on improving pt's ability to ambulate level surfaces 10 feet x 2 trials with RW and minimal assist provided by therapist  In comparison to previous sessions pt with improvements and able to ambulate two gait trials  Pt continues to require verbal cues for upright posture and exhibits decreased emery, decreased stride length, and decreased heel strike during ambulation  Pt required slightly increased assistance with sit to stand transfer from a lower surface  Pt tolerated therapeutic exercise well without complaints of pain, however continues to require seated rest breaks throughout  Pt was received on 2L O2 via NC, SpO2 >90% throughout the session and pt without complaints of SOB  Pt's goals were updated this session  Post session pt was left supine in bed in NAD, +bed alarm   Pt continues to be functioning below baseline level and remains limited secondary to decreased lower extremity strength, impaired balance, decreased endurance, gait deviations, and decreased functional mobility  Continue to recommend STR at time of discharge to maximize pt's functional independence and safety with mobility  PT will continue to see pt while here in order to address the deficits listed above and provide interventions consistent with the POC in effort to achieve short term goals  Barriers to Discharge: Inaccessible home environment, Decreased caregiver support    PT Discharge Recommendation: Post acute rehabilitation services     PT - OK to Discharge: Yes (when medically cleared, if to STR)    See flowsheet documentation for full assessment

## 2021-08-05 NOTE — DISCHARGE INSTR - AVS FIRST PAGE
Thank you for choosing [facility] for year care, please take all prescriptions as instructed, please make appropriate follow-up visits

## 2021-08-05 NOTE — CASE MANAGEMENT
Case Management Progress Note    Patient name Mike King  Location /- MRN [id number]  : 1945 Date 2021       LOS (days): 8  Geometric Mean LOS (GMLOS) (days): 4 50  Days to GMLOS:-3 5        BUNDLE:      OBJECTIVE:  Pt is a 64y o  year old /[employer],  [4], female with [employer] preference of None admitted on 2021  4:09 PM  Pt is admitted to [place] [address]  at [address] [place] [address] with complaints of Weakness   Current admission status: Inpatient  Preferred Pharmacy:   [facility] [address] [place] [id number]  Phone: [phone] Fax: [phone]    [facility] [phone] R R [phone] R R [phone])  [facility]  Phone: [phone] Fax: [phone]    Primary Care Provider: Jessica Berman MD    Primary Insurance: [facility] RE[facility]  Secondary Insurance: [employer]    PROGRESS NOTE:    Call placed to [facility] after the level of care was efaxed to them  [facility] Palacio stated that they will still accept this patient

## 2021-08-05 NOTE — ASSESSMENT & PLAN NOTE
Lab Results   Component Value Date    HGBA1C 6 8 (H) 01/12/2021       Recent Labs     08/04/21  1535 08/04/21  2123 08/05/21  0753 08/05/21  1201   POCGLU 159* 175* 159* 180*     Blood Sugar Average: Last 72 hrs:  (P) 424 [id number]   · Patient hypoglycemic on arrival  · Holding oral hypoglycemic agents  · Has since resolved  · Sliding scale insulin

## 2021-08-06 VITALS
HEART RATE: 85 BPM | RESPIRATION RATE: 18 BRPM | DIASTOLIC BLOOD PRESSURE: 95 MMHG | OXYGEN SATURATION: 98 % | SYSTOLIC BLOOD PRESSURE: 162 MMHG | TEMPERATURE: 98.2 F | BODY MASS INDEX: 33.15 KG/M2 | WEIGHT: 168.87 LBS | HEIGHT: 60 IN

## 2021-08-06 LAB — GLUCOSE SERPL-MCNC: 189 MG/DL (ref 65–140)

## 2021-08-06 PROCEDURE — 99239 HOSP IP/OBS DSCHRG MGMT >30: CPT | Performed by: NURSE PRACTITIONER

## 2021-08-06 PROCEDURE — 82948 REAGENT STRIP/BLOOD GLUCOSE: CPT

## 2021-08-06 RX ADMIN — HEPARIN SODIUM 5000 UNITS: 5000 INJECTION INTRAVENOUS; SUBCUTANEOUS at 05:26

## 2021-08-06 RX ADMIN — PAROXETINE 10 MG: 20 TABLET, FILM COATED ORAL at 09:25

## 2021-08-06 RX ADMIN — Medication 2 G: at 07:25

## 2021-08-06 RX ADMIN — INSULIN LISPRO 1 UNITS: 100 INJECTION, SOLUTION INTRAVENOUS; SUBCUTANEOUS at 08:26

## 2021-08-06 RX ADMIN — MICONAZOLE NITRATE: 20 CREAM TOPICAL at 09:26

## 2021-08-06 RX ADMIN — ATORVASTATIN CALCIUM 10 MG: 10 TABLET, FILM COATED ORAL at 09:25

## 2021-08-06 NOTE — DISCHARGE SUMMARY
[address] [facility]  Discharge- Elvie Carpenter 1945, 64 y o  female MRN: [id number]  Unit/Bed#: -01 Encounter: [id number]  Primary Care Provider: Dharmesh Bell MD   Date and time admitted to hospital: 7/27/2021  4:09 PM     Acute metabolic encephalopathy  Assessment & Plan  POA in the setting of metabolic derangement (hypoglycemia, hypokaemia, hyponatremia a/e/b increased confusion, disorientation, inability to perform ADLs , reported by family treated with fall precautions, delirium precautions, frequent reorientation, and assistance with ADLs and sleep hygiene       Resolved, AO x3     * Weakness  Assessment & Plan  69-year-old lady who was recently admitted from 06/19/2021 until 06/29/2021 secondary to acute metabolic encephalopathy secondary to hyponatremia/UTI/hypoglycemia and discharged to [address] [place] [address] rehab, recently discharged home 4 days ago, presents accompanied by her son due to symptoms of worsening weakness/worsening confusion and significant difficulty conducting activities of daily living      · Labs noted mild hypokalemia with potassium of 3 3  Sodium normal at 135  · TSH within normal limits  · PT/OT recommending rehab     Hypoglycemia  Assessment & Plan  · Blood sugar 40 on arrival  · Holding patient's metformin/glimepiride/Januvia  · Has since resolved  · Will dc glimepiride on dc        Essential hypertension  Assessment & Plan  · Adequately controlled  · Currently on Lasix      Acute on chronic diastolic congestive heart failure (HCC)  Assessment & Plan      Wt Readings from Last 3 Encounters:   08/05/21 76 5 kg (168 lb 10 4 oz)   06/29/21 84 1 kg (185 lb 6 5 oz)   06/08/21 94 kg (207 lb 3 7 oz)   A/e/b shortness of breath at rest and with activity, elevated BNP 2 206, and BLE edema, CXR finding pulmonary edema treated in ED with lasix X1 dose, daily weights and I &0's  · Continue with Lasix      · Also note patient currently on salt tablets given recent history of hyponatremia  · Monitor electrolytes/renal function  · Monitor intake/output, daily weights  · 2D echo on 06/21/2021 notes EF 60% with grade 1 diastolic dysfunction  · Patient currently on 2 L oxygen via nasal cannula which were patient's son is her new normal since last hospitalization            Depression  Assessment & Plan  · Continue with Paxil/Seroquel      Dyslipidemia  Assessment & Plan  · Continue with atorvastatin      Diabetes mellitus type 2 in obese [facility])  Assessment & Plan        Lab Results   Component Value Date     HGBA1C 6 8 (H) 01/12/2021                Recent Labs     08/04/21  1535 08/04/21  2123 08/05/21  0753 08/05/21  1201   POCGLU 159* 175* 159* 180*      Blood Sugar Average: Last 72 hrs:  (P) 767 [id number]   · Patient hypoglycemic on arrival  · Holding oral hypoglycemic agents  · Has since resolved  · Sliding scale insulin     Anxiety disorder  Assessment & Plan  · Continue with p r n  Xanax for anxiety      Asthma  Assessment & Plan  · Currently not in exacerbation  · On baseline 2 L oxygen via nasal cannula  · Continue with albuterol inhaler/albuterol nebulization            Discharging Physician / Practitioner: Calvin Herrera  PCP: Trina Arenas MD  Admission Date:       Admission Orders (From admission, onward)              Ordered          07/28/21 1423   Inpatient Admission  Once           07/27/21 1845   Place in Observation  Once                       Discharge Date: 08/06/21         Medical Problems            Resolved Problems  Date Reviewed: 8/4/2021           None                     Consultations During Hospital Stay:  ·  None     Procedures Performed:   · Chest x-ray     Significant Findings / Test Results:      · XR chest 1 view portable   · ED Interpretation by Diana Herr MD (07/27 4594)   · Poor inspiratory effort  No infiltrates  ·     · Final Result by Nita Garvey MD (07/28 6977)   ·     · No acute disease     ·     · Redemonstration of pulmonary artery enlargement compatible with pulmonary hypertension  ·     ·     ·     ·     ·     · Workstation performed: [id number]   ·     ·     ·    ·       Incidental Findings:   · none      Test Results Pending at Discharge (will require follow up):   · none     Outpatient Tests Requested:  · none     Complications:  none     Reason for Admission:         Chief Complaint   Patient presents with    Shortness of Breath       Pt's son reports pt was in rehab and sent home  Pt has she chronic asthma  Pt's son reports they can no longer care for her at home             Hospital Course:      Feliciano Leo is a 64 y o  female patient who originally presented to the hospital on 7/27/2021 due to shortness of breath  Patient was recently at rehab and was discharged home when she got home she was experiencing some shortness of breath so they brought her back to the hospital   Patient's shortness of breath essentially resolved on admission or workup was largely negative however she does have significant ambulatory dysfunction and weakness she was evaluated by Physical therapy will be transferred back to short-term rehab        Please see above list of diagnoses and related plan for additional information       Condition at Discharge: stable      Discharge Day Visit / Exam:      Subjective:  Denies any chest pain chest tightness shortness of breath or difficulty breathing  Vitals: Blood Pressure: 145/78 (08/05/21 0625)  Pulse: 67 (08/05/21 0625)  Temperature: 99 1 °F (37 3 °C) (08/05/21 0625)  Temp Source: Oral (08/05/21 0625)  Respirations: 18 (08/05/21 0625)  Height: 5' (152 4 cm) (07/27/21 2257)  Weight - Scale: 76 5 kg (168 lb 10 4 oz) (08/05/21 0600)  SpO2: 94 % (08/05/21 0716)  Exam:   Physical Exam  Vitals and nursing note reviewed  HENT:      Head: Normocephalic  Cardiovascular:      Rate and Rhythm: Normal rate  Pulses: Normal pulses     Pulmonary:      Effort: Pulmonary effort is normal    Abdominal: Palpations: Abdomen is soft  Musculoskeletal:         General: Normal range of motion  Skin:     General: Skin is warm  Neurological:      General: No focal deficit present  Mental Status: She is alert  Mental status is at baseline  Psychiatric:         Mood and Affect: Mood normal          Thought Content: Thought content normal          Judgment: Judgment normal          Discussion with Family:  Case management discussed with family     Discharge instructions/Information to patient and family:   See after visit summary for information provided to patient and family        Provisions for Follow-Up Care:  See after visit summary for information related to follow-up care and any pertinent home health orders        Disposition:      Acute Rehab at [address] [place] [address] SNF:   · [facility]  AND [facility] - Not Applicable to this Patient     Planned Readmission:  No     Discharge Statement:  I spent 50 minutes discharging the patient  This time was spent on the day of discharge  I had direct contact with the patient on the day of discharge  Greater than 50% of the total time was spent examining patient, answering all patient questions, arranging and discussing plan of care with patient as well as directly providing post-discharge instructions    Additional time then spent on discharge activities      Discharge Medications:  See after visit summary for reconciled discharge medications provided to patient and family        ** Please Note: This note has been constructed using a voice recognition system **

## 2021-08-13 NOTE — UTILIZATION REVIEW
Notification of Discharge   This is a Notification of Discharge from our facility [address]  Please be advised that this patient has been discharge from our facility  Below you will find the admission and discharge date and time including the patients disposition  UTILIZATION REVIEW CONTACT:  Mari Judge  Utilization   Network Utilization Review Department  Phone: [phone] x carefully listen to the prompts  All voicemails are confidential   Email: [email or web address]     PHYSICIAN ADVISORY SERVICES:  FOR OBFP-NI-YAOO REVIEW - MEDICAL NECESSITY DENIAL  Phone: [phone]  Fax: [phone]  Email: [email or web address]     PRESENTATION DATE: 7/27/2021  4:09 PM  OBERVATION ADMISSION DATE: 07/27/2021  INPATIENT ADMISSION DATE: 7/28/21  2:24 PM   DISCHARGE DATE: 8/6/2021  1:00 PM  DISPOSITION: Non [facility] SNF/TCU/SNU Non [facility] SNF/TCU/SNU      IMPORTANT INFORMATION:  Send all requests for admission clinical reviews, approved or denied determinations and any other requests to dedicated fax number below belonging to the campus where the patient is receiving treatment   List of dedicated fax numbers:  [address] [place] [address] DENIALS (Administrative/Medical Necessity) [phone]   [address] [place] [address] (Maternity/NICU/Pediatrics) [phone]   Job Meng [phone]   Tova Howard [phone]   [address] [place] [address]  [phone]   [facility] [phone]   [facility]  [phone]   [address] [place] [address], [address]  [facility] 1000 [facility] [phone]

## 2022-05-26 ENCOUNTER — APPOINTMENT (EMERGENCY)
Dept: RADIOLOGY | Facility: HOSPITAL | Age: 77
DRG: 871 | End: 2022-05-26
Payer: COMMERCIAL

## 2022-05-26 ENCOUNTER — APPOINTMENT (EMERGENCY)
Dept: CT IMAGING | Facility: HOSPITAL | Age: 77
DRG: 871 | End: 2022-05-26
Payer: COMMERCIAL

## 2022-05-26 ENCOUNTER — APPOINTMENT (INPATIENT)
Dept: RADIOLOGY | Facility: HOSPITAL | Age: 77
DRG: 871 | End: 2022-05-26
Payer: COMMERCIAL

## 2022-05-26 ENCOUNTER — HOSPITAL ENCOUNTER (INPATIENT)
Facility: HOSPITAL | Age: 77
LOS: 5 days | Discharge: NURSING, REHAB OR LONG-TERM CARE | DRG: 871 | End: 2022-05-31
Attending: EMERGENCY MEDICINE | Admitting: INTERNAL MEDICINE
Payer: COMMERCIAL

## 2022-05-26 DIAGNOSIS — E87.1 HYPONATREMIA: ICD-10-CM

## 2022-05-26 DIAGNOSIS — R41.82 ALTERED MENTAL STATUS: Primary | ICD-10-CM

## 2022-05-26 DIAGNOSIS — N39.0 UTI (URINARY TRACT INFECTION): ICD-10-CM

## 2022-05-26 PROBLEM — R94.31 PROLONGED Q-T INTERVAL ON ECG: Status: ACTIVE | Noted: 2022-05-26

## 2022-05-26 PROBLEM — A41.9 SEPSIS (HCC): Status: ACTIVE | Noted: 2022-05-26

## 2022-05-26 LAB
ALBUMIN SERPL BCP-MCNC: 3.5 G/DL (ref 3.5–5)
ALP SERPL-CCNC: 75 U/L (ref 46–116)
ALT SERPL W P-5'-P-CCNC: 13 U/L (ref 12–78)
ANION GAP SERPL CALCULATED.3IONS-SCNC: 16 MMOL/L (ref 4–13)
APTT PPP: 34 SECONDS (ref 23–37)
AST SERPL W P-5'-P-CCNC: 27 U/L (ref 5–45)
ATRIAL RATE: 133 BPM
BACTERIA UR QL AUTO: ABNORMAL /HPF
BASOPHILS # BLD AUTO: 0.05 THOUSANDS/ΜL (ref 0–0.1)
BASOPHILS NFR BLD AUTO: 0 % (ref 0–1)
BILIRUB SERPL-MCNC: 0.69 MG/DL (ref 0.2–1)
BILIRUB UR QL STRIP: NEGATIVE
BUN SERPL-MCNC: 18 MG/DL (ref 5–25)
CALCIUM SERPL-MCNC: 9.3 MG/DL (ref 8.3–10.1)
CHLORIDE SERPL-SCNC: 96 MMOL/L (ref 100–108)
CLARITY UR: CLEAR
CO2 SERPL-SCNC: 23 MMOL/L (ref 21–32)
COLOR UR: YELLOW
CREAT SERPL-MCNC: 0.87 MG/DL (ref 0.6–1.3)
EOSINOPHIL # BLD AUTO: 0 THOUSAND/ΜL (ref 0–0.61)
EOSINOPHIL NFR BLD AUTO: 0 % (ref 0–6)
ERYTHROCYTE [DISTWIDTH] IN BLOOD BY AUTOMATED COUNT: 14.3 % (ref 11.6–15.1)
FLUAV RNA RESP QL NAA+PROBE: NEGATIVE
FLUBV RNA RESP QL NAA+PROBE: NEGATIVE
GFR SERPL CREATININE-BSD FRML MDRD: 64 ML/MIN/1.73SQ M
GLUCOSE SERPL-MCNC: 139 MG/DL (ref 65–140)
GLUCOSE SERPL-MCNC: 159 MG/DL (ref 65–140)
GLUCOSE SERPL-MCNC: 160 MG/DL (ref 65–140)
GLUCOSE SERPL-MCNC: 194 MG/DL (ref 65–140)
GLUCOSE SERPL-MCNC: 240 MG/DL (ref 65–140)
GLUCOSE SERPL-MCNC: 259 MG/DL (ref 65–140)
GLUCOSE SERPL-MCNC: 315 MG/DL (ref 65–140)
GLUCOSE SERPL-MCNC: 321 MG/DL (ref 65–140)
GLUCOSE UR STRIP-MCNC: ABNORMAL MG/DL
HCT VFR BLD AUTO: 39 % (ref 34.8–46.1)
HGB BLD-MCNC: 13.2 G/DL (ref 11.5–15.4)
HGB UR QL STRIP.AUTO: ABNORMAL
HYALINE CASTS #/AREA URNS LPF: ABNORMAL /LPF
IMM GRANULOCYTES # BLD AUTO: 0.13 THOUSAND/UL (ref 0–0.2)
IMM GRANULOCYTES NFR BLD AUTO: 1 % (ref 0–2)
INR PPP: 1.12 (ref 0.84–1.19)
KETONES UR STRIP-MCNC: ABNORMAL MG/DL
LACTATE SERPL-SCNC: 1.7 MMOL/L (ref 0.5–2)
LACTATE SERPL-SCNC: 4.7 MMOL/L (ref 0.5–2)
LEUKOCYTE ESTERASE UR QL STRIP: ABNORMAL
LYMPHOCYTES # BLD AUTO: 1.17 THOUSANDS/ΜL (ref 0.6–4.47)
LYMPHOCYTES NFR BLD AUTO: 6 % (ref 14–44)
MAGNESIUM SERPL-MCNC: 1.8 MG/DL (ref 1.6–2.6)
MCH RBC QN AUTO: 30.2 PG (ref 26.8–34.3)
MCHC RBC AUTO-ENTMCNC: 33.8 G/DL (ref 31.4–37.4)
MCV RBC AUTO: 89 FL (ref 82–98)
MONOCYTES # BLD AUTO: 0.59 THOUSAND/ΜL (ref 0.17–1.22)
MONOCYTES NFR BLD AUTO: 3 % (ref 4–12)
NEUTROPHILS # BLD AUTO: 17.74 THOUSANDS/ΜL (ref 1.85–7.62)
NEUTS SEG NFR BLD AUTO: 90 % (ref 43–75)
NITRITE UR QL STRIP: NEGATIVE
NON-SQ EPI CELLS URNS QL MICRO: ABNORMAL /HPF
NRBC BLD AUTO-RTO: 0 /100 WBCS
P AXIS: 106 DEGREES
PH UR STRIP.AUTO: 6 [PH]
PLATELET # BLD AUTO: 440 THOUSANDS/UL (ref 149–390)
PMV BLD AUTO: 9.6 FL (ref 8.9–12.7)
POTASSIUM SERPL-SCNC: 3.8 MMOL/L (ref 3.5–5.3)
PR INTERVAL: 182 MS
PROCALCITONIN SERPL-MCNC: 1.64 NG/ML
PROT SERPL-MCNC: 8.2 G/DL (ref 6.4–8.2)
PROT UR STRIP-MCNC: ABNORMAL MG/DL
PROTHROMBIN TIME: 14 SECONDS (ref 11.6–14.5)
QRS AXIS: -14 DEGREES
QRSD INTERVAL: 82 MS
QT INTERVAL: 352 MS
QTC INTERVAL: 523 MS
RBC # BLD AUTO: 4.37 MILLION/UL (ref 3.81–5.12)
RBC #/AREA URNS AUTO: ABNORMAL /HPF
RSV RNA RESP QL NAA+PROBE: NEGATIVE
SARS-COV-2 RNA RESP QL NAA+PROBE: NEGATIVE
SODIUM SERPL-SCNC: 135 MMOL/L (ref 136–145)
SP GR UR STRIP.AUTO: 1.02 (ref 1–1.03)
T WAVE AXIS: 63 DEGREES
UROBILINOGEN UR QL STRIP.AUTO: 0.2 E.U./DL
VENTRICULAR RATE: 133 BPM
WBC # BLD AUTO: 19.68 THOUSAND/UL (ref 4.31–10.16)
WBC #/AREA URNS AUTO: ABNORMAL /HPF

## 2022-05-26 PROCEDURE — 99223 1ST HOSP IP/OBS HIGH 75: CPT | Performed by: INTERNAL MEDICINE

## 2022-05-26 PROCEDURE — 73502 X-RAY EXAM HIP UNI 2-3 VIEWS: CPT

## 2022-05-26 PROCEDURE — 96361 HYDRATE IV INFUSION ADD-ON: CPT

## 2022-05-26 PROCEDURE — 85730 THROMBOPLASTIN TIME PARTIAL: CPT | Performed by: EMERGENCY MEDICINE

## 2022-05-26 PROCEDURE — 87077 CULTURE AEROBIC IDENTIFY: CPT | Performed by: EMERGENCY MEDICINE

## 2022-05-26 PROCEDURE — 84145 PROCALCITONIN (PCT): CPT | Performed by: EMERGENCY MEDICINE

## 2022-05-26 PROCEDURE — 96365 THER/PROPH/DIAG IV INF INIT: CPT

## 2022-05-26 PROCEDURE — 83605 ASSAY OF LACTIC ACID: CPT | Performed by: EMERGENCY MEDICINE

## 2022-05-26 PROCEDURE — 99285 EMERGENCY DEPT VISIT HI MDM: CPT

## 2022-05-26 PROCEDURE — 83735 ASSAY OF MAGNESIUM: CPT | Performed by: PHYSICIAN ASSISTANT

## 2022-05-26 PROCEDURE — 87086 URINE CULTURE/COLONY COUNT: CPT | Performed by: EMERGENCY MEDICINE

## 2022-05-26 PROCEDURE — 99291 CRITICAL CARE FIRST HOUR: CPT | Performed by: EMERGENCY MEDICINE

## 2022-05-26 PROCEDURE — 36415 COLL VENOUS BLD VENIPUNCTURE: CPT | Performed by: EMERGENCY MEDICINE

## 2022-05-26 PROCEDURE — 93005 ELECTROCARDIOGRAM TRACING: CPT

## 2022-05-26 PROCEDURE — 96360 HYDRATION IV INFUSION INIT: CPT

## 2022-05-26 PROCEDURE — 85610 PROTHROMBIN TIME: CPT | Performed by: EMERGENCY MEDICINE

## 2022-05-26 PROCEDURE — 70450 CT HEAD/BRAIN W/O DYE: CPT

## 2022-05-26 PROCEDURE — 82948 REAGENT STRIP/BLOOD GLUCOSE: CPT

## 2022-05-26 PROCEDURE — 85025 COMPLETE CBC W/AUTO DIFF WBC: CPT | Performed by: EMERGENCY MEDICINE

## 2022-05-26 PROCEDURE — 93010 ELECTROCARDIOGRAM REPORT: CPT | Performed by: INTERNAL MEDICINE

## 2022-05-26 PROCEDURE — 73552 X-RAY EXAM OF FEMUR 2/>: CPT

## 2022-05-26 PROCEDURE — 0241U HB NFCT DS VIR RESP RNA 4 TRGT: CPT | Performed by: EMERGENCY MEDICINE

## 2022-05-26 PROCEDURE — 80053 COMPREHEN METABOLIC PANEL: CPT | Performed by: EMERGENCY MEDICINE

## 2022-05-26 PROCEDURE — 87040 BLOOD CULTURE FOR BACTERIA: CPT | Performed by: EMERGENCY MEDICINE

## 2022-05-26 PROCEDURE — 71045 X-RAY EXAM CHEST 1 VIEW: CPT

## 2022-05-26 PROCEDURE — 81001 URINALYSIS AUTO W/SCOPE: CPT | Performed by: EMERGENCY MEDICINE

## 2022-05-26 RX ORDER — LANOLIN ALCOHOL/MO/W.PET/CERES
3 CREAM (GRAM) TOPICAL
Status: DISCONTINUED | OUTPATIENT
Start: 2022-05-26 | End: 2022-05-29

## 2022-05-26 RX ORDER — LINAGLIPTIN 5 MG/1
5 TABLET, FILM COATED ORAL DAILY
COMMUNITY

## 2022-05-26 RX ORDER — POTASSIUM CHLORIDE 750 MG/1
10 CAPSULE, EXTENDED RELEASE ORAL 2 TIMES DAILY
COMMUNITY

## 2022-05-26 RX ORDER — SENNOSIDES 8.6 MG
1 TABLET ORAL 2 TIMES DAILY
Status: DISCONTINUED | OUTPATIENT
Start: 2022-05-26 | End: 2022-06-01 | Stop reason: HOSPADM

## 2022-05-26 RX ORDER — ALBUTEROL SULFATE 2.5 MG/3ML
2.5 SOLUTION RESPIRATORY (INHALATION) EVERY 6 HOURS PRN
Status: DISCONTINUED | OUTPATIENT
Start: 2022-05-26 | End: 2022-05-29

## 2022-05-26 RX ORDER — ASPIRIN 81 MG/1
81 TABLET, CHEWABLE ORAL DAILY
Status: DISCONTINUED | OUTPATIENT
Start: 2022-05-26 | End: 2022-06-01 | Stop reason: HOSPADM

## 2022-05-26 RX ORDER — LACTULOSE 20 G/30ML
20 SOLUTION ORAL DAILY PRN
Status: DISCONTINUED | OUTPATIENT
Start: 2022-05-26 | End: 2022-06-01 | Stop reason: HOSPADM

## 2022-05-26 RX ORDER — LORATADINE 10 MG/1
10 TABLET ORAL
Status: DISCONTINUED | OUTPATIENT
Start: 2022-05-26 | End: 2022-06-01 | Stop reason: HOSPADM

## 2022-05-26 RX ORDER — ALBUTEROL SULFATE 90 UG/1
2 AEROSOL, METERED RESPIRATORY (INHALATION) EVERY 6 HOURS PRN
Status: DISCONTINUED | OUTPATIENT
Start: 2022-05-26 | End: 2022-06-01 | Stop reason: HOSPADM

## 2022-05-26 RX ORDER — LACTULOSE 20 G/30ML
20 SOLUTION ORAL DAILY PRN
COMMUNITY

## 2022-05-26 RX ORDER — ASPIRIN 81 MG/1
81 TABLET, CHEWABLE ORAL DAILY
COMMUNITY

## 2022-05-26 RX ORDER — ACETAMINOPHEN 650 MG/1
650 SUPPOSITORY RECTAL ONCE
Status: COMPLETED | OUTPATIENT
Start: 2022-05-26 | End: 2022-05-26

## 2022-05-26 RX ORDER — SENNA PLUS 8.6 MG/1
1 TABLET ORAL 2 TIMES DAILY
COMMUNITY

## 2022-05-26 RX ORDER — FUROSEMIDE 20 MG/1
20 TABLET ORAL 2 TIMES DAILY
COMMUNITY

## 2022-05-26 RX ORDER — INSULIN LISPRO 100 [IU]/ML
1-5 INJECTION, SOLUTION INTRAVENOUS; SUBCUTANEOUS
Status: DISCONTINUED | OUTPATIENT
Start: 2022-05-26 | End: 2022-05-26

## 2022-05-26 RX ORDER — ACETAMINOPHEN 650 MG/1
650 SUPPOSITORY RECTAL EVERY 6 HOURS PRN
Status: DISCONTINUED | OUTPATIENT
Start: 2022-05-26 | End: 2022-06-01 | Stop reason: HOSPADM

## 2022-05-26 RX ORDER — LORATADINE 10 MG/1
10 TABLET ORAL
COMMUNITY

## 2022-05-26 RX ORDER — PAROXETINE HYDROCHLORIDE 20 MG/1
10 TABLET, FILM COATED ORAL EVERY MORNING
Status: DISCONTINUED | OUTPATIENT
Start: 2022-05-27 | End: 2022-06-01 | Stop reason: HOSPADM

## 2022-05-26 RX ORDER — ACETAMINOPHEN 325 MG/1
650 TABLET ORAL EVERY 6 HOURS PRN
Status: DISCONTINUED | OUTPATIENT
Start: 2022-05-26 | End: 2022-05-26

## 2022-05-26 RX ORDER — LISINOPRIL 5 MG/1
5 TABLET ORAL DAILY
COMMUNITY

## 2022-05-26 RX ORDER — MAGNESIUM SULFATE 1 G/100ML
1 INJECTION INTRAVENOUS ONCE
Status: COMPLETED | OUTPATIENT
Start: 2022-05-26 | End: 2022-05-26

## 2022-05-26 RX ORDER — MONTELUKAST SODIUM 10 MG/1
10 TABLET ORAL
Status: DISCONTINUED | OUTPATIENT
Start: 2022-05-26 | End: 2022-06-01 | Stop reason: HOSPADM

## 2022-05-26 RX ORDER — ATORVASTATIN CALCIUM 10 MG/1
10 TABLET, FILM COATED ORAL
Status: DISCONTINUED | OUTPATIENT
Start: 2022-05-26 | End: 2022-06-01 | Stop reason: HOSPADM

## 2022-05-26 RX ORDER — LISINOPRIL 5 MG/1
5 TABLET ORAL DAILY
Status: DISCONTINUED | OUTPATIENT
Start: 2022-05-26 | End: 2022-06-01 | Stop reason: HOSPADM

## 2022-05-26 RX ORDER — PANTOPRAZOLE SODIUM 40 MG/1
40 TABLET, DELAYED RELEASE ORAL DAILY
Status: DISCONTINUED | OUTPATIENT
Start: 2022-05-26 | End: 2022-06-01 | Stop reason: HOSPADM

## 2022-05-26 RX ORDER — INSULIN LISPRO 100 [IU]/ML
1-5 INJECTION, SOLUTION INTRAVENOUS; SUBCUTANEOUS EVERY 6 HOURS SCHEDULED
Status: DISCONTINUED | OUTPATIENT
Start: 2022-05-26 | End: 2022-05-28

## 2022-05-26 RX ORDER — OMEPRAZOLE 10 MG/1
20 CAPSULE, DELAYED RELEASE ORAL DAILY
COMMUNITY

## 2022-05-26 RX ORDER — SODIUM CHLORIDE 1000 MG
1 TABLET, SOLUBLE MISCELLANEOUS 2 TIMES DAILY WITH MEALS
Status: DISCONTINUED | OUTPATIENT
Start: 2022-05-26 | End: 2022-06-01 | Stop reason: HOSPADM

## 2022-05-26 RX ORDER — FUROSEMIDE 20 MG/1
20 TABLET ORAL 2 TIMES DAILY
Status: DISCONTINUED | OUTPATIENT
Start: 2022-05-26 | End: 2022-06-01 | Stop reason: HOSPADM

## 2022-05-26 RX ORDER — ENOXAPARIN SODIUM 100 MG/ML
40 INJECTION SUBCUTANEOUS DAILY
Status: DISCONTINUED | OUTPATIENT
Start: 2022-05-26 | End: 2022-06-01 | Stop reason: HOSPADM

## 2022-05-26 RX ADMIN — SODIUM CHLORIDE 1000 ML: 0.9 INJECTION, SOLUTION INTRAVENOUS at 03:15

## 2022-05-26 RX ADMIN — INSULIN LISPRO 2 UNITS: 100 INJECTION, SOLUTION INTRAVENOUS; SUBCUTANEOUS at 11:19

## 2022-05-26 RX ADMIN — ENOXAPARIN SODIUM 40 MG: 40 INJECTION SUBCUTANEOUS at 10:52

## 2022-05-26 RX ADMIN — INSULIN LISPRO 1 UNITS: 100 INJECTION, SOLUTION INTRAVENOUS; SUBCUTANEOUS at 13:18

## 2022-05-26 RX ADMIN — SODIUM CHLORIDE 1000 ML: 0.9 INJECTION, SOLUTION INTRAVENOUS at 03:45

## 2022-05-26 RX ADMIN — SODIUM CHLORIDE 1000 ML: 0.9 INJECTION, SOLUTION INTRAVENOUS at 02:41

## 2022-05-26 RX ADMIN — INSULIN LISPRO 2 UNITS: 100 INJECTION, SOLUTION INTRAVENOUS; SUBCUTANEOUS at 08:02

## 2022-05-26 RX ADMIN — ACETAMINOPHEN 650 MG: 650 SUPPOSITORY RECTAL at 10:54

## 2022-05-26 RX ADMIN — Medication 1000 MG: at 18:28

## 2022-05-26 RX ADMIN — ACETAMINOPHEN 650 MG: 650 SUPPOSITORY RECTAL at 02:42

## 2022-05-26 RX ADMIN — MAGNESIUM SULFATE HEPTAHYDRATE 1 G: 1 INJECTION, SOLUTION INTRAVENOUS at 06:59

## 2022-05-26 RX ADMIN — CEFEPIME HYDROCHLORIDE 2000 MG: 2 INJECTION, POWDER, FOR SOLUTION INTRAVENOUS at 04:48

## 2022-05-26 NOTE — ED NOTES
This nurse spoke with the pts son about the pt status and updated him on her plan of care        Gary Sotelo RN  05/26/22 6631

## 2022-05-26 NOTE — ASSESSMENT & PLAN NOTE
· Urinalysis positive for leukocytes, occasional bacteria  · Urine culture pending  · Start IV ceftriaxone 1 g daily

## 2022-05-26 NOTE — QUICK NOTE
QUICK NOTE - Deterioration Index  Pearl Curtis 64 y o  female MRN: [id number]  Unit/Bed#: ED 27 Encounter: [id number]    Date Paged: 22  Time Paged: 05:47  Room #: ED 27  Arrival Time: 05:50  Deterioration index score at time of page: 73 14  %  Spoke with primary RN  Need to escalate level of care: no     PROBLEMS resulting in high DI score:   29% Wheatcroft coma scale 10   16 Age 64years old   15% Supplemental oxygen Nasal cannula   12% Neurological exam Lethargic       PLAN:     GCS/Neuro: Depressed in setting of sepsis per discussion with ED admitting  Continue to trend   Age: Non-modifiable risk factor   Supplemental O2: Maintained on 4L NC  Please contact critical care via [employer] with any questions or concerns       Vitals:   Vitals:    22 0400 22 0415 22 0500 22 0509   BP: 143/67 148/70 140/67    BP Location: Right arm Right arm Right arm    Pulse: (!) 109 (!) 107 (!) 110    Resp: (!) 24 (!) 23 21    Temp:    100 1 °F (37 8 °C)   TempSrc:    Axillary   SpO2: 99% 100% 100%        Respiratory:  SpO2: SpO2: 100 %, SpO2 Activity: SpO2 Activity: At Rest, SpO2 Device: O2 Device: Nasal cannula  Nasal Cannula O2 Flow Rate (L/min): 4 L/min    Temperature: Temp (24hrs), Av 6 °F (38 7 °C), Min:100 1 °F (37 8 °C), Max:102 8 °F (39 3 °C)  Current: Temperature: 100 1 °F (37 8 °C)    Labs:   Results from last 7 days   Lab Units 22  0233   WBC Thousand/uL 19 68*   HEMOGLOBIN g/dL 13 2   HEMATOCRIT % 39 0   PLATELETS Thousands/uL 440*   NEUTROS PCT % 90*   MONOS PCT % 3*     Results from last 7 days   Lab Units 22  0233   SODIUM mmol/L 135*   POTASSIUM mmol/L 3 8   CHLORIDE mmol/L 96*   CO2 mmol/L 23   BUN mg/dL 18   CREATININE mg/dL 0 87   CALCIUM mg/dL 9 3   ALK PHOS U/L 75   ALT U/L 13   AST U/L 27         Results from last 7 days   Lab Units 22  0233   LACTIC ACID mmol/L 4 7*         Results from last 7 days   Lab Units 22  0233   PROCALCITONIN ng/ml 1 64* Code Status: Prior

## 2022-05-26 NOTE — ED PROVIDER NOTES
History  Chief Complaint   Patient presents with    Altered Mental Status     Pt arrives via EMS from [facility] due to pt "not being herself" per EMS pt had temp of 104  Per facility they are unaware of when pt was last at her baseline  Pt 98% on 4L which she wears at home     78-year-old female presents from [facility] [place], altered mental status  Unknown last normal  Usually patient able to talk, now not able to respond or speak to physician  Opens eyes to verbal  Obeys commands  Pre-hospital patient was noted to be febrile and tachycardic  Unknown source of infection at this time  prehospital glucose 320 - DM2  Baseline O2 requirement of 4L NC  Prior to Admission Medications   Prescriptions Last Dose Informant Patient Reported? Taking?    Melatonin 3 MG CAPS   Yes Yes   Sig: Take 3 mg by mouth daily at bedtime   PARoxetine (PAXIL) 10 mg tablet   Yes Yes   Sig: Take 10 mg by mouth every morning   QUEtiapine (SEROquel) 25 mg tablet Not Taking at Unknown time  No No   Sig: Take 0 5 tablets (12 5 mg total) by mouth daily at bedtime   Patient not taking: Reported on 5/26/2022   albuterol (2 5 mg/3 mL) 0 083 % nebulizer solution   Yes Yes   Sig: Take 2 5 mg by nebulization every 6 (six) hours as needed for wheezing   albuterol (PROVENTIL HFA,VENTOLIN HFA) 90 mcg/act inhaler   Yes Yes   Sig: Inhale 2 puffs every 6 (six) hours as needed for wheezing   aspirin 81 mg chewable tablet  Outside Facility (Specify) Yes Yes   Sig: Chew 81 mg daily   atorvastatin (LIPITOR) 10 mg tablet   Yes Yes   Sig: Take 10 mg by mouth daily at bedtime   bisacodyl (FLEET) 10 MG/30ML ENEM  Outside Facility (Specify) Yes Yes   Sig: Insert 10 mg into the rectum daily as needed for constipation   furosemide (LASIX) 20 mg tablet   Yes Yes   Sig: Take 20 mg by mouth 2 (two) times a day   lactulose 20 g/30 mL   Yes Yes   Sig: Take 20 g by mouth daily as needed (constipation)   linaGLIPtin (Tradjenta) 5 MG TABS   Yes Yes   Sig: Take 5 mg by mouth daily   lisinopril (ZESTRIL) 5 mg tablet   Yes Yes   Sig: Take 5 mg by mouth daily   loratadine (CLARITIN) 10 mg tablet   Yes Yes   Sig: Take 10 mg by mouth daily at bedtime   metFORMIN (GLUCOPHAGE) 500 mg tablet   No Yes   Sig: Take 1 tablet (500 mg total) by mouth 2 (two) times a day with meals Resume on 12/11/18   montelukast (SINGULAIR) 10 mg tablet   Yes Yes   Sig: Take 10 mg by mouth daily at bedtime   omeprazole (PriLOSEC) 10 mg delayed release capsule   Yes Yes   Sig: Take 20 mg by mouth daily   potassium chloride (MICRO-K) 10 MEQ CR capsule  Outside Facility (Specify) Yes Yes   Sig: Take 10 mEq by mouth 2 (two) times a day   senna (SENOKOT) 8 6 MG tablet  Outside Facility (Specify) Yes Yes   Sig: Take 1 tablet by mouth 2 (two) times a day   sodium chloride 1 g tablet  Outside Facility (Specify) No Yes   Sig: Take 2 tablets (2 g total) by mouth 3 (three) times a day with meals   Patient taking differently: Take 1 g by mouth 2 (two) times a day with meals      Facility-Administered Medications: None       Past Medical History:   Diagnosis Date    Asthma     Diabetes mellitus ([facility] [address] )     Glaucoma     Hypertension     Hypoglycemia 6/20/2021       Past Surgical History:   Procedure Laterality Date    COLONOSCOPY N/A 12/8/2018    Procedure: COLONOSCOPY;  Surgeon: Mira Park MD;  Location: [facility]; Service: Gastroenterology       No family history on file  I have reviewed and agree with the history as documented  E-Cigarette/Vaping    E-Cigarette Use Never User      E-Cigarette/Vaping Substances     Social History     Tobacco Use    Smoking status: Never Smoker    Smokeless tobacco: Never Used   Vaping Use    Vaping Use: Never used   Substance Use Topics    Alcohol use: Never    Drug use: No       Review of Systems   Unable to perform ROS: Mental status change       Physical Exam  Physical Exam  Vitals reviewed     Constitutional:       General: She is not in acute distress  Appearance: She is well-developed  She is ill-appearing and toxic-appearing  She is not diaphoretic  HENT:      Head: Normocephalic and atraumatic  Mouth/Throat:      Comments: dry  Eyes:      General: No scleral icterus  Right eye: No discharge  Left eye: No discharge  Conjunctiva/sclera: Conjunctivae normal       Pupils: Pupils are equal, round, and reactive to light  Neck:      Vascular: No JVD  Cardiovascular:      Rate and Rhythm: Regular rhythm  Tachycardia present  Heart sounds: Normal heart sounds  No murmur heard  No friction rub  No gallop  Pulmonary:      Effort: Pulmonary effort is normal  No respiratory distress  Breath sounds: Normal breath sounds  No wheezing, rhonchi or rales  Chest:      Chest wall: No tenderness  Abdominal:      General: Bowel sounds are normal  There is no distension  Palpations: Abdomen is soft  Tenderness: There is no abdominal tenderness  There is no guarding or rebound  Musculoskeletal:         General: No tenderness or deformity  Normal range of motion  Cervical back: Normal range of motion and neck supple  Skin:     General: Skin is warm and dry  Coloration: Skin is not pale  Findings: No erythema or rash  Neurological:      Mental Status: She is lethargic and disoriented  GCS: GCS eye subscore is 3  GCS verbal subscore is 1  GCS motor subscore is 5  Cranial Nerves: No cranial nerve deficit     Psychiatric:         Behavior: Behavior normal          Vital Signs  ED Triage Vitals   Temperature Pulse Respirations Blood Pressure SpO2   05/26/22 0226 05/26/22 0219 05/26/22 0219 05/26/22 0219 05/26/22 0219   (!) 102 8 °F (39 3 °C) (!) 130 20 126/88 98 %      Temp Source Heart Rate Source Patient Position - Orthostatic VS BP Location FiO2 (%)   05/26/22 0226 05/26/22 0219 05/26/22 0219 05/26/22 0219 --   Axillary Monitor Lying Right arm       Pain Score       05/26/22 0242 Med Not Given for Pain - for MAR use only           Vitals:    05/30/22 1729 05/30/22 2057 05/31/22 0756 05/31/22 1557   BP: 122/72 114/77 107/59 118/86   Pulse: 92 75 84 77   Patient Position - Orthostatic VS:             Visual Acuity      ED Medications  Medications   sodium chloride 0 9 % bolus 1,000 mL (0 mL Intravenous Stopped 5/26/22 0351)     Followed by   sodium chloride 0 9 % bolus 1,000 mL (0 mL Intravenous Stopped 5/26/22 0445)     Followed by   sodium chloride 0 9 % bolus 1,000 mL (0 mL Intravenous Stopped 5/26/22 0510)   acetaminophen (TYLENOL) rectal suppository 650 mg (650 mg Rectal Given 5/26/22 0242)   cefepime (MAXIPIME) 2 g/50 mL dextrose IVPB (0 mg Intravenous Stopped 5/26/22 0510)   magnesium sulfate IVPB (premix) SOLN 1 g (0 g Intravenous Stopped 5/26/22 0824)   potassium chloride (K-DUR,KLOR-CON) CR tablet 40 mEq (40 mEq Oral Given 5/28/22 1014)       Diagnostic Studies  Results Reviewed     Procedure Component Value Units Date/Time    Blood culture #1 [[id number]] Collected: 05/26/22 0233    Lab Status: Final result Specimen: Blood from Arm, Right Updated: 05/31/22 0805     Blood Culture No Growth After 5 Days  Blood culture #2 [[id number]] Collected: 05/26/22 0233    Lab Status: Final result Specimen: Blood from Arm, Left Updated: 05/31/22 0805     Blood Culture No Growth After 5 Days      Urine culture [[id number]]  (Abnormal)  (Susceptibility) Collected: 05/26/22 0333    Lab Status: Final result Specimen: Urine, Straight Cath Updated: 05/28/22 0910     Urine Culture <10,000 cfu/ml Streptococcus anginosus    Susceptibility     Streptococcus anginosus (1)     Antibiotic Interpretation Microscan Method Status    ZID Performed  Yes BOB Final                   Fingerstick Glucose (POCT) [[id number]]  (Abnormal) Collected: 05/26/22 1315    Lab Status: Final result Updated: 05/26/22 1316     POC Glucose 194 mg/dl     Fingerstick Glucose (POCT) [[id number]]  (Abnormal) Collected: 05/26/22 1058 Lab Status: Final result Updated: 05/26/22 1114     POC Glucose 240 mg/dl     Fingerstick Glucose (POCT) [[id number]]  (Abnormal) Collected: 05/26/22 0758    Lab Status: Final result Updated: 05/26/22 0759     POC Glucose 259 mg/dl     Magnesium [[id number]]  (Normal) Collected: 05/26/22 0233    Lab Status: Final result Specimen: Blood from Arm, Left Updated: 05/26/22 0643     Magnesium 1 8 mg/dL     Lactic acid 2 Hours [[id number]]  (Normal) Collected: 05/26/22 0521    Lab Status: Final result Specimen: Blood from Arm, Right Updated: 05/26/22 0550     LACTIC ACID 1 7 mmol/L     Narrative:      Result may be elevated if tourniquet was used during collection  Urine Microscopic [[id number]]  (Abnormal) Collected: 05/26/22 0333    Lab Status: Final result Specimen: Urine, Straight Cath Updated: 05/26/22 0351     RBC, UA 4-10 /hpf      WBC, UA 20-30 /hpf      Epithelial Cells Occasional /hpf      Bacteria, UA Occasional /hpf      Hyaline Casts, UA 2-4 /lpf     UA w Reflex to Microscopic w Reflex to Culture [[id number]]  (Abnormal) Collected: 05/26/22 0333    Lab Status: Final result Specimen: Urine, Straight Cath Updated: 05/26/22 0344     Color, UA Yellow     Clarity, UA Clear     Specific Gravity, UA 1 025     pH, UA 6 0     Leukocytes, UA Small     Nitrite, UA Negative     Protein, UA 30 (1+) mg/dl      Glucose,  (1/4%) mg/dl      Ketones, UA 40 (2+) mg/dl      Urobilinogen, UA 0 2 E U /dl      Bilirubin, UA Negative     Blood, UA Moderate    COVID/FLU/RSV - 2 hour TAT [[id number]]  (Normal) Collected: 05/26/22 0233    Lab Status: Final result Specimen: Nares from Nose Updated: 05/26/22 0323     SARS-CoV-2 Negative     INFLUENZA A PCR Negative     INFLUENZA B PCR Negative     RSV PCR Negative    Narrative:      FOR PEDIATRIC PATIENTS - copy/paste COVID Guidelines URL to browser: [email or web address]  [facility]    SARS-CoV-2 assay is a Nucleic Acid Amplification assay intended for the  qualitative detection of nucleic acid from SARS-CoV-2 in nasopharyngeal  swabs  Results are for the presumptive identification of SARS-CoV-2 RNA  Positive results are indicative of infection with SARS-CoV-2, the virus  causing COVID-19, but do not rule out bacterial infection or co-infection  with other viruses  Laboratories within the [place] and its  territories are required to report all positive results to the appropriate  public health authorities  Negative results do not preclude SARS-CoV-2  infection and should not be used as the sole basis for treatment or other  patient management decisions  Negative results must be combined with  clinical observations, patient history, and epidemiological information  This test has not been FDA cleared or approved  This test has been authorized by FDA under an Emergency Use Authorization  (EUA)  This test is only authorized for the duration of time the  declaration that circumstances exist justifying the authorization of the  emergency use of an in vitro diagnostic tests for detection of SARS-CoV-2  virus and/or diagnosis of COVID-19 infection under section 564(b)(1) of  the Act, 21 U  S C  014LDV-6(X)(4), unless the authorization is terminated  or revoked sooner  The test has been validated but independent review by FDA  and CLIA is pending  Test performed using [employer] GeneXpert: This RT-PCR assay targets N2,  a region unique to SARS-CoV-2  A conserved region in the E-gene was chosen  for pan-Sarbecovirus detection which includes SARS-CoV-2  Lactic acid [[id number]]  (Abnormal) Collected: 05/26/22 0233    Lab Status: Final result Specimen: Blood from Arm, Left Updated: 05/26/22 0318     LACTIC ACID 4 7 mmol/L     Narrative:      Result may be elevated if tourniquet was used during collection      Procalcitonin [[id number]]  (Abnormal) Collected: 05/26/22 0233    Lab Status: Final result Specimen: Blood from Arm, Left Updated: 05/26/22 3350     Procalcitonin 1 64 ng/ml     CBC and differential [[id number]]  (Abnormal) Collected: 05/26/22 0233    Lab Status: Final result Specimen: Blood from Arm, Right Updated: 05/26/22 0305     WBC 19 68 Thousand/uL      RBC 4 37 Million/uL      Hemoglobin 13 2 g/dL      Hematocrit 39 0 %      MCV 89 fL      MCH 30 2 pg      MCHC 33 8 g/dL      RDW 14 3 %      MPV 9 6 fL      Platelets 546 Thousands/uL      nRBC 0 /100 WBCs      Neutrophils Relative 90 %      Immat GRANS % 1 %      Lymphocytes Relative 6 %      Monocytes Relative 3 %      Eosinophils Relative 0 %      Basophils Relative 0 %      Neutrophils Absolute 17 74 Thousands/µL      Immature Grans Absolute 0 13 Thousand/uL      Lymphocytes Absolute 1 17 Thousands/µL      Monocytes Absolute 0 59 Thousand/µL      Eosinophils Absolute 0 00 Thousand/µL      Basophils Absolute 0 05 Thousands/µL     Narrative: This is an appended report  These results have been appended to a previously verified report      Comprehensive metabolic panel [[id number]]  (Abnormal) Collected: 05/26/22 0233    Lab Status: Final result Specimen: Blood from Arm, Left Updated: 05/26/22 0305     Sodium 135 mmol/L      Potassium 3 8 mmol/L      Chloride 96 mmol/L      CO2 23 mmol/L      ANION GAP 16 mmol/L      BUN 18 mg/dL      Creatinine 0 87 mg/dL      Glucose 321 mg/dL      Calcium 9 3 mg/dL      AST 27 U/L      ALT 13 U/L      Alkaline Phosphatase 75 U/L      Total Protein 8 2 g/dL      Albumin 3 5 g/dL      Total Bilirubin 0 69 mg/dL      eGFR 64 ml/min/1 73sq m     Narrative:      [place] guidelines for Chronic Kidney Disease (CKD):     Stage 1 with normal or high GFR (GFR > 90 mL/min/1 73 square meters)    Stage 2 Mild CKD (GFR = 60-89 mL/min/1 73 square meters)    Stage 3A Moderate CKD (GFR = 45-59 mL/min/1 73 square meters)    Stage 3B Moderate CKD (GFR = 30-44 mL/min/1 73 square meters)    Stage 4 Severe CKD (GFR = 15-29 mL/min/1 73 square meters)    Stage 5 End Stage CKD (GFR <15 mL/min/1 73 square meters)  Note: GFR calculation is accurate only with a steady state creatinine    Protime-INR [[id number]]  (Normal) Collected: 05/26/22 0233    Lab Status: Final result Specimen: Blood from Arm, Left Updated: 05/26/22 0300     Protime 14 0 seconds      INR 1 12    APTT [[id number]]  (Normal) Collected: 05/26/22 0233    Lab Status: Final result Specimen: Blood from Arm, Left Updated: 05/26/22 0300     PTT 34 seconds     Fingerstick Glucose (POCT) [[id number]]  (Abnormal) Collected: 05/26/22 0250    Lab Status: Final result Updated: 05/26/22 0251     POC Glucose 315 mg/dl                  XR femur 2 vw right   Final Result by Tania Espitia MD (05/26 1649)      No acute osseous abnormality  Workstation performed: [id number]         XR hip/pelv 2-3 vws right if performed   Final Result by Tania Espitia MD (05/26 1650)      Mild bilateral hip and diffuse lower lumbar spine degenerative changes  Workstation performed: [id number]         XR chest portable   ED Interpretation by Marcelino Hope DO (05/26 0405)   No acute change from prior      Final Result by Tania Espitia MD (05/26 1038)      Unchanged cardiomegaly without acute findings  Workstation performed: [id number]         CT head without contrast   ED Interpretation by Marcelino Hope DO (05/26 0403)   No acute intracranial abnormality         Right parietal subcutaneous contusion lesion possibly contusion  Recommend direct visualization and clinical correlation         Final Result by Matthew Denise MD (05/26 8725)      No acute intracranial abnormality  Right parietal subcutaneous contusion lesion possibly contusion  Recommend direct visualization and clinical correlation            Workstation performed: [id number]                    Procedures  CriticalCare Time  Performed by: Marcelino Hope   Authorized by: Lilliana Driscoll DO     Critical care provider statement:     Critical care time (minutes):  45    Critical care time was exclusive of:  Separately billable procedures and treating other patients and teaching time    Critical care was necessary to treat or prevent imminent or life-threatening deterioration of the following conditions:  Sepsis    Critical care was time spent personally by me on the following activities:  Obtaining history from patient or surrogate, development of treatment plan with patient or surrogate, discussions with consultants, evaluation of patient's response to treatment, examination of patient, interpretation of cardiac output measurements, ordering and performing treatments and interventions, ordering and review of laboratory studies, ordering and review of radiographic studies and re-evaluation of patient's condition             ED Course  ED Course as of 06/01/22 0708   Thu May 26, 2022   0255 WBC(!): 19 68   0308 Anion Gap(!): 16   0319 LACTIC ACID(!!): 4 7  Getting IVF     0346 Leukocytes, UA(!): Small   0513 Temperature: 100 1 °F (37 8 °C)                  Stroke Assessment     Row Name 05/26/22 0541             NIH Stroke Scale    Interval Baseline      Level of Consciousness (1a ) 1      LOC Questions (1b ) 0      LOC Commands (1c ) 0      Best Gaze (2 ) 0      Visual (3 ) 0      Facial Palsy (4 ) 1      Motor Arm, Left (5a ) 0      Motor Arm, Right (5b ) 0      Motor Leg, Left (6a ) 0      Motor Leg, Right (6b ) 0      Limb Ataxia (7 ) 0      Sensory (8 ) 0      Best Language (9 ) 1      Dysarthria (10 ) 0      Extinction and Inattention (11 ) (Formerly Neglect) 0      Total 3              Flowsheet Row Most Recent Value   TPA Decision Options    TPA Decision Patient not a TPA candidate  Patient is not a candidate options Unclear time of onset outside appropriate time window    [symptoms likely from infection - also questionable recent fall w subcutaneous swelling to R face ]                 Initial Sepsis Screening     Row Name 05/26/22 0543                Is the patient's history suggestive of a new or worsening infection? Yes (Proceed)  -VC        Suspected source of infection urinary tract infection  -VC        Are two or more of the following signs & symptoms of infection both present and new to the patient? Yes (Proceed)  -VC        Indicate SIRS criteria Hyperthemia > 38 3C (100 9F); Tachycardia > 90 bpm  -VC        If the answer is yes to both questions, suspicion of sepsis is present --        If severe sepsis is present AND tissue hypoperfusion perists in the hour after fluid resuscitation or lactate > 4, the patient meets criteria for SEPTIC SHOCK --        Are any of the following organ dysfunction criteria present within 6 hours of suspected infection and SIRS criteria that are NOT considered to be chronic conditions?  Yes  -VC        Organ dysfunction Lactate >/equal 4 0 mmol/L (MEETS CRITERIA FOR SEPTIC SHOCK)  -VC        Date of presentation of severe sepsis 05/26/22  -VC        Time of presentation of severe sepsis 0318  -VC        Tissue hypoperfusion persists in the hour after crystalloid fluid administration, evidenced, by either: --        Was hypotension present within one hour of the conclusion of crystalloid fluid administration? --        Date of presentation of septic shock --        Time of presentation of septic shock --              User Key  (r) = Recorded By, (t) = Taken By, (c) = Cosigned By    [address] Name Provider Type    Carlo Irving , DO Physician              Default Flowsheet Data (last 720 hours)     Sepsis [facility] Name 05/26/22 0549                   Repeat Volume Status and Tissue Perfusion Assessment Performed    Repeat Volume Status and Tissue Perfusion Assessment Performed Yes  -VC                  Volume Status and Tissue Perfusion Post Fluid Resuscitation * Must Document All *    Vital Signs Reviewed (HR, RR, BP, T) Yes  -VC        Shock Index Reviewed Yes  -VC        Arterial Oxygen Saturation Reviewed (POx, SaO2 or SpO2) Yes (comment %)  -VC        Cardio Normal S1/S2  Tachycardia, improved  -VC        Pulmonary Normal effort  -VC        Capillary Refill Brisk  -VC        Peripheral Pulses --        Skin Warm  -VC        Urine output assessed --                  *OR*   Intensive Monitoring- Must Document One of the Following Four *:    Vital Signs Reviewed Yes  -VC        * Central Venous Pressure (CVP or RAP) --        * Central Venous Oxygen (SVO2, ScvO2 or Oxygen saturation via central catheter) --        * Bedside Cardiovascular US in IVC diameter and % collapse --        * Passive Leg Raise OR Crystalloid Challenge --              User Key  (r) = Recorded By, (t) = Taken By, (c) = Cosigned By    Initials Name Provider Type    VC Julio Lord DO Physician                            MDM  Number of Diagnoses or Management Options  Altered mental status  UTI (urinary tract infection)  Diagnosis management comments: Altered mental status  Patient has full altered mental status workup, no acute abnormality in head CT  Workup is concerning for urinary tract infection which may be causing her altered mental status  Patient is admitted to Internal Medicine for IV antibiotics, repeat neuro checks, and further evaluation and treatment to diagnose cause of altered mental status  Amount and/or Complexity of Data Reviewed  Clinical lab tests: ordered and reviewed  Tests in the radiology section of CPT®: ordered and reviewed        Disposition  Final diagnoses:    Altered mental status   UTI (urinary tract infection)     Time reflects when diagnosis was documented in both MDM as applicable and the Disposition within this note     Time User Action Codes Description Comment    5/26/2022  5:38 AM Olimpia Dunn Cha [R47 19] Altered mental status     5/26/2022  5:39 AM Olimpia Dunn Cha Add [N39 0] UTI (urinary tract infection)     5/31/2022  9:09 AM Thad Herrera Add [E87 1] Hyponatremia       ED Disposition     ED Disposition   Admit    Condition   Stable    Date/Time   Thu May 26, 2022  5:38 AM    Comment   Case was discussed with Juliano Del Real (SLIM AP) and the patient's admission status was agreed to be Admission Status: inpatient status to the service of Dr Vanita Nguyen MD Documentation    [address] Name, [id number] [employer] and [address]) [place] Name, [id number] [employer] and [address]) [place]      Follow-up Information    None         Discharge Medication List as of 5/31/2022  7:12 PM      START taking these medications    Details   cefpodoxime (VANTIN) 200 mg tablet Take 1 tablet (200 mg total) by mouth 2 (two) times a day for 1 day, Starting Wed 6/1/2022, Until Thu 6/2/2022, Normal         CONTINUE these medications which have CHANGED    Details   sodium chloride 1 g tablet Take 1 tablet (1 g total) by mouth 2 (two) times a day with meals, Starting Tue 5/31/2022, No Print         CONTINUE these medications which have NOT CHANGED    Details   albuterol (2 5 mg/3 mL) 0 083 % nebulizer solution Take 2 5 mg by nebulization every 6 (six) hours as needed for wheezing, Historical Med      albuterol (PROVENTIL HFA,VENTOLIN HFA) 90 mcg/act inhaler Inhale 2 puffs every 6 (six) hours as needed for wheezing, Historical Med      aspirin 81 mg chewable tablet Chew 81 mg daily, Historical Med      atorvastatin (LIPITOR) 10 mg tablet Take 10 mg by mouth daily at bedtime, Historical Med      bisacodyl (FLEET) 10 MG/30ML ENEM Insert 10 mg into the rectum daily as needed for constipation, Historical Med      furosemide (LASIX) 20 mg tablet Take 20 mg by mouth 2 (two) times a day, Historical Med      lactulose 20 g/30 mL Take 20 g by mouth daily as needed (constipation), Historical Med      linaGLIPtin (Tradjenta) 5 MG TABS Take 5 mg by mouth daily, Historical Med      lisinopril (ZESTRIL) 5 mg tablet Take 5 mg by mouth daily, Historical Med      loratadine (CLARITIN) 10 mg tablet Take 10 mg by mouth daily at bedtime, Historical Med      Melatonin 3 MG CAPS Take 3 mg by mouth daily at bedtime, Historical Med      metFORMIN (GLUCOPHAGE) 500 mg tablet Take 1 tablet (500 mg total) by mouth 2 (two) times a day with meals Resume on 12/11/18, Starting Tue 12/11/2018, No Print      montelukast (SINGULAIR) 10 mg tablet Take 10 mg by mouth daily at bedtime, Historical Med      omeprazole (PriLOSEC) 10 mg delayed release capsule Take 20 mg by mouth daily, Historical Med      PARoxetine (PAXIL) 10 mg tablet Take 10 mg by mouth every morning, Historical Med      potassium chloride (MICRO-K) 10 MEQ CR capsule Take 10 mEq by mouth 2 (two) times a day, Historical Med      senna (SENOKOT) 8 6 MG tablet Take 1 tablet by mouth 2 (two) times a day, Historical Med         STOP taking these medications       QUEtiapine (SEROquel) 25 mg tablet Comments:   Reason for Stopping:               Outpatient Discharge Orders   Basic metabolic panel   Standing Status: Future Standing Exp   Date: 05/31/23     Discharge Diet     Activity as tolerated       PDMP Review       Value Time User    PDMP Reviewed  Yes 5/26/2022  6:15 AM Zaki Barr PA-C          ED Provider  Electronically Signed by           Marisel Saba DO  06/01/22 9342

## 2022-05-26 NOTE — ASSESSMENT & PLAN NOTE
· Most likely secondary to sepsis related to UTI  · Hypoglycemia ruled out  · Chest x-ray with no significant changes, no sign of pneumonia, official read pending  · CT head negative for acute changes, right parietal subcutaneous contusion  · COVID/flu/RSV negative  · Fall precautions, delirium precautions with lights off at night, lights on during the day, quiet environment at night  · Continue home Paxil, melatonin q h s

## 2022-05-26 NOTE — ASSESSMENT & PLAN NOTE
· EKG revealing QTC of 523, sinus tachycardia  · Will give 1 dose IV magnesium now  · Recheck EKG tomorrow  · Also due to prolonged QT Will hold today's dose of Paxil restart tomorrow

## 2022-05-26 NOTE — H&P
[facility]  H&P- Berry Snowball 1945, 64 y o  female MRN: [id number]  Unit/Bed#: ED 27 Encounter: [id number]  Primary Care Provider: Nina Barraza MD   Date and time admitted to hospital: 5/26/2022  2:17 AM    * Acute metabolic encephalopathy  Assessment & Plan  · Most likely secondary to sepsis related to UTI  · Hypoglycemia ruled out  · Chest x-ray with no significant changes, no sign of pneumonia, official read pending  · CT head negative for acute changes, right parietal subcutaneous contusion  · COVID/flu/RSV negative  · Fall precautions, delirium precautions with lights off at night, lights on during the day, quiet environment at night  · Continue home Paxil, melatonin q h s  Prolonged Q-T interval on ECG  Assessment & Plan  · EKG revealing QTC of 523, sinus tachycardia  · Will give 1 dose IV magnesium now  · Recheck EKG tomorrow  · Also due to prolonged QT Will hold today's dose of Paxil restart tomorrow    Sepsis [facility])  Assessment & Plan  · Meeting criteria due to being febrile, tachycardia, tachypnea, leukocytosis in setting of possible UTI  · Lactic acid initially elevated at 4 7, repeat WNL at 1 7 and procalcitonin elevated  · Blood culture x2 pending  · Continue IV ceftriaxone 1 g daily  · Patient given IV fluid hydration in the ED, for now to avoid further volume overload will hold off on further fluids at this time, will give judiciously    Chronic diastolic (congestive) heart failure (HCC)  Assessment & Plan  Wt Readings from Last 3 Encounters:   08/06/21 76 6 kg (168 lb 14 oz)   06/29/21 84 1 kg (185 lb 6 5 oz)   06/08/21 94 kg (207 lb 3 7 oz)     · Slight edema in bilateral lower extremities though no significant vascular congestion on chest x-ray  · Patient currently not hypoxic, does not appear to be in significant volume overload at this time  · For now will continue home p o   Lasix 20 mg b i d   · Intake and output monitoring, daily weights  · Low-sodium diet        UTI (urinary tract infection)  Assessment & Plan  · Urinalysis positive for leukocytes, occasional bacteria  · Urine culture pending  · Start IV ceftriaxone 1 g daily    Diabetes mellitus type 2 in obese [facility])  Assessment & Plan  Lab Results   Component Value Date    HGBA1C 6 8 (H) 01/12/2021       Recent Labs     05/26/22  0250   POCGLU 315*       Blood Sugar Average: Last 72 hrs:  (P) 315   · Blood glucose checks q i d , hypoglycemia protocol  · Hold p o  Diabetic meds  · Sliding scale insulin      VTE Pharmacologic Prophylaxis: VTE Score: 7 High Risk (Score >/= 5) - Pharmacological DVT Prophylaxis Ordered: enoxaparin (Lovenox)  Sequential Compression Devices Ordered  Code Status: Level 1 - Full Code per POLST provided by SNF  Discussion with family: Patient's son to be updated later in day  Anticipated Length of Stay: Patient will be admitted on an inpatient basis with an anticipated length of stay of greater than 2 midnights secondary to see above  Total Time for Visit, including Counseling / Coordination of Care: 60 minutes Greater than 50% of this total time spent on direct patient counseling and coordination of care  Chief Complaint:    Chief Complaint   Patient presents with    Altered Mental Status     Pt arrives via EMS from [facility] due to pt "not being herself" per EMS pt had temp of 104  Per facility they are unaware of when pt was last at her baseline  Pt 98% on 4L which she wears at home       History of Present Illness:  Mic Rai is a 64 y o  female with a PMH of pulmonary hypertension, diabetes mellitus type 2, CHF, hypertension, depression who presents from [facility] after being found tonight to have altered mental status, fever, tachycardia  Per ED provider note patient's son reported he saw her well and herself and talking around 1:00 p m  Yesterday  Patient per nursing home on baseline nasal cannula 4 L, not hypoxic    Patient currently with altered mental status and unable to answer any questions  Is alert to name does open eyes to name and during exam, but unable to follow commands or answer questions  Review of Systems:  Review of Systems   Unable to perform ROS: Mental status change       Past Medical and Surgical History:   Past Medical History:   Diagnosis Date    Asthma     Diabetes mellitus ([address] )     Glaucoma     Hypertension     Hypoglycemia 6/20/2021       Past Surgical History:   Procedure Laterality Date    COLONOSCOPY N/A 12/8/2018    Procedure: COLONOSCOPY;  Surgeon: Jennifer Chung MD;  Location: [facility]; Service: Gastroenterology       Meds/Allergies:  Prior to Admission medications    Medication Sig Start Date End Date Taking?  Authorizing Provider   albuterol (2 5 mg/3 mL) 0 083 % nebulizer solution Take 2 5 mg by nebulization every 6 (six) hours as needed for wheezing   Yes Historical Provider, MD   albuterol (PROVENTIL HFA,VENTOLIN HFA) 90 mcg/act inhaler Inhale 2 puffs every 6 (six) hours as needed for wheezing   Yes Historical Provider, MD   aspirin 81 mg chewable tablet Chew 81 mg daily   Yes Historical Provider, MD   atorvastatin (LIPITOR) 10 mg tablet Take 10 mg by mouth daily at bedtime   Yes Historical Provider, MD   bisacodyl (FLEET) 10 MG/30ML ENEM Insert 10 mg into the rectum daily as needed for constipation   Yes Historical Provider, MD   furosemide (LASIX) 20 mg tablet Take 20 mg by mouth 2 (two) times a day   Yes Historical Provider, MD   lactulose 20 g/30 mL Take 20 g by mouth daily as needed (constipation)   Yes Historical Provider, MD   linaGLIPtin (Tradjenta) 5 MG TABS Take 5 mg by mouth daily   Yes Historical Provider, MD   lisinopril (ZESTRIL) 5 mg tablet Take 5 mg by mouth daily   Yes Historical Provider, MD   loratadine (CLARITIN) 10 mg tablet Take 10 mg by mouth daily at bedtime   Yes Historical Provider, MD   Melatonin 3 MG CAPS Take 3 mg by mouth daily at bedtime   Yes Historical Provider, MD   metFORMIN (GLUCOPHAGE) 500 mg tablet Take 1 tablet (500 mg total) by mouth 2 (two) times a day with meals Resume on 12/11/18 12/11/18  Yes Kevon Siemens, MD   montelukast (SINGULAIR) 10 mg tablet Take 10 mg by mouth daily at bedtime   Yes Historical Provider, MD   omeprazole (PriLOSEC) 10 mg delayed release capsule Take 20 mg by mouth daily   Yes Historical Provider, MD   PARoxetine (PAXIL) 10 mg tablet Take 10 mg by mouth every morning   Yes Historical Provider, MD   potassium chloride (MICRO-K) 10 MEQ CR capsule Take 10 mEq by mouth 2 (two) times a day   Yes Historical Provider, MD   senna (SENOKOT) 8 6 MG tablet Take 1 tablet by mouth 2 (two) times a day   Yes Historical Provider, MD   sodium chloride 1 g tablet Take 2 tablets (2 g total) by mouth 3 (three) times a day with meals  Patient taking differently: Take 1 g by mouth 2 (two) times a day with meals 6/29/21  Yes Tamy Ray PA-C   QUEtiapine (SEROquel) 25 mg tablet Take 0 5 tablets (12 5 mg total) by mouth daily at bedtime  Patient not taking: Reported on 5/26/2022 6/29/21   Edward Garcia PA-C   LORazepam (ATIVAN) 0 5 mg tablet Take 1 tablet (0 5 mg total) by mouth every 6 (six) hours as needed for anxiety  Patient not taking: Reported on 5/26/2022 6/29/21 5/26/22  Edward Garcia PA-C   senna-docusate sodium (SENOKOT S) 8 6-50 mg per tablet Take 1 tablet by mouth 2 (two) times a day  Patient not taking: Reported on 5/26/2022 8/5/21 5/26/22  RON Gusman   sitaGLIPtin (JANUVIA) 100 mg tablet Take 100 mg by mouth daily  Patient not taking: Reported on 5/26/2022 5/26/22  Historical Provider, MD FLOREZ have reveiwed home medications using records provided by       Allergies: No Known Allergies    Social History:  Marital Status: /Civil Union     Patient Pre-hospital Living Situation: Skilled Nursing Facility: [facility]  Patient Pre-hospital Level of Mobility: unable to be assessed at time of evaluation  Patient Pre-hospital Diet Restrictions:  Diabetic  Substance Use History:   Social History     Substance and Sexual Activity   Alcohol Use Never     Social History     Tobacco Use   Smoking Status Never Smoker   Smokeless Tobacco Never Used     Social History     Substance and Sexual Activity   Drug Use No       Family History:  No family history on file  Physical Exam:     Vitals:   Blood Pressure: 148/71 (05/26/22 0600)  Pulse: (!) 111 (05/26/22 0600)  Temperature: 100 1 °F (37 8 °C) (05/26/22 0509)  Temp Source: Axillary (05/26/22 0509)  Respirations: (!) 24 (05/26/22 0600)  SpO2: 100 % (05/26/22 0600)    Physical Exam  Vitals and nursing note reviewed  Constitutional:       General: She is not in acute distress  Appearance: She is not toxic-appearing or diaphoretic  Comments: Lethargic, but does open eyes to name  HENT:      Head: Normocephalic  Eyes:      Conjunctiva/sclera: Conjunctivae normal    Cardiovascular:      Rate and Rhythm: Regular rhythm  Tachycardia present  Heart sounds: Normal heart sounds  Pulmonary:      Effort: Pulmonary effort is normal  No respiratory distress  Breath sounds: Normal breath sounds  No stridor  No wheezing, rhonchi or rales  Abdominal:      General: Abdomen is flat  Bowel sounds are normal  There is no distension  Palpations: Abdomen is soft  Musculoskeletal:         General: No tenderness  Comments: Mild edema in bilateral lower extremities   Skin:     General: Skin is warm and dry  Coloration: Skin is not pale  Findings: No erythema  Neurological:      Comments: Currently lethargic but does open eyes to name and during exam with tactile stimuli    Unable to answer any questions and currently not following commands, so unable to complete further neuro exam   Psychiatric:      Comments: Unable to examine due to altered mental status          Additional Data:     Lab Results:  Results from last 7 days   Lab Units 05/26/22  0976 WBC Thousand/uL 19 68*   HEMOGLOBIN g/dL 13 2   HEMATOCRIT % 39 0   PLATELETS Thousands/uL 440*   NEUTROS PCT % 90*   LYMPHS PCT % 6*   MONOS PCT % 3*   EOS PCT % 0     Results from last 7 days   Lab Units 05/26/22  0233   SODIUM mmol/L 135*   POTASSIUM mmol/L 3 8   CHLORIDE mmol/L 96*   CO2 mmol/L 23   BUN mg/dL 18   CREATININE mg/dL 0 87   ANION GAP mmol/L 16*   CALCIUM mg/dL 9 3   ALBUMIN g/dL 3 5   TOTAL BILIRUBIN mg/dL 0 69   ALK PHOS U/L 75   ALT U/L 13   AST U/L 27   GLUCOSE RANDOM mg/dL 321*     Results from last 7 days   Lab Units 05/26/22  0233   INR  1 12     Results from last 7 days   Lab Units 05/26/22  0250   POC GLUCOSE mg/dl 315*         Results from last 7 days   Lab Units 05/26/22  0521 05/26/22  0233   LACTIC ACID mmol/L 1 7 4 7*   PROCALCITONIN ng/ml  --  1 64*       Imaging: Reviewed radiology reports from this admission including: CT head and Personally reviewed the following imaging: chest xray  XR chest portable   ED Interpretation by Janis Lr DO (05/26 3663)   No acute change from prior      CT head without contrast   ED Interpretation by Janis Lr DO (05/26 7479)   No acute intracranial abnormality         Right parietal subcutaneous contusion lesion possibly contusion  Recommend direct visualization and clinical correlation         Final Result by Karen Montez MD (05/26 4433)      No acute intracranial abnormality  Right parietal subcutaneous contusion lesion possibly contusion  Recommend direct visualization and clinical correlation            Workstation performed: [id number]             EKG and Other Studies Reviewed on Admission:   · EKG: Prolonged QTC, sinus tachycardia  ** Please Note: This note has been constructed using a voice recognition system   **

## 2022-05-26 NOTE — SEPSIS NOTE
Sepsis Note   Sara Bell 64 y o  female MRN: [id number]  Unit/Bed#: ED 27 Encounter: [id number]       qSOFA     Row Name 05/26/22 0500 05/26/22 0415 05/26/22 0400 05/26/22 0345 05/26/22 0330    Altered mental status GCS < 15 -- -- -- -- --    Respiratory Rate > / =22 0 1 1 0 0    Systolic BP < / =746 0 0 0 0 0    Q Sofa Score 1 2 2 1 1    Row Name 05/26/22 0315 05/26/22 0301 05/26/22 0245 05/26/22 0219       Altered mental status GCS < 15 -- 1 -- --     Respiratory Rate > / =22 0 -- 1 0     Systolic BP < / =577 0 -- 0 0     Q Sofa Score 1 2 1 0                Initial Sepsis Screening     Row Name 05/26/22 0543                Is the patient's history suggestive of a new or worsening infection? Yes (Proceed)  -VC        Suspected source of infection urinary tract infection  -VC        Are two or more of the following signs & symptoms of infection both present and new to the patient? Yes (Proceed)  -VC        Indicate SIRS criteria Hyperthemia > 38 3C (100 9F); Tachycardia > 90 bpm  -VC        If the answer is yes to both questions, suspicion of sepsis is present --        If severe sepsis is present AND tissue hypoperfusion perists in the hour after fluid resuscitation or lactate > 4, the patient meets criteria for SEPTIC SHOCK --        Are any of the following organ dysfunction criteria present within 6 hours of suspected infection and SIRS criteria that are NOT considered to be chronic conditions?  Yes  -VC        Organ dysfunction Lactate >/equal 4 0 mmol/L (MEETS CRITERIA FOR SEPTIC SHOCK)  -VC        Date of presentation of severe sepsis 05/26/22  -VC        Time of presentation of severe sepsis 0318  -VC        Tissue hypoperfusion persists in the hour after crystalloid fluid administration, evidenced, by either: --        Was hypotension present within one hour of the conclusion of crystalloid fluid administration? --        Date of presentation of septic shock --        Time of presentation of septic shock -- User Key  (r) = Recorded By, (t) = Taken By, (c) = Cosigned By    Initials Name Provider Type    Carlo U  64 , DO Physician                  Default Flowsheet Data (last 720 hours)     Sepsis Reassess     Row Name 05/26/22 0549                   Repeat Volume Status and Tissue Perfusion Assessment Performed    Repeat Volume Status and Tissue Perfusion Assessment Performed Yes  -VC                  Volume Status and Tissue Perfusion Post Fluid Resuscitation * Must Document All *    Vital Signs Reviewed (HR, RR, BP, T) Yes  -VC        Shock Index Reviewed Yes  -VC        Arterial Oxygen Saturation Reviewed (POx, SaO2 or SpO2) Yes (comment %)  -VC        Cardio Normal S1/S2  Tachycardia, improved  -VC        Pulmonary Normal effort  -VC        Capillary Refill Brisk  -VC        Peripheral Pulses --        Skin Warm  -VC        Urine output assessed --                  *OR*   Intensive Monitoring- Must Document One of the Following Four *:    Vital Signs Reviewed Yes  -VC        * Central Venous Pressure (CVP or RAP) --        * Central Venous Oxygen (SVO2, ScvO2 or Oxygen saturation via central catheter) --        * Bedside Cardiovascular US in IVC diameter and % collapse --        * Passive Leg Raise OR Crystalloid Challenge --              User Key  (r) = Recorded By, (t) = Taken By, (c) = Cosigned By    Initials Name Provider Type    Carlo TRISTAN  64 , DO Physician

## 2022-05-26 NOTE — ASSESSMENT & PLAN NOTE
· Meeting criteria due to being febrile, tachycardia, tachypnea, leukocytosis in setting of possible UTI  · Lactic acid initially elevated at 4 7, repeat WNL at 1 7 and procalcitonin elevated  · Blood culture x2 pending  · Continue IV ceftriaxone 1 g daily  · Patient given IV fluid hydration in the ED, for now to avoid further volume overload will hold off on further fluids at this time, will give judiciously

## 2022-05-26 NOTE — ED NOTES
Patient transported to [address] [place] [address], [address] [place] [address]  05/26/22 3730

## 2022-05-26 NOTE — ASSESSMENT & PLAN NOTE
Wt Readings from Last 3 Encounters:   08/06/21 76 6 kg (168 lb 14 oz)   06/29/21 84 1 kg (185 lb 6 5 oz)   06/08/21 94 kg (207 lb 3 7 oz)     · Slight edema in bilateral lower extremities though no significant vascular congestion on chest x-ray  · Patient currently not hypoxic, does not appear to be in significant volume overload at this time  · For now will continue home p o   Lasix 20 mg b i d   · Intake and output monitoring, daily weights  · Low-sodium diet

## 2022-05-26 NOTE — ASSESSMENT & PLAN NOTE
Lab Results   Component Value Date    HGBA1C 6 8 (H) 01/12/2021       Recent Labs     05/26/22  0250   POCGLU 315*       Blood Sugar Average: Last 72 hrs:  (P) 315   · Blood glucose checks q i d , hypoglycemia protocol  · Hold p o   Diabetic meds  · Sliding scale insulin

## 2022-05-26 NOTE — ED NOTES
While changing patient, she was rolled to the left side and woke suddenly favoring her right leg  Patient opened her eyes, grabbed her right leg and groaned loudly with pain  SLIM Provider will be notified via "[employer]       Ander Gunter  05/26/22 Benja Parker  05/26/22 1129

## 2022-05-26 NOTE — CASE MANAGEMENT
Case Management Assessment & Discharge Planning Note    Patient name Christi Paget  Location ED 27/ED 32 MRN [id number]  : 1945 Date 2022       Current Admission Date: 2022  Current Admission Diagnosis:Acute metabolic encephalopathy   Patient Active Problem List    Diagnosis Date Noted    Sepsis ([facility] ) 2022    Prolonged Q-T interval on ECG 2022    Chronic diastolic (congestive) heart failure ([facility] ) 2021    Essential hypertension 2021    Weakness 2021    Hypoglycemia 2021    Acute metabolic encephalopathy     UTI (urinary tract infection) 2021    Hyponatremia 2021    Acute diastolic congestive heart failure ([facility] ) 2021    Pulmonary hypertension ([place] [address] )     Bleeding per rectum 2018    GI bleed 2018    Acute pulmonary insufficiency 2017    Asthma 2017    Obesity 2017    Anxiety disorder 2017    Diabetes mellitus type 2 in obese ([place] [address] ) 2017    Dyslipidemia 2017    Depression 2017    Accelerated hypertension 2017      LOS (days): 0  Geometric Mean LOS (GMLOS) (days):   Days to GMLOS:     OBJECTIVE:    Risk of Unplanned Readmission Score: 17 63         Current admission status: Inpatient       Preferred Pharmacy:   [facility] [address] [address]  Phone: [phone] Fax: [phone] [address] R R 1 [phone] R R [phone] [id number])  [address] [facility] [address]  Phone: [phone] Fax: [phone]    Primary Care Provider: Daxa Tirado MD    Primary Insurance: [address] [place] [address],[address] REP  Secondary Insurance: [employer]    ASSESSMENT:  Devendra  [place] [facility] Representative - Son   Primary Phone: [phone] (Mobile)               Advance Directives  Does patient have a [address] [place] [address]?: No  Was patient offered paperwork?: Yes (Patient's son declined paperwork at this time )  Does patient currently have a Health Care decision maker?: Yes, please see Health Care Proxy section  Does patient have Advance Directives?: No  Was patient offered paperwork?: Yes (Patient's son declined paperwork at this time )  Primary Contact: Patient's Son    Readmission Root Cause  30 Day Readmission: No    Patient Information  Admitted from[de-identified] Home  Mental Status: Confused  During Assessment patient was accompanied by: Not accompanied during assessment  Assessment information provided by[de-identified] Son  Primary Caregiver: Private caregiver  Support Systems: Spouse/significant other, Children, Family members  [place] of Residence: [place] [address] do you live in?:  [employer] entry access options  Select all that apply : No steps to enter home  Type of Current Residence: Facility (Patient is a long term resident at [facility] )  Upon entering residence, is there a bedroom on the main floor (no further steps)?: Yes  Upon entering residence, is there a bathroom on the main floor (no further steps)?: Yes  In the last 12 months, was there a time when you were not able to pay the mortgage or rent on time?: No  In the last 12 months, how many places have you lived?: 1  In the last 12 months, was there a time when you did not have a steady place to sleep or slept in a shelter (including now)?: No  Homeless/housing insecurity resource given?: N/A  Living Arrangements: Other (Comment) (lives in a SNF)  Is patient a ?: No    Activities of Daily Living Prior to Admission  Functional Status: Total dependent  Completes ADLs independently?: No  Level of ADL dependence: Total Dependent  Ambulates independently?: No  Level of ambulatory dependence: Total Dependent  Does patient use assisted devices?: Yes  Assisted Devices (DME) used:  Wheelchair  Does patient currently own DME?: Yes  What DME does the patient currently own?: Wheelchair  Does patient have a history of Outpatient Therapy (PT/OT)?: No  Does the patient have a history of Short-Term Rehab?: Yes  Does patient have a history of HHC?: No  Does patient currently have [facility]?: No    Patient Information Continued  Income Source: Pension/senior living  Does patient have prescription coverage?: Yes  Within the past 12 months, you worried that your food would run out before you got the money to buy more : Never true  Within the past 12 months, the food you bought just didn't last and you didn't have money to get more : Never true  Food insecurity resource given?: N/A  Does patient receive dialysis treatments?: No  Does patient have a history of substance abuse?: No  Does patient have a history of Mental Health Diagnosis?: No    Means of Transportation  Means of Transport to Appts[de-identified] Family transport  In the past 12 months, has lack of transportation kept you from medical appointments or from getting medications?: No  In the past 12 months, has lack of transportation kept you from meetings, work, or from getting things needed for daily living?: No  Was application for public transport provided?: N/A    DISCHARGE DETAILS:    Discharge planning discussed with[de-identified] Patient's Son  Freedom of Choice: Yes  Comments - Freedom of Choice: CM discussed freedom of choice as it pertains to discharge planning  Patient's son reported that patient is a long term resident at [address] [place] [address] and son prefers that she returns there at discharge    CM contacted family/caregiver?: Yes  Were Treatment Team discharge recommendations reviewed with patient/caregiver?: Yes  Did patient/caregiver verbalize understanding of patient care needs?: Yes  Were patient/caregiver advised of the risks associated with not following Treatment Team discharge recommendations?: Yes    Contacts  Patient Contacts: Jose F Joseph  Relationship to Patient[de-identified] Family  Contact Method: Phone  Phone Number: [phone]  Reason/Outcome: Continuity of Care, Discharge [address] Is the patient interested in [place] [address] at discharge?: No    DME Referral Provided  Referral made for DME?: No    Other Referral/Resources/Interventions Provided:  Interventions: SNF  Referral Comments: CM sent a referral to [address] [place] [address] to confirm that they can accept patient back at discharge      Would you like to participate in our [address] service program?  : No - Declined    Treatment Team Recommendation: SNF  Discharge Destination Plan[de-identified] SNF  Transport at Discharge : [facility] Ambulance

## 2022-05-26 NOTE — SPEECH THERAPY NOTE
Speech Language/Pathology Missed Visit Note    Dysphagia evaluation orders received and appreciated  Evaluation attempted, however pt's poor level of arousal precludes safe PO trails at this time  Evaluation deferred  Will re-attempt as patient status and scheduling permits  Josh Cortez [address], [address] [facility]  Speech-Language Pathologist  [place] #[id number]  [place] #[id number]

## 2022-05-27 LAB
ANION GAP SERPL CALCULATED.3IONS-SCNC: 12 MMOL/L (ref 4–13)
BASOPHILS # BLD AUTO: 0.09 THOUSANDS/ΜL (ref 0–0.1)
BASOPHILS NFR BLD AUTO: 1 % (ref 0–1)
BUN SERPL-MCNC: 13 MG/DL (ref 5–25)
CALCIUM SERPL-MCNC: 8.2 MG/DL (ref 8.3–10.1)
CHLORIDE SERPL-SCNC: 103 MMOL/L (ref 100–108)
CO2 SERPL-SCNC: 21 MMOL/L (ref 21–32)
CREAT SERPL-MCNC: 0.34 MG/DL (ref 0.6–1.3)
EOSINOPHIL # BLD AUTO: 0.05 THOUSAND/ΜL (ref 0–0.61)
EOSINOPHIL NFR BLD AUTO: 0 % (ref 0–6)
ERYTHROCYTE [DISTWIDTH] IN BLOOD BY AUTOMATED COUNT: 14.6 % (ref 11.6–15.1)
GFR SERPL CREATININE-BSD FRML MDRD: 105 ML/MIN/1.73SQ M
GLUCOSE SERPL-MCNC: 151 MG/DL (ref 65–140)
GLUCOSE SERPL-MCNC: 154 MG/DL (ref 65–140)
GLUCOSE SERPL-MCNC: 184 MG/DL (ref 65–140)
GLUCOSE SERPL-MCNC: 205 MG/DL (ref 65–140)
GLUCOSE SERPL-MCNC: 244 MG/DL (ref 65–140)
HCT VFR BLD AUTO: 36.3 % (ref 34.8–46.1)
HGB BLD-MCNC: 11.5 G/DL (ref 11.5–15.4)
IMM GRANULOCYTES # BLD AUTO: 0.04 THOUSAND/UL (ref 0–0.2)
IMM GRANULOCYTES NFR BLD AUTO: 0 % (ref 0–2)
LYMPHOCYTES # BLD AUTO: 2.53 THOUSANDS/ΜL (ref 0.6–4.47)
LYMPHOCYTES NFR BLD AUTO: 18 % (ref 14–44)
MCH RBC QN AUTO: 29.6 PG (ref 26.8–34.3)
MCHC RBC AUTO-ENTMCNC: 31.7 G/DL (ref 31.4–37.4)
MCV RBC AUTO: 93 FL (ref 82–98)
MONOCYTES # BLD AUTO: 1.4 THOUSAND/ΜL (ref 0.17–1.22)
MONOCYTES NFR BLD AUTO: 10 % (ref 4–12)
NEUTROPHILS # BLD AUTO: 9.9 THOUSANDS/ΜL (ref 1.85–7.62)
NEUTS SEG NFR BLD AUTO: 71 % (ref 43–75)
NRBC BLD AUTO-RTO: 0 /100 WBCS
PLATELET # BLD AUTO: 349 THOUSANDS/UL (ref 149–390)
PMV BLD AUTO: 9.7 FL (ref 8.9–12.7)
POTASSIUM SERPL-SCNC: 3.6 MMOL/L (ref 3.5–5.3)
PROCALCITONIN SERPL-MCNC: 5.52 NG/ML
RBC # BLD AUTO: 3.89 MILLION/UL (ref 3.81–5.12)
SODIUM SERPL-SCNC: 136 MMOL/L (ref 136–145)
WBC # BLD AUTO: 14.01 THOUSAND/UL (ref 4.31–10.16)

## 2022-05-27 PROCEDURE — 85025 COMPLETE CBC W/AUTO DIFF WBC: CPT | Performed by: INTERNAL MEDICINE

## 2022-05-27 PROCEDURE — 93005 ELECTROCARDIOGRAM TRACING: CPT

## 2022-05-27 PROCEDURE — 99233 SBSQ HOSP IP/OBS HIGH 50: CPT | Performed by: INTERNAL MEDICINE

## 2022-05-27 PROCEDURE — 84145 PROCALCITONIN (PCT): CPT | Performed by: INTERNAL MEDICINE

## 2022-05-27 PROCEDURE — 80048 BASIC METABOLIC PNL TOTAL CA: CPT | Performed by: INTERNAL MEDICINE

## 2022-05-27 PROCEDURE — 82948 REAGENT STRIP/BLOOD GLUCOSE: CPT

## 2022-05-27 PROCEDURE — 92610 EVALUATE SWALLOWING FUNCTION: CPT

## 2022-05-27 RX ADMIN — INSULIN LISPRO 1 UNITS: 100 INJECTION, SOLUTION INTRAVENOUS; SUBCUTANEOUS at 06:28

## 2022-05-27 RX ADMIN — MONTELUKAST 10 MG: 10 TABLET, FILM COATED ORAL at 22:00

## 2022-05-27 RX ADMIN — PANTOPRAZOLE SODIUM 40 MG: 40 TABLET, DELAYED RELEASE ORAL at 08:58

## 2022-05-27 RX ADMIN — INSULIN LISPRO 2 UNITS: 100 INJECTION, SOLUTION INTRAVENOUS; SUBCUTANEOUS at 11:52

## 2022-05-27 RX ADMIN — ASPIRIN 81 MG: 81 TABLET, CHEWABLE ORAL at 08:59

## 2022-05-27 RX ADMIN — INSULIN LISPRO 1 UNITS: 100 INJECTION, SOLUTION INTRAVENOUS; SUBCUTANEOUS at 00:55

## 2022-05-27 RX ADMIN — ENOXAPARIN SODIUM 40 MG: 40 INJECTION SUBCUTANEOUS at 08:58

## 2022-05-27 RX ADMIN — Medication 1 G: at 09:07

## 2022-05-27 RX ADMIN — LORATADINE 10 MG: 10 TABLET ORAL at 22:00

## 2022-05-27 RX ADMIN — FUROSEMIDE 20 MG: 20 TABLET ORAL at 09:07

## 2022-05-27 RX ADMIN — INSULIN LISPRO 1 UNITS: 100 INJECTION, SOLUTION INTRAVENOUS; SUBCUTANEOUS at 17:44

## 2022-05-27 RX ADMIN — SENNOSIDES 8.6 MG: 8.6 TABLET, FILM COATED ORAL at 08:59

## 2022-05-27 RX ADMIN — Medication 1 G: at 17:45

## 2022-05-27 RX ADMIN — SENNOSIDES 8.6 MG: 8.6 TABLET, FILM COATED ORAL at 17:44

## 2022-05-27 RX ADMIN — INSULIN LISPRO 1 UNITS: 100 INJECTION, SOLUTION INTRAVENOUS; SUBCUTANEOUS at 22:00

## 2022-05-27 RX ADMIN — PAROXETINE 10 MG: 20 TABLET, FILM COATED ORAL at 08:59

## 2022-05-27 RX ADMIN — Medication 1000 MG: at 17:50

## 2022-05-27 RX ADMIN — Medication 3 MG: at 22:00

## 2022-05-27 RX ADMIN — ATORVASTATIN CALCIUM 10 MG: 10 TABLET, FILM COATED ORAL at 22:00

## 2022-05-27 RX ADMIN — LISINOPRIL 5 MG: 5 TABLET ORAL at 09:07

## 2022-05-27 RX ADMIN — FUROSEMIDE 20 MG: 20 TABLET ORAL at 17:48

## 2022-05-27 NOTE — ASSESSMENT & PLAN NOTE
· Urinalysis positive for leukocytes, occasional bacteria  · Urine culture pending  · Start IV ceftriaxone 1 g daily- continue IV antibiotics for now, follow final urine culture

## 2022-05-27 NOTE — UTILIZATION REVIEW
Initial Clinical Review    Admission: Date/Time/Statement:   Admission Orders (From admission, onward)     Ordered        05/26/22 0539  Inpatient Admission  Once                      Orders Placed This Encounter   Procedures    Inpatient Admission     Standing Status:   Standing     Number of Occurrences:   1     Order Specific Question:   Level of Care     Answer:   Med Surg [16]     Order Specific Question:   Estimated length of stay     Answer:   More than 2 Midnights     Order Specific Question:   Certification     Answer:   I certify that inpatient services are medically necessary for this patient for a duration of greater than two midnights  See H&P and MD Progress Notes for additional information about the patient's course of treatment  ED Arrival Information     Expected   -    Arrival   5/26/2022 02:17    Acuity   Emergent            Means of arrival   Ambulance    Escorted by   [facility]    Service   Hospitalist    Admission type   Emergency            Arrival complaint   ams           Chief Complaint   Patient presents with    Altered Mental Status     Pt arrives via EMS from [place] due to pt "not being herself" per EMS pt had temp of 104  Per facility they are unaware of when pt was last at her baseline  Pt 98% on 4L which she wears at home       Initial Presentation: 64 y o  female to ED from [facility] via EMS w  Altered mental status , fever , tachycardia   Son saw her at baseline yest at 1pm   On 4l at baseline  On arrival unable to answer questions   Unable to follow commands  In ED found to have lactic acid 4 7, tachycardia , Ua + leukocytes and occas bacteria  CT head neg  Prolonged QT interval on EKG recheck EKG tomorrow   Admitted IP status w/ acute metabolic encephalopathy likely sec to sepsis r/t UTI   Cont iv ceftriaxone , lactic acid improved after IVF   CHF slight edema , cont po lasix , monitor I&O , weights and low Na diet   ua cx pending   DM SSI and monitor        PE: BLE mild edema , lethargic     Date:  5/27 Day 2:mildly decreased urine output, bladder scan revealed 20 cc  Patient had urine output of 150 cc overnight  last temperature of 100 8° yesterday at 11:15 a m , has been afebrile since then  saturating % on 2 L oxygen, patient is on baseline oxygen at the nursing facility  Blood pressure-stable, most recent-130/65 /O-patient had urine output of 225 cc in a 24 hour  , has a pure wick catheter  Labs :  CBC-pending, BMP-creatinine-0 3, blood glucose-140-180   Cont ceftriaxone      05/27/22 61 3 kg (135 lb 2 3 oz)   08/06/21 76 6 kg (168 lb 14 oz)   06/29/21 84 1 kg (185 lb 6 5 oz)         ED Triage Vitals   Temperature Pulse Respirations Blood Pressure SpO2   05/26/22 0226 05/26/22 0219 05/26/22 0219 05/26/22 0219 05/26/22 0219   (!) 102 8 °F (39 3 °C) (!) 130 20 126/88 98 %      Temp Source Heart Rate Source Patient Position - Orthostatic VS BP Location FiO2 (%)   05/26/22 0226 05/26/22 0219 05/26/22 0219 05/26/22 0219 --   Axillary Monitor Lying Right arm       Pain Score       05/26/22 0242       Med Not Given for Pain - for MAR use only          Wt Readings from Last 1 Encounters:   05/27/22 61 3 kg (135 lb 2 3 oz)     Additional Vital Signs:   05/27/22 07:20:38 97 9 °F (36 6 °C) 75 -- 134/63 87 96 % -- -- -- --   05/27/22 0545 -- -- -- -- -- 97 % 28 2 L/min Nasal cannula --   05/26/22 23:03:29 98 2 °F (36 8 °C) 67 20 105/56 72 100 % -- -- -- Lying   05/26/22 1700 -- -- -- -- -- -- 36 4 L/min Nasal cannula --   05/26/22 16:35:18 97 9 °F (36 6 °C) 73 17 110/55 73 100 % -- -- -- --   05/26/22 1444 98 8 °F (37 1 °C) -- -- -- -- -- -- -- -- --   05/26/22 1430 -- 82 18 99/54 70 100 % 36 4 L/min Nasal cannula Sitting   05/26/22 1400 -- 84 19 107/59 79 100 % 36 4 L/min Nasal cannula --   05/26/22 1300 -- 89 18 108/56 77 99 % -- -- -- --   05/26/22 1230 -- 92 18 101/59 77 99 % -- -- -- --   05/26/22 1115 100 8 °F (38 2 °C) Abnormal  93 18 102/58 75 100 % -- -- -- --   05/26/22 0900 -- 101 18 108/56 78 98 % 36 4 L/min Nasal cannula Sitting   05/26/22 0830 -- 101 18 104/59 76 97 % 36 4 L/min Nasal cannula Sitting   05/26/22 0810 100 1 °F (37 8 °C) -- -- -- -- -- -- -- -- --   05/26/22 0730 -- 109 Abnormal  18 122/62 86 98 % -- -- -- --   05/26/22 0600 -- 111 Abnormal  24 Abnormal  148/71 102 100 % 36 4 L/min Nasal cannula Lying   05/26/22 0509 100 1 °F (37 8 °C) -- -- -- -- -- -- -- -- --   05/26/22 0500 -- 110 Abnormal  21 140/67 96 100 % 36 4 L/min Nasal cannula Lying   05/26/22 0415 -- 107 Abnormal  23 Abnormal  148/70 101 100 % 36 4 L/min Nasal cannula Lying   05/26/22 0400 -- 109 Abnormal  24 Abnormal  143/67 97 99 % 36 4 L/min Nasal cannula Lying   05/26/22 0345 -- 109 Abnormal  20 137/70 97 100 % 36 4 L/min Nasal cannula Lying   05/26/22 0339 101 9 °F (38 8 °C) Abnormal  -- -- -- -- -- -- -- -- --   05/26/22 0330 -- 112 Abnormal  21 145/69 99 99 % 36 4 L/min Nasal cannula Lying   05/26/22 0315 -- 115 Abnormal  18 137/75 100 100 % -- -- None (Room air) Lying   05/26/22 0245 -- 130 Abnormal  23 Abnormal  163/88 117 99 % 36 4 L/min Nasal cannula Lying   05/26/22 0226 102 8 °F (39 3 °C) Abnormal  -- -- -- -- --           Pertinent Labs/Diagnostic Test Results:   · 5/26 EKG Prolonged QTC, sinus tachycardia    XR femur 2 vw right   Final Result by Holli Moore MD (05/26 1649)      No acute osseous abnormality  Workstation performed: [id number]         XR hip/pelv 2-3 vws right if performed   Final Result by Holli Moore MD (05/26 1650)      Mild bilateral hip and diffuse lower lumbar spine degenerative changes  Workstation performed: [id number]         XR chest portable   ED Interpretation by Ifeanyi Askew DO (05/26 8144)   No acute change from prior      Final Result by Holli Moore MD (05/26 1038)      Unchanged cardiomegaly without acute findings                    Workstation performed: [id number]         CT head without contrast   ED Interpretation by Wendi Chavez DO (05/26 0403)   No acute intracranial abnormality         Right parietal subcutaneous contusion lesion possibly contusion  Recommend direct visualization and clinical correlation         Final Result by Alma De Luna MD (05/26 0352)      No acute intracranial abnormality  Right parietal subcutaneous contusion lesion possibly contusion  Recommend direct visualization and clinical correlation            Workstation performed: [id number]           Results from last 7 days   Lab Units 05/26/22  0233   SARS-COV-2  Negative     Results from last 7 days   Lab Units 05/26/22  0233   WBC Thousand/uL 19 68*   HEMOGLOBIN g/dL 13 2   HEMATOCRIT % 39 0   PLATELETS Thousands/uL 440*   NEUTROS ABS Thousands/µL 17 74*     Results from last 7 days   Lab Units 05/27/22  0506 05/26/22  0233   SODIUM mmol/L 136 135*   POTASSIUM mmol/L 3 6 3 8   CHLORIDE mmol/L 103 96*   CO2 mmol/L 21 23   ANION GAP mmol/L 12 16*   BUN mg/dL 13 18   CREATININE mg/dL 0 34* 0 87   EGFR ml/min/1 73sq m 105 64   CALCIUM mg/dL 8 2* 9 3   MAGNESIUM mg/dL  --  1 8     Results from last 7 days   Lab Units 05/26/22  0233   AST U/L 27   ALT U/L 13   ALK PHOS U/L 75   TOTAL PROTEIN g/dL 8 2   ALBUMIN g/dL 3 5   TOTAL BILIRUBIN mg/dL 0 69     Results from last 7 days   Lab Units 05/27/22  0604 05/26/22  2355 05/26/22  2109 05/26/22  1654 05/26/22  1315 05/26/22  1055 05/26/22  0758 05/26/22  0250   POC GLUCOSE mg/dl 184* 160* 159* 139 194* 240* 259* 315*     Results from last 7 days   Lab Units 05/27/22  0506 05/26/22  0233   GLUCOSE RANDOM mg/dL 154* 321*     Results from last 7 days   Lab Units 05/26/22  0233   PROTIME seconds 14 0   INR  1 12   PTT seconds 34     Results from last 7 days   Lab Units 05/26/22  0233   PROCALCITONIN ng/ml 1 64*     Results from last 7 days   Lab Units 05/26/22  0521 05/26/22  0233   LACTIC ACID mmol/L 1 7 4 7*     Results from last 7 days   Lab Units 05/26/22  0333   CLARITY UA  Clear   COLOR UA  Yellow   SPEC GRAV UA  1 025   PH UA  6 0   GLUCOSE UA mg/dl 250 (1/4%)*   KETONES UA mg/dl 40 (2+)*   BLOOD UA  Moderate*   PROTEIN UA mg/dl 30 (1+)*   NITRITE UA  Negative   BILIRUBIN UA  Negative   UROBILINOGEN UA E U /dl 0 2   LEUKOCYTES UA  Small*   WBC UA /hpf 20-30*   RBC UA /hpf 4-10*   BACTERIA UA /hpf Occasional   EPITHELIAL CELLS WET PREP /hpf Occasional     Results from last 7 days   Lab Units 05/26/22  0233   INFLUENZA A PCR  Negative   INFLUENZA B PCR  Negative   RSV PCR  Negative     Results from last 7 days   Lab Units 05/26/22  0233   BLOOD CULTURE  No Growth at 24 hrs  No Growth at 24 hrs       ED Treatment:   Medication Administration from 05/26/2022 0217 to 05/26/2022 1615       Date/Time Order Dose Route Action     05/26/2022 0241 sodium chloride 0 9 % bolus 1,000 mL 1,000 mL Intravenous New Bag     05/26/2022 0315 sodium chloride 0 9 % bolus 1,000 mL 1,000 mL Intravenous New Bag     05/26/2022 0345 sodium chloride 0 9 % bolus 1,000 mL 1,000 mL Intravenous New Bag     05/26/2022 0242 acetaminophen (TYLENOL) rectal suppository 650 mg 650 mg Rectal Given     05/26/2022 0448 cefepime (MAXIPIME) 2 g/50 mL dextrose IVPB 2,000 mg Intravenous New Bag     05/26/2022 0659 magnesium sulfate IVPB (premix) SOLN 1 g 1 g Intravenous New Bag     05/26/2022 1052 enoxaparin (LOVENOX) subcutaneous injection 40 mg 40 mg Subcutaneous Given     05/26/2022 1119 insulin lispro (HumaLOG) 100 units/mL subcutaneous injection 1-5 Units 2 Units Subcutaneous Given     05/26/2022 0802 insulin lispro (HumaLOG) 100 units/mL subcutaneous injection 1-5 Units 2 Units Subcutaneous Given     05/26/2022 1054 acetaminophen (TYLENOL) rectal suppository 650 mg 650 mg Rectal Given     05/26/2022 1318 insulin lispro (HumaLOG) 100 units/mL subcutaneous injection 1-5 Units 1 Units Subcutaneous Given        Past Medical History:   Diagnosis Date    Asthma     [facility] )     Glaucoma     Hypertension     Hypoglycemia 6/20/2021     Present on Admission:   Acute metabolic encephalopathy   Seps[facility] )   UTI (urinary tract infection)   Diabetes mellitus type 2 in obese (HCC)   Prolonged Q-T interval on ECG   Chronic diastolic (congestive) heart failure (HCC)      Admitting Diagnosis: UTI (urinary tract infection) [N39 0]  Altered mental status [R41 82]  AMS (altered mental status) [R41 82]  Age/Sex: 64 y o  female  Admission Orders:  Scheduled Medications:  aspirin, 81 mg, Oral, Daily  atorvastatin, 10 mg, Oral, HS  cefTRIAXone, 1,000 mg, Intravenous, Q24H  enoxaparin, 40 mg, Subcutaneous, Daily  furosemide, 20 mg, Oral, BID  insulin lispro, 1-5 Units, Subcutaneous, Q6H FLORESITA  lisinopril, 5 mg, Oral, Daily  loratadine, 10 mg, Oral, HS  melatonin, 3 mg, Oral, HS  montelukast, 10 mg, Oral, HS  pantoprazole, 40 mg, Oral, Daily  PARoxetine, 10 mg, Oral, QAM  senna, 1 tablet, Oral, BID  sodium chloride, 1 g, Oral, BID With Meals      Continuous IV Infusions:     PRN Meds:  acetaminophen, 650 mg, Rectal, Q6H PRN  albuterol, 2 puff, Inhalation, Q6H PRN  albuterol, 2 5 mg, Nebulization, Q6H PRN  lactulose, 20 g, Oral, Daily PRN      Fingerstick q6hr   NPO   Speech eval   Weights  I&O       Network Utilization Review Department  ATTENTION: Please call with any questions or concerns to [phone] and carefully listen to the prompts so that you are directed to the right person  All voicemails are confidential   Denia Chopra all requests for admission clinical reviews, approved or denied determinations and any other requests to dedicated fax number below belonging to the campus where the patient is receiving treatment   List of dedicated fax numbers for the Facilities:  [address] DENIALS (Administrative/Medical Necessity) [phone]   [address] (Maternity/NICU/Pediatrics) [phone]   5000 [facility] Alejandra Yancey [phone]   8049 [facility] [phone]   [address] [place] [address] [facility] [address] [place] [address] [address] [phone]   [address] [phone]

## 2022-05-27 NOTE — ASSESSMENT & PLAN NOTE
Wt Readings from Last 3 Encounters:   05/27/22 61 3 kg (135 lb 2 3 oz)   08/06/21 76 6 kg (168 lb 14 oz)   06/29/21 84 1 kg (185 lb 6 5 oz)     · Slight edema in bilateral lower extremities though no significant vascular congestion on chest x-ray  · For now will continue home p o   Lasix 20 mg b i d   · Intake and output monitoring, daily weights  · Low-sodium diet

## 2022-05-27 NOTE — QUICK NOTE
Plan of care discussed with the patient's family member - Ángel [place] (son) and another son present at bedside  Answered their questions/ concerns, provided daily updates

## 2022-05-27 NOTE — ASSESSMENT & PLAN NOTE
· EKG revealing QTC of 523, sinus tachycardia  · Status post 1 dose of IV magnesium at the time of admission  · Repeat EKG to assess QT prolongation  · Patient is currently on Paxil-medication at the nursing facility

## 2022-05-27 NOTE — ASSESSMENT & PLAN NOTE
Lab Results   Component Value Date    HGBA1C 6 8 (H) 01/12/2021       Recent Labs     05/26/22  1654 05/26/22  2109 05/26/22  2355 05/27/22  0604   POCGLU 139 159* 160* 184*       Blood Sugar Average: Last 72 hrs:  (P) 206 25   · Blood glucose checks q i d , hypoglycemia protocol  · Hold p o   Diabetic meds  · Sliding scale insulin

## 2022-05-27 NOTE — SPEECH THERAPY NOTE
Speech-Language Pathology Bedside Swallow Evaluation      Patient Name: Praful Whiting    FQMPN'O Date: 5/27/2022     Problem List  Principal Problem:    Acute metabolic encephalopathy  Active Problems:    Diabetes mellitus type 2 in obese [facility])    UTI (urinary tract infection)    Chronic diastolic (congestive) heart failure (HCC)    Sepsis (HCC)    Prolonged Q-T interval on ECG         Past Medical History  Past Medical History:   Diagnosis Date    Asthma     Diabetes mellitus ([address] )     Glaucoma     Hypertension     Hypoglycemia 6/20/2021       Past Surgical History  Past Surgical History:   Procedure Laterality Date    COLONOSCOPY N/A 12/8/2018    Procedure: COLONOSCOPY;  Surgeon: Dawn Wiggins MD;  Location: [facility]; Service: Gastroenterology       Summary/Impressions:   Bedside observations support grossly intact oropharyngeal swallow function across all consistencies tested  Requires assist with feeding as cognitive status has not fully returnted to baseline  No s/s suggestive of aspiration, dysphagia or distress  Patient denies difficulties, pt confirms minimal changes from baseline function  Recommendations:   Diet: regular diet and thin liquids   Meds: whole with liquid   Feeding assistance: 1:1 assist as pt not yet at baseline cog status  Frequent Oral care: 2-4x/day  Aspiration precautions and compensatory swallowing strategies: upright posture, only feed when fully alert, slow rate of feeding, small bites/sips and alternating bites and sips  Other Recommendations/ considerations: SLP to f/u x1 and ensure tolerance to diet/no changes        Current Medical Status  Pt is a 64 y o  female who presented to [facility] with encephalopathy/worsening mental status  Found to be septic likely secondary to UTI  Dysphagia evaluation orders placed prior to initiation of oral diet  Deferred x1 2/2 poor arousal; pt now awake and alert, able to participate in basic/simple conversation  Past medical history:  Please see H&P for details    Special Studies:  5/26/22 CXR:  Unchanged cardiomegaly without acute findings  5/26/22 CT Head:  No acute intracranial abnormality  Social/Education/Vocational Hx:  Pt lives in SNF/ECF    Swallow Information   Current Risks for Dysphagia & Aspiration: AMS  Current Symptoms/Concerns: AMS  Current Diet: NPO   Baseline Diet: regular diet and thin liquids    Baseline Assessment   Behavior/Cognition: decreased attention  Speech/Language Status: able to participate in basic conversation  Patient Positioning: upright in bed    Swallow Mechanism Exam   Facial: symmetrical  Labial: WFL  Lingual: WFL  Velum: symmetrical  Mandible: adequate ROM  Dentition: full dentures  Vocal quality:clear/adequate   Volitional Cough: strong/productive   Respiratory: 2L NC    Consistencies Assessed and Performance   Consistencies Administered: ice chips, thin liquids, nectar thick, honey thick, puree, soft solids and hard solids    Oral Stage: WFL  Patient presents with adequate bolus acceptance, containment and manipulation  Mastication judged to be prolonged, yet functional and complete  No oral residue  Pharyngeal Stage: Appears functional   Laryngeal rise noted upon palpation  Swallow initiation appears timely  No overt s/s of aspiration or distress  Vocal quality remains clear and dry  Esophageal Concerns: none reported     Plan  Will continue to follow 1x to ensure tolerance to oral diet  Goals:  Pt will tolerate regular solids with thin liquids across 1-3x diagnostic sessions with no overt s/s of aspiration or distress        Results Reviewed with: patient, RN, MD and family       Speech Therapy Prognosis   Prognosis: good  Prognosis Considerations: medical status      Josh Ogden [address], [address] [facility]  Speech-Language Pathologist  [place] #[id number]  [place] #[id number]

## 2022-05-27 NOTE — ASSESSMENT & PLAN NOTE
· Sepsis resolved  · Met criteria due to being febrile, tachycardia, tachypnea, leukocytosis in setting of possible UTI/possible aspiration  · Lactic acid initially elevated at 4 7, repeat WNL at 1 7 and procalcitonin elevated  · Blood culture x2 - no growth at 24 h   · Continue IV ceftriaxone 1 g daily

## 2022-05-27 NOTE — PROGRESS NOTES
[address] [facility]  Progress Note - Bárbara Parkinson 1945, 64 y o  female MRN: [id number]  Unit/Bed#: -Bam Encounter: [id number]  Primary Care Provider: Paulie Cuenca MD   Date and time admitted to hospital: 5/26/2022  2:17 AM    Prolonged Q-T interval on ECG  Assessment & Plan  EKG revealing QTC of 523, sinus tachycardia  Status post 1 dose of IV magnesium at the time of admission  Repeat EKG to assess QT prolongation  Patient is currently on Paxil-medication at the nursing facility  Sepsis ([facility] [address] )  Assessment & Plan  Sepsis resolved  Met criteria due to being febrile, tachycardia, tachypnea, leukocytosis in setting of possible UTI/possible aspiration  Lactic acid initially elevated at 4 7, repeat WNL at 1 7 and procalcitonin elevated  Blood culture x2 - no growth at 24 h   Continue IV ceftriaxone 1 g daily      Chronic diastolic (congestive) heart failure (HCC)  Assessment & Plan  Wt Readings from Last 3 Encounters:   05/27/22 61 3 kg (135 lb 2 3 oz)   08/06/21 76 6 kg (168 lb 14 oz)   06/29/21 84 1 kg (185 lb 6 5 oz)     Slight edema in bilateral lower extremities though no significant vascular congestion on chest x-ray  For now will continue home p o  Lasix 20 mg b i d  Intake and output monitoring, daily weights  Low-sodium diet        UTI (urinary tract infection)  Assessment & Plan  Urinalysis positive for leukocytes, occasional bacteria  Urine culture pending  Start IV ceftriaxone 1 g daily- continue IV antibiotics for now, follow final urine culture    Diabetes mellitus type 2 in obese [facility])  Assessment & Plan  Lab Results   Component Value Date    HGBA1C 6 8 (H) 01/12/2021       Recent Labs     05/26/22  1654 05/26/22  2109 05/26/22  2355 05/27/22  0604   POCGLU 139 159* 160* 184*       Blood Sugar Average: Last 72 hrs:  (P) 206 25   Blood glucose checks q i d , hypoglycemia protocol  Hold p o   Diabetic meds  Sliding scale insulin    * Acute metabolic encephalopathy  Assessment & Plan  Most likely secondary to sepsis related to UTI/aspiration  Hypoglycemia ruled out  Chest x-ray with no significant changes, no sign of pneumonia   CT head negative for acute changes, right parietal subcutaneous contusion  Patient has no history of prior fall  COVID/flu/RSV negative  Fall precautions, delirium precautions with lights off at night, lights on during the day, quiet environment at night  Continue home Paxil, melatonin q h s  Mental status is significantly improved today, evaluated by speech therapy-recommended regular diet with thin liquids  Continue delirium precautions, frequent reorientation, IV antibiotics  INTERNAL MEDICINE RESIDENCY PROGRESS NOTE     Name: Jose Elizabeth   Age & Sex: 64 y o  female   MRN: [id number]  Unit/Bed#: MS 36-1   Encounter: [id number]     PATIENT INFORMATION     Name: Jose Elizabeth   Age & Sex: 64 y o  female   MRN: [id number]  Hospital Stay Days: 1    ASSESSMENT/PLAN     Principal Problem:    Acute metabolic encephalopathy  Active Problems:    Diabetes mellitus type 2 in [facility])    UTI (urinary tract infection)    Chronic diastolic (congestive) heart failure (HCC)    Sepsis ([facility] )    Prolonged Q-T interval on ECG      Prolonged Q-T interval on ECG  Assessment & Plan  EKG revealing QTC of 523, sinus tachycardia  Status post 1 dose of IV magnesium at the time of admission  Repeat EKG to assess QT prolongation  Patient is currently on Paxil-medication at the nursing facility      Sepsis ([facility] )  Assessment & Plan  Sepsis resolved  Met criteria due to being febrile, tachycardia, tachypnea, leukocytosis in setting of possible UTI/possible aspiration  Lactic acid initially elevated at 4 7, repeat WNL at 1 7 and procalcitonin elevated  Blood culture x2 - no growth at 24 h   Continue IV ceftriaxone 1 g daily      Chronic diastolic (congestive) heart failure (HCC)  Assessment & Plan  Wt Readings from Last 3 Encounters:   05/27/22 61 3 kg (135 lb 2 3 oz)   08/06/21 76 6 kg (168 lb 14 oz)   06/29/21 84 1 kg (185 lb 6 5 oz)     Slight edema in bilateral lower extremities though no significant vascular congestion on chest x-ray  For now will continue home p o  Lasix 20 mg b i d  Intake and output monitoring, daily weights  Low-sodium diet        UTI (urinary tract infection)  Assessment & Plan  Urinalysis positive for leukocytes, occasional bacteria  Urine culture pending  Start IV ceftriaxone 1 g daily- continue IV antibiotics for now, follow final urine culture    Diabetes mellitus type 2 in obese [facility])  Assessment & Plan  Lab Results   Component Value Date    HGBA1C 6 8 (H) 01/12/2021       Recent Labs     05/26/22  1654 05/26/22  2109 05/26/22  2355 05/27/22  0604   POCGLU 139 159* 160* 184*       Blood Sugar Average: Last 72 hrs:  (P) 206 25   Blood glucose checks q i d , hypoglycemia protocol  Hold p o  Diabetic meds  Sliding scale insulin    * Acute metabolic encephalopathy  Assessment & Plan  Most likely secondary to sepsis related to UTI/aspiration  Hypoglycemia ruled out  Chest x-ray with no significant changes, no sign of pneumonia   CT head negative for acute changes, right parietal subcutaneous contusion  Patient has no history of prior fall  COVID/flu/RSV negative  Fall precautions, delirium precautions with lights off at night, lights on during the day, quiet environment at night  Continue home Paxil, melatonin q h s  Mental status is significantly improved today, evaluated by speech therapy-recommended regular diet with thin liquids  Continue delirium precautions, frequent reorientation, IV antibiotics  Disposition:  Likely in the next 24 hours back to nursing facility  SUBJECTIVE     Patient seen and examined  No acute events overnight  As per RN, patient had a mildly decreased urine output, bladder scan revealed 20 cc  Patient had urine output of 150 cc overnight    Patient was not combative/aggressive, responded with one-word answers overnight  This morning, patient is awake, alert and conversant  She is answering questions appropriately, with some translation by her sons present at bedside  She does not appear to be in significant distress  She denies any pain  She does report some difficulty breathing  She denies chest pain, nausea/vomiting, abdominal pain, urinary complaints including dysuria  She cannot accurately recall when was her last bowel movement however reports that it was probably 1 day prior  The 24 hour events-- last temperature of 100 8° yesterday at 11:15 a m , has been afebrile since then/saturating % on 2 L oxygen, patient is on baseline oxygen at the nursing facility  Blood pressure-stable, most recent-130/65  I/O-patient had urine output of 225 cc in a 24 hour  , has a pure wick catheter  Labs :  CBC-pending, BMP-creatinine-0 3, blood glucose-140-180  Imaging-right hip/pelvis, femur x-ray-negative for any acute fracture or dislocation  Microbiology-urine culture-pending, blood culture-negative to date  Medications- currently on ceftriaxone-day 2      OBJECTIVE     Vitals:    22 0545 22 0600 22 0720 22 0907   BP:   134/63 130/65   BP Location:       Pulse:   75    Resp:       Temp:   97 9 °F (36 6 °C)    TempSrc:       SpO2: 97%  96%    Weight:  61 3 kg (135 lb 2 3 oz)        Temperature:   Temp (24hrs), Av 7 °F (37 1 °C), Min:97 9 °F (36 6 °C), Max:100 8 °F (38 2 °C)    Temperature: 97 9 °F (36 6 °C)  Intake & Output:  I/O        07 07 0701   07 07 07    P  O   0 120    I V  (mL/kg)  20 (0 3)     IV Piggyback  50     Total Intake(mL/kg)  70 (1 1) 120 (2)    Urine (mL/kg/hr)  225 (0 2)     Stool  0     Total Output  225     Net  -155 +120           Unmeasured Stool Occurrence  0 x         Weights:        Body mass index is 26 39 kg/m²    Weight (last 2 days)     Date/Time Weight    22 0600 61 3 (135 14)    22 0626 -- Weight: pt was in the ED at 05/26/22 0626        Physical Exam  Vitals and nursing note reviewed  Constitutional:       General: She is not in acute distress  Appearance: She is well-developed  Comments: Connected to 2 L oxygen via nasal cannula   HENT:      Head: Normocephalic and atraumatic  Mouth/Throat:      Mouth: Mucous membranes are moist    Eyes:      General:         Right eye: No discharge  Left eye: No discharge  Conjunctiva/sclera: Conjunctivae normal    Cardiovascular:      Rate and Rhythm: Normal rate and regular rhythm  Pulses: Normal pulses  Heart sounds: Normal heart sounds  No murmur heard  Pulmonary:      Effort: Pulmonary effort is normal  No respiratory distress  Breath sounds: Normal breath sounds  Abdominal:      Palpations: Abdomen is soft  Tenderness: There is no abdominal tenderness  Musculoskeletal:      Cervical back: Neck supple  Right lower leg: No edema  Left lower leg: No edema  Comments: Bilateral ankle swelling however no pitting edema   Skin:     General: Skin is warm and dry  Capillary Refill: Capillary refill takes less than 2 seconds  Neurological:      Mental Status: She is alert  Mental status is at baseline  LABORATORY DATA     Labs: I have personally reviewed pertinent reports    Results from last 7 days   Lab Units 05/26/22  0233   WBC Thousand/uL 19 68*   HEMOGLOBIN g/dL 13 2   HEMATOCRIT % 39 0   PLATELETS Thousands/uL 440*   NEUTROS PCT % 90*   MONOS PCT % 3*      Results from last 7 days   Lab Units 05/27/22  0506 05/26/22  0233   POTASSIUM mmol/L 3 6 3 8   CHLORIDE mmol/L 103 96*   CO2 mmol/L 21 23   BUN mg/dL 13 18   CREATININE mg/dL 0 34* 0 87   CALCIUM mg/dL 8 2* 9 3   ALK PHOS U/L  --  75   ALT U/L  --  13   AST U/L  --  27     Results from last 7 days   Lab Units 05/26/22  0233   MAGNESIUM mg/dL 1 8          Results from last 7 days   Lab Units 05/26/22  0233   INR  1 12   PTT seconds 34     Results from last 7 days   Lab Units 05/26/22  0521   LACTIC ACID mmol/L 1 7           IMAGING & DIAGNOSTIC TESTING     Radiology Results: I have personally reviewed pertinent reports  XR chest portable    Result Date: 5/26/2022  Impression: Unchanged cardiomegaly without acute findings  Workstation performed: [id number]     XR hip/pelv 2-3 vws right if performed    Result Date: 5/26/2022  Impression: Mild bilateral hip and diffuse lower lumbar spine degenerative changes  Workstation performed: [id number]     XR femur 2 vw right    Result Date: 5/26/2022  Impression: No acute osseous abnormality  Workstation performed: [id number]     CT head without contrast    Result Date: 5/26/2022  Impression: No acute intracranial abnormality  Right parietal subcutaneous contusion lesion possibly contusion  Recommend direct visualization and clinical correlation    Workstation performed: [id number]     Other Diagnostic Testing: I have personally reviewed pertinent reports      ACTIVE MEDICATIONS     Current Facility-Administered Medications   Medication Dose Route Frequency    acetaminophen (TYLENOL) rectal suppository 650 mg  650 mg Rectal Q6H PRN    albuterol (PROVENTIL HFA,VENTOLIN HFA) inhaler 2 puff  2 puff Inhalation Q6H PRN    albuterol inhalation solution 2 5 mg  2 5 mg Nebulization Q6H PRN    aspirin chewable tablet 81 mg  81 mg Oral Daily    atorvastatin (LIPITOR) tablet 10 mg  10 mg Oral HS    ceftriaxone (ROCEPHIN) 1 g/50 mL in dextrose IVPB  1,000 mg Intravenous Q24H    enoxaparin (LOVENOX) subcutaneous injection 40 mg  40 mg Subcutaneous Daily    furosemide (LASIX) tablet 20 mg  20 mg Oral BID    insulin lispro (HumaLOG) 100 units/mL subcutaneous injection 1-5 Units  1-5 Units Subcutaneous Q6H [address]    lactulose oral solution 20 g  20 g Oral Daily PRN    lisinopril (ZESTRIL) tablet 5 mg  5 mg Oral Daily    loratadine (CLARITIN) tablet 10 mg  10 mg Oral HS    melatonin tablet 3 mg  3 mg Oral HS    montelukast (SINGULAIR) tablet 10 mg  10 mg Oral HS    pantoprazole (PROTONIX) EC tablet 40 mg  40 mg Oral Daily    PARoxetine (PAXIL) tablet 10 mg  10 mg Oral QAM    senna (SENOKOT) tablet 8 6 mg  1 tablet Oral BID    sodium chloride tablet 1 g  1 g Oral BID With Meals       VTE Pharmacologic Prophylaxis: Enoxaparin (Lovenox)  VTE Mechanical Prophylaxis: sequential compression device    Portions of the record may have been created with voice recognition software  Occasional wrong word or "sound a like" substitutions may have occurred due to the inherent limitations of voice recognition software    Read the chart carefully and recognize, using context, where substitutions have occurred   ==  Audrey Ann MD  [address] [facility]  Internal Medicine Residency PGY-3

## 2022-05-27 NOTE — CASE MANAGEMENT
Case Management Discharge Planning Note    Patient name Lc Herald  Location [address] [id number] MRN [id number]  : 1945 Date 2022       Current Admission Date: 2022  Current Admission Diagnosis:Acute metabolic encephalopathy   Patient Active Problem List    Diagnosis Date Noted    Sepsis ([facility] ) 2022    Prolonged Q-T interval on ECG 2022    Chronic diastolic (congestive) heart failure ([facility] ) 2021    Essential hypertension 2021    Weakness 2021    Hypoglycemia 2021    Acute metabolic encephalopathy     UTI (urinary tract infection) 2021    Hyponatremia 2021    Acute diastolic congestive heart failure ([facility] ) 2021    Pulmonary hypertension ([place] [address] )     Bleeding per rectum 2018    GI bleed 2018    Acute pulmonary insufficiency 2017    Asthma 2017    Obesity 2017    Anxiety disorder 2017    Diabetes mellitus type 2 in obese ([facility] ) 2017    Dyslipidemia 2017    Depression 2017    Accelerated hypertension 2017      LOS (days): 1  Geometric Mean LOS (GMLOS) (days): 4 80  Days to GMLOS:3 4     OBJECTIVE:  Risk of Unplanned Readmission Score: 20 25         Current admission status: Inpatient   Preferred Pharmacy:   [facility] [address] [address]  Phone: [phone] Fax: [phone] - GUILLAUME Reinoso - [phone])  [place] [address]  Phone: [phone] Fax: [phone]    Primary Care Provider: Lynn Ortiz MD    Primary Insurance: [facility]  Secondary Insurance: [employer]    DISCHARGE DETAILS:    Additional Comments: Patient reviewed during care coordination rounds with Dr Maira Naylor who informed that pt will likely be medically stable to return to [facility] in 24-48 hours pending mental status   CM spoke with [facility] liaison who confirmed that they would be able to take pt back over the weekend if cleared medically  CM department to follow

## 2022-05-27 NOTE — ASSESSMENT & PLAN NOTE
· Most likely secondary to sepsis related to UTI/aspiration  · Hypoglycemia ruled out  · Chest x-ray with no significant changes, no sign of pneumonia   · CT head negative for acute changes, right parietal subcutaneous contusion  · Patient has no history of prior fall  · COVID/flu/RSV negative  · Fall precautions, delirium precautions with lights off at night, lights on during the day, quiet environment at night  · Continue home Paxil, melatonin q h s  · Mental status is significantly improved today, evaluated by speech therapy-recommended regular diet with thin liquids  · Continue delirium precautions, frequent reorientation, IV antibiotics

## 2022-05-28 LAB
ANION GAP SERPL CALCULATED.3IONS-SCNC: 9 MMOL/L (ref 4–13)
BACTERIA UR CULT: ABNORMAL
BASOPHILS # BLD AUTO: 0.08 THOUSANDS/ΜL (ref 0–0.1)
BASOPHILS NFR BLD AUTO: 1 % (ref 0–1)
BUN SERPL-MCNC: 7 MG/DL (ref 5–25)
CALCIUM SERPL-MCNC: 8.2 MG/DL (ref 8.3–10.1)
CHLORIDE SERPL-SCNC: 103 MMOL/L (ref 100–108)
CO2 SERPL-SCNC: 26 MMOL/L (ref 21–32)
CREAT SERPL-MCNC: 0.34 MG/DL (ref 0.6–1.3)
EOSINOPHIL # BLD AUTO: 0.13 THOUSAND/ΜL (ref 0–0.61)
EOSINOPHIL NFR BLD AUTO: 1 % (ref 0–6)
ERYTHROCYTE [DISTWIDTH] IN BLOOD BY AUTOMATED COUNT: 14.4 % (ref 11.6–15.1)
GFR SERPL CREATININE-BSD FRML MDRD: 105 ML/MIN/1.73SQ M
GLUCOSE SERPL-MCNC: 161 MG/DL (ref 65–140)
GLUCOSE SERPL-MCNC: 176 MG/DL (ref 65–140)
GLUCOSE SERPL-MCNC: 181 MG/DL (ref 65–140)
GLUCOSE SERPL-MCNC: 201 MG/DL (ref 65–140)
GLUCOSE SERPL-MCNC: 207 MG/DL (ref 65–140)
HCT VFR BLD AUTO: 36.7 % (ref 34.8–46.1)
HGB BLD-MCNC: 11.7 G/DL (ref 11.5–15.4)
IMM GRANULOCYTES # BLD AUTO: 0.05 THOUSAND/UL (ref 0–0.2)
IMM GRANULOCYTES NFR BLD AUTO: 0 % (ref 0–2)
LYMPHOCYTES # BLD AUTO: 2.62 THOUSANDS/ΜL (ref 0.6–4.47)
LYMPHOCYTES NFR BLD AUTO: 21 % (ref 14–44)
MCH RBC QN AUTO: 29.5 PG (ref 26.8–34.3)
MCHC RBC AUTO-ENTMCNC: 31.9 G/DL (ref 31.4–37.4)
MCV RBC AUTO: 92 FL (ref 82–98)
MONOCYTES # BLD AUTO: 1.37 THOUSAND/ΜL (ref 0.17–1.22)
MONOCYTES NFR BLD AUTO: 11 % (ref 4–12)
NEUTROPHILS # BLD AUTO: 8.37 THOUSANDS/ΜL (ref 1.85–7.62)
NEUTS SEG NFR BLD AUTO: 66 % (ref 43–75)
NRBC BLD AUTO-RTO: 0 /100 WBCS
PLATELET # BLD AUTO: 370 THOUSANDS/UL (ref 149–390)
PMV BLD AUTO: 9.3 FL (ref 8.9–12.7)
POTASSIUM SERPL-SCNC: 3.1 MMOL/L (ref 3.5–5.3)
RBC # BLD AUTO: 3.97 MILLION/UL (ref 3.81–5.12)
SODIUM SERPL-SCNC: 138 MMOL/L (ref 136–145)
WBC # BLD AUTO: 12.62 THOUSAND/UL (ref 4.31–10.16)

## 2022-05-28 PROCEDURE — 99233 SBSQ HOSP IP/OBS HIGH 50: CPT | Performed by: INTERNAL MEDICINE

## 2022-05-28 PROCEDURE — 82948 REAGENT STRIP/BLOOD GLUCOSE: CPT

## 2022-05-28 PROCEDURE — 80048 BASIC METABOLIC PNL TOTAL CA: CPT | Performed by: INTERNAL MEDICINE

## 2022-05-28 PROCEDURE — 97167 OT EVAL HIGH COMPLEX 60 MIN: CPT

## 2022-05-28 PROCEDURE — 97163 PT EVAL HIGH COMPLEX 45 MIN: CPT

## 2022-05-28 PROCEDURE — 85025 COMPLETE CBC W/AUTO DIFF WBC: CPT | Performed by: INTERNAL MEDICINE

## 2022-05-28 RX ORDER — INSULIN LISPRO 100 [IU]/ML
1-5 INJECTION, SOLUTION INTRAVENOUS; SUBCUTANEOUS
Status: DISCONTINUED | OUTPATIENT
Start: 2022-05-28 | End: 2022-06-01 | Stop reason: HOSPADM

## 2022-05-28 RX ORDER — POTASSIUM CHLORIDE 20 MEQ/1
20 TABLET, EXTENDED RELEASE ORAL DAILY
Status: DISCONTINUED | OUTPATIENT
Start: 2022-05-29 | End: 2022-06-01 | Stop reason: HOSPADM

## 2022-05-28 RX ORDER — POTASSIUM CHLORIDE 20 MEQ/1
40 TABLET, EXTENDED RELEASE ORAL ONCE
Status: COMPLETED | OUTPATIENT
Start: 2022-05-28 | End: 2022-05-28

## 2022-05-28 RX ADMIN — SENNOSIDES 8.6 MG: 8.6 TABLET, FILM COATED ORAL at 17:25

## 2022-05-28 RX ADMIN — LORATADINE 10 MG: 10 TABLET ORAL at 22:45

## 2022-05-28 RX ADMIN — Medication 1000 MG: at 17:50

## 2022-05-28 RX ADMIN — LISINOPRIL 5 MG: 5 TABLET ORAL at 08:49

## 2022-05-28 RX ADMIN — ENOXAPARIN SODIUM 40 MG: 40 INJECTION SUBCUTANEOUS at 08:49

## 2022-05-28 RX ADMIN — SENNOSIDES 8.6 MG: 8.6 TABLET, FILM COATED ORAL at 08:49

## 2022-05-28 RX ADMIN — INSULIN LISPRO 1 UNITS: 100 INJECTION, SOLUTION INTRAVENOUS; SUBCUTANEOUS at 22:44

## 2022-05-28 RX ADMIN — ATORVASTATIN CALCIUM 10 MG: 10 TABLET, FILM COATED ORAL at 22:45

## 2022-05-28 RX ADMIN — PAROXETINE 10 MG: 20 TABLET, FILM COATED ORAL at 08:49

## 2022-05-28 RX ADMIN — POTASSIUM CHLORIDE 40 MEQ: 1500 TABLET, EXTENDED RELEASE ORAL at 10:14

## 2022-05-28 RX ADMIN — Medication 3 MG: at 22:45

## 2022-05-28 RX ADMIN — Medication 1 G: at 17:25

## 2022-05-28 RX ADMIN — PANTOPRAZOLE SODIUM 40 MG: 40 TABLET, DELAYED RELEASE ORAL at 08:49

## 2022-05-28 RX ADMIN — FUROSEMIDE 20 MG: 20 TABLET ORAL at 17:25

## 2022-05-28 RX ADMIN — Medication 1 G: at 08:49

## 2022-05-28 RX ADMIN — FUROSEMIDE 20 MG: 20 TABLET ORAL at 08:49

## 2022-05-28 RX ADMIN — INSULIN LISPRO 1 UNITS: 100 INJECTION, SOLUTION INTRAVENOUS; SUBCUTANEOUS at 08:50

## 2022-05-28 RX ADMIN — INSULIN LISPRO 1 UNITS: 100 INJECTION, SOLUTION INTRAVENOUS; SUBCUTANEOUS at 17:25

## 2022-05-28 RX ADMIN — ASPIRIN 81 MG: 81 TABLET, CHEWABLE ORAL at 08:49

## 2022-05-28 RX ADMIN — MONTELUKAST 10 MG: 10 TABLET, FILM COATED ORAL at 22:45

## 2022-05-28 RX ADMIN — INSULIN LISPRO 1 UNITS: 100 INJECTION, SOLUTION INTRAVENOUS; SUBCUTANEOUS at 11:45

## 2022-05-28 NOTE — PLAN OF CARE
Problem: Potential for Falls  Goal: Patient will remain free of falls  Description: INTERVENTIONS:  - Educate patient/family on patient safety including physical limitations  - Instruct patient to call for assistance with activity   - Consult OT/PT to assist with strengthening/mobility   - Keep Call bell within reach  - Keep bed low and locked with side rails adjusted as appropriate  - Keep care items and personal belongings within reach  - Initiate and maintain comfort rounds  - Make Fall Risk Sign visible to staff  - Offer Toileting every 2 Hours, in advance of need  - Initiate/Maintain bed/chair alarm  - Apply yellow socks and bracelet for high fall risk patients  - Consider moving patient to room near nurses station  5/28/2022 1605 by Dorothy Minor RN  Outcome: Progressing  5/28/2022 1605 by Dorothy Minor RN  Outcome: Progressing     Problem: MOBILITY - ADULT  Goal: Maintain or return to baseline ADL function  Description: INTERVENTIONS:  -  Assess patient's ability to carry out ADLs; assess patient's baseline for ADL function and identify physical deficits which impact ability to perform ADLs (bathing, care of mouth/teeth, toileting, grooming, dressing, etc )  - Assess/evaluate cause of self-care deficits   - Assess range of motion  - Assess patient's mobility; develop plan if impaired  - Assess patient's need for assistive devices and provide as appropriate  - Encourage maximum independence but intervene and supervise when necessary  - Involve family in performance of ADLs  - Assess for home care needs following discharge   - Consider OT consult to assist with ADL evaluation and planning for discharge  - Provide patient education as appropriate  5/28/2022 1605 by Dorothy Minor RN  Outcome: Progressing  5/28/2022 1605 by Dorothy Minor RN  Outcome: Progressing  Goal: Maintains/Returns to pre admission functional level  Description: INTERVENTIONS:  - Perform BMAT or MOVE assessment daily    - Set and communicate daily mobility goal to care team and patient/family/caregiver  - Collaborate with rehabilitation services on mobility goals if consulted  - Out of bed for toileting  - Record patient progress and toleration of activity level   5/28/2022 1605 by Oscar Ashley RN  Outcome: Progressing  5/28/2022 1605 by Oscar Ashley RN  Outcome: Progressing     Problem: Prexisting or High Potential for Compromised Skin Integrity  Goal: Skin integrity is maintained or improved  Description: INTERVENTIONS:  - Identify patients at risk for skin breakdown  - Assess and monitor skin integrity  - Assess and monitor nutrition and hydration status  - Monitor labs   - Assess for incontinence   - Turn and reposition patient  - Assist with mobility/ambulation  - Relieve pressure over bony prominences  - Avoid friction and shearing  - Provide appropriate hygiene as needed including keeping skin clean and dry  - Evaluate need for skin moisturizer/barrier cream  - Collaborate with interdisciplinary team   - Patient/family teaching  - Consider wound care consult   5/28/2022 1605 by Oscar Ashley RN  Outcome: Progressing  5/28/2022 1605 by Oscar Ashley RN  Outcome: Progressing     Problem: NEUROSENSORY - ADULT  Goal: Achieves stable or improved neurological status  Description: INTERVENTIONS  - Monitor and report changes in neurological status  - Monitor vital signs such as temperature, blood pressure, glucose, and any other labs ordered   - Initiate measures to prevent increased intracranial pressure  - Monitor for seizure activity and implement precautions if appropriate      Outcome: Progressing  Goal: Achieves maximal functionality and self care  Description: INTERVENTIONS  - Monitor swallowing and airway patency with patient fatigue and changes in neurological status  - Encourage and assist patient to increase activity and self care     - Encourage visually impaired, hearing impaired and aphasic patients to use assistive/communication devices  Outcome: Progressing

## 2022-05-28 NOTE — PHYSICAL THERAPY NOTE
Physical Therapy Evaluation     Patient's Name: Fang Hi    Admitting Diagnosis  UTI (urinary tract infection) [N39 0]  Altered mental status [R41 82]  AMS (altered mental status) [R41 82]    Problem List  Patient Active Problem List   Diagnosis    Acute pulmonary insufficiency    Asthma    Obesity    Anxiety disorder    Diabetes mellitus type 2 in obese ([facility] )    Dyslipidemia    Depression    Accelerated hypertension    Bleeding per rectum    GI bleed    Acute metabolic encephalopathy    UTI (urinary tract infection)    Hyponatremia    Acute diastolic congestive heart failure ([facility] )    Pulmonary hypertension (HCC)    Hypokalemia    Chronic diastolic (congestive) heart failure ([facility] )    Essential hypertension    Weakness    Hypoglycemia    Sepsis ([facility] )    Prolonged Q-T interval on ECG       Past Medical History  Past Medical History:   Diagnosis Date    Asthma     Diabetes mellitus ([facility] )     Glaucoma     Hypertension     Hypoglycemia 6/20/2021       Past Surgical History  Past Surgical History:   Procedure Laterality Date    COLONOSCOPY N/A 12/8/2018    Procedure: COLONOSCOPY;  Surgeon: Laura Plata MD;  Location: [facility]; Service: Gastroenterology          05/28/22 1200   Note Type   Note type Evaluation   Pain Assessment   Pain Assessment Tool 0-10   Pain Score No Pain   Restrictions/Precautions   Other Precautions Chair Alarm; Bed Alarm;Cognitive;O2;Fall Risk;Pain  (pt has baseline O2, per chart 4 L; on 2 L in room with good saturation)   Home Living   Type of Home SNF   Home Layout One level   Additional Comments Pt  did indicate that she "walks" but did not indicate when nor with AD  Pt  pleasantly confused but conversational  Oftentimes switches to native language but can understand basic english     Prior Function   Level of [place] Needs assistance with ADLs and functional mobility  (unknown level of assist provided)   Lives With Facility staff   Receives Help From Personal care attendant IADLs Needs assistance   Falls in the last 6 months   (unable to recall)   Comments Pt  ? historian, from [facility] per intake notes, baseline O2   Cognition   Overall Cognitive Status Impaired   Arousal/Participation Responsive   Orientation Level Disoriented to person;Oriented to place; Disoriented to time;Disoriented to situation   Memory Decreased recall of precautions;Decreased recall of recent events   Following Commands Follows one step commands with increased time or repetition   Subjective   Subjective "I am sorry  I do not know "   RLE Assessment   RLE Assessment X   RLE Overall AROM   R Knee Flexion limited 90 degrees, painful per pt   Strength RLE   R Hip Flexion 3-/5   R Knee Extension 3-/5   R Ankle Dorsiflexion 3-/5   LLE Assessment   LLE Assessment X   LLE Overall AROM   L Knee Flexion limited 90 degrees, painful per pt   Strength LLE   L Hip Flexion 3-/5   L Knee Flexion 3-/5   L Knee Extension 3-/5   L Ankle Dorsiflexion 3-/5   Coordination   Sensation WFL   Bed Mobility   Supine to Sit 2  Maximal assistance   Additional items Assist x 2; Increased time required;Verbal cues   Sit to Supine 3  Moderate assistance   Additional items Increased time required;Assist x 1;Assist x 2   Transfers   Sit to Stand 1  Dependent   Additional items Assist x 2;Verbal cues; Increased time required   Additional Comments Completed STS transfer x 2 reps with pt attempting to initiate with therapist cueing  Unable to clear buttocks from bed   Continue to trial    Ambulation/Elevation   Gait pattern Not appropriate   Balance   Static Sitting Poor +  (progressed to poor+ to fair- with practice)   Dynamic Sitting Poor -   Static Standing Zero   Endurance Deficit   Endurance Deficit Yes   Endurance Deficit Description SPO2 levels remained > 90% with 2LO2 via NC throughout session   Activity Tolerance   Activity Tolerance Patient limited by fatigue   Nurse Made Aware RN ok to see; PCA made aware of functional mobility Assessment   Prognosis Fair   Problem List Decreased strength;Decreased endurance; Impaired balance;Decreased mobility; Decreased cognition;Decreased range of motion;Pain   Assessment Pt is 64 y o  female seen for PT evaluation s/p admit to [facility] on 9/18/0976 w/ Acute metabolic encephalopathy  PT consulted to assess pt's functional mobility and d/c needs  Order placed for PT eval and tx, w/ activity order  Comorbidities affecting pt's physical performance at time of assessment include: DM, UTI, CHF, sepsis, and AMS  PTA, pt was per pt  ambulatory with unknown level of assist, assit for ADL/IADL in SNF [facility]    Personal factors affecting pt at time of IE include: inability to ambulate household distances, decreased cognition, inability to perform IADLs and inability to perform ADLs  Please find objective findings from PT assessment regarding body systems outlined above with impairments and limitations including weakness, decreased ROM, impaired balance, decreased endurance, pain, fall risk, decreased cognition and decreased mobility from reported baseline  Of note pt  is questionable historian throughout IE, very pleasantly confused    The following objective measures performed on IE also reveal limitations: AM-PAC 6-Clicks: 4/90  Pt's clinical presentation is currently unstable/unpredictable seen in pt's presentation   Pt to benefit from continued PT tx to address deficits as defined above and maximize level of functional independent mobility and consistency  From PT/mobility standpoint, recommendation at time of d/c would be return to facility with  post acute rehabilitation services pending progress in order to facilitate return to PLOF  Barriers to Discharge Other (Comment)  (recommend return to SNF with rehab)   Goals   Patient Goals to eat  (set pt up with lunch)   STG Expiration Date 06/11/22   Short Term Goal #1 1  Pt will complete rolling L/R with Min A x1 using bedrails   2  Pt  Will complete bed mobility sit to/from supine with Min A x1 to increase functional mobility  2  Pt will complete sit to stand transfers with Mod A x2 to increase functional mobility  3  PT to see for ambulation and transfer goals 4  Pt will increase B/L LE strength by 1 grade to facilitate improved functional mobility with decreased risk of falls  5  Pt will increase sitting balance to fair+ in order to decrease risk of falls  PT Treatment Day 0   Plan   Treatment/Interventions Functional transfer training;LE strengthening/ROM; Therapeutic exercise; Endurance training;Bed mobility; Equipment eval/education;Patient/family training;Spoke to nursing   PT Frequency 3-5x/wk   Recommendation   PT Discharge Recommendation Return to facility with rehabilitation services   Equipment Recommended   (tbd)   [address] Mobility Inpatient   Turning in Bed Without Bedrails 3   Lying on Back to Sitting on Edge of Flat Bed 2   Moving Bed to Chair 1   Standing Up From Chair 1   Walk in Room 1   Climb 3-5 Stairs 1   Basic Mobility Inpatient Raw Score 9   Turning Head Towards Sound 4   Follow Simple Instructions 3   Low Function Basic Mobility Raw Score 16   Low Function Basic Mobility Standardized Score 25 72   Highest Level Of Mobility   -[facility] Goal 3: Sit at edge of bed         Rubén Hines, PT

## 2022-05-28 NOTE — ASSESSMENT & PLAN NOTE
· Sepsis resolved  · Met criteria due to being febrile, tachycardia, tachypnea, leukocytosis in setting of possible UTI/possible aspiration  · Lactic acid initially elevated at 4 7, repeat WNL at 1 7 and procalcitonin elevated - trend: 1 46 -> 5 52   · Blood culture x2 - no growth at 48 hours  · Continue IV ceftriaxone 1 g daily

## 2022-05-28 NOTE — ASSESSMENT & PLAN NOTE
· Serum potassium of 3 1 today, will replete  · Likely diuretic induced in the setting of poor oral intake  · Continue potassium chloride daily from tomorrow leslie Huang

## 2022-05-28 NOTE — PROGRESS NOTES
[facility]  Progress Note - Giselle Roper 1945, 64 y o  female MRN: [id number]  Unit/Bed#: -01 Encounter: [id number]  Primary Care Provider: Cristino Diego MD   Date and time admitted to hospital: 5/26/2022  2:17 AM    Prolonged Q-T interval on ECG  Assessment & Plan  EKG revealing QTC of 523, sinus tachycardia  Status post 1 dose of IV magnesium at the time of admission  Repeat EKG to assess QT prolongation-- QTC -442 on 05/27  Patient is currently on Paxil-medication at the nursing facility  Sepsis ([facility] )  Assessment & Plan  Sepsis resolved  Met criteria due to being febrile, tachycardia, tachypnea, leukocytosis in setting of possible UTI/possible aspiration  Lactic acid initially elevated at 4 7, repeat WNL at 1 7 and procalcitonin elevated - trend: 1 46 -> 5 52   Blood culture x2 - no growth at 48 hours  Continue IV ceftriaxone 1 g daily      Chronic diastolic (congestive) heart failure (HCC)  Assessment & Plan  Wt Readings from Last 3 Encounters:   05/28/22 61 4 kg (135 lb 5 8 oz)   08/06/21 76 6 kg (168 lb 14 oz)   06/29/21 84 1 kg (185 lb 6 5 oz)     Slight edema in bilateral lower extremities though no significant vascular congestion on chest x-ray  For now will continue home p o  Lasix 20 mg b i d  Intake and output monitoring, daily weights  Low-sodium diet        Hypokalemia  Assessment & Plan  Serum potassium of 3 1 today, will replete  Likely diuretic induced in the setting of poor oral intake  Continue potassium chloride daily from tomorrow a m      UTI (urinary tract infection)  Assessment & Plan  Urinalysis positive for leukocytes, occasional bacteria  Urine culture pending  Start IV ceftriaxone 1 g daily- continue IV antibiotics for now, follow final urine culture    Diabetes mellitus type 2 in obese [facility])  Assessment & Plan  Lab Results   Component Value Date    HGBA1C 6 8 (H) 01/12/2021       Recent Labs     05/27/22  1150 05/27/22  1623 05/27/22 2049 05/28/22  0719   POCGLU 244* 151* 205* 201*       Blood Sugar Average: Last 72 hrs:  (P) 204 25   Blood glucose checks q i d , hypoglycemia protocol  Hold p o  Diabetic meds  Sliding scale insulin    * Acute metabolic encephalopathy  Assessment & Plan  Improved  Most likely secondary to sepsis related to UTI/aspiration  Hypoglycemia ruled out  Chest x-ray with no significant changes, no sign of pneumonia   CT head negative for acute changes, right parietal subcutaneous contusion  Patient has no history of prior fall  COVID/flu/RSV negative  Fall precautions, delirium precautions with lights off at night, lights on during the day, quiet environment at night  Continue home Paxil, melatonin q h s  Mental status is significantly improved , was evaluated by speech therapy-recommended regular diet with thin liquids  Continue delirium precautions, frequent reorientation, IV antibiotics with ceftriaxone            INTERNAL MEDICINE RESIDENCY PROGRESS NOTE     Name: Ana Tripp   Age & Sex: 64 y o  female   MRN: [id number]  Unit/Bed#: MS 36-1   Encounter: [id number]    PATIENT INFORMATION     Name: Ana Tripp   Age & Sex: 64 y o  female   MRN: [id number]  Hospital Stay Days: 2    ASSESSMENT/PLAN     Principal Problem:    Acute metabolic encephalopathy  Active Problems:    Diabetes mellitus type 2 in obese [facility])    UTI (urinary tract infection)    Hypokalemia    Chronic diastolic (congestive) heart failure (HCC)    Sepsis ([facility] )    Prolonged Q-T interval on ECG      Prolonged Q-T interval on ECG  Assessment & Plan  EKG revealing QTC of 523, sinus tachycardia  Status post 1 dose of IV magnesium at the time of admission  Repeat EKG to assess QT prolongation-- QTC -442 on 05/27  Patient is currently on Paxil-medication at the nursing facility      Sepsis ([facility] )  Assessment & Plan  Sepsis resolved  Met criteria due to being febrile, tachycardia, tachypnea, leukocytosis in setting of possible UTI/possible aspiration  Lactic acid initially elevated at 4 7, repeat WNL at 1 7 and procalcitonin elevated - trend: 1 46 -> 5 52   Blood culture x2 - no growth at 48 hours  Continue IV ceftriaxone 1 g daily      Chronic diastolic (congestive) heart failure (HCC)  Assessment & Plan  Wt Readings from Last 3 Encounters:   05/28/22 61 4 kg (135 lb 5 8 oz)   08/06/21 76 6 kg (168 lb 14 oz)   06/29/21 84 1 kg (185 lb 6 5 oz)     Slight edema in bilateral lower extremities though no significant vascular congestion on chest x-ray  For now will continue home p o  Lasix 20 mg b i d  Intake and output monitoring, daily weights  Low-sodium diet        Hypokalemia  Assessment & Plan  Serum potassium of 3 1 today, will replete  Likely diuretic induced in the setting of poor oral intake  Continue potassium chloride daily from tomorrow a m  UTI (urinary tract infection)  Assessment & Plan  Urinalysis positive for leukocytes, occasional bacteria  Urine culture pending  Start IV ceftriaxone 1 g daily- continue IV antibiotics for now, follow final urine culture    Diabetes mellitus type 2 in obese [facility])  Assessment & Plan  Lab Results   Component Value Date    HGBA1C 6 8 (H) 01/12/2021       Recent Labs     05/27/22  1150 05/27/22  1623 05/27/22  2049 05/28/22  0719   POCGLU 244* 151* 205* 201*       Blood Sugar Average: Last 72 hrs:  (P) 204 25   Blood glucose checks q i d , hypoglycemia protocol  Hold p o  Diabetic meds  Sliding scale insulin    * Acute metabolic encephalopathy  Assessment & Plan  Improved  Most likely secondary to sepsis related to UTI/aspiration  Hypoglycemia ruled out  Chest x-ray with no significant changes, no sign of pneumonia   CT head negative for acute changes, right parietal subcutaneous contusion  Patient has no history of prior fall  COVID/flu/RSV negative  Fall precautions, delirium precautions with lights off at night, lights on during the day, quiet environment at night  Continue home Paxil, melatonin q h s    Mental status is significantly improved , was evaluated by speech therapy-recommended regular diet with thin liquids  Continue delirium precautions, frequent reorientation, IV antibiotics with ceftriaxone        Disposition:  Likely in the next 24 hours to [address]  SUBJECTIVE     Patient seen and examined  No acute events overnight  Patient has a language barrier however is able to answer questions  Denies any chest pain, reports that she intermittently gets short of breath  She also reports that she had 1 episode of cough this morning  Patient's  present at the bedside  Patient denies any other new or worsening symptoms    24 hour events-vitals-afebrile/heart rate-70-80/blood pressure-117/62, 120 to 130s [id number]/38L diastolic, saturating at %  On 2 L  I/O-urinary output -1 1 L, net -1 L    Labs-improving leukocytosis, WBC count today-12 6 from 14, potassium-3 1, creatinine-0 34  No new imaging in the past 24 hours    Micro- < 10,000 CFU Streptococcus anginosus  Blood culture-no growth at 48 hours  Medications-D3 of IV antibiotics, currently on ceftriaxone 1 g Q 24    OBJECTIVE     Vitals:    22 1529 22 1748 22 2251 22 0531   BP: 127/65 132/62 117/62    Pulse: 80 86 74    Resp: 17 17 18    Temp: (!) 97 3 °F (36 3 °C)  97 6 °F (36 4 °C)    TempSrc:       SpO2: 99% 100% 98%    Weight:    61 4 kg (135 lb 5 8 oz)      Temperature:   Temp (24hrs), Av 5 °F (36 4 °C), Min:97 3 °F (36 3 °C), Max:97 6 °F (36 4 °C)    Temperature: 97 6 °F (36 4 °C)  Intake & Output:  I/O        07 0700  0701   07 07 0700    P  O  0 120 120    I V  (mL/kg) 20 (0 3)      IV Piggyback 50      Total Intake(mL/kg) 70 (1 1) 120 (2) 120 (2)    Urine (mL/kg/hr) 225 (0 2) 1150 (0 8)     Stool 0 0     Total Output 225 1150     Net -155 -1030 +120           Unmeasured Stool Occurrence 0 x 0 x         Weights:        Body mass index is 26 44 kg/m²  Weight (last 2 days)     Date/Time Weight    05/28/22 0531 61 4 (135 36)    05/27/22 0600 61 3 (135 14)    05/26/22 0626 --     Weight: pt was in the ED at 05/26/22 0626        Physical Exam  Vitals and nursing note reviewed  Constitutional:       General: She is not in acute distress  Appearance: She is well-developed  Comments: Connected to 2 L oxygen via nasal cannula   HENT:      Head: Normocephalic and atraumatic  Mouth/Throat:      Mouth: Mucous membranes are moist    Eyes:      General:         Right eye: No discharge  Left eye: No discharge  Conjunctiva/sclera: Conjunctivae normal    Cardiovascular:      Rate and Rhythm: Normal rate  Pulses: Normal pulses  Heart sounds: Normal heart sounds  No murmur heard  Pulmonary:      Effort: Pulmonary effort is normal  No respiratory distress  Breath sounds: Normal breath sounds  Abdominal:      Palpations: Abdomen is soft  Tenderness: There is no abdominal tenderness  Musculoskeletal:      Cervical back: Neck supple  Right lower leg: No edema  Left lower leg: No edema  Skin:     General: Skin is warm and dry  Capillary Refill: Capillary refill takes less than 2 seconds  Neurological:      Mental Status: She is alert  Mental status is at baseline  LABORATORY DATA     Labs: I have personally reviewed pertinent reports    Results from last 7 days   Lab Units 05/28/22  0604 05/27/22  1311 05/26/22  0233   WBC Thousand/uL 12 62* 14 01* 19 68*   HEMOGLOBIN g/dL 11 7 11 5 13 2   HEMATOCRIT % 36 7 36 3 39 0   PLATELETS Thousands/uL 370 349 440*   NEUTROS PCT % 66 71 90*   MONOS PCT % 11 10 3*      Results from last 7 days   Lab Units 05/28/22  0502 05/27/22  0506 05/26/22  0233   POTASSIUM mmol/L 3 1* 3 6 3 8   CHLORIDE mmol/L 103 103 96*   CO2 mmol/L 26 21 23   BUN mg/dL 7 13 18   CREATININE mg/dL 0 34* 0 34* 0 87   CALCIUM mg/dL 8 2* 8 2* 9 3   ALK PHOS U/L  --   --  75   ALT U/L  --   -- 13   AST U/L  --   --  27     Results from last 7 days   Lab Units 05/26/22  0233   MAGNESIUM mg/dL 1 8          Results from last 7 days   Lab Units 05/26/22  0233   INR  1 12   PTT seconds 34     Results from last 7 days   Lab Units 05/26/22  0521   LACTIC ACID mmol/L 1 7           IMAGING & DIAGNOSTIC TESTING     Radiology Results: I have personally reviewed pertinent reports  XR chest portable    Result Date: 5/26/2022  Impression: Unchanged cardiomegaly without acute findings  Workstation performed: [id number]     XR hip/pelv 2-3 vws right if performed    Result Date: 5/26/2022  Impression: Mild bilateral hip and diffuse lower lumbar spine degenerative changes  Workstation performed: [id number]     XR femur 2 vw right    Result Date: 5/26/2022  Impression: No acute osseous abnormality  Workstation performed: [id number]     CT head without contrast    Result Date: 5/26/2022  Impression: No acute intracranial abnormality  Right parietal subcutaneous contusion lesion possibly contusion  Recommend direct visualization and clinical correlation    Workstation performed: [id number]     Other Diagnostic Testing: I have personally reviewed pertinent reports      ACTIVE MEDICATIONS     Current Facility-Administered Medications   Medication Dose Route Frequency    acetaminophen (TYLENOL) rectal suppository 650 mg  650 mg Rectal Q6H PRN    albuterol (PROVENTIL HFA,VENTOLIN HFA) inhaler 2 puff  2 puff Inhalation Q6H PRN    albuterol inhalation solution 2 5 mg  2 5 mg Nebulization Q6H PRN    aspirin chewable tablet 81 mg  81 mg Oral Daily    atorvastatin (LIPITOR) tablet 10 mg  10 mg Oral HS    ceftriaxone (ROCEPHIN) 1 g/50 mL in dextrose IVPB  1,000 mg Intravenous Q24H    enoxaparin (LOVENOX) subcutaneous injection 40 mg  40 mg Subcutaneous Daily    furosemide (LASIX) tablet 20 mg  20 mg Oral BID    insulin lispro (HumaLOG) 100 units/mL subcutaneous injection 1-5 Units  1-5 Units Subcutaneous TID AC    insulin lispro (HumaLOG) 100 units/mL subcutaneous injection 1-5 Units  1-5 Units Subcutaneous HS    lactulose oral solution 20 g  20 g Oral Daily PRN    lisinopril (ZESTRIL) tablet 5 mg  5 mg Oral Daily    loratadine (CLARITIN) tablet 10 mg  10 mg Oral HS    melatonin tablet 3 mg  3 mg Oral HS    montelukast (SINGULAIR) tablet 10 mg  10 mg Oral HS    pantoprazole (PROTONIX) EC tablet 40 mg  40 mg Oral Daily    PARoxetine (PAXIL) tablet 10 mg  10 mg Oral QAM    [START ON 5/29/2022] potassium chloride (K-DUR,KLOR-CON) CR tablet 20 mEq  20 mEq Oral Daily    potassium chloride (K-DUR,KLOR-CON) CR tablet 40 mEq  40 mEq Oral Once    senna (SENOKOT) tablet 8 6 mg  1 tablet Oral BID    sodium chloride tablet 1 g  1 g Oral BID With Meals       VTE Pharmacologic Prophylaxis: Enoxaparin (Lovenox)  VTE Mechanical Prophylaxis: sequential compression device    Portions of the record may have been created with voice recognition software  Occasional wrong word or "sound a like" substitutions may have occurred due to the inherent limitations of voice recognition software    Read the chart carefully and recognize, using context, where substitutions have occurred   ==  Susanne Basurto MD  [facility]  Internal Medicine Residency PGY-3

## 2022-05-28 NOTE — PLAN OF CARE
Problem: OCCUPATIONAL THERAPY ADULT  Goal: Performs self-care activities at highest level of function for planned discharge setting  See evaluation for individualized goals  Description: Treatment Interventions: ADL retraining, UE strengthening/ROM, Functional transfer training, Endurance training, Patient/family training, Compensatory technique education, Continued evaluation, Energy conservation, Activityengagement          See flowsheet documentation for full assessment, interventions and recommendations  Note: Limitation: Decreased ADL status, Decreased UE strength, Decreased endurance, Decreased cognition, Decreased self-care trans, Decreased high-level ADLs     Assessment: Patient is a 64 y o  female seen for OT evaluation s/p admit to [address] [facility] on 4/80/2670 w/Acute metabolic encephalopathy  Commorbidities affecting patient's functional performance at time of assessment include:Sepsis, Chronic diastolic congestive heart failure, UTI, DM,  Patient presented to ED ,  after being found  to have altered mental status, fever, tachycardia  Orders placed for OT evaluation and treatment  Performed at least two patient identifiers during session including name and wristband  Prior to admission, Chart review indicates patient resides at [facility]  did indicate that she "walks" but did not indicate when nor with AD  Pt  pleasantly confused but conversational  Oftentimes switches to native language but can understand basic english  Personal factors affecting patient at time of initial evaluation include: limited insight into deficits, decreased initiation and engagement and difficulty performing ADLs  Upon evaluation, patient requires moderate assist for UB ADLs, maximal assist for LB ADLs     Occupational performance is affected by the following deficits: decreased functional use of BUEs, decreased muscle strength, degenerative arthritic joint changes, impaired gross motor coordination, dynamic sit/ stand balance deficit with poor standing tolerance time for self care and functional mobility, decreased activity tolerance, impaired memory, impaired problem solving, decreased safety awareness and postural control and postural alignment deficit, requiring external assistance to complete transitional movements  Patient to benefit from continued Occupational Therapy treatment while in the hospital to address deficits as defined above and maximize level of functional independence with ADLs and functional mobility  Occupational Performance areas to address include: eating, grooming , bathing/ shower, dressing, toilet hygiene, transfer to all surfaces, functional mobility, emergency response, IADLs: safety procedures and Leisure Participation  From OT standpoint, recommendation at time of d/c would be Short Term Rehab/ return to facility with rehab evaluation       OT Discharge Recommendation: Return to facility with rehabilitation services

## 2022-05-28 NOTE — ASSESSMENT & PLAN NOTE
· EKG revealing QTC of 523, sinus tachycardia  · Status post 1 dose of IV magnesium at the time of admission  · Repeat EKG to assess QT prolongation-- QTC -442 on 05/27  · Patient is currently on Paxil-medication at the nursing facility

## 2022-05-28 NOTE — ASSESSMENT & PLAN NOTE
Lab Results   Component Value Date    HGBA1C 6 8 (H) 01/12/2021       Recent Labs     05/27/22  1150 05/27/22  1623 05/27/22  2049 05/28/22  0719   POCGLU 244* 151* 205* 201*       Blood Sugar Average: Last 72 hrs:  (P) 204 25   · Blood glucose checks q i d , hypoglycemia protocol  · Hold p o   Diabetic meds  · Sliding scale insulin

## 2022-05-28 NOTE — PLAN OF CARE
Problem: PHYSICAL THERAPY ADULT  Goal: Performs mobility at highest level of function for planned discharge setting  See evaluation for individualized goals  Description: Treatment/Interventions: Functional transfer training, LE strengthening/ROM, Therapeutic exercise, Endurance training, Bed mobility, Equipment eval/education, Patient/family training, Spoke to nursing  Equipment Recommended:  (tbd)       See flowsheet documentation for full assessment, interventions and recommendations  Outcome: Progressing  Note: Prognosis: Fair  Problem List: Decreased strength, Decreased endurance, Impaired balance, Decreased mobility, Decreased cognition, Decreased range of motion, Pain  Assessment: Pt is 64 y o  female seen for PT evaluation s/p admit to [facility] on 8/16/2365 w/ Acute metabolic encephalopathy  PT consulted to assess pt's functional mobility and d/c needs  Order placed for PT eval and tx, w/ activity order  Comorbidities affecting pt's physical performance at time of assessment include: DM, UTI, CHF, sepsis, and AMS  PTA, pt was per pt  ambulatory with unknown level of assist, assit for ADL/IADL in SN[facility]    Personal factors affecting pt at time of IE include: inability to ambulate household distances, decreased cognition, inability to perform IADLs and inability to perform ADLs  Please find objective findings from PT assessment regarding body systems outlined above with impairments and limitations including weakness, decreased ROM, impaired balance, decreased endurance, pain, fall risk, decreased cognition and decreased mobility from reported baseline  Of note pt  is questionable historian throughout IE, very pleasantly confused    The following objective measures performed on IE also reveal limitations: AM-PAC 6-Clicks: 2/52  Pt's clinical presentation is currently unstable/unpredictable seen in pt's presentation    Pt to benefit from continued PT tx to address deficits as defined above and maximize level of functional independent mobility and consistency  From PT/mobility standpoint, recommendation at time of d/c would be return to facility with  post acute rehabilitation services pending progress in order to facilitate return to PLOF  Barriers to Discharge: Other (Comment) (recommend return to SNF with rehab)        PT Discharge Recommendation: Return to facility with rehabilitation services          See flowsheet documentation for full assessment

## 2022-05-28 NOTE — ASSESSMENT & PLAN NOTE
· Improved  · Most likely secondary to sepsis related to UTI/aspiration  · Hypoglycemia ruled out  · Chest x-ray with no significant changes, no sign of pneumonia   · CT head negative for acute changes, right parietal subcutaneous contusion  · Patient has no history of prior fall  · COVID/flu/RSV negative  · Fall precautions, delirium precautions with lights off at night, lights on during the day, quiet environment at night  · Continue home Paxil, melatonin q h s  · Mental status is significantly improved , was evaluated by speech therapy-recommended regular diet with thin liquids    · Continue delirium precautions, frequent reorientation, IV antibiotics with ceftriaxone

## 2022-05-28 NOTE — OCCUPATIONAL THERAPY NOTE
Occupational Therapy Evaluation        Patient Name: Suni Baker  [id number] Date: 5/28/2022 05/28/22 1226   OT Last Visit   OT Visit Date 05/28/22   Note Type   Note type Evaluation   Restrictions/Precautions   Weight Bearing Precautions Per Order No   Other Precautions Chair Alarm; Bed Alarm;Cognitive;O2;Fall Risk;Pain   Pain Assessment   Pain Assessment Tool 0-10   Pain Score No Pain   Home Living   Type of Home SNF  [facility])   Home Layout One level   Additional Comments Pt  did indicate that she "walks" but did not indicate when nor with AD  Pt  pleasantly confused but conversational  Oftentimes switches to native language but can understand basic english  Prior Function   Level of [place] Needs assistance with ADLs and functional mobility  (unknown level of assist provided)   Lives With Facility staff   Receives Help From Personal care attendant   IADLs Needs assistance   Falls in the last 6 months   (unable to recall)   Lifestyle   Autonomy Chart review indicates patient resides at [facility]  did indicate that she "walks" but did not indicate when nor with AD  Pt  pleasantly confused but conversational  Oftentimes switches to native language but can understand basic english  Reciprocal Relationships Supportive staff   Psychosocial   Psychosocial (WDL) WDL   Ability to Express Feelings Needs assistance   Ability to Express Needs Needs assistance   Ability to Express Thoughts Needs assistance   Ability to Understand Others Usually understands   ADL   Eating Assistance 5  Supervision/Setup   Eating Deficit Verbal cueing; Increased time to complete   Grooming Assistance 5  Supervision/Setup   Grooming Deficit Setup; Increased time to complete   UB Bathing Assistance 3  Moderate Assistance   UB Bathing Deficit Increased time to complete   LB Bathing Assistance 2  Maximal Assistance   LB Bathing Deficit Increased time to complete   UB Dressing Assistance 3  Moderate Assistance   UB Dressing Deficit Increased time to complete   LB Dressing Assistance 2  Maximal Assistance   LB Dressing Deficit Increased time to complete   Toileting Assistance  2  Maximal Assistance   Toileting Deficit Increased time to complete   Functional Assistance 3  Moderate Assistance   Functional Deficit Increased time to complete;Supervision/safety   Bed Mobility   Supine to Sit 2  Maximal assistance   Additional items Assist x 2; Increased time required;Verbal cues   Sit to Supine 3  Moderate assistance   Additional items Increased time required;Assist x 2   Transfers   Sit to Stand 2  Maximal assistance   Additional items Assist x 2; Increased time required;Verbal cues  (unable to clear seating surface, despite assist of 2)   Balance   Static Sitting Poor +   Dynamic Sitting Poor   Activity Tolerance   Activity Tolerance Patient limited by fatigue   RUE Assessment   RUE Assessment X  (AROM WFL, strength deficit)   RUE Strength   RUE Overall Strength Deficits  (3+/5)   LUE Assessment   LUE Assessment X  (AROM WFL, strength deficit)   LUE Strength   LUE Overall Strength Deficits  (3+/5)   Hand Function   Gross Motor Coordination Impaired   Fine Motor Coordination Functional   Sensation   Light Touch No apparent deficits  (BUEs)   Proprioception   Proprioception No apparent deficits   Vision-Basic Assessment   Current Vision Does not wear glasses   Cognition   Overall Cognitive Status Impaired   Arousal/Participation Alert; Responsive; Cooperative   Attention Within functional limits   Orientation Level Disoriented to person;Oriented to place; Disoriented to time;Disoriented to situation   Memory Decreased recall of precautions;Decreased recall of recent events   Following Commands Follows one step commands with increased time or repetition   Assessment   Limitation Decreased ADL status; Decreased UE strength;Decreased endurance;Decreased cognition;Decreased self-care trans;Decreased high-level ADLs   Assessment Patient is a 64 y o  female seen for OT evaluation s/p admit to [address] [facility] on 2/62/1390 w/Acute metabolic encephalopathy  Commorbidities affecting patient's functional performance at time of assessment include:Sepsis, Chronic diastolic congestive heart failure, UTI, DM,  Patient presented to ED ,  after being found  to have altered mental status, fever, tachycardia  Orders placed for OT evaluation and treatment  Performed at least two patient identifiers during session including name and wristband  Prior to admission, [place] [facility]  did indicate that she "walks" but did not indicate when nor with AD  Pt  pleasantly confused but conversational  Oftentimes switches to native language but can understand basic english  Personal factors affecting patient at time of initial evaluation include: limited insight into deficits, decreased initiation and engagement and difficulty performing ADLs  Upon evaluation, patient requires moderate assist for UB ADLs, maximal assist for LB ADLs  Occupational performance is affected by the following deficits: decreased functional use of BUEs, decreased muscle strength, degenerative arthritic joint changes, impaired gross motor coordination, dynamic sit/ stand balance deficit with poor standing tolerance time for self care and functional mobility, decreased activity tolerance, impaired memory, impaired problem solving, decreased safety awareness and postural control and postural alignment deficit, requiring external assistance to complete transitional movements  Patient to benefit from continued Occupational Therapy treatment while in the hospital to address deficits as defined above and maximize level of functional independence with ADLs and functional mobility   Occupational Performance areas to address include: eating, grooming , bathing/ shower, dressing, toilet hygiene, transfer to all surfaces, functional mobility, emergency response, IADLs: safety procedures and Leisure Participation  From OT standpoint, recommendation at time of d/c would be Short Term Rehab/ return to facility with rehab evaluation  Plan   Treatment Interventions ADL retraining;UE strengthening/ROM; Functional transfer training; Endurance training;Patient/family training; Compensatory technique education;Continued evaluation; Energy conservation; Activityengagement   Goal Expiration Date 06/10/22   OT Frequency 3-5x/wk   Recommendation   OT Discharge Recommendation Return to facility with rehabilitation services   AM-PAC Daily Activity Inpatient   Lower Body Dressing 2   Bathing 2   Toileting 2   Upper Body Dressing 2   Grooming 2   Eating 3   Daily Activity Raw Score 13   Daily Activity Standardized Score (Calc for Raw Score >=11) 32 03   AM-PAC Applied Cognition Inpatient   Following a Speech/Presentation 3   Understanding Ordinary Conversation 3   Taking Medications 1   Remembering Where Things Are Placed or Put Away 2   Remembering List of 4-5 Errands 2   Taking Care of Complicated Tasks 1   Applied Cognition Raw Score 12   Applied Cognition Standardized Score 28 82   Barthel Index   Feeding 5   Bathing 0   Grooming Score 0   Dressing Score 5   Bladder Score 5   Bowels Score 5   Toilet Use Score 5   Transfers (Bed/Chair) Score 5   Mobility (Level Surface) Score 0   Stairs Score 0   Barthel Index Score 30     Occupational Therapy goals: In 7-14 days:     1- Patient will verbalize and demonstrate use of energy conservation/ deep breathing technique and work simplification skills during functional activity with no verbal cues  2-Patient will verbalize and demonstrate good body mechanics and joint protection techniques during  ADLs/ IADLs with no verbal cues  3- Patient will increase OOB/ sitting tolerance to 2-4 hours per day for increased participation in self care and leisure tasks with no s/s of exertion    4-Patient will increase standing tolerance time to 5  minutes with unilateral UE support to complete sink level ADLs@ mod I level   5- Patient will increase sitting tolerance at edge of bed to 20 minutes to complete UB ADLs @ set up assist level  6- Patient will transfer bed to Chair / toilet at Set up assist level with AD as indicated  7- Patient will complete UB ADLs with set up assist  8- Patient will complete LB ADLs with min assist with the use of adaptive equipment  9- Patient will complete toileting hygiene with set up assist/ supervision for thoroughness    10-Patient/ Family  will demonstrate competency with UE Home Exercise Program

## 2022-05-28 NOTE — ASSESSMENT & PLAN NOTE
Wt Readings from Last 3 Encounters:   05/28/22 61 4 kg (135 lb 5 8 oz)   08/06/21 76 6 kg (168 lb 14 oz)   06/29/21 84 1 kg (185 lb 6 5 oz)     · Slight edema in bilateral lower extremities though no significant vascular congestion on chest x-ray  · For now will continue home p o   Lasix 20 mg b i d   · Intake and output monitoring, daily weights  · Low-sodium diet

## 2022-05-29 PROBLEM — A41.9 SEPSIS (HCC): Status: RESOLVED | Noted: 2022-05-26 | Resolved: 2022-05-29

## 2022-05-29 LAB
ATRIAL RATE: 95 BPM
GLUCOSE SERPL-MCNC: 158 MG/DL (ref 65–140)
GLUCOSE SERPL-MCNC: 169 MG/DL (ref 65–140)
GLUCOSE SERPL-MCNC: 170 MG/DL (ref 65–140)
GLUCOSE SERPL-MCNC: 201 MG/DL (ref 65–140)
GLUCOSE SERPL-MCNC: 203 MG/DL (ref 65–140)
P AXIS: 54 DEGREES
PR INTERVAL: 210 MS
QRS AXIS: 13 DEGREES
QRSD INTERVAL: 96 MS
QT INTERVAL: 352 MS
QTC INTERVAL: 442 MS
T WAVE AXIS: 21 DEGREES
VENTRICULAR RATE: 95 BPM

## 2022-05-29 PROCEDURE — 99233 SBSQ HOSP IP/OBS HIGH 50: CPT | Performed by: INTERNAL MEDICINE

## 2022-05-29 PROCEDURE — 82948 REAGENT STRIP/BLOOD GLUCOSE: CPT

## 2022-05-29 PROCEDURE — 93010 ELECTROCARDIOGRAM REPORT: CPT | Performed by: INTERNAL MEDICINE

## 2022-05-29 RX ORDER — LANOLIN ALCOHOL/MO/W.PET/CERES
6 CREAM (GRAM) TOPICAL
Status: DISCONTINUED | OUTPATIENT
Start: 2022-05-29 | End: 2022-06-01 | Stop reason: HOSPADM

## 2022-05-29 RX ORDER — LANOLIN ALCOHOL/MO/W.PET/CERES
6 CREAM (GRAM) TOPICAL
Status: DISCONTINUED | OUTPATIENT
Start: 2022-05-30 | End: 2022-05-29

## 2022-05-29 RX ADMIN — SENNOSIDES 8.6 MG: 8.6 TABLET, FILM COATED ORAL at 08:07

## 2022-05-29 RX ADMIN — FUROSEMIDE 20 MG: 20 TABLET ORAL at 08:05

## 2022-05-29 RX ADMIN — LORATADINE 10 MG: 10 TABLET ORAL at 22:19

## 2022-05-29 RX ADMIN — ATORVASTATIN CALCIUM 10 MG: 10 TABLET, FILM COATED ORAL at 22:20

## 2022-05-29 RX ADMIN — MONTELUKAST 10 MG: 10 TABLET, FILM COATED ORAL at 22:19

## 2022-05-29 RX ADMIN — ASPIRIN 81 MG: 81 TABLET, CHEWABLE ORAL at 08:07

## 2022-05-29 RX ADMIN — PANTOPRAZOLE SODIUM 40 MG: 40 TABLET, DELAYED RELEASE ORAL at 08:07

## 2022-05-29 RX ADMIN — INSULIN LISPRO 1 UNITS: 100 INJECTION, SOLUTION INTRAVENOUS; SUBCUTANEOUS at 08:08

## 2022-05-29 RX ADMIN — SENNOSIDES 8.6 MG: 8.6 TABLET, FILM COATED ORAL at 17:31

## 2022-05-29 RX ADMIN — INSULIN LISPRO 1 UNITS: 100 INJECTION, SOLUTION INTRAVENOUS; SUBCUTANEOUS at 22:20

## 2022-05-29 RX ADMIN — FUROSEMIDE 20 MG: 20 TABLET ORAL at 17:31

## 2022-05-29 RX ADMIN — INSULIN LISPRO 1 UNITS: 100 INJECTION, SOLUTION INTRAVENOUS; SUBCUTANEOUS at 16:41

## 2022-05-29 RX ADMIN — Medication 6 MG: at 22:19

## 2022-05-29 RX ADMIN — ENOXAPARIN SODIUM 40 MG: 40 INJECTION SUBCUTANEOUS at 08:08

## 2022-05-29 RX ADMIN — Medication 1000 MG: at 16:41

## 2022-05-29 RX ADMIN — Medication 1 G: at 17:31

## 2022-05-29 RX ADMIN — LISINOPRIL 5 MG: 5 TABLET ORAL at 08:07

## 2022-05-29 RX ADMIN — Medication 1 G: at 08:07

## 2022-05-29 RX ADMIN — INSULIN LISPRO 1 UNITS: 100 INJECTION, SOLUTION INTRAVENOUS; SUBCUTANEOUS at 11:49

## 2022-05-29 RX ADMIN — PAROXETINE 10 MG: 20 TABLET, FILM COATED ORAL at 08:05

## 2022-05-29 RX ADMIN — POTASSIUM CHLORIDE 20 MEQ: 1500 TABLET, EXTENDED RELEASE ORAL at 08:05

## 2022-05-29 NOTE — ASSESSMENT & PLAN NOTE
· Urinalysis positive for leukocytes, occasional bacteria  · Urine culture - Streptococcus anginosus  · On IV ceftriaxone-day 4

## 2022-05-29 NOTE — ASSESSMENT & PLAN NOTE
Lab Results   Component Value Date    HGBA1C 6 8 (H) 01/12/2021       Recent Labs     05/28/22  2047 05/29/22  0736 05/29/22  1054 05/29/22  1127   POCGLU 181* 201* 158* 170*       Blood Sugar Average: Last 72 hrs:  (P) 008 [id number]   · Blood glucose checks q i d , hypoglycemia protocol  · Hold p o   Diabetic meds  · Sliding scale insulin

## 2022-05-29 NOTE — ASSESSMENT & PLAN NOTE
Wt Readings from Last 3 Encounters:   05/29/22 61 8 kg (136 lb 3 9 oz)   08/06/21 76 6 kg (168 lb 14 oz)   06/29/21 84 1 kg (185 lb 6 5 oz)     · Slight edema in bilateral lower extremities though no significant vascular congestion on chest x-ray  · For now will continue home p o   Lasix 20 mg b i d   · Intake and output monitoring, daily weights  · Low-sodium diet

## 2022-05-29 NOTE — ASSESSMENT & PLAN NOTE
Recent Labs     05/27/22  0506 05/28/22  0502   K 3 6 3 1*   · Will check magnesium  · Likely diuretic induced in the setting of poor oral intake  · Continue potassium chloride daily

## 2022-05-29 NOTE — PLAN OF CARE
Problem: MOBILITY - ADULT  Goal: Maintain or return to baseline ADL function  Description: INTERVENTIONS:  -  Assess patient's ability to carry out ADLs; assess patient's baseline for ADL function and identify physical deficits which impact ability to perform ADLs (bathing, care of mouth/teeth, toileting, grooming, dressing, etc )  - Assess/evaluate cause of self-care deficits   - Assess range of motion  - Assess patient's mobility; develop plan if impaired  - Assess patient's need for assistive devices and provide as appropriate  - Encourage maximum independence but intervene and supervise when necessary  - Involve family in performance of ADLs  - Assess for home care needs following discharge   - Consider OT consult to assist with ADL evaluation and planning for discharge  - Provide patient education as appropriate  Outcome: Progressing  Goal: Maintains/Returns to pre admission functional level  Description: INTERVENTIONS:  - Perform BMAT or MOVE assessment daily    - Set and communicate daily mobility goal to care team and patient/family/caregiver  - Collaborate with rehabilitation services on mobility goals if consulted  - Perform Range of Motion 3 times a day  - Reposition patient every 3 hours    - Dangle patient 3 times a day  - Stand patient 3 times a day  - Ambulate patient 3 times a day  - Out of bed to chair 3 times a day   - Out of bed for meals 3 times a day  - Out of bed for toileting  - Record patient progress and toleration of activity level   Outcome: Progressing     Problem: Potential for Falls  Goal: Patient will remain free of falls  Description: INTERVENTIONS:  - Educate patient/family on patient safety including physical limitations  - Instruct patient to call for assistance with activity   - Consult OT/PT to assist with strengthening/mobility   - Keep Call bell within reach  - Keep bed low and locked with side rails adjusted as appropriate  - Keep care items and personal belongings within reach  - Initiate and maintain comfort rounds  - Make Fall Risk Sign visible to staff  - Offer Toileting every 3 Hours, in advance of need  - Initiate/Maintain bed alarm  - Obtain necessary fall risk management equipment:   - Apply yellow socks and bracelet for high fall risk patients  - Consider moving patient to room near nurses station  Outcome: Progressing     Problem: NEUROSENSORY - ADULT  Goal: Achieves stable or improved neurological status  Description: INTERVENTIONS  - Monitor and report changes in neurological status  - Monitor vital signs such as temperature, blood pressure, glucose, and any other labs ordered   - Initiate measures to prevent increased intracranial pressure  - Monitor for seizure activity and implement precautions if appropriate      Outcome: Progressing  Goal: Achieves maximal functionality and self care  Description: INTERVENTIONS  - Monitor swallowing and airway patency with patient fatigue and changes in neurological status  - Encourage and assist patient to increase activity and self care     - Encourage visually impaired, hearing impaired and aphasic patients to use assistive/communication devices  Outcome: Progressing

## 2022-05-30 LAB
ANION GAP SERPL CALCULATED.3IONS-SCNC: 5 MMOL/L (ref 4–13)
BASOPHILS # BLD AUTO: 0.09 THOUSANDS/ΜL (ref 0–0.1)
BASOPHILS NFR BLD AUTO: 1 % (ref 0–1)
BUN SERPL-MCNC: 6 MG/DL (ref 5–25)
CALCIUM SERPL-MCNC: 8.4 MG/DL (ref 8.3–10.1)
CHLORIDE SERPL-SCNC: 102 MMOL/L (ref 100–108)
CO2 SERPL-SCNC: 32 MMOL/L (ref 21–32)
CREAT SERPL-MCNC: 0.43 MG/DL (ref 0.6–1.3)
EOSINOPHIL # BLD AUTO: 0.35 THOUSAND/ΜL (ref 0–0.61)
EOSINOPHIL NFR BLD AUTO: 4 % (ref 0–6)
ERYTHROCYTE [DISTWIDTH] IN BLOOD BY AUTOMATED COUNT: 14.4 % (ref 11.6–15.1)
GFR SERPL CREATININE-BSD FRML MDRD: 97 ML/MIN/1.73SQ M
GLUCOSE SERPL-MCNC: 130 MG/DL (ref 65–140)
GLUCOSE SERPL-MCNC: 153 MG/DL (ref 65–140)
GLUCOSE SERPL-MCNC: 170 MG/DL (ref 65–140)
GLUCOSE SERPL-MCNC: 170 MG/DL (ref 65–140)
GLUCOSE SERPL-MCNC: 203 MG/DL (ref 65–140)
HCT VFR BLD AUTO: 35.7 % (ref 34.8–46.1)
HGB BLD-MCNC: 11.4 G/DL (ref 11.5–15.4)
IMM GRANULOCYTES # BLD AUTO: 0.04 THOUSAND/UL (ref 0–0.2)
IMM GRANULOCYTES NFR BLD AUTO: 0 % (ref 0–2)
LYMPHOCYTES # BLD AUTO: 2.02 THOUSANDS/ΜL (ref 0.6–4.47)
LYMPHOCYTES NFR BLD AUTO: 20 % (ref 14–44)
MAGNESIUM SERPL-MCNC: 2.1 MG/DL (ref 1.6–2.6)
MCH RBC QN AUTO: 29.5 PG (ref 26.8–34.3)
MCHC RBC AUTO-ENTMCNC: 31.9 G/DL (ref 31.4–37.4)
MCV RBC AUTO: 92 FL (ref 82–98)
MONOCYTES # BLD AUTO: 1.21 THOUSAND/ΜL (ref 0.17–1.22)
MONOCYTES NFR BLD AUTO: 12 % (ref 4–12)
NEUTROPHILS # BLD AUTO: 6.34 THOUSANDS/ΜL (ref 1.85–7.62)
NEUTS SEG NFR BLD AUTO: 63 % (ref 43–75)
NRBC BLD AUTO-RTO: 0 /100 WBCS
PLATELET # BLD AUTO: 377 THOUSANDS/UL (ref 149–390)
PMV BLD AUTO: 9.5 FL (ref 8.9–12.7)
POTASSIUM SERPL-SCNC: 3.4 MMOL/L (ref 3.5–5.3)
RBC # BLD AUTO: 3.87 MILLION/UL (ref 3.81–5.12)
SODIUM SERPL-SCNC: 139 MMOL/L (ref 136–145)
WBC # BLD AUTO: 10.05 THOUSAND/UL (ref 4.31–10.16)

## 2022-05-30 PROCEDURE — 85025 COMPLETE CBC W/AUTO DIFF WBC: CPT | Performed by: INTERNAL MEDICINE

## 2022-05-30 PROCEDURE — 83735 ASSAY OF MAGNESIUM: CPT | Performed by: INTERNAL MEDICINE

## 2022-05-30 PROCEDURE — 99233 SBSQ HOSP IP/OBS HIGH 50: CPT | Performed by: INTERNAL MEDICINE

## 2022-05-30 PROCEDURE — 82948 REAGENT STRIP/BLOOD GLUCOSE: CPT

## 2022-05-30 PROCEDURE — 80048 BASIC METABOLIC PNL TOTAL CA: CPT | Performed by: INTERNAL MEDICINE

## 2022-05-30 RX ADMIN — Medication 1000 MG: at 17:30

## 2022-05-30 RX ADMIN — FUROSEMIDE 20 MG: 20 TABLET ORAL at 09:03

## 2022-05-30 RX ADMIN — Medication 1 G: at 17:30

## 2022-05-30 RX ADMIN — INSULIN LISPRO 1 UNITS: 100 INJECTION, SOLUTION INTRAVENOUS; SUBCUTANEOUS at 09:01

## 2022-05-30 RX ADMIN — FUROSEMIDE 20 MG: 20 TABLET ORAL at 17:30

## 2022-05-30 RX ADMIN — ENOXAPARIN SODIUM 40 MG: 40 INJECTION SUBCUTANEOUS at 09:03

## 2022-05-30 RX ADMIN — PAROXETINE 10 MG: 20 TABLET, FILM COATED ORAL at 09:02

## 2022-05-30 RX ADMIN — SENNOSIDES 8.6 MG: 8.6 TABLET, FILM COATED ORAL at 09:02

## 2022-05-30 RX ADMIN — LISINOPRIL 5 MG: 5 TABLET ORAL at 09:02

## 2022-05-30 RX ADMIN — PANTOPRAZOLE SODIUM 40 MG: 40 TABLET, DELAYED RELEASE ORAL at 09:02

## 2022-05-30 RX ADMIN — INSULIN LISPRO 1 UNITS: 100 INJECTION, SOLUTION INTRAVENOUS; SUBCUTANEOUS at 22:54

## 2022-05-30 RX ADMIN — POTASSIUM CHLORIDE 20 MEQ: 1500 TABLET, EXTENDED RELEASE ORAL at 09:02

## 2022-05-30 RX ADMIN — Medication 1 G: at 09:03

## 2022-05-30 RX ADMIN — SENNOSIDES 8.6 MG: 8.6 TABLET, FILM COATED ORAL at 17:30

## 2022-05-30 RX ADMIN — ASPIRIN 81 MG: 81 TABLET, CHEWABLE ORAL at 09:02

## 2022-05-30 RX ADMIN — INSULIN LISPRO 1 UNITS: 100 INJECTION, SOLUTION INTRAVENOUS; SUBCUTANEOUS at 11:57

## 2022-05-30 NOTE — ASSESSMENT & PLAN NOTE
· EKG revealed QTC of 523, sinus tachycardia  · Status post 1 dose of IV magnesium at the time of admission  · Repeat EKG to assess QT prolongation-- QTC -442 on 05/27  · Patient is currently on Paxil-medication at the nursing facility

## 2022-05-30 NOTE — ASSESSMENT & PLAN NOTE
Wt Readings from Last 3 Encounters:   05/30/22 60 2 kg (132 lb 11 5 oz)   08/06/21 76 6 kg (168 lb 14 oz)   06/29/21 84 1 kg (185 lb 6 5 oz)     · Slight edema in bilateral lower extremities though no significant vascular congestion on chest x-ray  · Continue home dose of Lasix

## 2022-05-30 NOTE — PROGRESS NOTES
[address]  Progress Note - Sara Bell 1945, 64 y o  female MRN: [id number]  Unit/Bed#: -01 Encounter: [id number]  Primary Care Provider: Aniya Gaona MD   Date and time admitted to hospital: 5/26/2022  2:17 AM    Prolonged Q-T interval on ECG  Assessment & Plan  · EKG revealing QTC of 523, sinus tachycardia  · Status post 1 dose of IV magnesium at the time of admission  · Repeat EKG to assess QT prolongation-- QTC -442 on 05/27  · Patient is currently on Paxil-medication at the nursing facility  Chronic diastolic (congestive) heart failure (HCC)  Assessment & Plan  Wt Readings from Last 3 Encounters:   05/30/22 60 2 kg (132 lb 11 5 oz)   08/06/21 76 6 kg (168 lb 14 oz)   06/29/21 84 1 kg (185 lb 6 5 oz)     · Slight edema in bilateral lower extremities though no significant vascular congestion on chest x-ray  · For now will continue home p o  Lasix 20 mg b i d   · Intake and output monitoring, daily weights  · Low-sodium diet        Hypokalemia  Assessment & Plan  Recent Labs     05/28/22  0502 05/30/22  0541   K 3 1* 3 4*   MG  --  2 1   ·   · Likely diuretic induced in the setting of poor oral intake  · Continue to replace  · Continue potassium chloride daily    UTI (urinary tract infection)  Assessment & Plan  · Urinalysis positive for leukocytes, occasional bacteria  · Urine culture - Streptococcus anginosus  · On IV Rocephin consider a total of 7 days    Diabetes mellitus type 2 in obese [facility])  Assessment & Plan  Lab Results   Component Value Date    HGBA1C 6 8 (H) 01/12/2021       Recent Labs     05/29/22  2043 05/30/22  0732 05/30/22  1109 05/30/22  1613   POCGLU 203* 170* 153* 130       Blood Sugar Average: Last 72 hrs:  (P) 180 5     · Blood glucose checks q i d , hypoglycemia protocol  · Hold p o   Diabetic meds  · Sliding scale insulin    * Acute metabolic encephalopathy  Assessment & Plan  · Patient continues to have waxing and waning mental status, this is likely a combination of acute metabolic encephalopathy due to UTI as well as possible undiagnosed dementia  · Most likely secondary to sepsis related to UTI/aspiration  · Hypoglycemia ruled out  · Chest x-ray with no significant changes, no sign of pneumonia   · CT head negative for acute changes, right parietal subcutaneous contusion  · Patient has no history of prior fall  · COVID/flu/RSV negative  · Fall precautions, delirium precautions with lights off at night, lights on during the day, quiet environment at night  · Continue home Paxil, melatonin q h s  · Mental status is significantly improved , was evaluated by speech therapy-recommended regular diet with thin liquids  · Continue delirium precautions, frequent reorientation, IV antibiotics with ceftriaxone - I would probably aim for a 7 day course  VTE Pharmacologic Prophylaxis:   VTE Score: 7 Moderate Risk (Score 3-4) - Pharmacological DVT Prophylaxis Ordered: Enoxaparin (Lovenox)  Mechanical VTE Prophylaxis in Place: Yes    Patient Centered Rounds: I have performed bedside rounds with nursing staff today  Discussions with Specialists or Other Care Team Provider: yes    Education and Discussions with Family / Patient: Discussed with son over the phone    Current Length of Stay: 4 day(s)    Current Patient Status: Inpatient     Discharge Plan / Estimated Discharge Date: Anticipate discharge in 48 hrs to rehab facility  Code Status: Level 1 - Full Code      Subjective:     Patient evaluated this morning  Appears overall stable  No other events reported  Hemoglobin 11 4, potassium 3 4    Objective:     Vitals:   Temp (24hrs), Av 4 °F (36 9 °C), Min:98 3 °F (36 8 °C), Max:98 5 °F (36 9 °C)    Temp:  [98 3 °F (36 8 °C)-98 5 °F (36 9 °C)] 98 3 °F (36 8 °C)  HR:  [] 72  Resp:  [18] 18  BP: (115-135)/(66-69) 115/66  SpO2:  [97 %-98 %] 98 %  Body mass index is 25 92 kg/m²  Input and Output Summary (last 24 hours):        Intake/Output Summary (Last 24 hours) at 5/30/2022 1657  Last data filed at 5/30/2022 0911  Gross per 24 hour   Intake 120 ml   Output 750 ml   Net -630 ml       Physical Exam:     Physical Exam  Vitals and nursing note reviewed  Constitutional:       General: She is not in acute distress  Appearance: She is well-developed  HENT:      Head: Normocephalic and atraumatic  Mouth/Throat:      Mouth: Mucous membranes are moist    Eyes:      General:         Right eye: No discharge  Left eye: No discharge  Conjunctiva/sclera: Conjunctivae normal    Cardiovascular:      Rate and Rhythm: Normal rate  Pulses: Normal pulses  Heart sounds: Normal heart sounds  No murmur heard  Pulmonary:      Effort: Pulmonary effort is normal  No respiratory distress  Breath sounds: Normal breath sounds  Abdominal:      Palpations: Abdomen is soft  Tenderness: There is no abdominal tenderness  Musculoskeletal:      Cervical back: Neck supple  Right lower leg: No edema  Left lower leg: No edema  Skin:     General: Skin is warm and dry  Capillary Refill: Capillary refill takes less than 2 seconds  Neurological:      Mental Status: She is alert  She is disoriented            Additional Data:     Labs:  Results from last 7 days   Lab Units 05/30/22  0541   WBC Thousand/uL 10 05   HEMOGLOBIN g/dL 11 4*   HEMATOCRIT % 35 7   PLATELETS Thousands/uL 377   NEUTROS PCT % 63   LYMPHS PCT % 20   MONOS PCT % 12   EOS PCT % 4     Results from last 7 days   Lab Units 05/30/22  0541 05/27/22  0506 05/26/22  0233   SODIUM mmol/L 139   < > 135*   POTASSIUM mmol/L 3 4*   < > 3 8   CHLORIDE mmol/L 102   < > 96*   CO2 mmol/L 32   < > 23   BUN mg/dL 6   < > 18   CREATININE mg/dL 0 43*   < > 0 87   ANION GAP mmol/L 5   < > 16*   CALCIUM mg/dL 8 4   < > 9 3   ALBUMIN g/dL  --   --  3 5   TOTAL BILIRUBIN mg/dL  --   --  0 69   ALK PHOS U/L  --   --  75   ALT U/L  --   --  13   AST U/L  --   --  27   GLUCOSE RANDOM mg/dL 170*   < > 321*    < > = values in this interval not displayed  Results from last 7 days   Lab Units 05/26/22  0233   INR  1 12     Results from last 7 days   Lab Units 05/30/22  1613 05/30/22  1109 05/30/22  0732 05/29/22  2043 05/29/22  1615 05/29/22  1127 05/29/22  1054 05/29/22  0736 05/28/22  2047 05/28/22  1539 05/28/22  1103 05/28/22  0719   POC GLUCOSE mg/dl 130 153* 170* 203* 169* 170* 158* 201* 181* 207* 161* 201*         Results from last 7 days   Lab Units 05/27/22  1311 05/26/22  0521 05/26/22  0233   LACTIC ACID mmol/L  --  1 7 4 7*   PROCALCITONIN ng/ml 5 52*  --  1 64*       Imaging: No pertinent imaging reviewed  Recent Cultures (last 7 days):     Results from last 7 days   Lab Units 05/26/22  0333 05/26/22  0233   BLOOD CULTURE   --  No Growth After 4 Days  No Growth After 4 Days     URINE CULTURE  <10,000 cfu/ml Streptococcus anginosus*  --        Lines/Drains:  Invasive Devices  Report    Peripheral Intravenous Line  Duration           Peripheral IV 05/29/22 Left;Ventral (anterior) Wrist <1 day          Drain  Duration           External Urinary Catheter Small 4 days                Telemetry:        Last 24 Hours Medication List:   Current Facility-Administered Medications   Medication Dose Route Frequency Provider Last Rate    acetaminophen  650 mg Rectal Q6H PRN Yolanda Gallagher MD      albuterol  2 puff Inhalation Q6H PRN Magdaline Interior, PA-C      aspirin  81 mg Oral Daily Magdaline Interior, PA-C      atorvastatin  10 mg Oral HS Magdaline Interior, PA-C      cefTRIAXone  1,000 mg Intravenous Q24H Magdaline Interior, PA-C 1,000 mg (05/29/22 1641)    enoxaparin  40 mg Subcutaneous Daily Magdaline Interior, PA-C      furosemide  20 mg Oral BID Magdaline Interior, PA-C      insulin lispro  1-5 Units Subcutaneous TID AC Sawyer Roblero [place] starla, PA-C      insulin lispro  1-5 Units Subcutaneous HS [place] GUILLAUME cha-ED      lactulose  20 g Oral Daily PRN Magdaline Interior, PA-C      lisinopril 5 mg Oral Daily Shannon Ledesma PA-C      loratadine  10 mg Oral HS Shannon Ledesma PA-C      melatonin  6 mg Oral HS [place]      montelukast  10 mg Oral HS Shannon Ledesma PA-C      pantoprazole  40 mg Oral Daily Shannon Ledesma PA-C      PARoxetine  10 mg Oral QAM Shannon Ledesma PA-C      potassium chloride  20 mEq Oral Daily Krystal Kennedy MD      senna  1 tablet Oral BID Shannon Ledesma PA-C      sodium chloride  1 g Oral BID With Meals Shannon Ledesma PA-C          Today, Patient Was Seen By: Boone Kim MD    ** Please Note: This note has been constructed using a voice recognition system   **

## 2022-05-30 NOTE — SPEECH THERAPY NOTE
Speech Language/Pathology Missed Visit Note    Bedside dysphagia tx attempted during lunchtime  Patient received awake with tray set up  Appears to have eaten very little, reports she is finished and does not wish to continue  Follow-up deferred  Will re-attempt as appropriate         Josh [address], [address] [facility]  Speech-Language Pathologist  [place] #[id number]  [place] #[id number]

## 2022-05-30 NOTE — ASSESSMENT & PLAN NOTE
Recent Labs     05/28/22  0502 05/30/22  0541   K 3 1* 3 4*   MG  --  2 1   ·   · Likely diuretic induced in the setting of poor oral intake  · Continue to replace  · Serum potassium on the day of discharge-3 7

## 2022-05-30 NOTE — ASSESSMENT & PLAN NOTE
Lab Results   Component Value Date    HGBA1C 6 8 (H) 01/12/2021       Recent Labs     05/29/22  2043 05/30/22  0732 05/30/22  1109 05/30/22  1613   POCGLU 203* 170* 153* 130       Blood Sugar Average: Last 72 hrs:  (P) 180 5     · Blood glucose checks q i d , hypoglycemia protocol  · Continue p o   Diabetic meds at the time of discharge

## 2022-05-30 NOTE — ASSESSMENT & PLAN NOTE
· Urinalysis positive for leukocytes, occasional bacteria  · Urine culture - Streptococcus anginosus  · On IV Rocephin consider a total of 7 days, discharge on PO Vantin

## 2022-05-30 NOTE — ASSESSMENT & PLAN NOTE
· Patient continues to have waxing and waning mental status, this is likely a combination of acute metabolic encephalopathy due to UTI as well as possible undiagnosed dementia  · Most likely secondary to sepsis related to UTI/aspiration  · Hypoglycemia ruled out  · Chest x-ray with no significant changes, no sign of pneumonia   · CT head negative for acute changes, right parietal subcutaneous contusion  · Patient has no history of prior fall  · COVID/flu/RSV negative  · Fall precautions, delirium precautions with lights off at night, lights on during the day, quiet environment at night  · Continue home Paxil, melatonin q h s  · Mental status is significantly improved , was evaluated by speech therapy-recommended regular diet with thin liquids  · Continue delirium precautions, frequent reorientation, IV antibiotics with ceftriaxone -- changed to p o   Vantin the time of discharge to complete a total 7 day course

## 2022-05-31 VITALS
WEIGHT: 132.5 LBS | OXYGEN SATURATION: 93 % | SYSTOLIC BLOOD PRESSURE: 118 MMHG | BODY MASS INDEX: 26.01 KG/M2 | DIASTOLIC BLOOD PRESSURE: 86 MMHG | HEIGHT: 60 IN | RESPIRATION RATE: 18 BRPM | HEART RATE: 77 BPM | TEMPERATURE: 98.6 F

## 2022-05-31 PROBLEM — E87.6 HYPOKALEMIA: Status: RESOLVED | Noted: 2021-06-26 | Resolved: 2022-05-31

## 2022-05-31 LAB
ALBUMIN SERPL BCP-MCNC: 2.7 G/DL (ref 3.5–5)
ALP SERPL-CCNC: 54 U/L (ref 46–116)
ALT SERPL W P-5'-P-CCNC: 16 U/L (ref 12–78)
ANION GAP SERPL CALCULATED.3IONS-SCNC: 2 MMOL/L (ref 4–13)
AST SERPL W P-5'-P-CCNC: 16 U/L (ref 5–45)
BACTERIA BLD CULT: NORMAL
BACTERIA BLD CULT: NORMAL
BASOPHILS # BLD AUTO: 0.07 THOUSANDS/ΜL (ref 0–0.1)
BASOPHILS NFR BLD AUTO: 1 % (ref 0–1)
BILIRUB SERPL-MCNC: 0.34 MG/DL (ref 0.2–1)
BUN SERPL-MCNC: 6 MG/DL (ref 5–25)
CALCIUM ALBUM COR SERPL-MCNC: 9.4 MG/DL (ref 8.3–10.1)
CALCIUM SERPL-MCNC: 8.4 MG/DL (ref 8.3–10.1)
CHLORIDE SERPL-SCNC: 101 MMOL/L (ref 100–108)
CO2 SERPL-SCNC: 35 MMOL/L (ref 21–32)
CREAT SERPL-MCNC: 0.47 MG/DL (ref 0.6–1.3)
EOSINOPHIL # BLD AUTO: 0.34 THOUSAND/ΜL (ref 0–0.61)
EOSINOPHIL NFR BLD AUTO: 3 % (ref 0–6)
ERYTHROCYTE [DISTWIDTH] IN BLOOD BY AUTOMATED COUNT: 14.3 % (ref 11.6–15.1)
FLUAV RNA RESP QL NAA+PROBE: NEGATIVE
FLUBV RNA RESP QL NAA+PROBE: NEGATIVE
GFR SERPL CREATININE-BSD FRML MDRD: 94 ML/MIN/1.73SQ M
GLUCOSE SERPL-MCNC: 165 MG/DL (ref 65–140)
GLUCOSE SERPL-MCNC: 172 MG/DL (ref 65–140)
GLUCOSE SERPL-MCNC: 186 MG/DL (ref 65–140)
GLUCOSE SERPL-MCNC: 203 MG/DL (ref 65–140)
HCT VFR BLD AUTO: 36.8 % (ref 34.8–46.1)
HGB BLD-MCNC: 11.7 G/DL (ref 11.5–15.4)
IMM GRANULOCYTES # BLD AUTO: 0.04 THOUSAND/UL (ref 0–0.2)
IMM GRANULOCYTES NFR BLD AUTO: 0 % (ref 0–2)
LYMPHOCYTES # BLD AUTO: 1.94 THOUSANDS/ΜL (ref 0.6–4.47)
LYMPHOCYTES NFR BLD AUTO: 18 % (ref 14–44)
MCH RBC QN AUTO: 29.8 PG (ref 26.8–34.3)
MCHC RBC AUTO-ENTMCNC: 31.8 G/DL (ref 31.4–37.4)
MCV RBC AUTO: 94 FL (ref 82–98)
MONOCYTES # BLD AUTO: 1.19 THOUSAND/ΜL (ref 0.17–1.22)
MONOCYTES NFR BLD AUTO: 11 % (ref 4–12)
NEUTROPHILS # BLD AUTO: 7.03 THOUSANDS/ΜL (ref 1.85–7.62)
NEUTS SEG NFR BLD AUTO: 67 % (ref 43–75)
NRBC BLD AUTO-RTO: 0 /100 WBCS
PLATELET # BLD AUTO: 369 THOUSANDS/UL (ref 149–390)
PMV BLD AUTO: 9.2 FL (ref 8.9–12.7)
POTASSIUM SERPL-SCNC: 3.7 MMOL/L (ref 3.5–5.3)
PROT SERPL-MCNC: 6.5 G/DL (ref 6.4–8.2)
RBC # BLD AUTO: 3.93 MILLION/UL (ref 3.81–5.12)
RSV RNA RESP QL NAA+PROBE: NEGATIVE
SARS-COV-2 RNA RESP QL NAA+PROBE: NEGATIVE
SODIUM SERPL-SCNC: 138 MMOL/L (ref 136–145)
WBC # BLD AUTO: 10.61 THOUSAND/UL (ref 4.31–10.16)

## 2022-05-31 PROCEDURE — 0241U HB NFCT DS VIR RESP RNA 4 TRGT: CPT | Performed by: INTERNAL MEDICINE

## 2022-05-31 PROCEDURE — 82948 REAGENT STRIP/BLOOD GLUCOSE: CPT

## 2022-05-31 PROCEDURE — 80053 COMPREHEN METABOLIC PANEL: CPT | Performed by: INTERNAL MEDICINE

## 2022-05-31 PROCEDURE — 99239 HOSP IP/OBS DSCHRG MGMT >30: CPT | Performed by: STUDENT IN AN ORGANIZED HEALTH CARE EDUCATION/TRAINING PROGRAM

## 2022-05-31 PROCEDURE — 92526 ORAL FUNCTION THERAPY: CPT

## 2022-05-31 PROCEDURE — 85025 COMPLETE CBC W/AUTO DIFF WBC: CPT | Performed by: INTERNAL MEDICINE

## 2022-05-31 PROCEDURE — 97110 THERAPEUTIC EXERCISES: CPT

## 2022-05-31 PROCEDURE — 97530 THERAPEUTIC ACTIVITIES: CPT

## 2022-05-31 RX ORDER — CEFPODOXIME PROXETIL 200 MG/1
200 TABLET, FILM COATED ORAL 2 TIMES DAILY
Qty: 2 TABLET | Refills: 0 | Status: SHIPPED | OUTPATIENT
Start: 2022-06-01 | End: 2022-06-02

## 2022-05-31 RX ORDER — SODIUM CHLORIDE 1000 MG
1 TABLET, SOLUBLE MISCELLANEOUS 2 TIMES DAILY WITH MEALS
Start: 2022-05-31

## 2022-05-31 RX ADMIN — LORATADINE 10 MG: 10 TABLET ORAL at 00:45

## 2022-05-31 RX ADMIN — PAROXETINE 10 MG: 20 TABLET, FILM COATED ORAL at 08:39

## 2022-05-31 RX ADMIN — SENNOSIDES 8.6 MG: 8.6 TABLET, FILM COATED ORAL at 08:40

## 2022-05-31 RX ADMIN — ASPIRIN 81 MG: 81 TABLET, CHEWABLE ORAL at 08:40

## 2022-05-31 RX ADMIN — PANTOPRAZOLE SODIUM 40 MG: 40 TABLET, DELAYED RELEASE ORAL at 08:40

## 2022-05-31 RX ADMIN — ATORVASTATIN CALCIUM 10 MG: 10 TABLET, FILM COATED ORAL at 00:45

## 2022-05-31 RX ADMIN — Medication 1 G: at 08:39

## 2022-05-31 RX ADMIN — INSULIN LISPRO 1 UNITS: 100 INJECTION, SOLUTION INTRAVENOUS; SUBCUTANEOUS at 08:40

## 2022-05-31 RX ADMIN — Medication 6 MG: at 00:45

## 2022-05-31 RX ADMIN — Medication 1 G: at 17:00

## 2022-05-31 RX ADMIN — FUROSEMIDE 20 MG: 20 TABLET ORAL at 17:00

## 2022-05-31 RX ADMIN — ENOXAPARIN SODIUM 40 MG: 40 INJECTION SUBCUTANEOUS at 08:41

## 2022-05-31 RX ADMIN — POTASSIUM CHLORIDE 20 MEQ: 1500 TABLET, EXTENDED RELEASE ORAL at 08:39

## 2022-05-31 RX ADMIN — Medication 1000 MG: at 17:00

## 2022-05-31 RX ADMIN — SENNOSIDES 8.6 MG: 8.6 TABLET, FILM COATED ORAL at 17:00

## 2022-05-31 RX ADMIN — INSULIN LISPRO 1 UNITS: 100 INJECTION, SOLUTION INTRAVENOUS; SUBCUTANEOUS at 17:00

## 2022-05-31 RX ADMIN — INSULIN LISPRO 1 UNITS: 100 INJECTION, SOLUTION INTRAVENOUS; SUBCUTANEOUS at 11:43

## 2022-05-31 RX ADMIN — MONTELUKAST 10 MG: 10 TABLET, FILM COATED ORAL at 00:44

## 2022-05-31 NOTE — PLAN OF CARE
Problem: OCCUPATIONAL THERAPY ADULT  Goal: Performs self-care activities at highest level of function for planned discharge setting  See evaluation for individualized goals  Description: Treatment Interventions: ADL retraining, Functional transfer training, UE strengthening/ROM, Endurance training, Cognitive reorientation, Patient/family training, Equipment evaluation/education, Compensatory technique education, Cardiac education, Energy conservation, Continued evaluation          See flowsheet documentation for full assessment, interventions and recommendations  Note: Limitation: Decreased ADL status, Decreased UE strength, Decreased endurance, Decreased cognition, Decreased self-care trans, Decreased high-level ADLs     Assessment: Patient participated in Skilled OT session this date with interventions consisting of ADL re training with the use of correct body mechnaics, safety awareness and fall prevention techniques, therapeutic exercise to: increase functional use of BUEs, increase BUE muscle strength ,  therapeutic activities to: increase activity tolerance, increase dynamic sit/ stand balance during functional activity , increase postural control and increase OOB/ sitting tolerance   Patient agreeable to OT treatment session, upon arrival patient was found supine in bed, alert, responsive  and in no apparent distress  In comparison to previous session, patient with improvements in overall disposition to participate in therapy session  Patient noted with increased participation in bed mobility/ UE therapeutic exercises and increased sitting tolerance time at edge of bed  Please refer to flow sheet for detailed performance  Patient requires mod assist for UB ADLs, max assist for LB ADLs, dependent for lower leg and foot  Patient requiring verbal cues for safety, verbal cues for correct technique and frequent rest periods   Patient continues to be functioning below baseline level, occupational performance remains limited secondary to factors listed above and increased risk for falls and injury  From OT standpoint, recommendation at time of d/c would be Short Term Rehab  Return to previous facility with Rehab evaluation  Patient to benefit from continued Occupational Therapy treatment while in the hospital to address deficits as defined above and maximize level of functional independence with ADLs and functional mobility       OT Discharge Recommendation: Return to facility with rehabilitation services

## 2022-05-31 NOTE — CASE MANAGEMENT
Case Management Discharge Planning Note    Patient name Milton Walsh  [address] 312/-79 MRN [id number]  : 1945 Date 2022       Current Admission Date: 2022  Current Admission Diagnosis:Acute metabolic encephalopathy   Patient Active Problem List    Diagnosis Date Noted    Prolonged Q-T interval on ECG 2022    Chronic diastolic (congestive) heart failure ([facility] ) 2021    Essential hypertension 2021    Weakness 2021    Hypoglycemia 2021    Hypokalemia 2021    Acute metabolic encephalopathy     UTI (urinary tract infection) 2021    Hyponatremia 2021    Acute diastolic congestive heart failure ([facility] ) 2021    Pulmonary hypertension ([facility] )     Bleeding per rectum 2018    GI bleed 2018    Acute pulmonary insufficiency 2017    Asthma 2017    Obesity 2017    Anxiety disorder 2017    Diabetes mellitus type 2 in obese ([facility] ) 2017    Dyslipidemia 2017    Depression 2017    Accelerated hypertension 2017      LOS (days): 5  Geometric Mean LOS (GMLOS) (days): 4 80  Days to GMLOS:-0 5     OBJECTIVE:  Risk of Unplanned Readmission Score: 19 12         Current admission status: Inpatient   Preferred Pharmacy:   [facility] [address] [address]  Phone: [phone] Fax: [phone] - [address] [place], [place] - [facility] [address] [phone] R [address] [phone] [id number])  [address] [facility] [address]  Phone: [phone] Fax: [phone]    Primary Care Provider: Griselda Grizzle, MD    Primary Insurance: [facility] REP  Secondary Insurance: [address]:    Transport at Discharge : [facility] Ambulance  Dispatcher Contacted: Yes  Number/Name of Dispatcher: [facility]  Transported by [employer] and Unit #): [place]  ETA of Transport (Date): 22  ETA of Transport (Time): 1900     Additional Comments: Patient reviewed with Dr Elana Osborn who informed that pt is medically stable for discharge back to [facility] today  CM spoke with pt and son, Mami Rodriguez, who are in agreement with discharge plan  IMM was reviewed, pt's son expressed understanding, IMM in scan bin to be entered into pt's EHR  Per [employer], they are able to accept pt back today as long as Chavez Sadler is initiated  Awaiting therapy notes to submit through [facility] Support Team  CM requested a Covid test from Dr Raulito Hall  CM sent BLS transport request to [facility] via [place] who confirmed a 7:00 PM pickup via [employer]  CMN in scan bin to be entered into pt's EHR, copy in paper chart  Pt's son, RN, Dr Raulito Hall, and [facility] liaison aware of transportation time  Accepting Facility Name, [address] :  [facility]  Receiving Facility/Agency Phone Number: [phone] option 2  Facility/Agency Fax Number: [phone]

## 2022-05-31 NOTE — CASE MANAGEMENT
Case Management Discharge Planning Note    Patient name Pearl Curtis  Location [address] 312/-87 MRN [id number]  : 1945 Date 2022       Current Admission Date: 2022  Current Admission Diagnosis:Acute metabolic encephalopathy   Patient Active Problem List    Diagnosis Date Noted    Prolonged Q-T interval on ECG 2022    Chronic diastolic (congestive) heart failure ([facility] ) 2021    Essential hypertension 2021    Weakness 2021    Hypoglycemia 2021    Hypokalemia 2021    Acute metabolic encephalopathy     UTI (urinary tract infection) 2021    Hyponatremia 2021    Acute diastolic congestive heart failure ([facility] ) 2021    Pulmonary hypertension ([facility] )     Bleeding per rectum 2018    GI bleed 2018    Acute pulmonary insufficiency 2017    Asthma 2017    Obesity 2017    Anxiety disorder 2017    Diabetes mellitus type 2 in obese ([facility] ) 2017    Dyslipidemia 2017    Depression 2017    Accelerated hypertension 2017      LOS (days): 5  Geometric Mean LOS (GMLOS) (days): 4 80  Days to GMLOS:-0 6     OBJECTIVE:  Risk of Unplanned Readmission Score: 19 12         Current admission status: Inpatient   Preferred Pharmacy:   [facility] [address] [place] [address]  Phone: [phone] Fax: [phone] - [place], [place] - [phone] R [address] [phone])  [facility] [address]  Phone: [phone] Fax: [phone]    Primary Care Provider: Satish Serna MD    Primary Insurance: [facility] REP  Secondary Insurance: [employer]    DISCHARGE DETAILS:    Additional Comments: PT/OT notes were updated, CM sent STR auth request to [facility] Support Team via [place]  Per [employer], they are able to accept pt with auth pending as pt is a bed hold

## 2022-05-31 NOTE — PLAN OF CARE
Problem: PHYSICAL THERAPY ADULT  Goal: Performs mobility at highest level of function for planned discharge setting  See evaluation for individualized goals  Description: Treatment/Interventions: Functional transfer training, LE strengthening/ROM, Therapeutic exercise, Endurance training, Bed mobility, Spoke to nursing, OT  Equipment Recommended:  (tbd)       See flowsheet documentation for full assessment, interventions and recommendations  Outcome: Progressing  Note: Prognosis: Fair  Problem List: Decreased strength, Decreased endurance, Impaired balance, Decreased mobility, Decreased cognition, Pain  Assessment: Pt seen for PT treatment session this date with interventions consisting of Therapeutic exercise consisting of: bilateral ankle pumps, heel slides and LAQs and therapeutic activity consisting of training: bed mobility, supine<>sit transfers, sit<>stand transfers, static standing tolerance for 2 x 20-30 seconds w/ bilateral UE support and vc and tactile cues for static standing posture faciliation  Pt agreeable to PT treatment session upon arrival, pt found supine in bed w/ HOB elevated, in no apparent distress and responsive  In comparison to previous session, pt with improvements in activity tolerance and balance  Post session: pt returned BTB, all needs in reach and RN notified of session findings/recommendations  Continue to recommend post acute rehabilitation services at time of d/c in order to maximize pt's functional independence and safety w/ mobility  Pt continues to be functioning below baseline level, and remains limited 2* factors listed above and including continued need for medical management and monitoring, impaired endurance and activity tolerance, decreased strength and balance resulting in an increased risk for falls   PT will continue to see pt during current hospitalization in order to address the deficits listed above and provide interventions consistent w/ POC in effort to achieve STGs   Barriers to Discharge: Other (Comment) (recommend return to SNF with skilled therapy)        PT Discharge Recommendation: Return to facility with rehabilitation services          See flowsheet documentation for full assessment

## 2022-05-31 NOTE — PHYSICAL THERAPY NOTE
Physical Therapy Treatment Note    Patient's Name: Bárbara Lynch    Admitting Diagnosis  UTI (urinary tract infection) [N39 0]  Altered mental status [R41 82]  AMS (altered mental status) [R41 82]    Problem List  Patient Active Problem List   Diagnosis    Acute pulmonary insufficiency    Asthma    Obesity    Anxiety disorder    Diabetes mellitus type 2 in obese ([facility] )    Dyslipidemia    Depression    Accelerated hypertension    Bleeding per rectum    GI bleed    Acute metabolic encephalopathy    UTI (urinary tract infection)    Hyponatremia    Acute diastolic congestive heart failure (HCC)    Pulmonary hypertension (HCC)    Hypokalemia    Chronic diastolic (congestive) heart failure ([facility] )    Essential hypertension    Weakness    Hypoglycemia    Prolonged Q-T interval on ECG        05/31/22 1150   PT Last Visit   PT Visit Date 05/31/22   Note Type   Note Type Treatment   Pain Assessment   Pain Assessment Tool 0-10   Pain Score No Pain   Restrictions/Precautions   Weight Bearing Precautions Per Order No   Braces or Orthoses Other (Comment)  (none per patient)   Other Precautions Chair Alarm; Bed Alarm;Cognitive;O2;Fall Risk;Pain   General   Chart Reviewed Yes   Response to Previous Treatment Patient reporting fatigue but able to participate  Family/Caregiver Present Yes   Cognition   Overall Cognitive Status Impaired   Arousal/Participation Alert   Attention Within functional limits   Orientation Level Oriented to person;Oriented to place;Oriented to time   Memory Decreased short term memory;Decreased recall of recent events   Following Commands Follows one step commands with increased time or repetition   Comments Pt agreeable to particpate in PT treatment session   Bed Mobility   Rolling R 3  Moderate assistance   Additional items Assist x 2; Increased time required;Verbal cues;LE management   Rolling L 3  Moderate assistance   Additional items Assist x 2;LE management; Increased time required   Supine to Sit 2 Maximal assistance   Additional items Assist x 2; Increased time required;Verbal cues;LE management   Sit to Supine 3  Moderate assistance   Additional items Assist x 2; Increased time required;Verbal cues   Additional Comments Pt received at start of session supine in bed with HOB elevated  Following session pt returned to bed positioned in chair position   Transfers   Sit to Stand 2  Maximal assistance   Additional items Assist x 2; Increased time required;Verbal cues   Stand to Sit 2  Maximal assistance   Additional items Assist x 2; Increased time required;Verbal cues   Additional Comments Completed STS transfer x3 attempts with use of gait belt and max Ax2 for force production and upright posture, max VCs for encouragement  Pt able to tolerate static standing 20-30seconds x3 trials   Ambulation/Elevation   Gait pattern Not appropriate   Balance   Static Sitting Fair   Dynamic Sitting Fair -   Static Standing Poor -   Endurance Deficit   Endurance Deficit Yes   Endurance Deficit Description decreased activity tolerance   Activity Tolerance   Activity Tolerance Patient limited by fatigue   Nurse Made Aware RN confirmed pt appropriate for PT treatment session   Exercises   Heelslides Supine;10 reps;AAROM; Bilateral   Knee AROM Long Arc Quad Sitting;10 reps;AROM; Bilateral   Ankle Pumps Sitting;10 reps;AROM; Bilateral   Assessment   Prognosis Fair   Problem List Decreased strength;Decreased endurance; Impaired balance;Decreased mobility; Decreased cognition;Pain   Assessment Pt seen for PT treatment session this date with interventions consisting of Therapeutic exercise consisting of: bilateral ankle pumps, heel slides and LAQs and therapeutic activity consisting of training: bed mobility, supine<>sit transfers, sit<>stand transfers, static standing tolerance for 2 x 20-30 seconds w/ bilateral UE support and vc and tactile cues for static standing posture faciliation   Pt agreeable to PT treatment session upon arrival, pt found supine in bed w/ HOB elevated, in no apparent distress and responsive  In comparison to previous session, pt with improvements in activity tolerance and balance  Post session: pt returned BTB, all needs in reach and RN notified of session findings/recommendations  Continue to recommend post acute rehabilitation services at time of d/c in order to maximize pt's functional independence and safety w/ mobility  Pt continues to be functioning below baseline level, and remains limited 2* factors listed above and including continued need for medical management and monitoring, impaired endurance and activity tolerance, decreased strength and balance resulting in an increased risk for falls  PT will continue to see pt during current hospitalization in order to address the deficits listed above and provide interventions consistent w/ POC in effort to achieve STGs  Barriers to Discharge Other (Comment)  (recommend return to SNF with skilled therapy)   Goals   Patient Goals None stated   STG Expiration Date 06/11/22   PT Treatment Day 1   Plan   Treatment/Interventions Functional transfer training;LE strengthening/ROM; Therapeutic exercise; Endurance training;Bed mobility;Spoke to nursing;OT   Progress Slow progress, decreased activity tolerance   PT Frequency 3-5x/wk   Recommendation   PT Discharge Recommendation Return to facility with rehabilitation services   [address]   Turning in Bed Without Bedrails 3   Lying on Back to Sitting on Edge of Flat Bed 2   Moving Bed to Chair 1   Standing Up From Chair 2   Walk in Room 1   Climb 3-5 Stairs 1   Basic Mobility Inpatient Raw Score 10   Turning Head Towards Sound 4   Follow Simple Instructions 3   Low Function Basic Mobility Raw Score 17   Low Function Basic Mobility Standardized Score 27 46   Highest Level Of Mobility   JH-HLM Goal 4: Move to chair/commode   JH-HLM Achieved 3: Sit at edge of bed   Education   Education Provided Mobility training;Assistive device   Patient Demonstrates acceptance/verbal understanding   End of Consult   Patient Position at End of Consult Bed/Chair alarm activated; All needs within reach; Supine       Ilene Weems, PT    Time In: 11:50  Time Out: 12:20  Total Time:30 mins

## 2022-05-31 NOTE — QUICK NOTE
Plan of care discussed with the patient's family member - Marya Aguiar (son) present at bedside  Answered their questions/ concerns, provided daily updates and discharge plan  Discussed with CM -- patient will need auth to go to the facility, however facility is willing to take patient back without an [place]  Patient will need therapy notes from today -- discussed with PT  Anticipated DC :likely late today / tomorrow  Patient's son is agreeable

## 2022-05-31 NOTE — CASE MANAGEMENT
Case Management Discharge Planning Note    Patient name Mackenzie Agosto  [address] 312/-01 MRN [id number]  : 1945 Date 2022       Current Admission Date: 2022  Current Admission Diagnosis:Acute metabolic encephalopathy   Patient Active Problem List    Diagnosis Date Noted    Prolonged Q-T interval on ECG 2022    Chronic diastolic (congestive) heart failure ([facility] ) 2021    Essential hypertension 2021    Weakness 2021    Hypoglycemia 2021    Hypokalemia 2021    Acute metabolic encephalopathy     UTI (urinary tract infection) 2021    Hyponatremia 2021    Acute diastolic congestive heart failure ([facility] ) 2021    Pulmonary hypertension ([facility] )     Bleeding per rectum 2018    GI bleed 2018    Acute pulmonary insufficiency 2017    Asthma 2017    Obesity 2017    Anxiety disorder 2017    Diabetes mellitus type 2 in obese ([facility] ) 2017    Dyslipidemia 2017    Depression 2017    Accelerated hypertension 2017      LOS (days): 5  Geometric Mean LOS (GMLOS) (days): 4 80  Days to GMLOS:-0 6     OBJECTIVE:  Risk of Unplanned Readmission Score: 19 12         Current admission status: Inpatient   Preferred Pharmacy:   [facility] [address], [address] [facility] [address]  Phone: [phone] Fax: [phone] [facility] [phone])  [id number] [facility] [address]  Phone: [phone] Fax: [phone]    Primary Care Provider: Zulema Figueroa MD    Primary Insurance: [facility]  Secondary Insurance: [facility] Number: Jaida Martínez Pending EWKTEMLSLIBER#[id number] for patient to be transferred to [facility] (MDSHANTI#[id number])  Requqest with clinicals submitted via [employer]

## 2022-05-31 NOTE — SPEECH THERAPY NOTE
Speech Language/Pathology     Speech/Language Pathology Progress Note     Patient Name: Suni RIOS Date: 5/31/2022     Problem List  Principal Problem:    Acute metabolic encephalopathy  Active Problems:    Diabetes mellitus type 2 in obese [facility])    UTI (urinary tract infection)    Hypokalemia    Chronic diastolic (congestive) heart failure (HCC)    Prolonged Q-T interval on ECG      Recommendations:   Diet: regular diet and thin liquids   Meds: whole with liquid   Feeding Assistance: tray set up  Frequent Oral care: 2-4x/day  Aspiration precautions and compensatory swallowing strategies: upright posture, only feed when fully alert and small bites/sips  Other Recommendations/ considerations: No additional SLP f/u; continue aspiration precautions       Subjective:  Patient received awake and alert following OT/PT; son present and reports no difficulties with swallowing  However intake remains limited  Previous/current diet: reg/thin; follow-up as per MD request     Objective: The following consistencies were tested regular solids, meltable/puree solid (ice cream), thin liquid by straw  Patient presents with adequate bolus containment, manipulation and control  Mastication judged to be functional and complete  No overt s/s of aspiration or distress  Vocal quality noted to remain clear  Assessment:  Oropharyngeal swallow function appears unchanged from previous encounters  Tolerated all trials with no s/s suggestive of aspiration or pharyngeal dysphagia  Swallow function appears to be at baseline  Plan:  No additional follow-up from this department; please re-order should status change or concerns arise       Josh Quiñones [address], [address] [facility]  Speech-Language Pathologist  [place] #[id number]  [place] #[id number]

## 2022-05-31 NOTE — OCCUPATIONAL THERAPY NOTE
Occupational Therapy Treatment Note        Patient Name: Purvi Kwon  MTVDU'N Date: 5/31/2022 05/31/22 1220   OT Last Visit   OT Visit Date 05/31/22   Note Type   Note Type Treatment   Restrictions/Precautions   Weight Bearing Precautions Per Order No   Other Precautions Chair Alarm; Bed Alarm;Cognitive;O2;Fall Risk;Pain   Pain Assessment   Pain Assessment Tool FLACC   Pain Score No Pain  (at rest/ discomfort upon movement especially bilateral knees and lower legs )   Pain Rating: FLACC (Rest) - Face 0   Pain Rating: FLACC (Rest) - Legs 0   Pain Rating: FLACC (Rest) - Activity 0   Pain Rating: FLACC (Rest) - Cry 0   Pain Rating: FLACC (Rest) - Consolability 0   Score: FLACC (Rest) 0   Pain Rating: FLACC (Activity) - Face 0   Pain Rating: FLACC (Activity) - Legs 1   Pain Rating: FLACC (Activity) - Activity 0   Pain Rating: FLACC (Activity) - Cry 0   Pain Rating: FLACC (Activity) - Consolability 0   Score: FLACC (Activity) 1   ADL   Where Assessed Other (Comment)  (Performance levels based on overall functioning demonstrated during this session, using simulated self care tasks )   Eating Assistance 5  Supervision/Setup   Eating Deficit Setup; Increased time to complete   Grooming Assistance 5  Supervision/Setup   Grooming Deficit Setup; Increased time to complete;Verbal cueing   UB Bathing Assistance 3  Moderate Assistance   UB Bathing Deficit Setup; Increased time to complete;Supervision/safety   LB Bathing Assistance 2  Maximal Assistance   LB Bathing Deficit Other (Comment); Increased time to complete  (dependent for lower leg and foot)   UB Dressing Assistance 4  Minimal Assistance   UB Dressing Deficit Supervision/safety; Increased time to complete   LB Dressing Assistance 2  Maximal Assistance   LB Dressing Deficit Setup;Verbal cueing; Increased time to complete   Toileting Assistance  2  Maximal Assistance   Toileting Deficit Increased time to complete;Use of bedpan/urinal setup   Bed Mobility Rolling R 3  Moderate assistance   Additional items Assist x 2; Increased time required;Verbal cues;LE management   Rolling L 3  Moderate assistance   Additional items Assist x 2;LE management; Increased time required   Supine to Sit 2  Maximal assistance   Additional items Assist x 2; Increased time required;Verbal cues;LE management   Sit to Supine 3  Moderate assistance   Additional items Assist x 2; Increased time required;Verbal cues   Transfers   Sit to Stand 2  Maximal assistance   Additional items Assist x 2; Increased time required;Verbal cues  (please see PT report for detailed performance)   Stand to Sit 2  Maximal assistance   Additional items Assist x 2; Increased time required;Verbal cues   Therapeutic Exercise - ROM   UE-ROM Yes   ROM- Right Upper Extremities   R Shoulder AROM; Flexion;ABduction; Extension;Horizontal ABduction   R Elbow AROM;Elbow flexion;Elbow extension   R Weight/Reps/Sets 2 sets of 10 repetitions each   ROM - Left Upper Extremities    L Shoulder AROM; Flexion;ABduction; Extension;Horizontal ABduction   L Elbow AROM;Elbow flexion;Elbow extension   L Weight/Reps/Sets 2 sets of 10 repetitions each   Cognition   Overall Cognitive Status Impaired   Arousal/Participation Alert; Responsive; Cooperative   Attention Within functional limits   Orientation Level Oriented to person;Oriented to place;Oriented to time  (inconsistent orientation to situation)   Memory Decreased short term memory;Decreased recall of recent events   Following Commands Follows one step commands with increased time or repetition   Comments patient fearful  of falling/ increased anxiety with mobility/ patient's son present during session- very helpful and encouraging Mom to do her best    Activity Tolerance   Activity Tolerance Patient limited by fatigue;Treatment limited secondary to medical complications (Comment)  (patient fearful  of falling/ increased anxiety with mobility)   Assessment   Assessment Patient participated in Skilled OT session this date with interventions consisting of ADL re training with the use of correct body mechnaics, safety awareness and fall prevention techniques, therapeutic exercise to: increase functional use of BUEs, increase BUE muscle strength ,  therapeutic activities to: increase activity tolerance, increase dynamic sit/ stand balance during functional activity , increase postural control and increase OOB/ sitting tolerance   Patient agreeable to OT treatment session, upon arrival patient was found supine in bed, alert, responsive  and in no apparent distress  In comparison to previous session, patient with improvements in overall disposition to participate in therapy session  Patient noted with increased participation in bed mobility/ UE therapeutic exercises and increased sitting tolerance time at edge of bed  Please refer to flow sheet for detailed performance  Patient requires mod assist for UB ADLs, max assist for LB ADLs, dependent for lower leg and foot  Patient requiring verbal cues for safety, verbal cues for correct technique and frequent rest periods  Patient continues to be functioning below baseline level, occupational performance remains limited secondary to factors listed above and increased risk for falls and injury  From OT standpoint, recommendation at time of d/c would be Short Term Rehab  Return to previous facility with Rehab evaluation  Patient to benefit from continued Occupational Therapy treatment while in the hospital to address deficits as defined above and maximize level of functional independence with ADLs and functional mobility  Plan   Treatment Interventions ADL retraining;Functional transfer training;UE strengthening/ROM; Endurance training;Cognitive reorientation;Patient/family training;Equipment evaluation/education; Compensatory technique education;Cardiac education; Energy conservation;Continued evaluation   Goal Expiration Date 06/10/22   OT Treatment Day 1   OT Frequency 3-5x/wk   Recommendation   OT Discharge Recommendation Return to facility with rehabilitation services   AM-PAC Daily Activity Inpatient   Lower Body Dressing 2   Bathing 2   Toileting 2   Upper Body Dressing 2   Grooming 3   Eating 3   Daily Activity Raw Score 14   Daily Activity Standardized Score (Calc for Raw Score >=11) 33 39   AM-PAC Applied Cognition Inpatient   Following a Speech/Presentation 3   Understanding Ordinary Conversation 3   Taking Medications 1   Remembering Where Things Are Placed or Put Away 2   Remembering List of 4-5 Errands 2   Taking Care of Complicated Tasks 1   Applied Cognition Raw Score 12   Applied Cognition Standardized Score 28 82     Co treatment session with Physical Therapy secondary to complex medical condition, requiring two skilled therapists to provide therapeutic techniques to position, facilitate and elicit functional mobility and activity engagement in occupational performance areas

## 2022-05-31 NOTE — DISCHARGE INSTR - AVS FIRST PAGE
DISCHARGE INSTRUCTIONS :     Please take Vantin 200 mg by mouth two times a day for 1 day on 6/1  Please obtain BMP on 6/3 for follow up of elevated bicarbonate  Please ensure to drink enough water **

## 2022-06-02 NOTE — CASE MANAGEMENT
Case Management Discharge Planning Note    Patient name Jose Elizabeth  Location [facility] [address] [address] 312/-63 MRN [id number]  : 1945 Date 2022       Current Admission Date: 2022  Current Admission Diagnosis:Acute metabolic encephalopathy   Patient Active Problem List    Diagnosis Date Noted    Prolonged Q-T interval on ECG 2022    Chronic diastolic (congestive) heart failure ([facility] [address] ) 2021    Essential hypertension 2021    Weakness 2021    Hypoglycemia 2021    Acute metabolic encephalopathy     UTI (urinary tract infection) 2021    Hyponatremia 2021    Acute diastolic congestive heart failure ([facility] ) 2021    Pulmonary hypertension ([facility] )     Bleeding per rectum 2018    GI bleed 2018    Acute pulmonary insufficiency 2017    Asthma 2017    Obesity 2017    Anxiety disorder 2017    Diabetes mellitus type 2 in obese ([facility] ) 2017    Dyslipidemia 2017    Depression 2017    Accelerated hypertension 2017      LOS (days): 5  Geometric Mean LOS (GMLOS) (days): 4 80  Days to GMLOS:-0 8     OBJECTIVE:  Risk of Unplanned Readmission Score: 19 21         Current admission status: Inpatient   Preferred Pharmacy:   [facility] [address] [address]  Phone: [phone] Fax: [phone]    [id number] [place] [place] - [phone] R [phone])  [address] [facility] [address]  Phone: [phone] Fax: [phone]    Primary Care Provider: Lucien Sarabia MD    Primary Insurance: [facility]  Secondary Insurance: [facility] Number: Evander Closs Director Dr iAleen Chavira is denying SNF auth request d/t no skilled needs required skilled level of care   Peer to peer p# [phone] opt 2 Deadline: 6/2/22 4:30 PM EST  Appeal P# [phone]   Nurse Ivett P# [phone]

## 2022-06-07 NOTE — UTILIZATION REVIEW
Notification of Discharge   This is a Notification of Discharge from our facility [address]  Please be advised that this patient has been discharge from our facility  Below you will find the admission and discharge date and time including the patients disposition  UTILIZATION REVIEW CONTACT:  Candace Lino  Utilization   Network Utilization Review Department  Phone: [phone] x carefully listen to the prompts  All voicemails are confidential   Email: [email or web address]     PHYSICIAN ADVISORY SERVICES:  FOR IUIQ-XI-LATK REVIEW - MEDICAL NECESSITY DENIAL  Phone: [phone]  Fax: [phone]  Email: [email or web address]     PRESENTATION DATE: 5/26/2022  2:17 AM  OBERVATION ADMISSION DATE: N/A  INPATIENT ADMISSION DATE: 5/26/22  5:39 AM   DISCHARGE DATE: 5/31/2022  9:00 PM  DISPOSITION: Non [facility] SNF/TCU/SNU Non [facility] SNF/TCU/SNU      IMPORTANT INFORMATION:  Send all requests for admission clinical reviews, approved or denied determinations and any other requests to dedicated fax number below belonging to the campus where the patient is receiving treatment   List of dedicated fax numbers:  [address] DENIALS (Administrative/Medical Necessity) [phone]   [address] (Maternity/NICU/Pediatrics) [phone]   Fabiola Hernandez [phone]   130 [facility] [phone]   [address] [place] [address] [phone]   [address] [phone]   [facility]  [phone]   2205 [facility]  2401 [facility] 1000 [address] [facility] [phone]

## 2022-09-20 NOTE — PROGRESS NOTES
10/06/2021 Progress Notes: CHELI Alvarado MD          Reason for Appointment   1. Presyncope established patient   History of Present Illness   Presyncope:   I had the pleasure of seeing this patient in the pediatric cardiology office today. As you may recall, the patient is a 13 year old who I follow with a history of presyncope and one episode of syncope. The patient was last seen 2 months ago and returns today for late follow up since initiating Florinef 0.1 mg daily. The patient reports medication compliance with her last dose taken last night. The patient's mother reports they did not start the medication until 3-4 weeks ago but has noticed the Florinef is helping. There are no complaints of shortness of breath, chest pain, tachycardia, arrhythmia, palpitations, syncope, exercise intolerance.    Current Medications   Taking    Estarylla(Norgestimate-Eth Estradiol) 0.25-35 MG-MCG Tablet 1 tablet Orally Once a day   Florinef 0.1 mg tablet as directed orally every day (qd)   Multivitamin    Not-Taking    Birth Control    Magnesium    Medication List reviewed and reconciled with the patient      Past Medical History   Dysautonomia (one syncopal event).     Chronic fatigue.     Mononucleosis.     Surgical History   No prior surgical procedures    Family History   Father: alive, diagnosed with Hypertension   3 sister(s) - healthy.    There is no direct family history of congenital heart disease, sudden death, arrhythmia, hypercholesterolemia, myocardial infarction, stroke, diabetes, cancer, or other inheritable disorders.   Social History   Observations: no.   Language: no language barriers.   Tobacco Use Are you a: never smoker.   Smokers in the household: No.   Education: 11th Grade.   Exercise/activities: Active.   Number of siblings: 3.   Caffeine: yes.   Alcohol: no.   Drugs: no.    Allergies   Amoxicillin   Hospitalization/Major Diagnostic Procedure   No prior hospitalizations    Review of Systems   Genetics:  [facility]  Progress Note - Berry Snowball 1945, 64 y o  female MRN: [id number]  Unit/Bed#: -Bam Encounter: [id number]  Primary Care Provider: Nina Barraza MD   Date and time admitted to hospital: 5/26/2022  2:17 AM    Prolonged Q-T interval on ECG  Assessment & Plan  · EKG revealing QTC of 523, sinus tachycardia  · Status post 1 dose of IV magnesium at the time of admission  · Repeat EKG to assess QT prolongation-- QTC -442 on 05/27  · Patient is currently on Paxil-medication at the nursing facility  Chronic diastolic (congestive) heart failure (HCC)  Assessment & Plan  Wt Readings from Last 3 Encounters:   05/29/22 61 8 kg (136 lb 3 9 oz)   08/06/21 76 6 kg (168 lb 14 oz)   06/29/21 84 1 kg (185 lb 6 5 oz)     · Slight edema in bilateral lower extremities though no significant vascular congestion on chest x-ray  · For now will continue home p o  Lasix 20 mg b i d   · Intake and output monitoring, daily weights  · Low-sodium diet        Hypokalemia  Assessment & Plan  Recent Labs     05/27/22  0506 05/28/22  0502   K 3 6 3 1*   · Will check magnesium  · Likely diuretic induced in the setting of poor oral intake  · Continue potassium chloride daily    UTI (urinary tract infection)  Assessment & Plan  · Urinalysis positive for leukocytes, occasional bacteria  · Urine culture - Streptococcus anginosus  · On IV ceftriaxone-day 4    Diabetes mellitus type 2 in obese [facility])  Assessment & Plan  Lab Results   Component Value Date    HGBA1C 6 8 (H) 01/12/2021       Recent Labs     05/28/22  2047 05/29/22  0736 05/29/22  1054 05/29/22  1127   POCGLU 181* 201* 158* 170*       Blood Sugar Average: Last 72 hrs:  (P) 863 [id number]   · Blood glucose checks q i d , hypoglycemia protocol  · Hold p o   Diabetic meds  · Sliding scale insulin    * Acute metabolic encephalopathy  Assessment & Plan  · Improved  · Most likely secondary to sepsis related to UTI/aspiration  · Hypoglycemia ruled   Syndrome none.   Constitutional:   Fatigue none. Fever none. Loss of appetite none. Weakness none. Weight no problems reported.   Neurologic:   Syncope none. Dizziness yes. Headaches none. Seizures absent.   Ophthalmologic:   Contacts or glasses none. Diminished vision none.   Ear, nose, throat:   Eyes no problems present. Mouth and throat no problems noted. Upper airway obstruction none present. Nasal congestion none.   Respiratory:   Asthma none. Tachypnea none. Cough none. Shortness of breath associated with dizziness. Wheezing none.   Cardiovascular:   See HPI for details.   Gastrointestinal:   Stomach no nausea or vomiting. Bowel no diarrhea, no constipation. Abdomen No complaints.   Endocrine:   Thyroid disease none. Diabetes none.   Genitourinary:   Renal disease no problems noted. Urinary tract infection none.   Musculoskeletal:   Joint pain none. Joint swelling none. Muscle no cramping, aching, or stiffness.   Dermatologic:   Itching none. Rash none.   Hematology, oncology:   Anemia none. Abnormal bleeding none. Clotting disorder none.   Allergy:   Seasonal/Environmental none. Food none. Latex none.   Psychology:   ADD or ADHD none. Nervousness none. Mental Illness none. Anxiety none. Depression none.      Vital Signs   Ht 166.5 cm, Wt 66.4 kg, BMI 23.95, heart rate (HR) 100 bpm, blood pressure (BP) Right Arm: 126/69 mmHg, respiratory rate (RR) 24.   Physical Examination   General:   General Appearance: pleasant. Nutrition well nourished. Distress none. Cyanosis none.   HEENT:   Head: atraumatic, normocephalic.   Neck:   Neck: supple.   Chest:   Shape and Expansion: equal expansion bilaterally, no retractions, no grunting. Breath Sounds: clear to auscultation, no wheezing, rhonchi, crackles, or stridor. Wheezes: none. Chest wall: no gross deformities, no tenderness. Crackles: none.   Heart:   Pulses: radial and brachial artery pulses full and equal. Inspection: normal and acyanotic. Palpation: normal  out  · Chest x-ray with no significant changes, no sign of pneumonia   · CT head negative for acute changes, right parietal subcutaneous contusion  · Patient has no history of prior fall  · COVID/flu/RSV negative  · Fall precautions, delirium precautions with lights off at night, lights on during the day, quiet environment at night  · Continue home Paxil, melatonin q h s  · Mental status is significantly improved , was evaluated by speech therapy-recommended regular diet with thin liquids  · Continue delirium precautions, frequent reorientation, IV antibiotics with ceftriaxone    Sepsis (HCC)-resolved as of 2022  Assessment & Plan  · Sepsis resolved  · Met criteria due to being febrile, tachycardia, tachypnea, leukocytosis in setting of possible UTI/possible aspiration  · Lactic acid initially elevated at 4 7, repeat WNL at 1 7 and procalcitonin elevated - trend: 1 46 -> 5 52   · Blood culture x2 - no growth at 48 hours  · Continue IV ceftriaxone 1 g daily          VTE Pharmacologic Prophylaxis:   VTE Score: 7 Moderate Risk (Score 3-4) - Pharmacological DVT Prophylaxis Ordered: Enoxaparin (Lovenox)  Mechanical VTE Prophylaxis in Place: Yes    Patient Centered Rounds: I have performed bedside rounds with nursing staff today  Discussions with Specialists or Other Care Team Provider: yes    Education and Discussions with Family / Patient: Updated  () at bedside  Current Length of Stay: 3 day(s)    Current Patient Status: Inpatient     Discharge Plan / Estimated Discharge Date: Anticipate discharge in 48 hrs to rehab facility  Code Status: Level 1 - Full Code      Subjective:   Seen and examined at bedside  Awake alert comfortable on room air  Pleasantly confused    No overnight events reported    Objective:     Vitals:   Temp (24hrs), Av 5 °F (36 4 °C), Min:97 4 °F (36 3 °C), Max:97 6 °F (36 4 °C)    Temp:  [97 4 °F (36 3 °C)-97 6 °F (36 4 °C)] 97 6 °F (36 4 °C)  HR: [82-94] 82  Resp:  [18] 18  BP: (129-141)/(71-81) 141/81  SpO2:  [97 %-99 %] 97 %  Body mass index is 26 61 kg/m²  Input and Output Summary (last 24 hours): Intake/Output Summary (Last 24 hours) at 5/29/2022 1203  Last data filed at 5/29/2022 0859  Gross per 24 hour   Intake 230 ml   Output 1100 ml   Net -870 ml       Physical Exam:     Physical Exam  Vitals and nursing note reviewed  Constitutional:       General: She is not in acute distress  Appearance: She is well-developed  HENT:      Head: Normocephalic and atraumatic  Mouth/Throat:      Mouth: Mucous membranes are moist    Eyes:      General:         Right eye: No discharge  Left eye: No discharge  Conjunctiva/sclera: Conjunctivae normal    Cardiovascular:      Rate and Rhythm: Normal rate  Pulses: Normal pulses  Heart sounds: Normal heart sounds  No murmur heard  Pulmonary:      Effort: Pulmonary effort is normal  No respiratory distress  Breath sounds: Normal breath sounds  Abdominal:      Palpations: Abdomen is soft  Tenderness: There is no abdominal tenderness  Musculoskeletal:      Cervical back: Neck supple  Right lower leg: No edema  Left lower leg: No edema  Skin:     General: Skin is warm and dry  Capillary Refill: Capillary refill takes less than 2 seconds  Neurological:      Mental Status: She is alert  She is disoriented            Additional Data:     Labs:  Results from last 7 days   Lab Units 05/28/22  0604   WBC Thousand/uL 12 62*   HEMOGLOBIN g/dL 11 7   HEMATOCRIT % 36 7   PLATELETS Thousands/uL 370   NEUTROS PCT % 66   LYMPHS PCT % 21   MONOS PCT % 11   EOS PCT % 1     Results from last 7 days   Lab Units 05/28/22  0502 05/27/22  0506 05/26/22  0233   SODIUM mmol/L 138   < > 135*   POTASSIUM mmol/L 3 1*   < > 3 8   CHLORIDE mmol/L 103   < > 96*   CO2 mmol/L 26   < > 23   BUN mg/dL 7   < > 18   CREATININE mg/dL 0 34*   < > 0 87   ANION GAP mmol/L 9   < > 16* point of maximal impulse. Rate: regular. Rhythm: regular. S1: normal. S2: physiologically split. Clicks: none. Systolic murmurs: none present. Diastolic murmurs: none present. Rubs, Gallops: none.   Abdomen:   Shape: normal. Tenderness: none. Liver, Spleen: no hepatosplenomegaly. Palpation soft.   Extremities:   Edema: no. Cyanosis: no. Clubbing: no. Pulses: 2+ bilaterally.      Assessments      1. Syncope and collapse - R55 (Primary)   2. Venous insufficiency (chronic) (peripheral) - I87.2   In summary, Ravi has a history of presyncope. It is possible the patient has mild vasodepressor syncope or dysautonomia. As you may be aware, this is typically a self limited problem and does not put the patient at any significant clinical risks. I discussed with the family that I do not believe cardiac pathology is present as she had a normal evaluation in the past including the electrocardiogram and echocardiogram. Due to the degree of her symptoms, I initiated Florinef 0.1 mg PO QD in August 2021. Ravi reports improvement of her symptoms with taking the medication. I therefore made no changes and asked her to continue the current dose. She should continue pushing salt and fluids because that will improve symptoms by increasing the intravascular volume. I asked that she return for follow up in 1 year for an electrocardiogram, reassessment of symptoms and medication check. Please contact me with questions.   Treatment   1. Syncope and collapse   Continue Florinef tablet, 0.1 mg, as directed, orally, every day (qd), 30 day(s), 30, Refills 12         Preventive Medicine   Counseling: Exercise - No activity restrictions. SBE prophylaxis - None indicated.    Follow Up   1 year (Reason: Electrocardiogram,Reassessment of Symptoms,Medication Check)        CALCIUM mg/dL 8 2*   < > 9 3   ALBUMIN g/dL  --   --  3 5   TOTAL BILIRUBIN mg/dL  --   --  0 69   ALK PHOS U/L  --   --  75   ALT U/L  --   --  13   AST U/L  --   --  27   GLUCOSE RANDOM mg/dL 176*   < > 321*    < > = values in this interval not displayed  Results from last 7 days   Lab Units 05/26/22  0233   INR  1 12     Results from last 7 days   Lab Units 05/29/22  1127 05/29/22  1054 05/29/22  0736 05/28/22  2047 05/28/22  1539 05/28/22  1103 05/28/22  0719 05/27/22  2049 05/27/22  1623 05/27/22  1150 05/27/22  0604 05/26/22  2355   POC GLUCOSE mg/dl 170* 158* 201* 181* 207* 161* 201* 205* 151* 244* 184* 160*         Results from last 7 days   Lab Units 05/27/22  1311 05/26/22  0521 05/26/22  0233   LACTIC ACID mmol/L  --  1 7 4 7*   PROCALCITONIN ng/ml 5 52*  --  1 64*       Imaging: No pertinent imaging reviewed  Recent Cultures (last 7 days):     Results from last 7 days   Lab Units 05/26/22  0333 05/26/22  0233   BLOOD CULTURE   --  No Growth at 72 hrs  No Growth at 72 hrs  URINE CULTURE  <10,000 cfu/ml Streptococcus anginosus*  --        Lines/Drains:  Invasive Devices  Report    Peripheral Intravenous Line  Duration           Peripheral IV 05/26/22 Dorsal (posterior); Left Forearm 3 days          Drain  Duration           External Urinary Catheter Small 2 days                Telemetry:        Last 24 Hours Medication List:   Current Facility-Administered Medications   Medication Dose Route Frequency Provider Last Rate    acetaminophen  650 mg Rectal Q6H PRN Khoi Castaneda MD      albuterol  2 puff Inhalation Q6H PRN GUILLAUME Hameed-ED      aspirin  81 mg Oral Daily [place]GUI[place]AUME Johnson-ED      atorvastatin  10 mg Oral HS GUILLAUME Hameed-ED      cefTRIAXone  1,000 mg Intravenous Q24H Ade Dodd PA-C 1,000 mg (05/28/22 1750)    enoxaparin  40 mg Subcutaneous Daily Ade Dodd PA-C      furosemide  20 mg Oral BID Ade Dodd PA-C      insulin lispro  1-5 Units Subcutaneous TID AC Sanjana [place], [place]      insulin lispro  1-5 Units Subcutaneous HS Thera Oral [place], [place]      lactulose  20 g Oral Daily PRN Bert Santana PA-C      lisinopril  5 mg Oral Daily Drenda Latammi, BRIJESH      loratadine  10 mg Oral HS Drenda Latammi, BRIJESH      melatonin  3 mg Oral HS Drenda Max, BRIJESH      montelukast  10 mg Oral HS Drenda Latammi, BRIJESH      pantoprazole  40 mg Oral Daily Drenda Max, BRIJESH      PARoxetine  10 mg Oral QAM Russellndleslie Santana, BRIJESH      potassium chloride  20 mEq Oral Daily Cindy Cornejo MD      senna  1 tablet Oral BID Bert Santana PA-C      sodium chloride  1 g Oral BID With Meals Bert Santana PA-C          Today, Patient Was Seen By: Srini Noguera MD    ** Please Note: This note has been constructed using a voice recognition system   **

## 2022-10-11 PROBLEM — N39.0 UTI (URINARY TRACT INFECTION): Status: RESOLVED | Noted: 2021-06-20 | Resolved: 2022-10-11

## 2022-12-08 NOTE — PROGRESS NOTES
[address] [facility]  Progress Note - Cathy Ross 1945, 66 y o  female MRN: [id number]  Unit/Bed#: -01 Encounter: [id number]  Primary Care Provider: Luz Calhoun MD   Date and time admitted to hospital: 6/19/2021  8:34 PM    * Metabolic encephalopathy  Assessment & Plan  · Patient's son reporting patient more confused at home and was found to be hypoglycemic around 55, has not been eating but taking diabetic meds  · CT head negative, EKG with sinus rhythm with PAC and T-wave inversion  · Most likely in setting of hypoglycemia, UTI, hyponatremia  · Hold home Ativan, Ambien, avoid opiates  · Has since resolved    UTI (urinary tract infection)  Assessment & Plan  · Urinalysis revealing moderate bacteria and white blood cells  · Continue IV cefepime 1 g q 12 hours, day 5    Acute diastolic congestive heart failure (HCC)  Assessment & Plan  · Noted on CT A, no PE, pulmonary edema with pulmonary hypertension  · Echocardiogram ef wnl but revealed diastolic dysfunction  · Will c/w further dieuresis  · Intake and output monitoring, daily weights  · Low-sodium diet with 1500 mL fluid restriction    Hypoglycemia  Assessment & Plan  · Patient's son reporting low blood glucose level at home, on arrival blood glucose 73  · Continue to monitor blood glucose checks q i d , hypoglycemia protocol  · Will hold home oral diabetic medications  · Has since resolved    Hyponatremia  Assessment & Plan  · Level on arrival 116, most likely in setting of persistent diarrhea and dehydration  · Initially treated with IVF with mild improvement of sodium level to 122 however was noted to be volume overloaded and IV     · Sodium stable at 131  · C/w lasix therapy  · 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  · Follow-up nephrology recs, Cleared for discharge from a Neph standpoint         VTE Pharmacologic Prophylaxis:   Pharmacologic: Heparin  Mechanical VTE Prophylaxis in Place: Yes    Patient Centered Rounds: I have performed bedside rounds with nursing staff today  Discussions with Specialists or Other Care Team Provider:  Discussed with case management primary RN    Education and Discussions with Family / Patient:  Discussed plan of care with patient family denies additional questions or concerns at this time    Time Spent for Care: 20 minutes  More than 50% of total time spent on counseling and coordination of care as described above  Current Length of Stay: 5 day(s)    Current Patient Status: Inpatient   Certification Statement: The patient will continue to require additional inpatient hospital stay due to Awaiting placement at short-term rehab    Discharge Plan / Estimated Discharge Date:  Medically stable pending placement      Code Status: Level 1 - Full Code      Subjective:   Offers no complaints resting comfortably in bed    Objective:     Vitals:   Temp (24hrs), Av 5 °F (36 9 °C), Min:98 °F (36 7 °C), Max:99 4 °F (37 4 °C)    Temp:  [98 °F (36 7 °C)-99 4 °F (37 4 °C)] 99 4 °F (37 4 °C)  HR:  [75-87] 87  Resp:  [20-24] 20  BP: (133-147)/(76-79) 147/79  SpO2:  [94 %-97 %] 94 %  Body mass index is 41 08 kg/m²  Input and Output Summary (last 24 hours): Intake/Output Summary (Last 24 hours) at 2021 1416  Last data filed at 2021 1101  Gross per 24 hour   Intake 520 ml   Output 800 ml   Net -280 ml       Physical Exam:     Physical Exam  Vitals reviewed  HENT:      Head: Atraumatic  Cardiovascular:      Rate and Rhythm: Normal rate  Pulses: Normal pulses  Pulmonary:      Effort: Pulmonary effort is normal    Abdominal:      Palpations: Abdomen is soft  Musculoskeletal:         General: Normal range of motion  Skin:     General: Skin is warm and dry  Neurological:      General: No focal deficit present  Mental Status: She is alert  Mental status is at baseline  Psychiatric:         Mood and Affect: Mood normal          Thought Content:  Thought content normal          Judgment: Judgment normal        Additional Data:     Labs:    Results from last 7 days   Lab Units 06/22/21  0613   WBC Thousand/uL 11 65*   HEMOGLOBIN g/dL 14 7   HEMATOCRIT % 45 1   PLATELETS Thousands/uL 302   NEUTROS PCT % 80*   LYMPHS PCT % 7*   MONOS PCT % 12   EOS PCT % 0     Results from last 7 days   Lab Units 06/25/21  0447 06/19/21  1922   POTASSIUM mmol/L 3 6 3 5   CHLORIDE mmol/L 88* 77*   CO2 mmol/L 42* 35*   BUN mg/dL 14 16   CREATININE mg/dL 0 61 0 69   CALCIUM mg/dL 8 7 8 8   ALK PHOS U/L  --  45*   ALT U/L  --  36   AST U/L  --  50*           * I Have Reviewed All Lab Data Listed Above  * Additional Pertinent Lab Tests Reviewed: Ross 64 Admission Reviewed    Recent Cultures (last 7 days):     Results from last 7 days   Lab Units 06/19/21 2008   BLOOD CULTURE  No Growth After 5 Days  No Growth After 5 Days         Last 24 Hours Medication List:   Current Facility-Administered Medications   Medication Dose Route Frequency Provider Last Rate   • acetaminophen  650 mg Oral Q6H PRN Zachariah Rondon PA-C     • albuterol  2 puff Inhalation Q6H PRN Zachariah BRIJESH Rondon     • albuterol  2 5 mg Nebulization Q6H PRN Zachariah BRIJESH Rondon     • atorvastatin  10 mg Oral Daily With Dinner Zachariah BRIJESH Rondon     • cefepime  1,000 mg Intravenous Q12H Zachariah BRIJSEH Rondon 1,000 mg (06/25/21 1029)   • furosemide  20 mg Oral BID (diuretic) Jordan Dickey MD     • heparin (porcine)  5,000 Units Subcutaneous Q8H [facility] Zachariah Rondon PA-C     • insulin lispro  1-6 Units Subcutaneous TID AC Erika Biggs MD     • insulin lispro  1-6 Units Subcutaneous HS Erika Biggs MD     • montelukast  10 mg Oral HS Zachariah BRIJESH Rondon     • QUEtiapine  12 5 mg Oral HS Anastasia Pacheco PA-C     • sodium chloride  2 g Oral TID With Meals Rich Gates MD          Today, Patient Was Seen By: RON Conklin    ** Please Note: Dragon 360 Dictation voice to text software may have been used in the creation of this document   ** 16